# Patient Record
Sex: MALE | Race: WHITE | Employment: OTHER | URBAN - METROPOLITAN AREA
[De-identification: names, ages, dates, MRNs, and addresses within clinical notes are randomized per-mention and may not be internally consistent; named-entity substitution may affect disease eponyms.]

---

## 2017-06-28 DIAGNOSIS — I70.90 ASVD (ARTERIOSCLEROTIC VASCULAR DISEASE): ICD-10-CM

## 2017-06-28 PROBLEM — R73.02 GLUCOSE INTOLERANCE (IMPAIRED GLUCOSE TOLERANCE): Status: ACTIVE | Noted: 2017-06-28

## 2017-06-28 PROBLEM — J30.9 ALLERGIC RHINITIS: Status: ACTIVE | Noted: 2017-06-28

## 2017-06-28 PROBLEM — K57.90 DIVERTICULOSIS: Status: ACTIVE | Noted: 2017-06-28

## 2017-06-28 PROBLEM — K21.9 GERD (GASTROESOPHAGEAL REFLUX DISEASE): Status: ACTIVE | Noted: 2017-06-28

## 2017-06-28 PROBLEM — I10 HTN (HYPERTENSION): Status: ACTIVE | Noted: 2017-06-28

## 2017-06-28 PROBLEM — M19.90 DJD (DEGENERATIVE JOINT DISEASE): Status: ACTIVE | Noted: 2017-06-28

## 2017-06-28 PROBLEM — M54.50 LOW BACK PAIN: Status: ACTIVE | Noted: 2017-06-28

## 2017-06-28 PROBLEM — Z79.899 ON STATIN THERAPY: Status: ACTIVE | Noted: 2017-06-28

## 2017-06-28 PROBLEM — I73.9 PVD (PERIPHERAL VASCULAR DISEASE) (HCC): Status: ACTIVE | Noted: 2017-06-28

## 2017-06-28 PROBLEM — R33.9 URINARY RETENTION: Status: ACTIVE | Noted: 2017-06-28

## 2017-06-28 PROBLEM — G47.00 INSOMNIA: Status: ACTIVE | Noted: 2017-06-28

## 2017-06-28 PROBLEM — N52.9 ED (ERECTILE DYSFUNCTION): Status: ACTIVE | Noted: 2017-06-28

## 2017-06-28 PROBLEM — E78.5 HYPERLIPIDEMIA: Status: ACTIVE | Noted: 2017-06-28

## 2017-06-28 RX ORDER — OMEPRAZOLE 20 MG/1
20 CAPSULE, DELAYED RELEASE ORAL DAILY
COMMUNITY
End: 2022-01-01

## 2017-06-28 RX ORDER — ATENOLOL 25 MG/1
25 TABLET ORAL DAILY
COMMUNITY
End: 2018-04-15

## 2017-06-28 RX ORDER — PRAVASTATIN SODIUM 20 MG/1
20 TABLET ORAL DAILY
COMMUNITY
End: 2018-05-02 | Stop reason: SINTOL

## 2017-06-28 RX ORDER — ASPIRIN 81 MG/1
81 TABLET ORAL DAILY
COMMUNITY
End: 2022-01-01

## 2017-06-28 RX ORDER — ETODOLAC 400 MG/1
400 TABLET, FILM COATED ORAL 2 TIMES DAILY
COMMUNITY
End: 2018-04-15

## 2017-06-28 RX ORDER — AMLODIPINE BESYLATE 5 MG/1
10 TABLET ORAL DAILY
COMMUNITY
End: 2020-03-19 | Stop reason: ALTCHOICE

## 2017-07-13 ENCOUNTER — OFFICE VISIT (OUTPATIENT)
Dept: INTERNAL MEDICINE CLINIC | Age: 72
End: 2017-07-13

## 2017-07-13 VITALS
HEART RATE: 48 BPM | RESPIRATION RATE: 16 BRPM | BODY MASS INDEX: 26.31 KG/M2 | DIASTOLIC BLOOD PRESSURE: 78 MMHG | WEIGHT: 183.8 LBS | HEIGHT: 70 IN | SYSTOLIC BLOOD PRESSURE: 130 MMHG

## 2017-07-13 DIAGNOSIS — E78.2 MIXED HYPERLIPIDEMIA: ICD-10-CM

## 2017-07-13 DIAGNOSIS — K21.9 GASTROESOPHAGEAL REFLUX DISEASE WITHOUT ESOPHAGITIS: ICD-10-CM

## 2017-07-13 DIAGNOSIS — J30.89 NON-SEASONAL ALLERGIC RHINITIS, UNSPECIFIED ALLERGIC RHINITIS TRIGGER: Primary | ICD-10-CM

## 2017-07-13 DIAGNOSIS — I73.9 PVD (PERIPHERAL VASCULAR DISEASE) (HCC): ICD-10-CM

## 2017-07-13 DIAGNOSIS — M19.90 OSTEOARTHRITIS, UNSPECIFIED OSTEOARTHRITIS TYPE, UNSPECIFIED SITE: ICD-10-CM

## 2017-07-13 DIAGNOSIS — R73.02 GLUCOSE INTOLERANCE (IMPAIRED GLUCOSE TOLERANCE): ICD-10-CM

## 2017-07-13 DIAGNOSIS — I10 ESSENTIAL HYPERTENSION: ICD-10-CM

## 2017-07-13 NOTE — PROGRESS NOTES
Chief Complaint   Patient presents with    Physical     3 month follow up. SUBJECTIVE:    Katarina Jimenez is a 70 y.o. male he presents in follow-up of his hypertension glucose intolerance hyperlipidemia GERD DJD allergic rhinitis peripheral vascular disease. He seems to be doing well currently denies any chest pain shortness of breath or any cardio respiratory complaints. He does note a little bit of a dry mouth which is concerned as to why that he is he notes a worse at nighttime when he wakes up. He does not note that he mouth breathes but he really does not know while he is asleep. He has taken all his medications and trying to follow his diet. He denies any other complaints on complete review of systems. Current Outpatient Prescriptions   Medication Sig Dispense Refill    pravastatin (PRAVACHOL) 20 mg tablet Take 20 mg by mouth daily. Indications: Pravastatin Sodium 20 MG, 1 (one) Tablet daily      atenolol (TENORMIN) 25 mg tablet Take 25 mg by mouth daily.  omeprazole (PRILOSEC) 20 mg capsule Take 20 mg by mouth daily. Indications: 1 (one) Capsule DR daily      etodolac (LODINE) 400 mg tablet Take 400 mg by mouth two (2) times a day. Indications: 1 (one) Tablet two times daily      amLODIPine (NORVASC) 5 mg tablet Take 5 mg by mouth daily. Indications: AmLODIPine Besylate 5 MG, 1 (one) Tablet daily      aspirin delayed-release 81 mg tablet Take 81 mg by mouth daily.        Past Medical History:   Diagnosis Date    Allergic rhinitis 6/28/2017    ASVD (arteriosclerotic vascular disease) 6/28/2017    Story: carotid stenosis followed by Vasc Surg    Diverticulosis 6/28/2017    Comments: on colonoscopy 12/01    DJD (degenerative joint disease) 6/28/2017    ED (erectile dysfunction) 6/28/2017    GERD (gastroesophageal reflux disease) 6/28/2017    Glucose intolerance (impaired glucose tolerance) 6/28/2017    HTN (hypertension) 6/28/2017    Hyperlipidemia 6/28/2017    Insomnia 6/28/2017    Low back pain 6/28/2017    On statin therapy 6/28/2017    PVD (peripheral vascular disease) ([facility] [address] 6/28/2017    Urinary retention 6/28/2017     Past Surgical History:   Procedure Laterality Date    HX APPENDECTOMY      HX CHOLECYSTECTOMY  1997    HX HEENT  1950    Tonsilectomy     Allergies   Allergen Reactions    Corticosteroids (Glucocorticoids) Other (comments)     LOSS OF CONSCIOUSNESS       REVIEW OF SYSTEMS:  General: negative for - chills or fever  ENT: negative for - headaches, nasal congestion or tinnitus  Respiratory: negative for - cough, hemoptysis, shortness of breath or wheezing  Cardiovascular : negative for - chest pain, edema, palpitations or shortness of breath  Gastrointestinal: negative for - abdominal pain, blood in stools, heartburn or nausea/vomiting  Genito-Urinary: no dysuria, trouble voiding, or hematuria  Musculoskeletal: negative for - gait disturbance, joint pain, joint stiffness or joint swelling  Neurological: no TIA or stroke symptoms  Hematologic: no bruises, no bleeding, no swollen glands  Integument: no lumps, mole changes, nail changes or rash  Endocrine:no malaise/lethargy or unexpected weight changes      Social History     Social History    Marital status:      Spouse name: N/A    Number of children: N/A    Years of education: N/A     Social History Main Topics    Smoking status: Former Smoker    Smokeless tobacco: Former User    Alcohol use No    Drug use: None    Sexual activity: Not Asked     Other Topics Concern    None     Social History Narrative     No family history on file. OBJECTIVE:     Visit Vitals    /78 (BP 1 Location: Right arm, BP Patient Position: Sitting)    Pulse (!) 48    Resp 16    Ht 5' 10\" (1.778 m)    Wt 183 lb 12.8 oz (83.4 kg)    BMI 26.37 kg/m2     CONSTITUTIONAL: well , well nourished, appears age appropriate  EYES: perrla, eom intact  ENMT:moist mucous membranes, pharynx clear  NECK: supple.  Thyroid normal  RESPIRATORY: Chest: clear to ascultation and percussion   CARDIOVASCULAR: Heart: regular rate and rhythm  GASTROINTESTINAL: Abdomen: soft, bowel sounds active  HEMATOLOGIC: no pathological lymph nodes palpated  MUSCULOSKELETAL: Extremities: no edema, pulse 1+   INTEGUMENT: No unusual rashes or suspicious skin lesions noted. Nails appear normal.  NEUROLOGIC: non-focal exam   MENTAL STATUS: alert and oriented, appropriate affect     ASSESSMENT:   1. Non-seasonal allergic rhinitis, unspecified allergic rhinitis trigger    2. Essential hypertension    3. Mixed hyperlipidemia    4. Glucose intolerance (impaired glucose tolerance)    5. Gastroesophageal reflux disease without esophagitis    6. Osteoarthritis, unspecified osteoarthritis type, unspecified site    7. PVD (peripheral vascular disease) ([facility] [address]      Impression  Hypertension seems to be well controlled we will continue his current medication reviewed with him  Glucose intolerance I reviewed his last A1c with him we will see what the status is today  Hyperlipidemia reviewed his last numbers with him and we will see what they are if we need to make any changes I will notify him  Peripheral vascular disease that seems to be stable he does not have any significant claudication symptoms  Allergic rhinitis seems to be stable at one of his dry mouth could be related to this  Dry mouth we will check a T4 TSH I suspect this is mouth breathing at nighttime  We will recheck him again in follow-up in 3 months sooner should there be a problem    PLAN:  .  Orders Placed This Encounter    AMB POC CK (CPK)    AMB POC HEMOGLOBIN A1C    AMB POC LIPID PROFILE    AMB POC HEPATIC FUNCTION PANEL    AMB POC GLUCOSE, QUANTITATIVE, BLOOD    AMB POC T4, FREE         ATTENTION:   This medical record was transcribed using an electronic medical records system. Although proofread, it may and can contain electronic and spelling errors.   Other human spelling and other errors may be present. Corrections may be executed at a later time. Please feel free to contact us for any clarifications as needed.       Follow-up Disposition: Not on File      Kimberley Holt MD

## 2017-07-13 NOTE — PROGRESS NOTES
1. Have you been to the ER, urgent care clinic since your last visit? Hospitalized since your last visit? No    2. Have you seen or consulted any other health care providers outside of the [address] [place] [address] since your last visit? Include any pap smears or colon screening. Yes. Saw Dr. Marisela Chapin, scheduled for cataract surgery.

## 2017-07-14 LAB
ALBUMIN SERPL-MCNC: 3.9 G/DL (ref 3.9–5.4)
ALKALINE PHOS POC: 116 U/L (ref 38–126)
ALT SERPL-CCNC: 53 U/L (ref 9–52)
AST SERPL-CCNC: 53 U/L (ref 14–36)
BILIRUBIN, CONJUGATED POC: 0 (ref 0–0.3)
BILIRUBIN, UNCONJUGATED POC: 0.3 (ref 0–1.1)
CHOLEST SERPL-MCNC: 170 MG/DL (ref 0–200)
CK (CPK) POC: 60 U/L (ref 30–135)
GLUCOSE POC: 102 MG/DL (ref 75–110)
HBA1C MFR BLD HPLC: 5.8 % (ref 4–5.7)
HDLC SERPL-MCNC: 50 MG/DL (ref 35–130)
LDL CHOLESTEROL POC: 97 MG/DL (ref 0–130)
PROT SERPL-MCNC: 6.9 G/DL (ref 6.3–8.2)
T4 FREE SERPL-MCNC: 1.02 NG/DL (ref 0.58–2.3)
TCHOL/HDL RATIO (POC): 3.4 (ref 0–4)
TOTAL BILIRUBIN POC: 0.9 MG/DL (ref 0.2–1.3)
TRIGL SERPL-MCNC: 115 MG/DL (ref 0–200)
TSH BLD-ACNC: 0.7 UIU/ML (ref 0.34–5.6)
VLDLC SERPL CALC-MCNC: 23 MG/DL

## 2017-08-01 ENCOUNTER — OFFICE VISIT (OUTPATIENT)
Dept: INTERNAL MEDICINE CLINIC | Age: 72
End: 2017-08-01

## 2017-08-01 VITALS
HEIGHT: 70 IN | SYSTOLIC BLOOD PRESSURE: 130 MMHG | BODY MASS INDEX: 26.2 KG/M2 | WEIGHT: 183 LBS | TEMPERATURE: 97.5 F | OXYGEN SATURATION: 99 % | DIASTOLIC BLOOD PRESSURE: 78 MMHG | RESPIRATION RATE: 18 BRPM | HEART RATE: 56 BPM

## 2017-08-01 DIAGNOSIS — Z01.810 PRE-OPERATIVE CARDIOVASCULAR EXAMINATION: Primary | ICD-10-CM

## 2017-08-01 DIAGNOSIS — H25.011 CORTICAL AGE-RELATED CATARACT OF RIGHT EYE: ICD-10-CM

## 2017-08-01 DIAGNOSIS — R73.02 GLUCOSE INTOLERANCE (IMPAIRED GLUCOSE TOLERANCE): ICD-10-CM

## 2017-08-01 DIAGNOSIS — I10 ESSENTIAL HYPERTENSION: ICD-10-CM

## 2017-08-01 PROBLEM — H25.9 AGE-RELATED CATARACT: Status: ACTIVE | Noted: 2017-08-01

## 2017-08-01 NOTE — PROGRESS NOTES
HPI:   27-year-old white male who presents today for preoperative medical consultation and clearance for planned right cataract surgery to be done on 8/14/2017 by Dr. Chaitanya Stein at [facility]. He is regularly followed by me for hypertension, glucose intolerance, hyperlipidemia, GERD, DJD, allergic rhinitis, atherosclerotic vascular disease, and BPH. He currently denies any chest pain shortness of breath cardiovascular complaints. There are no headaches or neurologic complaints. He denies any GI/ complaint. There are no other complaints on complete review of systems except decreased vision particularly in the right eye in the evening.     Patient Active Problem List    Diagnosis    Pre-operative cardiovascular examination    Age-related cataract    Allergic rhinitis    Diverticulosis     Comments: on colonoscopy 12/01      Urinary retention    PVD (peripheral vascular disease) ([address]    On statin therapy    Low back pain    Insomnia    HTN (hypertension)    Hyperlipidemia    Glucose intolerance (impaired glucose tolerance)    GERD (gastroesophageal reflux disease)    ED (erectile dysfunction)    DJD (degenerative joint disease)    ASVD (arteriosclerotic vascular disease)     Story: carotid stenosis followed by Vasc Surg         Past Medical History:   Diagnosis Date    Allergic rhinitis 6/28/2017    ASVD (arteriosclerotic vascular disease) 6/28/2017    Story: carotid stenosis followed by Vasc Surg    Diverticulosis 6/28/2017    Comments: on colonoscopy 12/01    DJD (degenerative joint disease) 6/28/2017    ED (erectile dysfunction) 6/28/2017    GERD (gastroesophageal reflux disease) 6/28/2017    Glucose intolerance (impaired glucose tolerance) 6/28/2017    HTN (hypertension) 6/28/2017    Hyperlipidemia 6/28/2017    Insomnia 6/28/2017    Low back pain 6/28/2017    On statin therapy 6/28/2017    PVD (peripheral vascular disease) ([address] 6/28/2017    Urinary retention 6/28/2017       Social History   Substance Use Topics    Smoking status: Former Smoker     Quit date: 8/1/1975    Smokeless tobacco: Former User     Quit date: 8/1/1992    Alcohol use Yes      Comment: social       Family History   Problem Relation Age of Onset    Diabetes Mother     Diabetes Father     Heart Disease Brother     Heart Disease Brother        Outpatient Prescriptions Marked as Taking for the 8/1/17 encounter (Office Visit) with Roxanne Dick MD   Medication Sig Dispense Refill    pravastatin (PRAVACHOL) 20 mg tablet Take 20 mg by mouth daily. Indications: Pravastatin Sodium 20 MG, 1 (one) Tablet daily      atenolol (TENORMIN) 25 mg tablet Take 25 mg by mouth daily.  omeprazole (PRILOSEC) 20 mg capsule Take 20 mg by mouth daily. Indications: 1 (one) Capsule DR daily      etodolac (LODINE) 400 mg tablet Take 400 mg by mouth two (2) times a day. Indications: 1 (one) Tablet two times daily      amLODIPine (NORVASC) 5 mg tablet Take 5 mg by mouth daily. Indications: AmLODIPine Besylate 5 MG, 1 (one) Tablet daily      aspirin delayed-release 81 mg tablet Take 81 mg by mouth daily. Allergies   Allergen Reactions    Corticosteroids (Glucocorticoids) Other (comments)     LOSS OF CONSCIOUSNESS       ROS:     Constitutional: He denies fevers, weight loss, sweats. Eyes: No blurred or double vision. Decreased visual acuity is noted in the evening  ENT: No difficulty with swallowing, taste, speech or smell. Neck: no stiffness or swelling  Respiratory: No cough wheezing or shortness of breath. Cardiovascular: Denies chest pain, palpitations, unexplained indigestion or syncope. Gastrointestinal:  No changes in bowel movements, no abdominal pain, no bloating. Genitourinary:  He denies frequency, nocturia or stranguria. Extremities: No joint pain, stiffness or swelling. Neurological:  No numbness, tingling, burring paresthesias or loss of motor strength.   No syncope, dizziness or frequent headache  Lymphatic: no adenopathy noted  Hematologic: no easy bruising or bleeding gums  Skin:  No recent rashes or mole changes. Psychiatric/Behavioral:  Negative for depression. The patient is not nervous/anxious. PE:     Vitals:    08/01/17 1435   BP: 130/78   Pulse: (!) 56   Resp: 18   Temp: 97.5 °F (36.4 °C)   TempSrc: Oral   SpO2: 99%   Weight: 183 lb (83 kg)   Height: 5' 10\" (1.778 m)   PainSc:   0 - No pain        General appearance - alert, well appearing, and in no distress  Mental status - alert, oriented to person, place, and time  HEENT:  Ears - bilateral TM's and external ear canals clear  Eyes - pupillary responses were normal.  Extraocular muscle function intact. Lids and conjunctiva not injected. Fundoscopic exam revealed sharp disc margins. eye movements intact  Pharynx- clear with teeth in good repair. No masses were noted  Neck - supple without thyromegaly or burit. No JVD noted  Lungs - clear to auscultation and percussion  Cardiac- normal rate, regular rhythm without murmurs. PMI not displaced. No gallop, rub or click  Abdomen - flat, soft, non-tender without palpable organomegaly or mass. No pulsatile mass was felt, and not bruit was heard. Bowel sounds were active  Extremities -  no clubbing cyanosis or edema  Lymphatics - no palpable lymphadenopathy, no hepatosplenomegaly  Hematologic: no petechiae or purpura  Peripheral vascular -Femoral, Dorsalis pedis and posterior tibial pulses felt without difficulty  Skin - no rash or unusual mole change noted  Neurological - Cranial nerves II-XII grossly intact. Motor strength 5/5. DTR's 2+ and symmetric. Station and gait normal  Back exam - full range of motion, no tenderness, palpable spasm or pain on motion  Musculoskeletal - no joint tenderness, deformity or swelling      Assessment/Plan:     ASSESSMENT:   1. Pre-operative cardiovascular examination    2. Cortical age-related cataract of right eye    3. Essential hypertension    4. Glucose intolerance (impaired glucose tolerance)      Impression  He is medically clear for the planned surgery. EKG done today normal sinus bradycardia at 54 otherwise within normal limits. Copy this to Dr. Shalini Zepeda. PLAN:  .  Orders Placed This Encounter    AMB POC EKG ROUTINE W/ 12 LEADS, INTER & REP         ATTENTION:   This medical record was transcribed using an electronic medical records system. Although proofread, it may and can contain electronic and spelling errors. Other human spelling and other errors may be present. Corrections may be executed at a later time. Please feel free to contact us for any clarifications as needed. Follow-up Disposition: Not on File      Susan Delong MD    Health Maintenance reviewed - updated. Orders Placed This Encounter    AMB POC EKG ROUTINE W/ 12 LEADS, INTER & REP     Order Specific Question:   Reason for Exam:     Answer:   htn       There are no discontinued medications. Current Outpatient Prescriptions   Medication Sig Dispense Refill    pravastatin (PRAVACHOL) 20 mg tablet Take 20 mg by mouth daily. Indications: Pravastatin Sodium 20 MG, 1 (one) Tablet daily      atenolol (TENORMIN) 25 mg tablet Take 25 mg by mouth daily.  omeprazole (PRILOSEC) 20 mg capsule Take 20 mg by mouth daily. Indications: 1 (one) Capsule DR daily      etodolac (LODINE) 400 mg tablet Take 400 mg by mouth two (2) times a day. Indications: 1 (one) Tablet two times daily      amLODIPine (NORVASC) 5 mg tablet Take 5 mg by mouth daily. Indications: AmLODIPine Besylate 5 MG, 1 (one) Tablet daily      aspirin delayed-release 81 mg tablet Take 81 mg by mouth daily. EKG - sinus bradycardia 54 within normal limits except rate    Recommended healthy diet low in carbohydrates, fats, sodium and cholesterol. Recommended regular cardiovascular exercise 3-6 times per week for 30-60 minutes daily. Verbal and written instructions (see AVS) provided. Patient expresses understanding of diagnosis and treatment plan.

## 2017-08-01 NOTE — PROGRESS NOTES
Chief Complaint   Patient presents with    Pre-op Exam     cataract surgery 8/14       1. Have you been to the ER, urgent care clinic since your last visit? Hospitalized since your last visit? No    2. Have you seen or consulted any other health care providers outside of the [address] [place] [address] since your last visit? Include any pap smears or colon screening.  No

## 2017-08-01 NOTE — MR AVS SNAPSHOT
Visit Information Date & Time Provider Department Dept. Phone Encounter #  
 8/1/2017  1:40 PM Cathy Rutherford MD [facility] [phone] [id number] Your Appointments 10/16/2017  8:20 AM  
FOLLOW UP 10 with MD KAYLA Nunes ANDREI ANDERSON [facility] ([address]) Appt Note: 3 month flp - HTN, Glucose Intolerance, Hyperlipidemia [address] [phone] [address] [address] Upcoming Health Maintenance Date Due Hepatitis C Screening 1945 DTaP/Tdap/Td series (1 - Tdap) 9/3/1966 FOBT Q 1 YEAR AGE 50-75 9/3/1995 ZOSTER VACCINE AGE 60> 7/3/2005 GLAUCOMA SCREENING Q2Y 9/3/2010 Pneumococcal 65+ Low/Medium Risk (1 of 2 - PCV13) 9/3/2010 MEDICARE YEARLY EXAM 9/3/2010 INFLUENZA AGE 9 TO ADULT 8/1/2017 Allergies as of 8/1/2017  Review Complete On: 8/1/2017 By: Bud Willis RN Severity Noted Reaction Type Reactions Corticosteroids (Glucocorticoids)  06/28/2017    Other (comments) LOSS OF CONSCIOUSNESS Current Immunizations  Never Reviewed Name Date Pneumococcal Conjugate (PCV-13) 8/3/2015 Pneumococcal Polysaccharide (PPSV-23) 9/27/2010 Not reviewed this visit Vitals BP Pulse Temp Resp Height(growth percentile) Weight(growth percentile) 130/78 (BP 1 Location: Left arm, BP Patient Position: Sitting) (!) 56 97.5 °F (36.4 °C) (Oral) 18 5' 10\" (1.778 m) 183 lb (83 kg) SpO2 BMI Smoking Status 99% 26.26 kg/m2 Former Smoker BMI and BSA Data Body Mass Index Body Surface Area  
 26.26 kg/m 2 2.02 m 2 Preferred Pharmacy Pharmacy Name Phone [facility] [phone], [address] AT [place] [address] [phone] Your Updated Medication List  
  
   
 This list is accurate as of: 8/1/17  3:19 PM.  Always use your most recent med list. amLODIPine 5 mg tablet Commonly known as:  Job Dub Take 5 mg by mouth daily. Indications: AmLODIPine Besylate 5 MG, 1 (one) Tablet daily  
  
 aspirin delayed-release 81 mg tablet Take 81 mg by mouth daily. atenolol 25 mg tablet Commonly known as:  TENORMIN Take 25 mg by mouth daily. etodolac 400 mg tablet Commonly known as:  LODINE Take 400 mg by mouth two (2) times a day. Indications: 1 (one) Tablet two times daily  
  
 omeprazole 20 mg capsule Commonly known as:  PRILOSEC Take 20 mg by mouth daily. Indications: 1 (one) Capsule DR daily  
  
 pravastatin 20 mg tablet Commonly known as:  PRAVACHOL Take 20 mg by mouth daily. Indications: Pravastatin Sodium 20 MG, 1 (one) Tablet daily Introducing [facility] SERVICES! Jana Hill introduces [employer] patient portal. Now you can access parts of your medical record, email your doctor's office, and request medication refills online. 1. In your internet browser, go to [email or web address] 2. Click on the First Time User? Click Here link in the Sign In box. You will see the New Member Sign Up page. 3. Enter your [employer] Access Code exactly as it appears below. You will not need to use this code after youve completed the sign-up process. If you do not sign up before the expiration date, you must request a new code. · [employer] Access Code: [email or web address] Expires: 10/11/2017  8:51 AM 
 
4. Enter the last four digits of your Social Security Number (xxxx) and Date of Birth (mm/dd/yyyy) as indicated and click Submit. You will be taken to the next sign-up page. 5. Create a [employer] ID. This will be your [employer] login ID and cannot be changed, so think of one that is secure and easy to remember. 6. Create a [employer] password. You can change your password at any time. 7. Enter your Password Reset Question and Answer. This can be used at a later time if you forget your password. 8. Enter your e-mail address. You will receive e-mail notification when new information is available in [address] 9. Click Sign Up. You can now view and download portions of your medical record. 10. Click the Download Summary menu link to download a portable copy of your medical information. If you have questions, please visit the Frequently Asked Questions section of the [employer] website. Remember, [employer] is NOT to be used for urgent needs. For medical emergencies, dial 911. Now available from your iPhone and Android! Please provide this summary of care documentation to your next provider. Your primary care clinician is listed as Bobby. If you have any questions after today's visit, please call [phone].

## 2017-08-02 ENCOUNTER — TELEPHONE (OUTPATIENT)
Dept: INTERNAL MEDICINE CLINIC | Age: 72
End: 2017-08-02

## 2017-08-02 NOTE — TELEPHONE ENCOUNTER
Follow up phone regarding elevated LFTs. Discussed with  and advised pt this would be followed at next visit in 3 months.

## 2017-08-04 RX ORDER — CHOLECALCIFEROL (VITAMIN D3) 125 MCG
440 CAPSULE ORAL
COMMUNITY
End: 2021-06-03

## 2017-08-04 NOTE — PERIOP NOTES
[facility]  Ambulatory Surgery Unit  Pre-operative Instructions    Surgery/Procedure Date  8/14/17            Tentative Arrival Time 7:00am      1. On the day of your surgery/procedure, please report to the Ambulatory Surgery Unit Registration Desk and sign in at your designated time. The Ambulatory Surgery Unit is located in [facility] on the [facility] side of the [facility] across from the [address] [place] [address]. Please have all of your health insurance cards and a photo ID. 2. You must have someone with you to drive you home, as you should not drive a car for 24 hours following anesthesia. Please make arrangements for a responsible adult friend or family member to stay with you for at least the first 24 hours after your surgery. 3. Do not have anything to eat or drink (including water, gum, mints, coffee, juice) after midnight   8/13/17. This may not apply to medications prescribed by your physician. (Please note below the special instructions with medications to take the morning of surgery, if applicable.)    4. We recommend you do not drink any alcoholic beverages for 24 hours before and after your surgery. 5. Stop all Aspirin, non-steroidal anti-inflammatory drugs (i.e. Advil, Aleve), vitamins, and supplements as directed by your surgeon's office. **If you are currently taking Plavix, Coumadin, or other blood-thinning agents, contact your surgeon for instructions. **    6. In an effort to help prevent surgical site infection, we ask that you shower with an anti-bacterial soap (i.e. Dial or Safeguard)  on the morning of surgery,  Do not apply any lotions, powders, or deodorants after the shower on the day of your procedure. If applicable, please do not shave the operative site for 48 hours prior to surgery. 7. Wear comfortable clothes. Wear glasses instead of contacts. Do not bring any jewelry or money (other than copays or fees as instructed).  Do not wear make-up, particularly baileyara, the morning of your surgery. Do not wear nail polish, particularly if you are having foot /hand surgery. Wear your hair loose or down, no ponytails, buns, silvio pins or clips. All body piercings must be removed. 8. You should understand that if you do not follow these instructions your surgery may be cancelled. If your physical condition changes (i.e. fever, cold or flu) please contact your surgeon as soon as possible. 9. It is important that you be on time. If a situation occurs where you may be late, or if you have any questions or problems, please call [phone].    10. Your surgery time may be subject to change. You will receive a phone call the day prior to surgery to confirm your arrival time. 11. Pediatric patients: please bring a change of clothes, diapers, bottle/sippy cup, pacifier, etc.      Special Instructions: Take all medications and inhalers, as prescribed, on the morning of surgery with a sip of water EXCEPT: Aspirin      I understand a pre-operative phone call will be made to verify my surgery time. In the event that I am not available, I give permission for a message to be left on my answering service and/or with another person? yes         ___________________      ___________________      ________________reviewed with patient during phone assessment: he acknowledged understanding of them.   (Signature of Patient)          (Witness)                   (Date and Time)

## 2017-08-11 ENCOUNTER — ANESTHESIA EVENT (OUTPATIENT)
Dept: SURGERY | Age: 72
End: 2017-08-11
Payer: MEDICARE

## 2017-08-11 RX ORDER — ONDANSETRON 2 MG/ML
4 INJECTION INTRAMUSCULAR; INTRAVENOUS AS NEEDED
Status: CANCELLED | OUTPATIENT
Start: 2017-08-11

## 2017-08-11 RX ORDER — SODIUM CHLORIDE, SODIUM LACTATE, POTASSIUM CHLORIDE, CALCIUM CHLORIDE 600; 310; 30; 20 MG/100ML; MG/100ML; MG/100ML; MG/100ML
25 INJECTION, SOLUTION INTRAVENOUS CONTINUOUS
Status: CANCELLED | OUTPATIENT
Start: 2017-08-11

## 2017-08-11 RX ORDER — DIPHENHYDRAMINE HYDROCHLORIDE 50 MG/ML
12.5 INJECTION, SOLUTION INTRAMUSCULAR; INTRAVENOUS AS NEEDED
Status: CANCELLED | OUTPATIENT
Start: 2017-08-11 | End: 2017-08-11

## 2017-08-11 RX ORDER — SODIUM CHLORIDE 0.9 % (FLUSH) 0.9 %
5-10 SYRINGE (ML) INJECTION AS NEEDED
Status: CANCELLED | OUTPATIENT
Start: 2017-08-11

## 2017-08-11 RX ORDER — FENTANYL CITRATE 50 UG/ML
25 INJECTION, SOLUTION INTRAMUSCULAR; INTRAVENOUS
Status: CANCELLED | OUTPATIENT
Start: 2017-08-11

## 2017-08-14 ENCOUNTER — ANESTHESIA (OUTPATIENT)
Dept: SURGERY | Age: 72
End: 2017-08-14
Payer: MEDICARE

## 2017-08-14 ENCOUNTER — HOSPITAL ENCOUNTER (OUTPATIENT)
Age: 72
Setting detail: OUTPATIENT SURGERY
Discharge: HOME | End: 2017-08-14
Attending: OPHTHALMOLOGY | Admitting: OPHTHALMOLOGY
Payer: MEDICARE

## 2017-08-14 VITALS
OXYGEN SATURATION: 100 % | BODY MASS INDEX: 25.93 KG/M2 | TEMPERATURE: 97.8 F | HEART RATE: 42 BPM | WEIGHT: 181.13 LBS | DIASTOLIC BLOOD PRESSURE: 74 MMHG | HEIGHT: 70 IN | SYSTOLIC BLOOD PRESSURE: 158 MMHG | RESPIRATION RATE: 16 BRPM

## 2017-08-14 PROCEDURE — 74011000250 HC RX REV CODE- 250

## 2017-08-14 PROCEDURE — 74011000250 HC RX REV CODE- 250: Performed by: OPHTHALMOLOGY

## 2017-08-14 PROCEDURE — 76060000073 HC AMB SURG ANES FIRST 0.5 HR: Performed by: OPHTHALMOLOGY

## 2017-08-14 PROCEDURE — 76030000002 HC AMB SURG OR TIME FIRST 0.: Performed by: OPHTHALMOLOGY

## 2017-08-14 PROCEDURE — V2632 POST CHMBR INTRAOCULAR LENS: HCPCS | Performed by: OPHTHALMOLOGY

## 2017-08-14 PROCEDURE — 77030018846 HC SOL IRR STRL H20 ICUM -A: Performed by: OPHTHALMOLOGY

## 2017-08-14 PROCEDURE — 74011250636 HC RX REV CODE- 250/636: Performed by: OPHTHALMOLOGY

## 2017-08-14 PROCEDURE — 74011250636 HC RX REV CODE- 250/636

## 2017-08-14 PROCEDURE — 76210000046 HC AMBSU PH II REC FIRST 0.5 HR: Performed by: OPHTHALMOLOGY

## 2017-08-14 DEVICE — LENS IOL POST 1-PC 6X13 19.5 -- ACRYSOF: Type: IMPLANTABLE DEVICE | Site: EYE | Status: FUNCTIONAL

## 2017-08-14 RX ORDER — OFLOXACIN 3 MG/ML
1 SOLUTION/ DROPS OPHTHALMIC
Status: COMPLETED | OUTPATIENT
Start: 2017-08-14 | End: 2017-08-14

## 2017-08-14 RX ORDER — SODIUM CHLORIDE 0.9 % (FLUSH) 0.9 %
5-10 SYRINGE (ML) INJECTION EVERY 8 HOURS
Status: DISCONTINUED | OUTPATIENT
Start: 2017-08-14 | End: 2017-08-14 | Stop reason: HOSPADM

## 2017-08-14 RX ORDER — TROPICAMIDE 10 MG/ML
1 SOLUTION/ DROPS OPHTHALMIC
Status: COMPLETED | OUTPATIENT
Start: 2017-08-14 | End: 2017-08-14

## 2017-08-14 RX ORDER — SODIUM CHLORIDE 0.9 % (FLUSH) 0.9 %
5-10 SYRINGE (ML) INJECTION AS NEEDED
Status: DISCONTINUED | OUTPATIENT
Start: 2017-08-14 | End: 2017-08-14 | Stop reason: HOSPADM

## 2017-08-14 RX ORDER — TIMOLOL MALEATE 5 MG/ML
SOLUTION/ DROPS OPHTHALMIC AS NEEDED
Status: DISCONTINUED | OUTPATIENT
Start: 2017-08-14 | End: 2017-08-14 | Stop reason: HOSPADM

## 2017-08-14 RX ORDER — TETRACAINE HYDROCHLORIDE 5 MG/ML
SOLUTION OPHTHALMIC AS NEEDED
Status: DISCONTINUED | OUTPATIENT
Start: 2017-08-14 | End: 2017-08-14 | Stop reason: HOSPADM

## 2017-08-14 RX ORDER — MIDAZOLAM HYDROCHLORIDE 1 MG/ML
INJECTION, SOLUTION INTRAMUSCULAR; INTRAVENOUS AS NEEDED
Status: DISCONTINUED | OUTPATIENT
Start: 2017-08-14 | End: 2017-08-14 | Stop reason: HOSPADM

## 2017-08-14 RX ORDER — TROPICAMIDE 10 MG/ML
SOLUTION/ DROPS OPHTHALMIC
Status: COMPLETED
Start: 2017-08-14 | End: 2017-08-14

## 2017-08-14 RX ORDER — SODIUM CHLORIDE 9 MG/ML
25 INJECTION, SOLUTION INTRAVENOUS CONTINUOUS
Status: DISCONTINUED | OUTPATIENT
Start: 2017-08-14 | End: 2017-08-14 | Stop reason: HOSPADM

## 2017-08-14 RX ORDER — SODIUM CHLORIDE, SODIUM LACTATE, POTASSIUM CHLORIDE, CALCIUM CHLORIDE 600; 310; 30; 20 MG/100ML; MG/100ML; MG/100ML; MG/100ML
25 INJECTION, SOLUTION INTRAVENOUS CONTINUOUS
Status: DISCONTINUED | OUTPATIENT
Start: 2017-08-14 | End: 2017-08-14 | Stop reason: HOSPADM

## 2017-08-14 RX ORDER — NEOMYCIN SULFATE, POLYMYXIN B SULFATE, AND DEXAMETHASONE 3.5; 10000; 1 MG/G; [USP'U]/G; MG/G
OINTMENT OPHTHALMIC AS NEEDED
Status: DISCONTINUED | OUTPATIENT
Start: 2017-08-14 | End: 2017-08-14 | Stop reason: HOSPADM

## 2017-08-14 RX ORDER — LIDOCAINE HYDROCHLORIDE 10 MG/ML
0.1 INJECTION, SOLUTION EPIDURAL; INFILTRATION; INTRACAUDAL; PERINEURAL AS NEEDED
Status: DISCONTINUED | OUTPATIENT
Start: 2017-08-14 | End: 2017-08-14 | Stop reason: HOSPADM

## 2017-08-14 RX ADMIN — MIDAZOLAM HYDROCHLORIDE 1 MG: 1 INJECTION, SOLUTION INTRAMUSCULAR; INTRAVENOUS at 08:23

## 2017-08-14 RX ADMIN — OFLOXACIN 1 DROP: 3 SOLUTION OPHTHALMIC at 07:29

## 2017-08-14 RX ADMIN — OFLOXACIN 1 DROP: 3 SOLUTION OPHTHALMIC at 07:25

## 2017-08-14 RX ADMIN — TROPICAMIDE 1 DROP: 10 SOLUTION/ DROPS OPHTHALMIC at 07:25

## 2017-08-14 RX ADMIN — TROPICAMIDE 1 DROP: 10 SOLUTION/ DROPS OPHTHALMIC at 07:16

## 2017-08-14 RX ADMIN — OFLOXACIN 1 DROP: 3 SOLUTION OPHTHALMIC at 07:16

## 2017-08-14 RX ADMIN — SODIUM CHLORIDE 25 ML/HR: 900 INJECTION, SOLUTION INTRAVENOUS at 07:25

## 2017-08-14 RX ADMIN — TROPICAMIDE 1 DROP: 10 SOLUTION/ DROPS OPHTHALMIC at 07:29

## 2017-08-14 NOTE — ANESTHESIA POSTPROCEDURE EVALUATION
Post-Anesthesia Evaluation and Assessment    Patient: Patricia Blair MRN: [id number]  SSN: [id number]    YOB: 1945  Age: 70 y.o. Sex: male       Cardiovascular Function/Vital Signs  Visit Vitals    /74 (BP 1 Location: Right arm, BP Patient Position: At rest)    Pulse (!) 42    Temp 36.6 °C (97.8 °F)    Resp 16    Ht 5' 10\" (1.778 m)    Wt 82.2 kg (181 lb 2 oz)    SpO2 100%    BMI 25.99 kg/m2       Patient is status post MAC anesthesia for Procedure(s):  CATARACT EXTRACTION WITH INTRA OCULAR LENS IMPLANT  RIGHT EYE. Nausea/Vomiting: None    Postoperative hydration reviewed and adequate. Pain:  Pain Scale 1: Numeric (0 - 10) (08/14/17 0849)  Pain Intensity 1: 0 (08/14/17 0849)   Managed    Neurological Status:   Neuro (WDL): Within Defined Limits (08/14/17 0708)   At baseline    Mental Status and Level of Consciousness: Arousable    Pulmonary Status:   O2 Device: Room air (08/14/17 0849)   Adequate oxygenation and airway patent    Complications related to anesthesia: None    Post-anesthesia assessment completed.  No concerns    Signed By: Luther Eason MD     August 14, 2017

## 2017-08-14 NOTE — OP NOTES
Preoperative Diagnosis: NUCLEAR SCLEROTIC CATARACT RIGHT EYE  H25.11  Postoperative Diagnosis: NUCLEAR SCLEROTIC CATARACT RIGHT EYE  H25.11  Procedure: Extracapsular cataract extraction with lens implant right eye  Anesthesia: MAC with local  Estimated Blood Loss: None  Complications: None  Specimens: None  Assistants: None    The patient's right eye was dilated with mydriacyl 1% and ofloxacin 0.3% for 3 doses preoperatively. The patient was taken to the operating room and was given sedation. Tetracaine was given topically to the right eye, and the eye was prepped and draped in the usual manner for sterile eye surgery, including Betadine solution being dropped onto the conjunctiva and the beginning of the prep. The eyelashes were isolated with a plastic drape. A lid speculum was placed. A #15 blade was used to make a paracentesis at the 10:30 location. The eye was flushed with a lidocaine / epinephrine mixture (\"Shugarcaine\"). The eye was filled with Healon, and a crescent blade was used to make a 2.5 mm incision at the limbus temporally. This was dissected 2 mm into clear cornea, and the eye was entered with a 2.4 mm keratome. A 0.12 forceps was used for fixation during the procedure. A capsulorhexis flap was started with a cystotome, and this was completed 360 degrees with Utrata forceps. The capsular piece was removed. [place] dissection was performed with the \"Shugarcaine\" mixture on a cannula. The lens nucleus was removed using phacoemulsification with a total phaco time of 0:57.1. The lens was cracked and manipulated with a Sinsky hook. Residual cortex was removed using irrigation / aspiration. The capsule remained intact. The capsule was refilled with Healon, and an Shawn Intraocular lens model [id number] power 19.50 was placed in a lens folding cartridge with Healon. The lens was unfolded into the capsular bag. The lens centered well. Residual Healon was removed using I / A.   No suture was required to close the incision. The eye was flushed with BSS through the paracentesis. The eye was left soft and formed at the end of the case. Betadine solution was placed on the conjunctival surface at the end of the case. The incision site was water tight. The speculum was removed, and a drop of timolol 0.5% and carmen/poly/dex ointment was placed on the eye followed by a shield. The patient tolerated the procedure well and is to follow-up in one day.

## 2017-08-14 NOTE — PERIOP NOTES
Anabelle Brochure  1945  [id number]    Situation:  Verbal report given from: [address]  Procedure: Procedure(s):  CATARACT EXTRACTION WITH INTRA OCULAR LENS IMPLANT  RIGHT EYE    Background:    Preoperative diagnosis: Age-related nuclear cataract of right eye [H25.11]    Postoperative diagnosis: Age-related nuclear cataract of right eye [H25.11]    :  Dr. Erlin Garcia    Assistant(s): Circ-1: Lauren Verduzco RN  Scrub Tech-1: Pio Shah    Specimens: * No specimens in log *    Assessment:  Intra-procedure medications         Anesthesia gave intra-procedure sedation and medications, see anesthesia flow sheet     Intravenous fluids: LR@ KVO     Vital signs stable       Recommendation:    Permission to share finding with family or friend yes

## 2017-08-14 NOTE — ANESTHESIA PREPROCEDURE EVALUATION
Anesthetic History   No history of anesthetic complications            Review of Systems / Medical History  Patient summary reviewed, nursing notes reviewed and pertinent labs reviewed    Pulmonary  Within defined limits                 Neuro/Psych   Within defined limits           Cardiovascular    Hypertension          PAD (carotid stenosis) and hyperlipidemia    Exercise tolerance: >4 METS     GI/Hepatic/Renal     GERD: well controlled           Endo/Other        Arthritis     Other Findings   Comments: Low back pain           Physical Exam    Airway  Mallampati: II  TM Distance: < 4 cm  Neck ROM: normal range of motion   Mouth opening: Normal     Cardiovascular    Rhythm: regular        Pertinent negatives: No murmur  Comments: bradycardia Dental    Dentition: Bridges  Comments: Upper right   Pulmonary  Breath sounds clear to auscultation               Abdominal  GI exam deferred       Other Findings            Anesthetic Plan    ASA: 2  Anesthesia type: MAC          Induction: Intravenous  Anesthetic plan and risks discussed with: Patient      Took BB at 6 am

## 2017-08-14 NOTE — DISCHARGE INSTRUCTIONS
Dimple Shah MD  [place] [address]  [place] [address] [address]  Phone: [phone]       Fax: [phone]  If you are unable to keep appointment, kindly give 24 hours notice please. REMOVE PATCH  START DROPS WHEN YOU GET HOME  PUT PATCH BACK ON AT BEDTIME    1. DO NOT RUB the eye that was operated on. 2. Do not strain excessively. It is all right to bend as long as you do not strain. 3. It is safe to take a shower, wash your face, and wash your hair. Just keep the eye closed. 4. Do not swim for 1 week after surgery. 5. If you have any problems or questions, do not hesitate to call. There is always a physician on call at [phone] ext. [phone].   6. Follow instructions on eye drops from office. If you were given prescriptions, please review the written information on the prescribed medications. DO NOT DRIVE WHILE TAKING NARCOTIC PAIN MEDICATIONS. DISCHARGE SUMMARY from Nurse    The following personal items collected during your admission are returned to you:   Dental Appliance: Dental Appliances: None  Vision: Visual Aid: Glasses  Hearing Aid:    Jewelry: Jewelry: None  Clothing: Clothing: None  Other Valuables: Other Valuables: None  Valuables sent to safe:      PATIENT INSTRUCTIONS:    After general anesthesia or intravenous sedation, for 24 hours or while taking prescription Narcotics:  · Someone should be with you for the next 24 hours. · For your own safety, a responsible adult must drive you home. · Limit your activities  · Recommended activity: Rest today, Do not climb stairs or shower unattended for the next 24 hours. · Do not drive and operate hazardous machinery  · Do not make important personal or business decisions  · Do  not drink alcoholic beverages  · If you have not urinated within 8 hours after discharge, please contact your surgeon on call.     Report the following to your surgeon:  · Excessive pain, swelling, redness or odor of or around the surgical area  · Temperature over 100.5  · Nausea and vomiting lasting longer than 4 hours or if unable to take medications  · Any signs of decreased circulation or nerve impairment to extremity: change in color, persistent  numbness, tingling, coldness or increase pain  ·   ·   · You will receive a Post Operative Call from one of the Recovery Room Nurses on the day after your surgery to check on you. It is very important for us to know how you are recovering after your surgery. · You may receive an e-mail or letter in the mail from [place] regarding your experience with us in the Ambulatory Surgery Unit. Your feedback is valuable to us and we appreciate your participation in the survey. · If the above instructions are not adequate, please contact Vinny Lopez RN, [facility] anesthesia Nurse Manager or our Anesthesiologist, at [phone]. ·   · We wish youre a speedy recovery ? What to do at Home:      *  Please give a list of your current medications to your Primary Care Provider. *  Please update this list whenever your medications are discontinued, doses are      changed, or new medications (including over-the-counter products) are added. *  Please carry medication information at all times in case of emergency situations. These are general instructions for a healthy lifestyle:    No smoking/ No tobacco products/ Avoid exposure to second hand smoke    Surgeon General's Warning:  Quitting smoking now greatly reduces serious risk to your health.     Obesity, smoking, and sedentary lifestyle greatly increases your risk for illness    A healthy diet, regular physical exercise & weight monitoring are important for maintaining a healthy lifestyle    You may be retaining fluid if you have a history of heart failure or if you experience any of the following symptoms:  Weight gain of 3 pounds or more overnight or 5 pounds in a week, increased swelling in our hands or feet or shortness of breath while lying flat in bed. Please call your doctor as soon as you notice any of these symptoms; do not wait until your next office visit. Recognize signs and symptoms of STROKE:    B - Balance  E - Eyes    F-face looks uneven    A-arms unable to move or move even    S-speech slurred or non-existent    T-time-call 911 as soon as signs and symptoms begin-DO NOT go       Back to bed or wait to see if you get better-TIME IS BRAIN. If you have not received your influenza and/or pneumococcal vaccine, please follow up with your primary care physician. The discharge information has been reviewed with the patient and caregiver. The patient and caregiver verbalized understanding.

## 2017-08-14 NOTE — IP AVS SNAPSHOT
[address] [facility] 
[phone] Patient: Addi Gautam MRN: [id number] [facility]:2/3/2606 You are allergic to the following Allergen Reactions Corticosteroids (Glucocorticoids) Other (comments) Depo medrol says patient, loss of consciousness Recent Documentation Height Weight BMI Smoking Status 1.778 m 82.2 kg 25.99 kg/m2 Former Smoker Emergency Contacts Name Discharge Info Relation Home Work Mobile [address]  Spouse [3] [phone] About your hospitalization You were admitted on:  August 14, 2017 You last received care in the:  [facility] You were discharged on:  August 14, 2017 Unit phone number:  [phone] Why you were hospitalized Your primary diagnosis was:  Not on File Providers Seen During Your Hospitalizations Provider Role Specialty Primary office phone Pito Crooks MD Attending Provider Ophthalmology [phone] Your Primary Care Physician (PCP) Primary Care Physician Office Phone Office Fax Gamaliel Mackay [phone] Follow-up Information Follow up With Details Comments Contact Info MD Amber Bateman [address] [facility] 
[phone] Your Appointments Monday August 14, 2017 CATARACT EXTRACTION WITH INTRA OCULAR LENS IMPLANT with MD [facility] UN[facility] (RI OR PRE ASSESSMENT) [address] [facility]  
[phone] Current Discharge Medication List  
  
ASK your doctor about these medications Dose & Instructions Dispensing Information Comments Morning Noon Evening Bedtime ALEVE 220 mg Cap Generic drug:  naproxen sodium Your last dose was:     
   
Your next dose is:    
   
   
 Dose:  440 mg  
 Take 440 mg by mouth daily as needed. Refills:  0  
     
   
   
   
  
 amLODIPine 5 mg tablet Commonly known as:  [place] Cordial Your last dose was: Your next dose is:    
   
   
 Dose:  5 mg Take 5 mg by mouth daily. Indications: AmLODIPine Besylate 5 MG, 1 (one) Tablet daily Refills:  0  
     
   
   
   
  
 aspirin delayed-release 81 mg tablet Your last dose was: Your next dose is:    
   
   
 Dose:  81 mg Take 81 mg by mouth daily. Refills:  0  
     
   
   
   
  
 atenolol 25 mg tablet Commonly known as:  TENORMIN Your last dose was: Your next dose is:    
   
   
 Dose:  25 mg Take 25 mg by mouth daily. Refills:  0  
     
   
   
   
  
 etodolac 400 mg tablet Commonly known as:  LODINE Your last dose was: Your next dose is:    
   
   
 Dose:  400 mg Take 400 mg by mouth two (2) times a day. Indications: 1 (one) Tablet two times daily Refills:  0  
     
   
   
   
  
 omeprazole 20 mg capsule Commonly known as:  PRILOSEC Your last dose was: Your next dose is:    
   
   
 Dose:  20 mg Take 20 mg by mouth daily. Indications: 1 (one) Capsule DR daily Refills:  0  
     
   
   
   
  
 pravastatin 20 mg tablet Commonly known as:  PRAVACHOL Your last dose was: Your next dose is:    
   
   
 Dose:  20 mg Take 20 mg by mouth daily. Indications: Pravastatin Sodium 20 MG, 1 (one) Tablet daily Refills:  0 PROLENSA 0.07 % ophthalmic solution Generic drug:  bromfenac Your last dose was: Your next dose is:    
   
   
 Dose:  1 Drop Administer 1 Drop to right eye daily. Refills:  0 Discharge Instructions MD SIVA Singletary [address] Phone: [phone]       Fax: [phone] If you are unable to keep appointment, kindly give 24 hours notice please. REMOVE PATCH 
START DROPS WHEN YOU GET HOME 
PUT PATCH BACK ON AT BEDTIME 1. DO NOT RUB the eye that was operated on. 2. Do not strain excessively. It is all right to bend as long as you do not strain. 3. It is safe to take a shower, wash your face, and wash your hair. Just keep the eye closed. 4. Do not swim for 1 week after surgery. 5. If you have any problems or questions, do not hesitate to call. There is always a physician on call at [phone] ext. [phone].  
6. Follow instructions on eye drops from office. If you were given prescriptions, please review the written information on the prescribed medications. DO NOT DRIVE WHILE TAKING NARCOTIC PAIN MEDICATIONS. DISCHARGE SUMMARY from Nurse The following personal items collected during your admission are returned to you:  
Dental Appliance: Dental Appliances: None Vision: Visual Aid: Glasses Hearing Aid:   
Jewelry: Jewelry: None Clothing: Clothing: None Other Valuables: Other Valuables: None Valuables sent to safe:   
 
PATIENT INSTRUCTIONS: 
 
After general anesthesia or intravenous sedation, for 24 hours or while taking prescription Narcotics: · Someone should be with you for the next 24 hours. · For your own safety, a responsible adult must drive you home. · Limit your activities · Recommended activity: Rest today, Do not climb stairs or shower unattended for the next 24 hours. · Do not drive and operate hazardous machinery · Do not make important personal or business decisions · Do  not drink alcoholic beverages · If you have not urinated within 8 hours after discharge, please contact your surgeon on call. Report the following to your surgeon: 
· Excessive pain, swelling, redness or odor of or around the surgical area · Temperature over 100.5 · Nausea and vomiting lasting longer than 4 hours or if unable to take medications · Any signs of decreased circulation or nerve impairment to extremity: change in color, persistent  numbness, tingling, coldness or increase pain ·  
·  
· You will receive a Post Operative Call from one of the Recovery Room Nurses on the day after your surgery to check on you. It is very important for us to know how you are recovering after your surgery. · You may receive an e-mail or letter in the mail from [employer] regarding your experience with us in the Ambulatory Surgery Unit. Your feedback is valuable to us and we appreciate your participation in the survey. · If the above instructions are not adequate, please contact Fuentes Guy RN, [facility] anesthesia Nurse Manager or our Anesthesiologist, at [phone]. ·  
· We wish youre a speedy recovery ? What to do at Home: *  Please give a list of your current medications to your Primary Care Provider. *  Please update this list whenever your medications are discontinued, doses are 
    changed, or new medications (including over-the-counter products) are added. *  Please carry medication information at all times in case of emergency situations. These are general instructions for a healthy lifestyle: No smoking/ No tobacco products/ Avoid exposure to second hand smoke Surgeon General's Warning:  Quitting smoking now greatly reduces serious risk to your health. Obesity, smoking, and sedentary lifestyle greatly increases your risk for illness A healthy diet, regular physical exercise & weight monitoring are important for maintaining a healthy lifestyle You may be retaining fluid if you have a history of heart failure or if you experience any of the following symptoms:  Weight gain of 3 pounds or more overnight or 5 pounds in a week, increased swelling in our hands or feet or shortness of breath while lying flat in bed. Please call your doctor as soon as you notice any of these symptoms; do not wait until your next office visit.  
 
Recognize signs and symptoms of STROKE: 
 
 B - Balance E - Eyes F-face looks uneven A-arms unable to move or move even S-speech slurred or non-existent T-time-call 911 as soon as signs and symptoms begin-DO NOT go Back to bed or wait to see if you get better-TIME IS BRAIN. If you have not received your influenza and/or pneumococcal vaccine, please follow up with your primary care physician. The discharge information has been reviewed with the patient and caregiver. The patient and caregiver verbalized understanding. Discharge Orders None Introducing [facility]! [facility] introduces [employer] patient portal. Now you can access parts of your medical record, email your doctor's office, and request medication refills online. 1. In your internet browser, go to [email or web address] 2. Click on the First Time User? Click Here link in the Sign In box. You will see the New Member Sign Up page. 3. Enter your Neo[employer] Access Code exactly as it appears below. You will not need to use this code after youve completed the sign-up process. If you do not sign up before the expiration date, you must request a new code. · Neo[employer] Access Code: [email or web address] Expires: 10/11/2017  8:51 AM 
 
4. Enter the last four digits of your Social Security Number (xxxx) and Date of Birth (mm/dd/yyyy) as indicated and click Submit. You will be taken to the next sign-up page. 5. Create a Neo[employer]x ID. This will be your Neo[employer]x login ID and cannot be changed, so think of one that is secure and easy to remember. 6. Create a Neo[employer]x password. You can change your password at any time. 7. Enter your Password Reset Question and Answer. This can be used at a later time if you forget your password. 8. Enter your e-mail address. You will receive e-mail notification when new information is available in [address] 9. Click Sign Up. You can now view and download portions of your medical record. 10. Click the Download Summary menu link to download a portable copy of your medical information. If you have questions, please visit the Frequently Asked Questions section of the Sense.[email or web address] website. Remember, MyChart is NOT to be used for urgent needs. For medical emergencies, dial 911. Now available from your iPhone and Android! General Information Please provide this summary of care documentation to your next provider. Patient Signature:  ____________________________________________________________ Date:  ____________________________________________________________  
  
[facility] Provider Signature:  ____________________________________________________________ Date:  ____________________________________________________________

## 2017-08-14 NOTE — BRIEF OP NOTE
BRIEF OPERATIVE NOTE    Date of Procedure: 8/14/2017   Preoperative Diagnosis: Age-related nuclear cataract of right eye [H25.11]  Postoperative Diagnosis: Age-related nuclear cataract of right eye [H25.11]    Procedure(s):  CATARACT EXTRACTION WITH INTRA OCULAR LENS IMPLANT  RIGHT EYE  Surgeon(s) and Role:     * Alea Almonte MD - Primary         Assistant Staff:       Surgical Staff:  Circ-1: Antonio Craig RN  Scrub Tech-1: Nina Shah  Event Time In   Incision Start 0831   Incision Close 0846     Anesthesia: MAC   Estimated Blood Loss: none  Specimens: * No specimens in log *   Findings: cataract right eye  Complications: none  Implants:   Implant Name Type Inv.  Item Serial No.  Lot No. LRB No. Used Action   LENS IOL POST 1-PC 6X13 19.5 -- [employer] - [id number]   LENS IOL POST 1-PC 6X13 19.5 -- [employer] [id number] [employer] NA Right 1 Implanted

## 2017-10-16 ENCOUNTER — OFFICE VISIT (OUTPATIENT)
Dept: INTERNAL MEDICINE CLINIC | Age: 72
End: 2017-10-16

## 2017-10-16 VITALS
WEIGHT: 185 LBS | OXYGEN SATURATION: 98 % | DIASTOLIC BLOOD PRESSURE: 74 MMHG | SYSTOLIC BLOOD PRESSURE: 140 MMHG | HEIGHT: 70 IN | TEMPERATURE: 97.7 F | HEART RATE: 45 BPM | BODY MASS INDEX: 26.48 KG/M2

## 2017-10-16 DIAGNOSIS — Z00.00 MEDICARE ANNUAL WELLNESS VISIT, INITIAL: ICD-10-CM

## 2017-10-16 DIAGNOSIS — J30.89 CHRONIC NON-SEASONAL ALLERGIC RHINITIS, UNSPECIFIED TRIGGER: ICD-10-CM

## 2017-10-16 DIAGNOSIS — M19.90 OSTEOARTHRITIS, UNSPECIFIED OSTEOARTHRITIS TYPE, UNSPECIFIED SITE: ICD-10-CM

## 2017-10-16 DIAGNOSIS — R73.02 GLUCOSE INTOLERANCE (IMPAIRED GLUCOSE TOLERANCE): ICD-10-CM

## 2017-10-16 DIAGNOSIS — T75.89XA EFFECTS OF EXPOSURE TO EXTERNAL CAUSE, INITIAL ENCOUNTER: ICD-10-CM

## 2017-10-16 DIAGNOSIS — Z12.5 PROSTATE CANCER SCREENING: ICD-10-CM

## 2017-10-16 DIAGNOSIS — E78.2 MIXED HYPERLIPIDEMIA: ICD-10-CM

## 2017-10-16 DIAGNOSIS — Z23 ENCOUNTER FOR IMMUNIZATION: ICD-10-CM

## 2017-10-16 DIAGNOSIS — I10 ESSENTIAL HYPERTENSION: Primary | ICD-10-CM

## 2017-10-16 DIAGNOSIS — K21.9 GASTROESOPHAGEAL REFLUX DISEASE WITHOUT ESOPHAGITIS: ICD-10-CM

## 2017-10-16 LAB
ALBUMIN SERPL-MCNC: 4.2 G/DL (ref 3.9–5.4)
ALKALINE PHOS POC: 98 U/L (ref 38–126)
ALT SERPL-CCNC: 48 U/L (ref 9–52)
AST SERPL-CCNC: 32 U/L (ref 14–36)
BACTERIA UA POCT, BACTPOCT: NORMAL
BILIRUB UR QL STRIP: NORMAL
BUN BLD-MCNC: 19 MG/DL (ref 9–20)
CALCIUM BLD-MCNC: 9.7 MG/DL (ref 8.4–10.2)
CASTS UA POCT: NORMAL
CHLORIDE BLD-SCNC: 106 MMOL/L (ref 98–107)
CHOLEST SERPL-MCNC: 172 MG/DL (ref 0–200)
CK (CPK) POC: 43 U/L (ref 30–135)
CLUE CELLS, CLUEPOCT: NEGATIVE
CO2 POC: 29 MMOL/L (ref 22–32)
CREAT BLD-MCNC: 1.2 MG/DL (ref 0.8–1.5)
CRYSTALS UA POCT, CRYSPOCT: NEGATIVE
EGFR (POC): 60.1
EPITHELIAL CELLS POCT: NORMAL
GLUCOSE POC: 100 MG/DL (ref 75–110)
GLUCOSE UR-MCNC: NEGATIVE MG/DL
GRAN# POC: 4.5 K/UL (ref 2–7.8)
GRAN% POC: 65.8 % (ref 37–92)
HBA1C MFR BLD HPLC: 5.9 % (ref 4.5–5.7)
HCT VFR BLD CALC: 46.9 % (ref 37–51)
HDLC SERPL-MCNC: 43 MG/DL (ref 35–130)
HGB BLD-MCNC: 15.4 G/DL (ref 12–18)
KETONES P FAST UR STRIP-MCNC: NEGATIVE MG/DL
LDL CHOLESTEROL POC: 103.6 MG/DL (ref 0–130)
LY# POC: 2 K/UL (ref 0.6–4.1)
LY% POC: 30.2 % (ref 10–58.5)
MCH RBC QN: 30.6 PG (ref 26–32)
MCHC RBC-ENTMCNC: 32.9 G/DL (ref 30–36)
MCV RBC: 93 FL (ref 80–97)
MID #, POC: 0.2 K/UL (ref 0–1.8)
MID% POC: 4 % (ref 0.1–24)
MUCUS UA POCT, MUCPOCT: NORMAL
PH UR STRIP: 6 [PH] (ref 5–7)
PLATELET # BLD: 179 K/UL (ref 140–440)
POTASSIUM SERPL-SCNC: 4.8 MMOL/L (ref 3.6–5)
PROT SERPL-MCNC: 7 G/DL (ref 6.3–8.2)
PROT UR QL STRIP: NEGATIVE MG/DL
PSA SERPL-MCNC: 4.6 NG/ML (ref 0–4)
RBC # BLD: 5.05 M/UL (ref 4.2–6.3)
RBC UA POCT, RBCPOCT: NORMAL
SODIUM SERPL-SCNC: 147 MMOL/L (ref 137–145)
SP GR UR STRIP: 1.01 (ref 1.01–1.02)
T4 FREE SERPL-MCNC: 1.03 NG/DL (ref 0.71–1.85)
TCHOL/HDL RATIO (POC): 4 (ref 0–4)
TOTAL BILIRUBIN POC: 0.7 MG/DL (ref 0.2–1.3)
TRICH UA POCT, TRICHPOC: NEGATIVE
TRIGL SERPL-MCNC: 127 MG/DL (ref 0–200)
TSH BLD-ACNC: 1.08 UIU/ML (ref 0.4–4.2)
UA UROBILINOGEN AMB POC: NORMAL (ref 0.2–1)
URINALYSIS CLARITY POC: CLEAR
URINALYSIS COLOR POC: NORMAL
URINE BLOOD POC: NORMAL
URINE CULT COMMENT, POCT: NORMAL
URINE LEUKOCYTES POC: NEGATIVE
URINE NITRITES POC: NEGATIVE
VLDLC SERPL CALC-MCNC: 25.4 MG/DL
WBC # BLD: 6.7 K/UL (ref 4.1–10.9)
WBC UA POCT, WBCPOCT: NORMAL
YEAST UA POCT, YEASTPOC: NEGATIVE

## 2017-10-16 NOTE — PROGRESS NOTES
Radha Mckeon presents with   Chief Complaint   Patient presents with    Follow-up   Patient is here for a 3 month followup & fasting labs. He is requesting a flu shot & is completing paperwork for this. No new complaints. 1. Have you been to the ER, urgent care clinic since your last visit? Hospitalized since your last visit? No    2. Have you seen or consulted any other health care providers outside of the [address] [place] [address] since your last visit? Include any pap smears or colon screening.  No
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
## 2017-10-16 NOTE — MR AVS SNAPSHOT
Visit Information Date & Time Provider Department Dept. Phone Encounter #  
 10/16/2017  8:20 AM Susanna Huntley MD [address] [place] [address] [facility] [phone] [id number] Follow-up Instructions Return in about 3 months (around 1/16/2018). Upcoming Health Maintenance Date Due DTaP/Tdap/Td series (1 - Tdap) 9/3/1966 FOBT Q 1 YEAR AGE 50-75 9/3/1995 ZOSTER VACCINE AGE 60> 7/3/2005 GLAUCOMA SCREENING Q2Y 9/3/2010 MEDICARE YEARLY EXAM 10/17/2018 Allergies as of 10/16/2017  Review Complete On: 10/16/2017 By: Susanna Huntley MD  
  
 Severity Noted Reaction Type Reactions Corticosteroids (Glucocorticoids) High 06/28/2017   Systemic Other (comments) Depo medrol says patient, loss of consciousness Current Immunizations  Never Reviewed Name Date Influenza High Dose Vaccine PF  Incomplete Pneumococcal Conjugate (PCV-13) 8/3/2015 Pneumococcal Polysaccharide (PPSV-23) 9/27/2010 Not reviewed this visit You Were Diagnosed With   
  
 Codes Comments Essential hypertension    -  Primary ICD-10-CM: I10 
ICD-9-CM: 401.9 Glucose intolerance (impaired glucose tolerance)     ICD-10-CM: R73.02 
ICD-9-CM: 790.22 Mixed hyperlipidemia     ICD-10-CM: E78.2 ICD-9-CM: 272.2 Gastroesophageal reflux disease without esophagitis     ICD-10-CM: K21.9 ICD-9-CM: 530.81 Osteoarthritis, unspecified osteoarthritis type, unspecified site     ICD-10-CM: M19.90 ICD-9-CM: 715.90 Chronic non-seasonal allergic rhinitis, unspecified trigger     ICD-10-CM: J30.89 ICD-9-CM: 477.9 Medicare annual wellness visit, initial     ICD-10-CM: Z00.00 ICD-9-CM: V70.0 Effects of exposure to external cause, initial encounter     ICD-10-CM: T75.89XA ICD-9-CM: 994.9 Encounter for immunization     ICD-10-CM: F50 ICD-9-CM: V03.89 Prostate cancer screening     ICD-10-CM: Z12.5 ICD-9-CM: V76.44 Vitals BP Pulse Temp Height(growth percentile) Weight(growth percentile) SpO2  
 140/74 (!) 45 97.7 °F (36.5 °C) (Oral) 5' 10\" (1.778 m) 185 lb (83.9 kg) 98% BMI Smoking Status 26.54 kg/m2 Former Smoker Vitals History BMI and BSA Data Body Mass Index Body Surface Area  
 26.54 kg/m 2 2.04 m 2 Preferred Pharmacy Pharmacy Name Phone [phone], [address] AT [place] [address] [phone] Your Updated Medication List  
  
   
This list is accurate as of: 10/16/17  8:52 AM.  Always use your most recent med list.  
  
  
  
  
 ALEVE 220 mg Cap Generic drug:  naproxen sodium Take 440 mg by mouth daily as needed. amLODIPine 5 mg tablet Commonly known as:  [place] Take 5 mg by mouth daily. Indications: AmLODIPine Besylate 5 MG, 1 (one) Tablet daily  
  
 aspirin delayed-release 81 mg tablet Take 81 mg by mouth daily. atenolol 25 mg tablet Commonly known as:  TENORMIN Take 25 mg by mouth daily. etodolac 400 mg tablet Commonly known as:  LODINE Take 400 mg by mouth two (2) times a day. Indications: 1 (one) Tablet two times daily  
  
 omeprazole 20 mg capsule Commonly known as:  PRILOSEC Take 20 mg by mouth daily. Indications: 1 (one) Capsule DR daily  
  
 pravastatin 20 mg tablet Commonly known as:  PRAVACHOL Take 20 mg by mouth daily. Indications: Pravastatin Sodium 20 MG, 1 (one) Tablet daily We Performed the Following ADMIN INFLUENZA VIRUS VAC [ [facility]] AMB POC CK (CPK) [48953 CPT(R)] AMB POC COMPLETE CBC,AUTOMATED ENTER T6071488 CPT(R)] AMB POC COMPREHENSIVE METABOLIC PANEL [80259 CPT(R)] AMB POC HEMOGLOBIN A1C [66885 CPT(R)] AMB POC LIPID PROFILE [54981 CPT(R)] AMB POC PSA  (MEDICARE) [ [facility]] AMB POC T4, FREE V039438 CPT(R)] AMB POC TSH [44838 CPT(R)] AMB POC URINALYSIS DIP STICK AUTO W/ MICRO  [51536 CPT(R)] HEPATITIS C AB [24097 CPT(R)] INFLUENZA VIRUS VACCINE, HIGH DOSE SEASONAL, PRESERVATIVE FREE [96442 CPT(R)] Follow-up Instructions Return in about 3 months (around 1/16/2018). Introducing [facility]! [employer] introduces [employer] patient portal. Now you can access parts of your medical record, email your doctor's office, and request medication refills online. 1. In your internet browser, go to [email or web address] 2. Click on the First Time User? Click Here link in the Sign In box. You will see the New Member Sign Up page. 3. Enter your [employer] Access Code exactly as it appears below. You will not need to use this code after youve completed the sign-up process. If you do not sign up before the expiration date, you must request a new code. · [employer] Access Code: [email or web address] Expires: 1/14/2018  8:10 AM 
 
4. Enter the last four digits of your Social Security Number (xxxx) and Date of Birth (mm/dd/yyyy) as indicated and click Submit. You will be taken to the next sign-up page. 5. Create a [employer]Apps ID. This will be your [employer] login ID and cannot be changed, so think of one that is secure and easy to remember. 6. Create a [employer] password. You can change your password at any time. 7. Enter your Password Reset Question and Answer. This can be used at a later time if you forget your password. 8. Enter your e-mail address. You will receive e-mail notification when new information is available in [address] 9. Click Sign Up. You can now view and download portions of your medical record. 10. Click the Download Summary menu link to download a portable copy of your medical information. If you have questions, please visit the Frequently Asked Questions section of the [employer] website. Remember, [employer]s is NOT to be used for urgent needs. For medical emergencies, dial 911. Now available from your iPhone and Android! Please provide this summary of care documentation to your next provider. Your primary care clinician is listed as Bobby. If you have any questions after today's visit, please call [phone].

## 2017-10-17 LAB — HCV AB S/CO SERPL IA: <0.1 S/CO RATIO (ref 0–0.9)

## 2017-10-17 NOTE — PROGRESS NOTES
Labs are good except for PSA is slightly elevated at 4.6. Probably due to infection so Cipro 500 twice daily for 2 weeks and follow-up PSA in about a month.

## 2017-10-18 RX ORDER — CIPROFLOXACIN 500 MG/1
500 TABLET ORAL 2 TIMES DAILY
Qty: 28 TAB | Refills: 0 | Status: SHIPPED | OUTPATIENT
Start: 2017-10-18 | End: 2018-04-15

## 2017-10-18 NOTE — PROGRESS NOTES
Message  Received: MD Kirstin Shin RN               Labs are good except for PSA is slightly elevated at 4.6. Probably due to infection so Cipro 500 twice daily for 2 weeks and follow-up PSA in about a month. Associated Results        AMB POC HEMOGLOBIN A1C   Status:  Final result   Visible to patient:  No (Not Released) Dx:  Glucose intolerance (impaired glucose. .. Order: [id number]         Notes Recorded by Geovanni Solis MD on 10/17/2017 at 7:05 PM   Labs are good except for PSA is slightly elevated at 4.6. Probably due to infection so Cipro 500 twice daily for 2 weeks and follow-up PSA in about a month. Ref Range & Units 2d ago   3mo ago     Hemoglobin A1c (POC) 4.5 - 5.7 %   5.9 (A)     5.8 (A)R, CM     Resulting Agency  [facility]           Specimen Collected: 10/16/17  9:01 AM Last Resulted: 10/16/17  9:04 AM                 CM=Additional comments  R=Reference range differs from displayed range                     AMB POC LIPID PROFILE   Status:  Final result   Visible to patient:  No (Not Released) Dx:  Mixed hyperlipidemia Order: [id number]         Notes Recorded by Geovanni Solis MD on 10/17/2017 at 7:05 PM   Labs are good except for PSA is slightly elevated at 4.6. Probably due to infection so Cipro 500 twice daily for 2 weeks and follow-up PSA in about a month.               Ref Range & Units 2d ago   3mo ago     Cholesterol (POC) 0 - 200 mg/dL   172.0     170.0      Triglycerides (POC) 0 - 200 mg/dL   127.0     115.0      HDL Cholesterol (POC) 35 - 130 mg/dL   43.0     50.0      VLDL (POC) MG/DL   25.4     23.0      LDL Cholesterol (POC) 0.0 - 130.0 MG/DL   103.6     97.0      TChol/HDL Ratio (POC) 0.0 - 4.0   4.0     3.4     Resulting Agency  [facility]           Specimen Collected: 10/16/17  9:01 AM Last Resulted: 10/16/17  9:04 AM                          AMB POC COMPREHENSIVE METABOLIC PANEL Status:  Final result   Visible to patient:  No (Not Released) Dx:  Mixed hyperlipidemia Order: [id number]         Notes Recorded by Radha Blackman MD on 10/17/2017 at 7:05 PM   Labs are good except for PSA is slightly elevated at 4.6. Probably due to infection so Cipro 500 twice daily for 2 weeks and follow-up PSA in about a month. Ref Range & Units 2d ago  (10/16/17) 3mo ago  (7/13/17) 3mo ago  (7/13/17)      GLUCOSE 75 - 110 mg/dL   100.0     102.0      BUN 9 - 20 mg/dL   19.0      Creatinine (POC) 0.8 - 1.5 mg/dL   1.2      Sodium (POC) 137 - 145 MMOL/L   147.0 (A)      Potassium (POC) 3.6 - 5.0 MMOL/L   4.8      CHLORIDE 98 - 107 MMOL/L   106.0      CO2 22 - 32 MMOL/L   29.0      CALCIUM 8.4 - 10.2 mg/dL   9.7      TOTAL PROTEIN 6.3 - 8.2 g/dL   7.0     6.9      ALBUMIN 3.9 - 5.4 g/dL   4.2     3.9      AST (POC) 14 - 36 U/L   32     53 (A)      ALT (POC) 9 - 52 U/L   48     53 (A)      ALKALINE PHOS 38 - 126 U/L   98.0     116.0      TOTAL BILIRUBIN 0.2 - 1.3 mg/dL   0.7     0.9      eGFR (POC)    60.1     Resulting Agency  [facility]           Specimen Collected: 10/16/17  9:01 AM Last Resulted: 10/16/17  9:04 AM                           AMB POC CK (CPK)   Status:  Final result   Visible to patient:  No (Not Released) Dx:  Mixed hyperlipidemia Order: [id number]         Notes Recorded by Radha Blackman MD on 10/17/2017 at 7:05 PM   Labs are good except for PSA is slightly elevated at 4.6. Probably due to infection so Cipro 500 twice daily for 2 weeks and follow-up PSA in about a month.               Ref Range & Units 2d ago   3mo ago     CK (CPK) (POC) 30 - 135 U/L   43.0     60.0     Resulting Agency  [facility]           Specimen Collected: 10/16/17  9:01 AM Last Resulted: 10/16/17  9:04 AM                          AMB POC PSA  (MEDICARE)   Status:  Final result   Visible to patient:  No (Not Released) Dx:  Prostate cancer screening Order: [id number]         Notes Recorded by Princess Regan MD on 10/17/2017 at 7:05 PM   Labs are good except for PSA is slightly elevated at 4.6. Probably due to infection so Cipro 500 twice daily for 2 weeks and follow-up PSA in about a month. Ref Range & Units 2d ago    PSA (POC) 0.0 - 4.0 ng/mL   4.6 (A)     Resulting Agency  [facility]           Specimen Collected: 10/16/17  9:01 AM Last Resulted: 10/16/17  9:04 AM                           AMB POC T4, FREE   Status:  Final result   Visible to patient:  No (Not Released) Dx:  Essential hypertension Order: [id number]         Notes Recorded by Princess Regan MD on 10/17/2017 at 7:05 PM   Labs are good except for PSA is slightly elevated at 4.6. Probably due to infection so Cipro 500 twice daily for 2 weeks and follow-up PSA in about a month. Ref Range & Units 2d ago   3mo ago     FREE T4 (POC) 0.71 - 1.85 ng/dL   1.03     1. 02R     Resulting Agency  [facility]           Specimen Collected: 10/16/17  9:01 AM Last Resulted: 10/16/17  9:04 AM                 R=Reference range differs from displayed range                     AMB POC TSH   Status:  Final result   Visible to patient:  No (Not Released) Dx:  Essential hypertension Order: [id number]         Notes Recorded by Princess Regan MD on 10/17/2017 at 7:05 PM   Labs are good except for PSA is slightly elevated at 4.6. Probably due to infection so Cipro 500 twice daily for 2 weeks and follow-up PSA in about a month.               Ref Range & Units 2d ago   3mo ago     TSH POC 0.40 - 4.20 uIU/ml   1.08     0.70R     Resulting Agency  [facility]           Specimen Collected: 10/16/17  9:01 AM Last Resulted: 10/16/17  9:04 AM                 R=Reference range differs from displayed range                     AMB POC URINALYSIS DIP STICK AUTO W/ MICRO    Status:  Final result   Visible to patient:  No (Not Released) Dx:  Essential hypertension Order: [id number]         Notes Recorded by Princess Regan MD on 10/17/2017 at 7:05 PM Labs are good except for PSA is slightly elevated at 4.6. Probably due to infection so Cipro 500 twice daily for 2 weeks and follow-up PSA in about a month. Ref Range & Units 2d ago    Color (UA POC)    Light Yellow     Clarity (UA POC)    Clear     Glucose (UA POC) Negative   Negative     Bilirubin (UA POC) Negative   3+     Ketones (UA POC) Negative   Negative     Specific gravity (UA POC) 1.010 - 1.025   1.015     Blood (UA POC) Negative   1+     pH (UA POC) 5.0 - 7.0   6.0     Protein (UA POC) Negative mg/dL   Negative     Urobilinogen (UA POC) 0.2 - 1   normal     Nitrites (UA POC) Negative   Negative     Leukocyte esterase (UA POC) Negative   Negative     Epithelial cells (UA POC)    Few     Mucus (UA POC)    None     WBCs (UA POC)    0-3     RBCs (UA POC)    0-2     Casts (UA POC) Negative   neg     Crystals (UA POC) Negative   Negative     Clue Cells (UA POC)    NEGATIVE     Trichomonas (UA POC)    Negative     Yeast (UA POC)    Negative     Bacteria (UA POC) Negative   None     URINE CULT COMMENT (UA POC)    Culture Not Indicated     Resulting Agency  [facility]           Narrative          Urine Dipstick  ICTOTEST   POSITIVE           Specimen Collected: 10/16/17  9:01 AM Last Resulted: 10/16/17  9:04 AM                           AMB POC COMPLETE CBC,AUTOMATED ENTER   Status:  Final result   Visible to patient:  No (Not Released) Dx:  Essential hypertension Order: [id number]         Notes Recorded by Kris Garza MD on 10/17/2017 at 7:05 PM   Labs are good except for PSA is slightly elevated at 4.6. Probably due to infection so Cipro 500 twice daily for 2 weeks and follow-up PSA in about a month.             Ref Range & Units 2d ago    WBC (POC) 4.1 - 10.9 K/uL   6.7     RBC (POC) 4.20 - 6.30 M/uL   5.05     HGB (POC) 12.0 - 18.0 g/dL   15.4     HCT (POC) 37.0 - 51.0 %   46.9     MCV (POC) 80.0 - 97.0 fL   93.0     MCH (POC) 26.0 - 32.0 pg   30.6     MCHC (POC) 30.0 - 36.0 g/dL   32.9 PLATELET (POC) 911.9 - 440.0 K/uL   179.0     ABS. LYMPHS (POC) 0.6 - 4.1 K/uL   2.0     LYMPHOCYTES (POC) 10.0 - 58.5 %   30.2     Mid # (POC) 0.0 - 1.8 K/uL   0.2     MID% POC 0.1 - 24.0 %   4.0     ABS. GRANS (POC) 2.0 - 7.8 K/uL   4.5     GRANULOCYTES (POC) 37.0 - 92.0 %   65.8     Resulting Agency  [facility]           Specimen Collected: 10/16/17  9:01 AM Last Resulted: 10/16/17  9:04 AM                           HEPATITIS C AB   Status:  Final result   Visible to patient:  No (Not Released) Dx:  Effects of exposure to external cause. .. Order: [id number]         Notes Recorded by Myrtha Bence, MD on 10/17/2017 at 7:05 PM   Labs are good except for PSA is slightly elevated at 4.6. Probably due to infection so Cipro 500 twice daily for 2 weeks and follow-up PSA in about a month. Ref Range & Units 2d ago    Hep C Virus Ab 0.0 - 0.9 s/co ratio   <0.1     Comments:                                   Negative:     < 0.8                                 Indeterminate: 0.8 - 0.9                                      Positive:     > 0.9     The CDC recommends that a positive HCV antibody result     be followed up with a HCV Nucleic Acid Amplification     test (516200).      Resulting Agency  [facility]           Narrative          Performed at:  [address]  [id number]  : Vicki Enciso MD, Phone:  [phone]           Specimen Collected: 10/16/17  8:54 AM Last Resulted: 10/17/17  7:42 AM                           Status of Other Orders          Order    Lab Status Result Date Provider Status       ADMIN INFLUENZA VIRUS VAC No Result  Ordered       INFLUENZA VIRUS VACCINE, HIGH DOSE SEASONAL, PRESERVATIVE FREE No Result  Ordered                           AMB POC HEMOGLOBIN A1C [[id number]] (Order [id number])   Point of Care Testing     Date: 10/16/2017   Department: [facility]   Ordering/Authorizing: Myrtha Bence, MD Result Notes        Notes Recorded by Maryse Pimentel MD on 10/17/2017 at 7:05 PM   Labs are good except for PSA is slightly elevated at 4.6. Probably due to infection so Cipro 500 twice daily for 2 weeks and follow-up PSA in about a month. 10/17/2017  1:30 PM - Bruna Herbie       Component Results        Component Value Flag Ref Range Units Status     Hemoglobin A1c (POC) 5.9 (A) 4.5 - 5.7 % Final         Lab and Collection        AMB POC HEMOGLOBIN A1C (Order #[id number]) on 10/16/2017 - Lab and Collection Information         Result History        AMB POC HEMOGLOBIN A1C (Order #[id number]) on 10/16/2017 - Order Result History Report         Specimen Information        Blood      Collected: 10/16/2017 0901           Provider Information        Ordering User Authorizing Provider     MD Maryse Denney MD       PCP Billing Provider     MD Maryse Denney MD         Lab [address] [phone]         mat             Lab Inquiry View [address] [facility] Lab Information     Reviewed By Lavell Garg RN on 10/18/2017  1:39 PM     Maryse Pimentel MD on 10/17/2017  7:06 PM         How to build your own SmartLinks        AMB POC HEMOGLOBIN A1C (Order #[id number]) on 10/16/17             AMB POC LIPID PROFILE [[id number]] (Order [id number])   Point of Care Testing     Date: 10/16/2017   Department: [address] [place] [address]   Ordering/Authorizing: Maryse Pimentel MD           Result Notes        Notes Recorded by Maryse Pimentel MD on 10/17/2017 at 7:05 PM   Labs are good except for PSA is slightly elevated at 4.6. Probably due to infection so Cipro 500 twice daily for 2 weeks and follow-up PSA in about a month.                  10/17/2017  1:29 PM - Bruna Herbie       Component Results Component Value Flag Ref Range Units Status     Cholesterol (POC) 172.0  0 - 200 mg/dL Final     Triglycerides (POC) 127.0  0 - 200 mg/dL Final     HDL Cholesterol (POC) 43.0  35 - 130 mg/dL Final     VLDL (POC) 25.4   MG/DL Final     LDL Cholesterol (POC) 103.6  0.0 - 130.0 MG/DL Final     TChol/HDL Ratio (POC) 4.0  0.0 - 4.0  Final         Lab and Collection        AMB POC LIPID PROFILE (Order #[id number]) on 10/16/2017 - Lab and Collection Information         Result History        AMB POC LIPID PROFILE (Order #[id number]) on 10/16/2017 - Order Result History Report         Specimen Information        Collected: 10/16/2017 0901           Provider Information        Ordering User Authorizing Provider     MD Kris Nunez MD       PCP Billing Provider     MD Kris Nunez MD         Lab [address] [phone]         mat             Lab Inquiry View [facility] Lab Information     Reviewed By Lavell Billingsley RN on 10/18/2017  1:39 PM     Kris Garza MD on 10/17/2017  7:06 PM         How to build your own SmartLinks        AMB POC LIPID PROFILE (Order #[id number]) on 10/16/17             AMB POC COMPREHENSIVE METABOLIC PANEL [[id number]] (Order [id number])   Point of Care Testing     Date: 10/16/2017   Department: [address] [place] [address]   Ordering/Authorizing: Kris Garza MD           Result Notes        Notes Recorded by Kris Garza MD on 10/17/2017 at 7:05 PM   Labs are good except for PSA is slightly elevated at 4.6. Probably due to infection so Cipro 500 twice daily for 2 weeks and follow-up PSA in about a month.                  10/17/2017  1:29 PM - Hall Semen       Component Results        Component Value Flag Ref Range Units Status     GLUCOSE 100.0  75 - 110 mg/dL Final     BUN 19.0  9 - 20 mg/dL Final     Creatinine (POC) 1.2  0.8 - 1.5 mg/dL Final     Sodium (POC) 147.0 (A) 137 - 145 MMOL/L Final     Potassium (POC) 4.8  3.6 - 5.0 MMOL/L Final     CHLORIDE 106.0  98 - 107 MMOL/L Final     CO2 29.0  22 - 32 MMOL/L Final     CALCIUM 9.7  8.4 - 10.2 mg/dL Final     TOTAL PROTEIN 7.0  6.3 - 8.2 g/dL Final     ALBUMIN 4.2  3.9 - 5.4 g/dL Final     AST (POC) 32  14 - 36 U/L Final     ALT (POC) 48  9 - 52 U/L Final     ALKALINE PHOS 98.0  38 - 126 U/L Final     TOTAL BILIRUBIN 0.7  0.2 - 1.3 mg/dL Final     eGFR (POC) 60.1    Final         Lab and Collection        AMB POC COMPREHENSIVE METABOLIC PANEL (Order #[id number]) on 10/16/2017 - Lab and Collection Information         Result History        AMB POC COMPREHENSIVE METABOLIC PANEL (Order #[id number]) on 10/16/2017 - Order Result History Report         Specimen Information        Collected: 10/16/2017 0901           Provider Information        Ordering User Authorizing Provider     MD Tika Colon MD       PCP Billing Provider     MD Tika Colon MD         Lab Information        Lab     [facility]    [address] [address]         mat             Lab Inquiry View [address] [place] [address] Lab Information     Reviewed By Lavell Cuellar RN on 10/18/2017  1:39 PM     Tika Moore MD on 10/17/2017  7:06 PM         How to build your own SmartLinks        [address] (Order #[id number]) on 10/16/17             AMB POC CK (CPK) [[id number]] (Order [id number])   Point of Care Testing     Date: 10/16/2017   Department: [address] [place] [address]   Ordering/Authorizing: Tika Moore MD           Result Notes        Notes Recorded by Tika Moore MD on 10/17/2017 at 7:05 PM   Labs are good except for PSA is slightly elevated at 4.6.   Probably due to infection so Cipro 500 twice daily for 2 weeks and follow-up PSA in about a month. 10/17/2017  1:31 PM - Georgiana Moy       Component Results        Component Value Flag Ref Range Units Status     CK (CPK) (POC) 43.0  30 - 135 U/L Final         Lab and Collection        AMB POC CK (CPK) (Order #[id number]) on 10/16/2017 - Lab and Collection Information         Result History        AMB POC CK (CPK) (Order #[id number]) on 10/16/2017 - Order Result History Report         Specimen Information        Collected: 10/16/2017 0901           Provider Information        Ordering User Authorizing Provider     MD Meagan Parish MD       PCP Billing Provider     MD Meagan Parish MD         Lab [address] [phone]         mat             Lab Inquiry View [address] [place] [address] Lab Information     Reviewed By Lavell Roberts RN on 10/18/2017  1:39 PM     Meagan Gupta MD on 10/17/2017  7:06 PM         How to build your own SmartLinks        AMB POC CK (CPK) (Order #[id number]) on 10/16/17             HEPATITIS C AB [[id number]] (Order [id number])   Lab     Date: 10/16/2017   Department: [address] [place] [address]   Ordering/Authorizing: Meagan Gupta MD           Result Notes        Notes Recorded by Meagan Gupta MD on 10/17/2017 at 7:05 PM   Labs are good except for PSA is slightly elevated at 4.6. Probably due to infection so Cipro 500 twice daily for 2 weeks and follow-up PSA in about a month.                  10/17/2017  7:42 AM - Ranjan, [facility] Lab Results In       Component Results        Component Value Flag Ref Range Units Status     Hep C Virus Ab <0.1  0.0 - 0.9 s/co ratio Final     Comment:                                       Negative:     < 0.8                                 Indeterminate: 0.8 - 0.9 Positive:     > 0.9     The CDC recommends that a positive HCV antibody result     be followed up with a HCV Nucleic Acid Amplification     test (725424). Narrative        Performed at:  [address]  [id number]  : Jayesh Gallegos MD, Phone:  [phone]         Lab and Collection        HEPATITIS Guilluame Due (Order #[id number]) on 10/16/2017 - Lab and Collection Information         Specimen Information        Collected: 10/16/2017 9138           Provider Information        Ordering User Authorizing Provider     MD Adelfo Renteria MD       PCP     Adelfo Donnelly MD         Lab Information        Lab     [facility]   Jayesh Gallegos M.D.   [address] Lab Information     Reviewed By Lavell Toribio RN on 10/18/2017  1:39 PM     Adelfo Donnelly MD on 10/17/2017  7:06 PM         How to build your own [facility] (Order #[id number]) on 10/16/17             AMB POC PSA  (MEDICARE) [[id number]] (Order [id number])   Point of Care Testing     Date: 10/16/2017   Department: [address] [place] [address]   Ordering/Authorizing: Adelfo Donnelly MD           Result Notes        Notes Recorded by Adelfo Donnelly MD on 10/17/2017 at 7:05 PM   Labs are good except for PSA is slightly elevated at 4.6. Probably due to infection so Cipro 500 twice daily for 2 weeks and follow-up PSA in about a month.                  10/17/2017  1:31 PM - Britney Gamino       Component Results        Component Value Flag Ref Range Units Status     PSA (POC) 4.6 (A) 0.0 - 4.0 ng/mL Final         Lab and Collection        AMB POC PSA  (MEDICARE) (Order #[id number]) on 10/16/2017 - Lab and Collection Information         Result History        AMB POC PSA  (MEDICARE) (Order #[id number]) on 10/16/2017 - Order Result History Report         Specimen Information        Collected: 10/16/2017 0901           Provider Information        Ordering User Authorizing Provider     MD Mello Mathis MD       PCP Billing Provider     MD Mello Mathis MD         Lab [address] [place]   [address] [place] [address]   [address] [address]         mat             Lab Inquiry View [facility] Lab Information     Reviewed By Lavell Theodore RN on 10/18/2017  1:39 PM     Mello Feliciano MD on 10/17/2017  7:06 PM         How to build your own SmartLinks        AMB POC PSA  (Zaina Burns) (Order #[id number]) on 10/16/17             AMB POC T4, FREE [[id number]] (Order [id number])   Point of Care Testing     Date: 10/16/2017   Department: [address] [place] [address]   Ordering/Authorizing: Mello Feliciano MD           Result Notes        Notes Recorded by Mello Feliciano MD on 10/17/2017 at 7:05 PM   Labs are good except for PSA is slightly elevated at 4.6. Probably due to infection so Cipro 500 twice daily for 2 weeks and follow-up PSA in about a month.                  10/17/2017  1:31 PM - Dewayne Kashif       Component Results        Component Value Flag Ref Range Units Status     FREE T4 (POC) 1.03  0.71 - 1.85 ng/dL Final         Lab and Collection        AMB POC T4, FREE (Order #[id number]) on 10/16/2017 - Lab and Collection Information         Result History        AMB POC T4, FREE (Order #[id number]) on 10/16/2017 - Order Result History Report         Specimen Information        Collected: 10/16/2017 0901           Provider Information        Ordering User Authorizing Provider     MD Mello Mathis MD       PCP Billing Provider     MD Mello Mathis MD         Lab Information        Lab [address] MD   [address] [place] [phone]         mat             Lab Inquiry View [address] [place] [address] Lab Information     Reviewed By Lavell Martinez RN on 10/18/2017  1:39 PM     Shaq Scott MD on 10/17/2017  7:06 PM         How to build your own SmartLinks        AMB POC T4, FREE (Order #[id number]) on 10/16/17             AMB POC TSH [[id number]] (Order [id number])   Point of Care Testing     Date: 10/16/2017   Department: [address] [place] [address]   Ordering/Authorizing: Shaq Scott MD           Result Notes        Notes Recorded by Shaq Scott MD on 10/17/2017 at 7:05 PM   Labs are good except for PSA is slightly elevated at 4.6. Probably due to infection so Cipro 500 twice daily for 2 weeks and follow-up PSA in about a month.                  10/17/2017  1:31 PM - Althea Drafts       Component Results        Component Value Flag Ref Range Units Status     TSH POC 1.08  0.40 - 4.20 uIU/ml Final         Lab and Collection        AMB POC TSH (Order #[id number]) on 10/16/2017 - Lab and Collection Information         Result History        AMB POC TSH (Order #[id number]) on 10/16/2017 - Order Result History Report         Specimen Information        Collected: 10/16/2017 0901           Provider Information        Ordering User Authorizing Provider     MD Shaq Gaines MD       PCP Billing Provider     MD Shaq Gaines MD         Lab Information        Lab     [facility]   DIA Godoy MD   [address] [phone]         mat             Lab Inquiry View [address] [place] [address] Lab Information     Reviewed By Lavell Martinez RN on 10/18/2017  1:39 PM     Shaq Scott MD on 10/17/2017  7:06 PM         How to build your own SmartLinks        AMB POC TSH (Order #[id number]) on 10/16/17             AMB POC URINALYSIS DIP STICK AUTO W/ MICRO  [[id number]] (Order [id number])   Point of Care Testing     Date: 10/16/2017   Department: [facility]   Ordering/Authorizing: Amie Vera MD           Result Notes        Notes Recorded by Amie Vera MD on 10/17/2017 at 7:05 PM   Labs are good except for PSA is slightly elevated at 4.6. Probably due to infection so Cipro 500 twice daily for 2 weeks and follow-up PSA in about a month.                  10/17/2017  1:33 PM - [facility]       Component Results        Component Value Flag Ref Range Units Status     Color (UA POC) Light Yellow    Final     Clarity (UA POC) Clear    Final     Glucose (UA POC) Negative  Negative  Final     Bilirubin (UA POC) 3+  Negative  Final     Ketones (UA POC) Negative  Negative  Final     Specific gravity (UA POC) 1.015  1.010 - 1.025  Final     Blood (UA POC) 1+  Negative  Final     pH (UA POC) 6.0  5.0 - 7.0  Final     Protein (UA POC) Negative  Negative mg/dL Final     Urobilinogen (UA POC) normal  0.2 - 1  Final     Nitrites (UA POC) Negative  Negative  Final     Leukocyte esterase (UA POC) Negative  Negative  Final     Epithelial cells (UA POC) Few    Final     Mucus (UA POC) None    Final     WBCs (UA POC) 0-3    Final     RBCs (UA POC) 0-2    Final     Casts (UA POC) neg  Negative  Final     Crystals (UA POC) Negative  Negative  Final     Clue Cells (UA POC) NEGATIVE    Final     Trichomonas (UA POC) Negative    Final     Yeast (UA POC) Negative    Final     Bacteria (UA POC) None  Negative  Final     URINE CULT COMMENT (UA POC) Culture Not Indicated    Final         Narrative        Urine Dipstick  ICTOTEST   POSITIVE         Lab and Collection        AMB POC URINALYSIS DIP STICK AUTO W/ MICRO  (Order #[id number]) on 10/16/2017 - Lab and Collection Information         Result History        AMB POC URINALYSIS DIP STICK AUTO W/ MICRO (Order #[id number]) on 10/16/2017 - Order Result History Report         Specimen Information        Collected: 10/16/2017 0901           Provider Information        Ordering User Authorizing Provider     MD Ariel Watts MD       PCP Billing Provider     MD Ariel Watts MD         Lab 9 [place] Keri Freedman [place]   [address] [place] [id number]         dan             Lab [address] [facility] Lab Information     Reviewed By Lavell Gavin RN on 10/18/2017  1:39 PM     Ariel Dean MD on 10/17/2017  7:06 PM         How to build your own SmartLinks        AMB POC URINALYSIS DIP [facility]  (Order #[id number]) on 10/16/17             AMB POC COMPLETE CBC,AUTOMATED ENTER [[id number]] (Order [id number])   Point of Care Testing     Date: 10/16/2017   Department: [address] [place] [address]   Ordering/Authorizing: Ariel eDan MD           Result Notes        Notes Recorded by Ariel Dean MD on 10/17/2017 at 7:05 PM   Labs are good except for PSA is slightly elevated at 4.6. Probably due to infection so Cipro 500 twice daily for 2 weeks and follow-up PSA in about a month. 10/17/2017  1:28 PM - [facility]       Component Results        Component Value Flag Ref Range Units Status     WBC (POC) 6.7  4.1 - 10.9 K/uL Final     RBC (POC) 5.05  4.20 - 6.30 M/uL Final     HGB (POC) 15.4  12.0 - 18.0 g/dL Final     HCT (POC) 46.9  37.0 - 51.0 % Final     MCV (POC) 93.0  80.0 - 97.0 fL Final     MCH (POC) 30.6  26.0 - 32.0 pg Final     MCHC (POC) 32.9  30.0 - 36.0 g/dL Final     PLATELET (POC) 069.9  140.0 - 440.0 K/uL Final     ABS. LYMPHS (POC) 2.0  0.6 - 4.1 K/uL Final     LYMPHOCYTES (POC) 30.2  10.0 - 58.5 % Final     Mid # (POC) 0.2  0.0 - 1.8 K/uL Final     MID% POC 4.0  0.1 - 24.0 % Final     ABS.  GRANS (POC) 4.5  2.0 - 7.8 K/uL Final     GRANULOCYTES (POC) 65.8  37.0 - 92.0 % Final         Lab and Collection        AMB POC COMPLETE CBC,AUTOMATED ENTER (Order #[id number]) on 10/16/2017 - Lab and Collection Information         Result History        AMB POC COMPLETE CBC,AUTOMATED ENTER (Order #[id number]) on 10/16/2017 - Order Result History Report         Specimen Information        Collected: 10/16/2017 0901           Provider Information        Ordering User Authorizing Provider     MD Tika Colon MD       PCP Billing Provider     MD Tika Colon MD         Lab Information        Lab      [facility], MD   [facility]   [address] [id number]         dan             Lab Inquiry View [address] [facility] Lab Information     Reviewed By Lavell Cuellar RN on 10/18/2017  1:39 PM     Tika Moore MD on 10/17/2017  7:06 PM     Labs are good except for PSA is slightly elevated at 4.6.  Probably due to infection so Cipro 500 twice daily for 2 weeks and follow-up PSA in about a month. Informed patient regarding lab showed slight increase in the PSA. Rx sent to patient's pharmacy. Other lab work okay except for borderline FBS and Glyco.  Patient has a history of borderline blood sugar problems. Advised to continue to work on diet and exercise.

## 2017-10-18 NOTE — TELEPHONE ENCOUNTER
Requested Prescriptions     Pending Prescriptions Disp Refills    ciprofloxacin HCl (CIPRO) 500 mg tablet 28 Tab 0     Sig: Take 1 Tab by mouth two (2) times a day.

## 2017-11-21 ENCOUNTER — LAB ONLY (OUTPATIENT)
Dept: INTERNAL MEDICINE CLINIC | Age: 72
End: 2017-11-21

## 2017-11-21 DIAGNOSIS — Z87.898 HISTORY OF ELEVATED PSA: Primary | ICD-10-CM

## 2017-11-21 LAB — PSA SERPL-MCNC: 3.9 NG/ML (ref 0–4)

## 2018-04-15 ENCOUNTER — HOSPITAL ENCOUNTER (EMERGENCY)
Age: 73
Discharge: HOME | End: 2018-04-15
Attending: EMERGENCY MEDICINE
Payer: MEDICARE

## 2018-04-15 VITALS
HEIGHT: 70 IN | DIASTOLIC BLOOD PRESSURE: 69 MMHG | HEART RATE: 65 BPM | RESPIRATION RATE: 24 BRPM | TEMPERATURE: 98.9 F | WEIGHT: 185 LBS | SYSTOLIC BLOOD PRESSURE: 128 MMHG | BODY MASS INDEX: 26.48 KG/M2 | OXYGEN SATURATION: 98 %

## 2018-04-15 DIAGNOSIS — R55 SYNCOPE AND COLLAPSE: Primary | ICD-10-CM

## 2018-04-15 DIAGNOSIS — R00.1 SINUS BRADYCARDIA: ICD-10-CM

## 2018-04-15 LAB
ANION GAP SERPL CALC-SCNC: 6 MMOL/L (ref 5–15)
BUN SERPL-MCNC: 17 MG/DL (ref 6–20)
BUN/CREAT SERPL: 15 (ref 12–20)
CALCIUM SERPL-MCNC: 7.9 MG/DL (ref 8.5–10.1)
CHLORIDE SERPL-SCNC: 110 MMOL/L (ref 97–108)
CO2 SERPL-SCNC: 26 MMOL/L (ref 21–32)
CREAT SERPL-MCNC: 1.12 MG/DL (ref 0.7–1.3)
ERYTHROCYTE [DISTWIDTH] IN BLOOD BY AUTOMATED COUNT: 13.4 % (ref 11.5–14.5)
GLUCOSE SERPL-MCNC: 138 MG/DL (ref 65–100)
HCT VFR BLD AUTO: 38.7 % (ref 36.6–50.3)
HGB BLD-MCNC: 13.4 G/DL (ref 12.1–17)
MCH RBC QN AUTO: 31 PG (ref 26–34)
MCHC RBC AUTO-ENTMCNC: 34.6 G/DL (ref 30–36.5)
MCV RBC AUTO: 89.6 FL (ref 80–99)
NRBC # BLD: 0 K/UL (ref 0–0.01)
NRBC BLD-RTO: 0 PER 100 WBC
PLATELET # BLD AUTO: 130 K/UL (ref 150–400)
PMV BLD AUTO: 10.7 FL (ref 8.9–12.9)
POTASSIUM SERPL-SCNC: 3.9 MMOL/L (ref 3.5–5.1)
RBC # BLD AUTO: 4.32 M/UL (ref 4.1–5.7)
SODIUM SERPL-SCNC: 142 MMOL/L (ref 136–145)
TROPONIN I SERPL-MCNC: <0.04 NG/ML
TROPONIN I SERPL-MCNC: <0.04 NG/ML
WBC # BLD AUTO: 5.8 K/UL (ref 4.1–11.1)

## 2018-04-15 PROCEDURE — 80048 BASIC METABOLIC PNL TOTAL CA: CPT | Performed by: EMERGENCY MEDICINE

## 2018-04-15 PROCEDURE — 74011250636 HC RX REV CODE- 250/636: Performed by: EMERGENCY MEDICINE

## 2018-04-15 PROCEDURE — 36415 COLL VENOUS BLD VENIPUNCTURE: CPT | Performed by: EMERGENCY MEDICINE

## 2018-04-15 PROCEDURE — 96361 HYDRATE IV INFUSION ADD-ON: CPT

## 2018-04-15 PROCEDURE — 93005 ELECTROCARDIOGRAM TRACING: CPT

## 2018-04-15 PROCEDURE — 84484 ASSAY OF TROPONIN QUANT: CPT | Performed by: EMERGENCY MEDICINE

## 2018-04-15 PROCEDURE — 85027 COMPLETE CBC AUTOMATED: CPT | Performed by: EMERGENCY MEDICINE

## 2018-04-15 PROCEDURE — 96360 HYDRATION IV INFUSION INIT: CPT

## 2018-04-15 PROCEDURE — 99284 EMERGENCY DEPT VISIT MOD MDM: CPT

## 2018-04-15 RX ADMIN — SODIUM CHLORIDE 1000 ML: 900 INJECTION, SOLUTION INTRAVENOUS at 11:45
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
## 2018-04-15 NOTE — ED NOTES
Pt ambulated in the hallway with out difficulty maintained SPO2 of 97-99% on room air and HR of 78-82 bpm. RN will notify MD.

## 2018-04-15 NOTE — ED NOTES
Pt states he has hx of bradycardia . EMS reports pt was diaphoretic with HR in 30's on arrival. Pt stated feeling light-headed. HR NSR on arrival. Denies chest pain or shortness of breath. Pt did state one episode of diarrhea this morning.

## 2018-04-15 NOTE — DISCHARGE INSTRUCTIONS
Bradycardia: Care Instructions  Your Care Instructions    Bradycardia is a slow heart rate. If your heart beats too slowly, it can't supply your body with enough blood. This can make you weak or dizzy. Or it may make you pass out. Sometimes medicine can cause this problem. If this happens, your doctor may have you adjust one of your medicines. If a medicine is not the problem, your doctor may recommend a pacemaker. It is important to treat bradycardia so that you don't get more serious health problems. Your doctor will want to see you on a routine schedule to make sure that your heartbeat is normal.  Follow-up care is a key part of your treatment and safety. Be sure to make and go to all appointments, and call your doctor if you are having problems. It's also a good idea to know your test results and keep a list of the medicines you take. How can you care for yourself at home? · Take your medicines exactly as prescribed. Call your doctor if you think you are having a problem with your medicine. If your bradycardia is caused by another disease, your doctor will try to treat the disease. If it is caused by heart medicines, he or she will adjust your medicines. · Make lifestyle changes to improve your heart health. ¨ Get regular exercise. Try for 30 minutes on most days of the week. If you do not have other heart problems, you likely do not have limits on the type or level of activity that you can do. You may want to walk, swim, bike, or do other activities. Ask your doctor what level of exercise is safe for you. ¨ To control your cholesterol, avoid foods with a lot of fat, saturated fat, or sodium. Try to eat more fiber. And if your doctor says it's okay, get some exercise on most days. ¨ Do not smoke. Smoking can make your heart condition worse. If you need help quitting, talk to your doctor about stop-smoking programs and medicines. These can increase your chances of quitting for good.   ¨ Limit alcohol to 2 drinks a day for men and 1 drink a day for women. Too much alcohol can cause health problems. Pacemaker  If you have a pacemaker, you will get more specific information about it. Be sure to:  · Check your pulse as your doctor tells you. · Have your pacemaker checked as often as your doctor recommends. You may be able to do this over the phone or computer. · Avoid strong magnetic or electrical fields. These include wand metal detectors used in airports, MRIs, welding equipment, and generators. · You will be checked several times right after you get your pacemaker and when it is time to have the battery changed. Batteries last for 5 to 15 years. · You can talk on a cell phone. But keep it 6 inches away from your pacemaker. · Microwaves, TVs, radios, and kitchen and bathroom appliances won't harm you. When should you call for help? Call 911 anytime you think you may need emergency care. For example, call if:  ? · You have symptoms of sudden heart failure. These may include:  ¨ Severe trouble breathing. ¨ A fast or irregular heartbeat. ¨ Coughing up pink, foamy mucus. ¨ You passed out. ? · You have symptoms of a stroke. These may include:  ¨ Sudden numbness, tingling, weakness, or loss of movement in your face, arm, or leg, especially on only one side of your body. ¨ Sudden vision changes. ¨ Sudden trouble speaking. ¨ Sudden confusion or trouble understanding simple statements. ¨ Sudden problems with walking or balance. ¨ A sudden, severe headache that is different from past headaches. ?Call your doctor now or seek immediate medical care if:  ? · You have new or changed symptoms of heart failure, such as:  ¨ New or increased shortness of breath. ¨ New or worse swelling in your legs, ankles, or feet. ¨ Sudden weight gain, such as more than 2 to 3 pounds in a day or 5 pounds in a week.  (Your doctor may suggest a different range of weight gain.)  ¨ Feeling dizzy or lightheaded or like you may faint.  ¨ Feeling so tired or weak that you cannot do your usual activities. ¨ Not sleeping well. Shortness of breath wakes you at night. You need extra pillows to prop yourself up to breathe easier. ? Watch closely for changes in your health, and be sure to contact your doctor if:  ? · You do not get better as expected. Where can you learn more? Go to http://satya-ashley.info/. Enter X688 in the search box to learn more about \"Bradycardia: Care Instructions. \"  Current as of: September 21, 2016  Content Version: 11.4  © 8306-6745 [employer]. Care instructions adapted under license by [employer] (which disclaims liability or warranty for this information). If you have questions about a medical condition or this instruction, always ask your healthcare professional. [address] any warranty or liability for your use of this information.

## 2018-04-16 LAB
ATRIAL RATE: 63 BPM
CALCULATED P AXIS, ECG09: 79 DEGREES
CALCULATED R AXIS, ECG10: 17 DEGREES
CALCULATED T AXIS, ECG11: 47 DEGREES
DIAGNOSIS, 93000: NORMAL
P-R INTERVAL, ECG05: 194 MS
Q-T INTERVAL, ECG07: 412 MS
QRS DURATION, ECG06: 86 MS
QTC CALCULATION (BEZET), ECG08: 421 MS
VENTRICULAR RATE, ECG03: 63 BPM

## 2018-04-30 ENCOUNTER — OFFICE VISIT (OUTPATIENT)
Dept: CARDIOLOGY CLINIC | Age: 73
End: 2018-04-30

## 2018-04-30 VITALS
OXYGEN SATURATION: 98 % | SYSTOLIC BLOOD PRESSURE: 124 MMHG | WEIGHT: 184.4 LBS | DIASTOLIC BLOOD PRESSURE: 68 MMHG | HEART RATE: 93 BPM | BODY MASS INDEX: 26.4 KG/M2 | HEIGHT: 70 IN

## 2018-04-30 DIAGNOSIS — I10 ESSENTIAL HYPERTENSION: ICD-10-CM

## 2018-04-30 DIAGNOSIS — R55 SYNCOPE, UNSPECIFIED SYNCOPE TYPE: Primary | ICD-10-CM

## 2018-04-30 DIAGNOSIS — E78.2 MIXED HYPERLIPIDEMIA: ICD-10-CM

## 2018-04-30 DIAGNOSIS — R00.1 SINUS BRADYCARDIA: ICD-10-CM

## 2018-04-30 RX ORDER — MELATONIN
2000 DAILY
Status: ON HOLD | COMMUNITY
End: 2022-01-01
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
## 2018-04-30 NOTE — PROGRESS NOTES
Chief Complaint   Patient presents with   [facility] Follow Up     DIZZINESS     1. Have you been to the ER, urgent care clinic since your last visit? Hospitalized since your last visit? YES, [facility] ON 4/15/18 FOR DIZZINESS    2. Have you seen or consulted any other health care providers outside of the [address] [place] [address] since your last visit? Include any pap smears or colon screening.  YES. DR. Joce Anguiano ( PCP AT [place] [address]. )

## 2018-04-30 NOTE — MR AVS SNAPSHOT
[address] [facility] 
[phone] Patient: Toy Zamora MRN: [id number] BILL:9[birth year]3/[birth year] Visit Information Date & Time Provider Department Dept. Phone Encounter #  
 4/30/2018  3:30 PM Alexandria Delong, [address] [place] [address] [facility] [phone] Your Appointments 5/3/2018 11:00 AM  
ECHO CARDIOGRAMS 2D with [address]  [facility] [address]) Appt Note: per Dr. Donny Batista [facility]  
[phone] [address] [facility] [address] [address]  
  
    
 5/4/2018 11:00 AM  
STRESS ECHOCARDIOGRAMS with [facility] [address] [place] [address]) Appt Note: per Dr. Donny Batista [facility]  
[phone] [address] [facility] [address] [address]  
  
    
 5/10/2018  2:00 PM  
PROCEDURE with Akira Posada [facility] [address] [place] [address]) Appt Note: per Dr. Ninfa Clemens [address] [facility]  
[phone] [address] [facility] Upcoming Health Maintenance Date Due DTaP/Tdap/Td series (1 - Tdap) 9/3/1966 ZOSTER VACCINE AGE 60> 7/3/2005 GLAUCOMA SCREENING Q2Y 9/3/2010 Influenza Age 5 to Adult 8/1/2018 MEDICARE YEARLY EXAM 10/17/2018 COLONOSCOPY 12/1/2021 Allergies as of 4/30/2018  Review Complete On: 4/30/2018 By: Alexandria Delong MD  
  
 Severity Noted Reaction Type Reactions Corticosteroids (Glucocorticoids) High 06/28/2017   Systemic Other (comments) Depo medrol says patient, loss of consciousness Current Immunizations  Never Reviewed Name Date Influenza High Dose Vaccine PF 10/16/2017 Pneumococcal Conjugate (PCV-13) 8/3/2015 Pneumococcal Polysaccharide (PPSV-23) 9/27/2010 Not reviewed this visit You Were Diagnosed With   
  
 Codes Comments Syncope, unspecified syncope type    -  Primary ICD-10-CM: R55 
ICD-9-CM: 780.2 Essential hypertension     ICD-10-CM: I10 
ICD-9-CM: 401.9 Sinus bradycardia     ICD-10-CM: R00.1 ICD-9-CM: 427.89 Mixed hyperlipidemia     ICD-10-CM: E78.2 ICD-9-CM: 272.2 Vitals BP Pulse Height(growth percentile) Weight(growth percentile) SpO2 BMI  
 124/68 (BP 1 Location: Left arm, BP Patient Position: Standing) 93 5' 10\" (1.778 m) 184 lb 6.4 oz (83.6 kg) 98% 26.46 kg/m2 Smoking Status Former Smoker Vitals History BMI and BSA Data Body Mass Index Body Surface Area  
 26.46 kg/m 2 2.03 m 2 Preferred Pharmacy [phone] - [address], [address] [place] [address] [phone] Your Updated Medication List  
  
   
This list is accurate as of 4/30/18  4:49 PM.  Always use your most recent med list.  
  
  
  
  
 ALEVE 220 mg Cap Generic drug:  naproxen sodium Take 440 mg by mouth daily as needed. amLODIPine 5 mg tablet Commonly known as:  Unknown Middleton Take 10 mg by mouth. Indications: AmLODIPine Besylate 5 MG, 1 (one) Tablet daily  
  
 aspirin delayed-release 81 mg tablet Take 81 mg by mouth daily. omeprazole 20 mg capsule Commonly known as:  PRILOSEC Take 20 mg by mouth daily. Indications: 1 (one) Capsule DR daily  
  
 pravastatin 20 mg tablet Commonly known as:  PRAVACHOL Take 20 mg by mouth daily. Indications: Pravastatin Sodium 20 MG, 1 (one) Tablet daily VITAMIN D3 1,000 unit tablet Generic drug:  cholecalciferol Take  by mouth daily. We Performed the Following AMB POC EKG ROUTINE W/ 12 LEADS, INTER & REP [25105 CPT(R)] Introducing [facility]!    
 [employer] introduces [employer] patient portal. Now you can access parts of your medical record, email your doctor's office, and request medication refills online. 1. In your internet browser, go to [email or web address] 2. Click on the First Time User? Click Here link in the Sign In box. You will see the New Member Sign Up page. 3. Enter your [employer] Access Code exactly as it appears below. You will not need to use this code after youve completed the sign-up process. If you do not sign up before the expiration date, you must request a new code. · [employer] Access Code: [email or web address] Expires: 7/14/2018 11:02 AM 
 
4. Enter the last four digits of your Social Security Number (xxxx) and Date of Birth (mm/dd/yyyy) as indicated and click Submit. You will be taken to the next sign-up page. 5. Create a Styky ID. This will be your Styky login ID and cannot be changed, so think of one that is secure and easy to remember. 6. Create a [employer]ky password. You can change your password at any time. 7. Enter your Password Reset Question and Answer. This can be used at a later time if you forget your password. 8. Enter your e-mail address. You will receive e-mail notification when new information is available in [address] 9. Click Sign Up. You can now view and download portions of your medical record. 10. Click the Download Summary menu link to download a portable copy of your medical information. If you have questions, please visit the Frequently Asked Questions section of the [employer] website. Remember, [employer] is NOT to be used for urgent needs. For medical emergencies, dial 911. Now available from your iPhone and Android! Please provide this summary of care documentation to your next provider. Your primary care clinician is listed as Bobby. If you have any questions after today's visit, please call [phone].

## 2018-05-03 ENCOUNTER — CLINICAL SUPPORT (OUTPATIENT)
Dept: CARDIOLOGY CLINIC | Age: 73
End: 2018-05-03

## 2018-05-03 DIAGNOSIS — I10 ESSENTIAL HYPERTENSION: ICD-10-CM

## 2018-05-03 DIAGNOSIS — R00.1 SINUS BRADYCARDIA: ICD-10-CM

## 2018-05-03 DIAGNOSIS — R55 SYNCOPE, UNSPECIFIED SYNCOPE TYPE: ICD-10-CM

## 2018-05-03 DIAGNOSIS — E78.2 MIXED HYPERLIPIDEMIA: ICD-10-CM

## 2018-05-04 ENCOUNTER — CLINICAL SUPPORT (OUTPATIENT)
Dept: CARDIOLOGY CLINIC | Age: 73
End: 2018-05-04

## 2018-05-04 DIAGNOSIS — E78.2 MIXED HYPERLIPIDEMIA: ICD-10-CM

## 2018-05-04 DIAGNOSIS — R00.1 SINUS BRADYCARDIA: ICD-10-CM

## 2018-05-04 DIAGNOSIS — R55 SYNCOPE, UNSPECIFIED SYNCOPE TYPE: ICD-10-CM

## 2018-05-04 DIAGNOSIS — R07.89 OTHER CHEST PAIN: Primary | ICD-10-CM

## 2018-05-04 DIAGNOSIS — I10 ESSENTIAL HYPERTENSION: ICD-10-CM

## 2018-05-04 DIAGNOSIS — R06.02 SOB (SHORTNESS OF BREATH): ICD-10-CM

## 2018-05-04 NOTE — PROGRESS NOTES
Identified patient using full name and . Patient education for testing complete. Patient verbalized understanding.     Katey Antoine RN

## 2018-05-10 ENCOUNTER — OFFICE VISIT (OUTPATIENT)
Dept: CARDIOLOGY CLINIC | Age: 73
End: 2018-05-10

## 2018-05-10 DIAGNOSIS — E78.2 MIXED HYPERLIPIDEMIA: ICD-10-CM

## 2018-05-10 DIAGNOSIS — R55 SYNCOPE, UNSPECIFIED SYNCOPE TYPE: ICD-10-CM

## 2018-05-10 DIAGNOSIS — I10 ESSENTIAL HYPERTENSION: ICD-10-CM

## 2018-05-10 DIAGNOSIS — R00.1 SINUS BRADYCARDIA: ICD-10-CM

## 2018-05-16 PROBLEM — I48.0 PAF (PAROXYSMAL ATRIAL FIBRILLATION) (HCC): Status: ACTIVE | Noted: 2018-05-16

## 2018-05-17 ENCOUNTER — TELEPHONE (OUTPATIENT)
Dept: CARDIOLOGY CLINIC | Age: 73
End: 2018-05-17

## 2018-05-17 NOTE — TELEPHONE ENCOUNTER
----- Message from Mika Tarango MD sent at 5/16/2018  9:04 PM EDT -----  Lucero Vazquez few short runs of irregular heartbeat on Holter monitor. No excessively slow heartbeat and no contraindication to driving based on his echo and stress test which were normal.  I recommend he get back on atenolol 25 mg daily, start aspirin 81 mg daily, follow-up with me or An Hong in the next month or 2 to reassess. Not quite enough atrial fibrillation in this 24 hour. To require a stronger blood thinner, but low threshold to start a stronger blood thinner if any prolonged atrial fibrillation or palpitations.

## 2018-05-17 NOTE — PROGRESS NOTES
Diaz Bark few short runs of irregular heartbeat on Holter monitor. No excessively slow heartbeat and no contraindication to driving based on his echo and stress test which were normal.  I recommend he get back on atenolol 25 mg daily, start aspirin 81 mg daily, follow-up with me or Alexandra Can in the next month or 2 to reassess. Not quite enough atrial fibrillation in this 24 hour. To require a stronger blood thinner, but low threshold to start a stronger blood thinner if any prolonged atrial fibrillation or palpitations.

## 2018-05-17 NOTE — TELEPHONE ENCOUNTER
Verified patient with two identifiers. Pt informed of holter monitor results. Informed to restart atenolol 25 mg and continue 81 mg aspirin. Also informed no excessively slow heart beat and no contraindication to driving based on echo and stress test which were normal.  He is to keep us posted on any change in palpitations. He will follow up in a month to reassess. Pt verbalized understanding.

## 2018-06-29 ENCOUNTER — OFFICE VISIT (OUTPATIENT)
Dept: CARDIOLOGY CLINIC | Age: 73
End: 2018-06-29

## 2018-06-29 VITALS
SYSTOLIC BLOOD PRESSURE: 150 MMHG | HEART RATE: 55 BPM | OXYGEN SATURATION: 99 % | WEIGHT: 183.8 LBS | HEIGHT: 70 IN | BODY MASS INDEX: 26.31 KG/M2 | DIASTOLIC BLOOD PRESSURE: 70 MMHG | RESPIRATION RATE: 16 BRPM

## 2018-06-29 DIAGNOSIS — I48.0 PAF (PAROXYSMAL ATRIAL FIBRILLATION) (HCC): Primary | ICD-10-CM

## 2018-06-29 DIAGNOSIS — I10 ESSENTIAL HYPERTENSION: ICD-10-CM

## 2018-06-29 DIAGNOSIS — I65.29 STENOSIS OF CAROTID ARTERY, UNSPECIFIED LATERALITY: ICD-10-CM

## 2018-06-29 DIAGNOSIS — R55 SYNCOPE, UNSPECIFIED SYNCOPE TYPE: ICD-10-CM

## 2018-06-29 DIAGNOSIS — E78.5 HYPERLIPIDEMIA, UNSPECIFIED HYPERLIPIDEMIA TYPE: ICD-10-CM

## 2018-06-29 RX ORDER — ATENOLOL 50 MG/1
TABLET ORAL DAILY
COMMUNITY
End: 2018-06-29 | Stop reason: SDUPTHER

## 2018-06-29 RX ORDER — ATENOLOL 25 MG/1
25 TABLET ORAL DAILY
Qty: 90 TAB | Refills: 4 | Status: SHIPPED | OUTPATIENT
Start: 2018-06-29 | End: 2019-07-10 | Stop reason: SDUPTHER

## 2018-06-29 NOTE — MR AVS SNAPSHOT
[address] [facility] 
[phone] Patient: Peterson Davis MRN: [id number] FAU:[birth year] Visit Information Date & Time Provider Department Dept. Phone Encounter #  
 6/29/2018 10:00 AM Familia Hudson [facility] [phone] Your Appointments 8/2/2018  9:00 AM  
PROCEDURE with Nirmal Salazar [facility]) Appt Note: Dr Jonnie Fuel holter 24hr, verbal from Dr. Ernesta [facility]  
[phone] [address]  
  
    
 2/4/2019  9:00 AM  
ESTABLISHED PATIENT with Yared Marino MD  
[address] [facility]) [address]  
[phone] [address] Upcoming Health Maintenance Date Due DTaP/Tdap/Td series (1 - Tdap) 9/3/1966 ZOSTER VACCINE AGE 60> 7/3/2005 GLAUCOMA SCREENING Q2Y 9/3/2010 Influenza Age 5 to Adult 8/1/2018 MEDICARE YEARLY EXAM 10/17/2018 COLONOSCOPY 12/1/2021 Allergies as of 6/29/2018  Review Complete On: 6/29/2018 By: Yared Marino MD  
  
 Severity Noted Reaction Type Reactions Corticosteroids (Glucocorticoids) High 06/28/2017   Systemic Other (comments) Depo medrol says patient, loss of consciousness Pravastatin  05/02/2018    Other (comments) Possible cause of elevated LFTs for 18 2018 AST 86  Current Immunizations  Never Reviewed Name Date Influenza High Dose Vaccine PF 10/16/2017 Pneumococcal Conjugate (PCV-13) 8/3/2015 Pneumococcal Polysaccharide (PPSV-23) 9/27/2010 Not reviewed this visit You Were Diagnosed With   
  
 Codes Comments PA[facility]oxysmal [facility] fibrillation) [facility]    -  Primary ICD-10-CM: I48.0 ICD-9-CM: 427.31   
 Essential hypertension     ICD-10-CM: I10 
ICD-9-CM: 401.9 Hyperlipidemia, unspecified hyperlipidemia type     ICD-10-CM: E78.5 ICD-9-CM: 272.4 Syncope, unspecified syncope type     ICD-10-CM: R55 
ICD-9-CM: 780.2 Stenosis of carotid artery, unspecified laterality     ICD-10-CM: I65.29 ICD-9-CM: 433.10 Vitals BP Pulse Resp Height(growth percentile) Weight(growth percentile) SpO2  
 150/70 (BP 1 Location: Right arm, BP Patient Position: Sitting) (!) 55 16 5' 10\" (1.778 m) 183 lb 12.8 oz (83.4 kg) 99% BMI Smoking Status 26.37 kg/m2 Former Smoker Vitals History BMI and BSA Data Body Mass Index Body Surface Area  
 26.37 kg/m 2 2.03 m 2 Preferred Pharmacy Pharmacy Name Phone [phone] [address] [facility], [address] [place] [address] [phone] Your Updated Medication List  
  
   
This list is accurate as of 6/29/18 10:51 AM.  Always use your most recent med list.  
  
  
  
  
 ALEVE 220 mg Cap Generic drug:  naproxen sodium Take 440 mg by mouth daily as needed. amLODIPine 5 mg tablet Commonly known as:  Choco Adriana Take 10 mg by mouth. Indications: AmLODIPine Besylate 5 MG, 1 (one) Tablet daily  
  
 aspirin delayed-release 81 mg tablet Take 81 mg by mouth daily. atenolol 25 mg tablet Commonly known as:  TENORMIN Take 1 Tab by mouth daily. omeprazole 20 mg capsule Commonly known as:  PRILOSEC Take 20 mg by mouth daily. Indications: 1 (one) Capsule DR daily VITAMIN D3 1,000 unit tablet Generic drug:  cholecalciferol Take  by mouth daily. Prescriptions Sent to Pharmacy Refills  
 atenolol (TENORMIN) 25 mg tablet 4 Sig: Take 1 Tab by mouth daily. Class: Normal  
 Pharmacy: [facility] [address] [place] [address], [facility], [facility] [address] [facility] #: [phone] Route: Oral  
  
We Performed the Following AMB POC EKG ROUTINE W/ 12 LEADS, INTER & REP [81908 CPT(R)] To-Do List   
 07/29/2018 ECG:  CARDIAC HOLTER MONITOR, 24 HOURS Introducing [facility]! [facility] introduces [employer] patient portal. Now you can access parts of your medical record, email your doctor's office, and request medication refills online. 1. In your internet browser, go to [email or web address] 2. Click on the First Time User? Click Here link in the Sign In box. You will see the New Member Sign Up page. 3. Enter your [employer] Access Code exactly as it appears below. You will not need to use this code after youve completed the sign-up process. If you do not sign up before the expiration date, you must request a new code. · [employer] Access Code: [email or web address] Expires: 7/14/2018 11:02 AM 
 
4. Enter the last four digits of your Social Security Number (xxxx) and Date of Birth (mm/dd/yyyy) as indicated and click Submit. You will be taken to the next sign-up page. 5. Create a [employer] ID. This will be your [employer] login ID and cannot be changed, so think of one that is secure and easy to remember. 6. Create a [employer] password. You can change your password at any time. 7. Enter your Password Reset Question and Answer. This can be used at a later time if you forget your password. 8. Enter your e-mail address. You will receive e-mail notification when new information is available in [address] 9. Click Sign Up. You can now view and download portions of your medical record. 10. Click the Download Summary menu link to download a portable copy of your medical information. If you have questions, please visit the Frequently Asked Questions section of the [employer] website. Remember, [employer] is NOT to be used for urgent needs. For medical emergencies, dial 911. Now available from your iPhone and Android! Please provide this summary of care documentation to your next provider. Your primary care clinician is listed as Jeniffer Stanford. If you have any questions after today's visit, please call [phone].

## 2018-06-29 NOTE — PROGRESS NOTES
1. Have you been to the ER, urgent care clinic since your last visit? Hospitalized since your last visit? No    2. Have you seen or consulted any other health care providers outside of the [address] [place] [address] since your last visit? Include any pap smears or colon screening.  Yes, Dr. Juárez Fees PCP x 1 mo ago    Chief Complaint   Patient presents with    Cholesterol Problem     2 mo f/u    Hypertension     \"    Results     for holter monitor, 24 hr
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
## 2018-08-02 ENCOUNTER — CLINICAL SUPPORT (OUTPATIENT)
Dept: CARDIOLOGY CLINIC | Age: 73
End: 2018-08-02

## 2018-08-02 DIAGNOSIS — I10 ESSENTIAL HYPERTENSION: ICD-10-CM

## 2018-08-02 DIAGNOSIS — R55 SYNCOPE, UNSPECIFIED SYNCOPE TYPE: ICD-10-CM

## 2018-08-02 DIAGNOSIS — E78.5 HYPERLIPIDEMIA, UNSPECIFIED HYPERLIPIDEMIA TYPE: ICD-10-CM

## 2018-08-02 DIAGNOSIS — I48.0 PAF (PAROXYSMAL ATRIAL FIBRILLATION) (HCC): ICD-10-CM

## 2018-08-02 DIAGNOSIS — I65.29 STENOSIS OF CAROTID ARTERY, UNSPECIFIED LATERALITY: ICD-10-CM

## 2018-08-06 NOTE — PROGRESS NOTES
Holter looks stable on lower dose of beta-blocker. If he is feeling better, continue same. No significant atrial fibrillation detected.

## 2018-08-07 ENCOUNTER — TELEPHONE (OUTPATIENT)
Dept: CARDIOLOGY CLINIC | Age: 73
End: 2018-08-07

## 2018-08-07 NOTE — TELEPHONE ENCOUNTER
----- Message from Oran Lennox, MD sent at 8/6/2018  4:45 PM EDT -----  Holter looks stable on lower dose of beta-blocker. If he is feeling better, continue same. No significant atrial fibrillation detected.

## 2018-08-15 ENCOUNTER — TELEPHONE (OUTPATIENT)
Dept: CARDIOLOGY CLINIC | Age: 73
End: 2018-08-15

## 2018-08-16 NOTE — TELEPHONE ENCOUNTER
Received a fax asking about cardiac clearance. Patient was seen in June 29, does he need an appt or is he ok ?

## 2018-08-16 NOTE — TELEPHONE ENCOUNTER
Please call Piotr Venegas,  called again today about getting cardiac clearance for \"MRI Guided fusion prostate biopsy scheduled for 9-10-18    Please call back to advise;  Anyone who answers phone can assist.     Phone [phone] ext [phone]

## 2018-08-22 ENCOUNTER — OFFICE VISIT (OUTPATIENT)
Dept: CARDIOLOGY CLINIC | Age: 73
End: 2018-08-22

## 2018-08-22 VITALS
DIASTOLIC BLOOD PRESSURE: 82 MMHG | OXYGEN SATURATION: 98 % | SYSTOLIC BLOOD PRESSURE: 130 MMHG | HEIGHT: 70 IN | RESPIRATION RATE: 16 BRPM | BODY MASS INDEX: 26.48 KG/M2 | HEART RATE: 60 BPM | WEIGHT: 185 LBS

## 2018-08-22 DIAGNOSIS — E78.5 HYPERLIPIDEMIA, UNSPECIFIED HYPERLIPIDEMIA TYPE: ICD-10-CM

## 2018-08-22 DIAGNOSIS — I65.29 STENOSIS OF CAROTID ARTERY, UNSPECIFIED LATERALITY: ICD-10-CM

## 2018-08-22 DIAGNOSIS — I10 ESSENTIAL HYPERTENSION: ICD-10-CM

## 2018-08-22 DIAGNOSIS — I48.0 PAF (PAROXYSMAL ATRIAL FIBRILLATION) (HCC): Primary | ICD-10-CM

## 2018-08-22 DIAGNOSIS — Z01.810 PRE-OPERATIVE CARDIOVASCULAR EXAMINATION: ICD-10-CM

## 2018-08-22 NOTE — MR AVS SNAPSHOT
[address] [facility] 
[phone] Patient: Мария Noland MRN: [id number] IMK:[birth year] Visit Information Date & Time Provider Department Dept. Phone Encounter #  
 8/22/2018  2:45 PM Peyman Greenwood, [address] [place] [address] [facility] [phone] [id number] Follow-up Instructions Return in about 1 year (around 8/22/2019). Follow-up and Disposition History Your Appointments 2/4/2019  9:00 AM  
ESTABLISHED PATIENT with MD Danelle Ameston [facility]) [address] [place] [address]  
[phone] [address] [place] [address] Upcoming Health Maintenance Date Due DTaP/Tdap/Td series (1 - Tdap) 9/3/1966 ZOSTER VACCINE AGE 60> 7/3/2005 GLAUCOMA SCREENING Q2Y 9/3/2010 Influenza Age 5 to Adult 8/1/2018 MEDICARE YEARLY EXAM 10/17/2018 COLONOSCOPY 12/1/2021 Allergies as of 8/22/2018  Review Complete On: 8/22/2018 By: Peyman Greenwood MD  
  
 Severity Noted Reaction Type Reactions Corticosteroids (Glucocorticoids) High 06/28/2017   Systemic Other (comments) Depo medrol says patient, loss of consciousness Pravastatin  05/02/2018    Other (comments) Possible cause of elevated LFTs for 18 2018 AST 86  Current Immunizations  Never Reviewed Name Date Influenza High Dose Vaccine PF 10/16/2017 Pneumococcal Conjugate (PCV-13) 8/3/2015 Pneumococcal Polysaccharide (PPSV-23) 9/27/2010 Not reviewed this visit You Were Diagnosed With   
  
 Codes Comments PA[facility]oxysmal [facility] fibrillation) [facility]    -  Primary ICD-10-CM: I48.0 ICD-9-CM: 427.31 Hyperlipidemia, unspecified hyperlipidemia type     ICD-10-CM: E78.5 ICD-9-CM: 272.4 Essential hypertension     ICD-10-CM: I10 
ICD-9-CM: 401.9 Pre-operative cardiovascular examination     ICD-10-CM: Z01.810 ICD-9-CM: V72.81 Stenosis of carotid artery, unspecified laterality     ICD-10-CM: I65.29 ICD-9-CM: 433.10 Vitals BP Pulse Resp Height(growth percentile) Weight(growth percentile) SpO2  
 130/82 60 16 5' 10\" (1.778 m) 185 lb (83.9 kg) 98% BMI Smoking Status 26.54 kg/m2 Former Smoker Vitals History BMI and BSA Data Body Mass Index Body Surface Area  
 26.54 kg/m 2 2.04 m 2 Preferred Pharmacy Pharmacy Name Phone [phone] - [address], [address] AT [place] [address] [phone] Your Updated Medication List  
  
   
This list is accurate as of 8/22/18  4:11 PM.  Always use your most recent med list.  
  
  
  
  
 ALEVE 220 mg Cap Generic drug:  naproxen sodium Take 440 mg by mouth daily as needed. amLODIPine 5 mg tablet Commonly known as:  Cedillo Del Take 5 mg by mouth. Indications: AmLODIPine Besylate 5 MG, 1 (one) Tablet daily  
  
 aspirin delayed-release 81 mg tablet Take 81 mg by mouth daily. atenolol 25 mg tablet Commonly known as:  TENORMIN Take 1 Tab by mouth daily. omeprazole 20 mg capsule Commonly known as:  PRILOSEC Take 20 mg by mouth daily. Indications: 1 (one) Capsule DR daily VITAMIN D3 1,000 unit tablet Generic drug:  cholecalciferol Take  by mouth daily. We Performed the Following AMB POC EKG ROUTINE W/ 12 LEADS, INTER & REP [93460 CPT(R)] Follow-up Instructions Return in about 1 year (around 8/22/2019). Introducing [facility]! Jacey Gomes introduces [employer] patient portal. Now you can access parts of your medical record, email your doctor's office, and request medication refills online. 1. In your internet browser, go to [email or web address] 2. Click on the First Time User? Click Here link in the Sign In box. You will see the New Member Sign Up page. 3. Enter your [employer] Access Code exactly as it appears below. You will not need to use this code after youve completed the sign-up process. If you do not sign up before the expiration date, you must request a new code. · [employer] Access Code: [email or web address] Expires: 10/31/2018  8:40 AM 
 
4. Enter the last four digits of your Social Security Number (xxxx) and Date of Birth (mm/dd/yyyy) as indicated and click Submit. You will be taken to the next sign-up page. 5. Create a [employer] ID. This will be your [employer] login ID and cannot be changed, so think of one that is secure and easy to remember. 6. Create a [employer] password. You can change your password at any time. 7. Enter your Password Reset Question and Answer. This can be used at a later time if you forget your password. 8. Enter your e-mail address. You will receive e-mail notification when new information is available in [address] 9. Click Sign Up. You can now view and download portions of your medical record. 10. Click the Download Summary menu link to download a portable copy of your medical information. If you have questions, please visit the Frequently Asked Questions section of the [employer] website. Remember, [employer] is NOT to be used for urgent needs. For medical emergencies, dial 911. Now available from your iPhone and Android! Please provide this summary of care documentation to your next provider. Your primary care clinician is listed as Tyler Royal. If you have any questions after today's visit, please call [phone].
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
## 2019-07-05 NOTE — PERIOP NOTES
[facility]  Ambulatory Surgery Unit  Pre-operative Instructions    Surgery/Procedure Date 7/15/19            Tentative Arrival Time 0615      1. On the day of your surgery/procedure, please report to the Ambulatory Surgery Unit Registration Desk and sign in at your designated time. The Ambulatory Surgery Unit is located in [facility] on the [facility] side of the [facility] across from the [address] [place] [address]. Please have all of your health insurance cards and a photo ID. 2. You must have someone with you to drive you home, as you should not drive a car for 24 hours following anesthesia. Please make arrangements for a responsible adult friend or family member to stay with you for at least the first 24 hours after your surgery. 3. Do not have anything to eat or drink (including water, gum, mints, coffee, juice) after 11:59 PM  7/14/19. This may not apply to medications prescribed by your physician. (Please note below the special instructions with medications to take the morning of surgery, if applicable.)    4. We recommend you do not drink any alcoholic beverages for 24 hours before and after your surgery. 5. Contact your surgeons office for instructions on the following medications: non-steroidal anti-inflammatory drugs (i.e. Advil, Aleve), vitamins, and supplements. (Some surgeons will want you to stop these medications prior to surgery and others may allow you to take them)   **If you are currently taking Plavix, Coumadin, Aspirin and/or other blood-thinning agents, contact your surgeon for instructions. ** Your surgeon will partner with the physician prescribing these medications to determine if it is safe to stop or if you need to continue taking. Please do not stop taking these medications without instructions from your surgeon.     6. In an effort to help prevent surgical site infection, we ask that you shower with an anti-bacterial soap (i.e. Dial/Safeguard, or the soap provided to you at your preadmission testing appointment) for 3 days prior to and on the morning of surgery, using a fresh towel after each shower. (Please begin this process with fresh bed linens.) Do not apply any lotions, powders, or deodorants after the shower on the day of your procedure. If applicable, please do not shave the operative site for 48 hours prior to surgery. 7. Wear comfortable clothes. Wear glasses instead of contacts. Do not bring any jewelry or money (other than copays or fees as instructed). Do not wear make-up, particularly mascara, the morning of your surgery. Do not wear nail polish, particularly if you are having foot /hand surgery. Wear your hair loose or down, no ponytails, buns, silvio pins or clips. All body piercings must be removed. 8. You should understand that if you do not follow these instructions your surgery may be cancelled. If your physical condition changes (i.e. fever, cold or flu) please contact your surgeon as soon as possible. 9. It is important that you be on time. If a situation occurs where you may be late, or if you have any questions or problems, please call [phone].    10. Your surgery time may be subject to change. You will receive a phone call the day prior to surgery to confirm your arrival time. 11. Pediatric patients: please bring a change of clothes, diapers, bottle/sippy cup, pacifier, etc.      Special Instructions: Take all medications and inhalers, as prescribed, on the morning of surgery with a sip of water EXCEPT: aspirin    Insulin Dependent Diabetic patients: Take your diabetic medications as prescribed the day before surgery. Hold all diabetic medications the day of surgery. If you are scheduled to arrive for surgery after 8:00 AM, and your AM blood sugar is >200, please call Ambulatory Surgery. I understand a pre-operative phone call will be made to verify my surgery time.   In the event that I am not available, I give permission for a message to be left on my answering service and/or with another person?      yes         ___________________      ___________________      ________________  (Signature of Patient)          (Witness)                   (Date and Time)

## 2019-07-11 RX ORDER — ATENOLOL 25 MG/1
TABLET ORAL
Qty: 90 TAB | Refills: 0 | Status: SHIPPED | OUTPATIENT
Start: 2019-07-11 | End: 2019-10-17 | Stop reason: SDUPTHER

## 2019-07-12 ENCOUNTER — ANESTHESIA EVENT (OUTPATIENT)
Dept: SURGERY | Age: 74
End: 2019-07-12
Payer: MEDICARE

## 2019-07-15 ENCOUNTER — ANESTHESIA (OUTPATIENT)
Dept: SURGERY | Age: 74
End: 2019-07-15
Payer: MEDICARE

## 2019-07-15 ENCOUNTER — HOSPITAL ENCOUNTER (OUTPATIENT)
Age: 74
Setting detail: OUTPATIENT SURGERY
Discharge: HOME | End: 2019-07-15
Attending: OPHTHALMOLOGY | Admitting: OPHTHALMOLOGY
Payer: MEDICARE

## 2019-07-15 VITALS
OXYGEN SATURATION: 99 % | WEIGHT: 183 LBS | RESPIRATION RATE: 16 BRPM | HEIGHT: 70 IN | SYSTOLIC BLOOD PRESSURE: 110 MMHG | BODY MASS INDEX: 26.2 KG/M2 | TEMPERATURE: 97.5 F | DIASTOLIC BLOOD PRESSURE: 84 MMHG | HEART RATE: 45 BPM

## 2019-07-15 PROCEDURE — 74011250636 HC RX REV CODE- 250/636

## 2019-07-15 PROCEDURE — 77030038831 HC SEAL SYNTH RESURE OCCULR OCEL -G: Performed by: OPHTHALMOLOGY

## 2019-07-15 PROCEDURE — 76210000046 HC AMBSU PH II REC FIRST 0.5 HR: Performed by: OPHTHALMOLOGY

## 2019-07-15 PROCEDURE — 77030021352 HC CBL LD SYS DISP COVD -B: Performed by: OPHTHALMOLOGY

## 2019-07-15 PROCEDURE — 76060000061 HC AMB SURG ANES 0.5 TO 1 HR: Performed by: OPHTHALMOLOGY

## 2019-07-15 PROCEDURE — 74011000250 HC RX REV CODE- 250

## 2019-07-15 PROCEDURE — 74011000250 HC RX REV CODE- 250: Performed by: OPHTHALMOLOGY

## 2019-07-15 PROCEDURE — V2632 POST CHMBR INTRAOCULAR LENS: HCPCS | Performed by: OPHTHALMOLOGY

## 2019-07-15 PROCEDURE — 77030018846 HC SOL IRR STRL H20 ICUM -A: Performed by: OPHTHALMOLOGY

## 2019-07-15 PROCEDURE — 76030000000 HC AMB SURG OR TIME 0.5 TO 1: Performed by: OPHTHALMOLOGY

## 2019-07-15 PROCEDURE — 74011250636 HC RX REV CODE- 250/636: Performed by: OPHTHALMOLOGY

## 2019-07-15 DEVICE — LENS IOL POST 1-PC 6X13 19.0. -- ACRYSOF: Type: IMPLANTABLE DEVICE | Site: EYE | Status: FUNCTIONAL

## 2019-07-15 RX ORDER — TIMOLOL MALEATE 5 MG/ML
SOLUTION/ DROPS OPHTHALMIC AS NEEDED
Status: DISCONTINUED | OUTPATIENT
Start: 2019-07-15 | End: 2019-07-15 | Stop reason: HOSPADM

## 2019-07-15 RX ORDER — SODIUM CHLORIDE 0.9 % (FLUSH) 0.9 %
5-40 SYRINGE (ML) INJECTION AS NEEDED
Status: DISCONTINUED | OUTPATIENT
Start: 2019-07-15 | End: 2019-07-15 | Stop reason: HOSPADM

## 2019-07-15 RX ORDER — CIPROFLOXACIN HYDROCHLORIDE 3.5 MG/ML
1 SOLUTION/ DROPS TOPICAL
Status: COMPLETED | OUTPATIENT
Start: 2019-07-15 | End: 2019-07-15

## 2019-07-15 RX ORDER — NEOMYCIN SULFATE, POLYMYXIN B SULFATE, AND DEXAMETHASONE 3.5; 10000; 1 MG/G; [USP'U]/G; MG/G
OINTMENT OPHTHALMIC AS NEEDED
Status: DISCONTINUED | OUTPATIENT
Start: 2019-07-15 | End: 2019-07-15 | Stop reason: HOSPADM

## 2019-07-15 RX ORDER — LIDOCAINE HYDROCHLORIDE 10 MG/ML
0.1 INJECTION, SOLUTION EPIDURAL; INFILTRATION; INTRACAUDAL; PERINEURAL AS NEEDED
Status: DISCONTINUED | OUTPATIENT
Start: 2019-07-15 | End: 2019-07-15 | Stop reason: HOSPADM

## 2019-07-15 RX ORDER — SODIUM CHLORIDE 9 MG/ML
25 INJECTION, SOLUTION INTRAVENOUS CONTINUOUS
Status: DISCONTINUED | OUTPATIENT
Start: 2019-07-15 | End: 2019-07-15 | Stop reason: HOSPADM

## 2019-07-15 RX ORDER — TROPICAMIDE 10 MG/ML
SOLUTION/ DROPS OPHTHALMIC
Status: COMPLETED
Start: 2019-07-15 | End: 2019-07-15

## 2019-07-15 RX ORDER — SODIUM CHLORIDE 0.9 % (FLUSH) 0.9 %
5-40 SYRINGE (ML) INJECTION EVERY 8 HOURS
Status: DISCONTINUED | OUTPATIENT
Start: 2019-07-15 | End: 2019-07-15 | Stop reason: HOSPADM

## 2019-07-15 RX ORDER — SODIUM CHLORIDE, SODIUM LACTATE, POTASSIUM CHLORIDE, CALCIUM CHLORIDE 600; 310; 30; 20 MG/100ML; MG/100ML; MG/100ML; MG/100ML
25 INJECTION, SOLUTION INTRAVENOUS CONTINUOUS
Status: DISCONTINUED | OUTPATIENT
Start: 2019-07-15 | End: 2019-07-15 | Stop reason: HOSPADM

## 2019-07-15 RX ORDER — TROPICAMIDE 10 MG/ML
1 SOLUTION/ DROPS OPHTHALMIC
Status: COMPLETED | OUTPATIENT
Start: 2019-07-15 | End: 2019-07-15

## 2019-07-15 RX ORDER — ONDANSETRON 2 MG/ML
4 INJECTION INTRAMUSCULAR; INTRAVENOUS AS NEEDED
Status: DISCONTINUED | OUTPATIENT
Start: 2019-07-15 | End: 2019-07-15 | Stop reason: HOSPADM

## 2019-07-15 RX ORDER — MIDAZOLAM HYDROCHLORIDE 1 MG/ML
INJECTION, SOLUTION INTRAMUSCULAR; INTRAVENOUS AS NEEDED
Status: DISCONTINUED | OUTPATIENT
Start: 2019-07-15 | End: 2019-07-15 | Stop reason: HOSPADM

## 2019-07-15 RX ORDER — TETRACAINE HYDROCHLORIDE 5 MG/ML
SOLUTION OPHTHALMIC AS NEEDED
Status: DISCONTINUED | OUTPATIENT
Start: 2019-07-15 | End: 2019-07-15 | Stop reason: HOSPADM

## 2019-07-15 RX ORDER — CIPROFLOXACIN HYDROCHLORIDE 3.5 MG/ML
SOLUTION/ DROPS TOPICAL
Status: COMPLETED
Start: 2019-07-15 | End: 2019-07-15

## 2019-07-15 RX ORDER — FENTANYL CITRATE 50 UG/ML
25 INJECTION, SOLUTION INTRAMUSCULAR; INTRAVENOUS
Status: DISCONTINUED | OUTPATIENT
Start: 2019-07-15 | End: 2019-07-15 | Stop reason: HOSPADM

## 2019-07-15 RX ORDER — DIPHENHYDRAMINE HYDROCHLORIDE 50 MG/ML
12.5 INJECTION, SOLUTION INTRAMUSCULAR; INTRAVENOUS AS NEEDED
Status: DISCONTINUED | OUTPATIENT
Start: 2019-07-15 | End: 2019-07-15 | Stop reason: HOSPADM

## 2019-07-15 RX ADMIN — CIPROFLOXACIN 1 DROP: 3 SOLUTION OPHTHALMIC at 06:45

## 2019-07-15 RX ADMIN — TROPICAMIDE 1 DROP: 10 SOLUTION/ DROPS OPHTHALMIC at 06:45

## 2019-07-15 RX ADMIN — TROPICAMIDE 1 DROP: 10 SOLUTION/ DROPS OPHTHALMIC at 06:57

## 2019-07-15 RX ADMIN — CIPROFLOXACIN HYDROCHLORIDE 1 DROP: 3.5 SOLUTION/ DROPS TOPICAL at 06:45

## 2019-07-15 RX ADMIN — MIDAZOLAM HYDROCHLORIDE 1 MG: 1 INJECTION, SOLUTION INTRAMUSCULAR; INTRAVENOUS at 07:39

## 2019-07-15 RX ADMIN — SODIUM CHLORIDE 25 ML/HR: 900 INJECTION, SOLUTION INTRAVENOUS at 06:42

## 2019-07-15 RX ADMIN — CIPROFLOXACIN HYDROCHLORIDE 1 DROP: 3.5 SOLUTION/ DROPS TOPICAL at 06:58

## 2019-07-15 RX ADMIN — TROPICAMIDE 1 DROP: 10 SOLUTION/ DROPS OPHTHALMIC at 06:51

## 2019-07-15 RX ADMIN — CIPROFLOXACIN HYDROCHLORIDE 1 DROP: 3.5 SOLUTION/ DROPS TOPICAL at 06:51

## 2019-07-15 NOTE — PERIOP NOTES
Nimisha Muller  1945  [id number]    Situation:  Verbal report given from: KClint Baez and KAILEE Frost RN  Procedure: Procedure(s):  CATARACT EXTRACTION WITH INTRA OCULAR LENS IMPLANT LEFT EYE    Background:    Preoperative diagnosis: H25.12 Age-related nuclear cataract, left eye    Postoperative diagnosis: H25.12 Age-related nuclear cataract, left eye    :  Dr. Griselda Milligan    Assistant(s): Circ-1: Mariaa Acuña RN  Scrub Tech-1: Alisa Lynn    Specimens: * No specimens in log *    Assessment:  Intra-procedure medications         Anesthesia gave intra-procedure sedation and medications, see anesthesia flow sheet     Intravenous fluids: LR@ KVO     Vital signs stable       Recommendation:    Permission to share finding with wife

## 2019-07-15 NOTE — DISCHARGE INSTRUCTIONS
Hue Mehta MD  [facility]  [facility] [address]  [place], [place] [facility]  Phone: [phone]       Fax: [phone]  If you are unable to keep appointment, kindly give 24 hours notice please. REMOVE PATCH  START DROPS WHEN YOU GET HOME  PUT PATCH BACK ON AT BEDTIME    1. DO NOT RUB the eye that was operated on. 2. Do not strain excessively. It is all right to bend as long as you do not strain. 3. It is safe to take a shower, wash your face, and wash your hair. Just keep the eye closed. 4. Do not swim for 1 week after surgery. 5. If you have any problems or questions, do not hesitate to call. There is always a physician on call at [phone] ext. [phone].   6. Follow instructions on eye drops from office. 7. You may take Tylenol or Advil for discomfort. If it pressure not relieved by Tylenol or Advil, please call Dr. Topher Peraza office. TO PREVENT AN INFECTION      1. [address] YOUR HANDS     To prevent infection, good handwashing is the most important thing you or your caregiver can do.  Wash your hands with soap and water or use the hand  we gave you before you touch any wounds. 2.  USE CLEAN SHEETS     Use freshly cleaned sheets on your bed after surgery.  To keep the surgery site clean, do not allow pets to sleep with you while your wound is still healing. 3. STOP SMOKING    Stop smoking, or at least cut back on smoking     Smoking slows your healing. 4.  CONTROL YOUR BLOOD SUGAR     High blood sugars slow wound healing.  If you are diabetic, control your blood sugar levels before and after your surgery. If you were given prescriptions, please review the written information on the prescribed medications. DO NOT DRIVE WHILE TAKING NARCOTIC PAIN MEDICATIONS.     DISCHARGE SUMMARY from Nurse    The following personal items collected during your admission are returned to you:   Dental Appliance: Dental Appliances: None  Vision: Visual Aid: Glasses(wife)  Hearing Aid:    Jewelry: Jewelry: Ring, With patient  Clothing: Clothing: With patient  Other Valuables: Other Valuables: Eyeglasses(wife)  Valuables sent to safe:      PATIENT INSTRUCTIONS:    After general anesthesia or intravenous sedation, for 24 hours or while taking prescription Narcotics:  · Someone should be with you for the next 24 hours. · For your own safety, a responsible adult must drive you home. · Limit your activities  · Recommended activity: Rest today, Do not climb stairs or shower unattended for the next 24 hours. · Do not drive and operate hazardous machinery  · Do not make important personal or business decisions  · Do  not drink alcoholic beverages  · If you have not urinated within 8 hours after discharge, please contact your surgeon on call. Report the following to your surgeon:  · Excessive pain, swelling, redness or odor of or around the surgical area  · Temperature over 100.5  · Nausea and vomiting lasting longer than 4 hours or if unable to take medications  · Any signs of decreased circulation or nerve impairment to extremity: change in color, persistent  numbness, tingling, coldness or increase pain  ·   ·   · You will receive a Post Operative Call from one of the Recovery Room Nurses on the day after your surgery to check on you. It is very important for us to know how you are recovering after your surgery. · You may receive an e-mail or letter in the mail from [facility] regarding your experience with us in the Ambulatory Surgery Unit. Your feedback is valuable to us and we appreciate your participation in the survey. · We wish youre a speedy recovery ? What to do at Home:      *  Please give a list of your current medications to your Primary Care Provider. *  Please update this list whenever your medications are discontinued, doses are      changed, or new medications (including over-the-counter products) are added.     *  Please carry medication information at all times in case of emergency situations. These are general instructions for a healthy lifestyle:    No smoking/ No tobacco products/ Avoid exposure to second hand smoke    Surgeon General's Warning:  Quitting smoking now greatly reduces serious risk to your health. Obesity, smoking, and sedentary lifestyle greatly increases your risk for illness    A healthy diet, regular physical exercise & weight monitoring are important for maintaining a healthy lifestyle    You may be retaining fluid if you have a history of heart failure or if you experience any of the following symptoms:  Weight gain of 3 pounds or more overnight or 5 pounds in a week, increased swelling in our hands or feet or shortness of breath while lying flat in bed. Please call your doctor as soon as you notice any of these symptoms; do not wait until your next office visit. Recognize signs and symptoms of STROKE:    B - Balance  E - Eyes    F-face looks uneven    A-arms unable to move or move even    S-speech slurred or non-existent    T-time-call 911 as soon as signs and symptoms begin-DO NOT go       Back to bed or wait to see if you get better-TIME IS BRAIN. If you have not received your influenza and/or pneumococcal vaccine, please follow up with your primary care physician. The discharge information has been reviewed with the patient and caregiver. The patient and caregiver verbalized understanding.

## 2019-07-15 NOTE — PERIOP NOTES
Permission received to review discharge instructions and discuss private health information with Hector Patel, wife.

## 2019-07-15 NOTE — OP NOTES
Date of Procedure: 7/15/19  Preoperative Diagnosis: Nuclear Sclerotic CATARACT LEFT EYE H25.12  Postoperative Diagnosis: Nuclear Sclerotic CATARACT LEFT EYE H25.12  Procedure: Extracapsular cataract extraction with lens implant left eye  Surgeon: MARCUS Norton MD  Assistants: None  Findings: Cataract left eye  Anesthesia: MAC with local  Estimated Blood Loss: None  Complications: None  Specimens: None  Prosthetic Devices Implanted: Intraocular lens implant left eye    The patient's left eye was dilated with mydriacyl 1% and ciprofloxacin 0.3% for 3 doses preoperatively. The patient was taken to the operating room and was given sedation. Tetracaine was given topically to the left eye, and the eye was prepped and draped in the usual manner for sterile eye surgery, including Betadine solution being dropped onto the conjunctiva at the beginning of the prep. The eyelashes were isolated with a plastic drape. A lid speculum was placed. Limbal relaxing incisions were made at the beginning of the case. A corneal marking gauge was used to make a 30 degree arc length at the 75 degree axis superiorly and inferiorly. After the marking was made, as small amount of Provisc (Duovisc) was placed on the incision sites, and a 600 micron depth leigh ann knife was used to make the 30 degree arc superiorly and inferiorly one half millimeters in from the limbus. A #15 blade was used to make a paracentesis at the 5:00 location. The eye was flushed with a lidocaine / epinephrine mixture (\"Shugarcaine\"). The eye was filled with Viscoat (Duovisc), and a crescent blade was used to make a 2.5 mm incision at the limbus temporally. This was dissected 2 mm into clear cornea, and the eye was entered with a 2.4 mm keratome. A 0.12 forceps was used for fixation during the procedure. A capsulorhexis flap was started with a cystotome, and this was completed 360 degrees with Utrata forceps. The capsular piece was removed.   [place] dissection was performed with the \"Shugarcaine\" mixture on a cannula. The lens nucleus was removed using phacoemulsification with a total phaco time of 1:16 minutes at 6.3%. The lens was cracked and manipulated with a Sinsky hook. Residual cortex was removed using irrigation / aspiration. The capsule remained intact. The capsule was refilled with Provisc, and an Shawn Intraocular lens model SN60WF power 19.0 was placed in a lens folding cartridge with Provisc. The lens was unfolded into the capsular bag. The lens centered well. Residual Provisc and Viscoat were removed using I / A. The Resure wound sealant was used to close the incision. The eye was flushed with BSS through the paracentesis. Betadine solution was placed on the conjunctival surface at the end of the case. The eye was left soft and formed at the end of the case. The incision site was water tight. The speculum was removed, and a drop of timolol 0.5% and carmen/poly/dex ointment was placed on the eye followed by a shield. The patient tolerated the procedure well and is to follow-up in one day.

## 2019-07-15 NOTE — ANESTHESIA POSTPROCEDURE EVALUATION
Procedure(s):  CATARACT EXTRACTION WITH INTRA OCULAR LENS IMPLANT LEFT EYE. MAC    Anesthesia Post Evaluation      Multimodal analgesia: multimodal analgesia not used between 6 hours prior to anesthesia start to PACU discharge  Patient location during evaluation: bedside  Patient participation: complete - patient participated  Level of consciousness: awake and alert  Pain score: 0  Airway patency: patent  Anesthetic complications: no  Cardiovascular status: acceptable  Respiratory status: acceptable  Hydration status: acceptable  Post anesthesia nausea and vomiting:  none      No vitals data found for the desired time range.

## 2019-07-15 NOTE — ANESTHESIA PREPROCEDURE EVALUATION
Anesthetic History   No history of anesthetic complications            Review of Systems / Medical History  Patient summary reviewed, nursing notes reviewed and pertinent labs reviewed    Pulmonary  Within defined limits                 Neuro/Psych   Within defined limits           Cardiovascular    Hypertension        Dysrhythmias : atrial fibrillation  PAD (carotid stenosis) and hyperlipidemia  Pertinent negatives: Cardiac stents: Paroxysmal.  Exercise tolerance: >4 METS     GI/Hepatic/Renal     GERD: well controlled           Endo/Other        Arthritis     Other Findings   Comments: Low back pain           Physical Exam    Airway  Mallampati: I  TM Distance: < 4 cm  Neck ROM: normal range of motion   Mouth opening: Normal     Cardiovascular    Rhythm: regular        Pertinent negatives: No murmur  Comments: bradycardia Dental    Dentition: Brid[place]  Comments: Upper right   Pulmonary  Breath sounds clear to auscultation               Abdominal  GI exam deferred       Other Findings            Anesthetic Plan    ASA: 2  Anesthesia type: MAC          Induction: Intravenous  Anesthetic plan and risks discussed with: Patient      Took BB yesterday at 2100

## 2019-07-15 NOTE — BRIEF OP NOTE
BRIEF OPERATIVE NOTE    Date of Procedure: 7/15/2019   Preoperative Diagnosis: H25.12 Age-related nuclear cataract, left eye  Postoperative Diagnosis: H25.12 Age-related nuclear cataract, left eye    Procedure(s):  CATARACT EXTRACTION WITH INTRA OCULAR LENS IMPLANT LEFT EYE  Surgeon(s) and Role:     * Mary May MD - Primary         Surgical Assistant: none    Surgical Staff:  Circ-1: Cesar Ogden RN  Scrub Tech-1: Nathan Michelle  Event Time In Time Out   Incision Start 4652    Incision Close 0815      Anesthesia: MAC   Estimated Blood Loss: none  Specimens: * No specimens in log *   Findings: cataract left eye  Complications: none  Implants:   Implant Name Type Inv.  Item Serial No.  Lot No. LRB No. Used Action   LENS IOL POST 1-PC 6X13 19.0. -- [employer] - [id number]  LENS IOL POST 1-PC 6X13 19.0. -- [employer] [id number] [employer]  Left 1 Implanted

## 2019-08-29 ENCOUNTER — OFFICE VISIT (OUTPATIENT)
Dept: CARDIOLOGY CLINIC | Age: 74
End: 2019-08-29

## 2019-08-29 VITALS
BODY MASS INDEX: 26.18 KG/M2 | HEIGHT: 70 IN | OXYGEN SATURATION: 98 % | DIASTOLIC BLOOD PRESSURE: 60 MMHG | HEART RATE: 58 BPM | SYSTOLIC BLOOD PRESSURE: 118 MMHG | RESPIRATION RATE: 18 BRPM | WEIGHT: 182.9 LBS

## 2019-08-29 DIAGNOSIS — I48.0 PAF (PAROXYSMAL ATRIAL FIBRILLATION) (HCC): Primary | ICD-10-CM

## 2019-08-29 DIAGNOSIS — I10 ESSENTIAL HYPERTENSION: ICD-10-CM

## 2019-08-29 DIAGNOSIS — E78.2 MIXED HYPERLIPIDEMIA: ICD-10-CM

## 2019-08-29 DIAGNOSIS — I65.29 STENOSIS OF CAROTID ARTERY, UNSPECIFIED LATERALITY: ICD-10-CM

## 2019-08-29 RX ORDER — LIDOCAINE 50 MG/G
OINTMENT TOPICAL AS NEEDED
COMMUNITY
End: 2022-01-01

## 2019-08-29 RX ORDER — PRAVASTATIN SODIUM 20 MG/1
20 TABLET ORAL
COMMUNITY
End: 2022-01-01

## 2019-08-29 RX ORDER — TAMSULOSIN HYDROCHLORIDE 0.4 MG/1
0.4 CAPSULE ORAL DAILY
COMMUNITY
End: 2022-01-01

## 2019-08-29 NOTE — PROGRESS NOTES
1. Have you been to the ER, urgent care clinic since your last visit? Hospitalized since your last visit? No.    2. Have you seen or consulted any other health care providers outside of the [address] [place] [address] since your last visit? Include any pap smears or colon screening.    No.        Chief Complaint   Patient presents with    Annual Exam     pt denies any cardiac symptoms

## 2019-08-29 NOTE — Clinical Note
8/29/19 Patient: Brie Cisneros YOB: 1945 Date of Visit: 8/29/2019 Foster Stein MD 
Patient Can Only Remember The Practice Name And Not The Physician 
VIA Dear Emily Stein MD, Thank you for referring Mr. Traci Roberts to [facility] for evaluation. My notes for this consultation are attached. If you have questions, please do not hesitate to call me. I look forward to following your patient along with you.  
 
 
Sincerely, 
 
Ariel Cantor MD
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
## 2019-09-24 PROBLEM — Z00.00 MEDICARE ANNUAL WELLNESS VISIT, INITIAL: Status: RESOLVED | Noted: 2017-10-16 | Resolved: 2019-09-24

## 2019-09-24 PROBLEM — Z12.5 PROSTATE CANCER SCREENING: Status: RESOLVED | Noted: 2017-10-16 | Resolved: 2019-09-24

## 2019-09-25 PROBLEM — Z01.810 PRE-OPERATIVE CARDIOVASCULAR EXAMINATION: Status: RESOLVED | Noted: 2017-08-01 | Resolved: 2019-09-25

## 2020-01-15 RX ORDER — ATENOLOL 25 MG/1
TABLET ORAL
Qty: 90 TAB | Refills: 0 | Status: SHIPPED | OUTPATIENT
Start: 2020-01-15 | End: 2020-04-13

## 2020-02-06 ENCOUNTER — TELEPHONE (OUTPATIENT)
Dept: CARDIOLOGY CLINIC | Age: 75
End: 2020-02-06

## 2020-02-06 NOTE — TELEPHONE ENCOUNTER
Verified patient with 2 identifiers   Patient is in [place] and recently had a PMI. Patient states he normally sees Dr Jamaica Chapman for cardiology. Advised patient he would still see Dr Jamaica Chapman however would also need to see Dr Daisha Velez as he is the EP here. Advised to schedule an appt when he returns to [place]. Patient advised understanding and agrees.

## 2020-02-06 NOTE — TELEPHONE ENCOUNTER
Please give pt a call, he recently got a pacemaker put in and would like to speak w/ you regarding that, He wanted to see Dr. Andrey Guido about his pacemaker and I told him he would have to see Adrián Maxwell Che for that, he did not want to make an apt w/o speaking to you 1st    thanks

## 2020-03-09 ENCOUNTER — TELEPHONE (OUTPATIENT)
Dept: CARDIOLOGY CLINIC | Age: 75
End: 2020-03-09

## 2020-03-09 NOTE — TELEPHONE ENCOUNTER
Please call to discuss hospitalization while in [place]; had pacemaker put in per spouse. Still having some dizziness off and on.

## 2020-03-09 NOTE — TELEPHONE ENCOUNTER
Patient will contact the hospital in [place] to have records faxed from recent visit. Has a medtronic micra pacemakee now, still C/O dizziness, his PCP is the [place] will monitor BP and call with results.  [employer] call for appt with Dr Berrios Center NP for Dr Jessica Orta thanks

## 2020-03-19 ENCOUNTER — OFFICE VISIT (OUTPATIENT)
Dept: CARDIOLOGY CLINIC | Age: 75
End: 2020-03-19

## 2020-03-19 ENCOUNTER — CLINICAL SUPPORT (OUTPATIENT)
Dept: CARDIOLOGY CLINIC | Age: 75
End: 2020-03-19

## 2020-03-19 VITALS
HEIGHT: 70 IN | OXYGEN SATURATION: 99 % | BODY MASS INDEX: 26.05 KG/M2 | HEART RATE: 58 BPM | DIASTOLIC BLOOD PRESSURE: 52 MMHG | RESPIRATION RATE: 16 BRPM | SYSTOLIC BLOOD PRESSURE: 96 MMHG | WEIGHT: 182 LBS

## 2020-03-19 DIAGNOSIS — I73.9 PVD (PERIPHERAL VASCULAR DISEASE) (HCC): ICD-10-CM

## 2020-03-19 DIAGNOSIS — R00.1 SINUS BRADYCARDIA: ICD-10-CM

## 2020-03-19 DIAGNOSIS — I48.0 PAF (PAROXYSMAL ATRIAL FIBRILLATION) (HCC): ICD-10-CM

## 2020-03-19 DIAGNOSIS — R55 SYNCOPE, UNSPECIFIED SYNCOPE TYPE: ICD-10-CM

## 2020-03-19 DIAGNOSIS — I48.0 PAF (PAROXYSMAL ATRIAL FIBRILLATION) (HCC): Primary | ICD-10-CM

## 2020-03-19 DIAGNOSIS — Z95.0 CARDIAC PACEMAKER IN SITU: Primary | ICD-10-CM

## 2020-03-19 DIAGNOSIS — Z95.0 PACEMAKER: ICD-10-CM

## 2020-03-19 DIAGNOSIS — E78.2 MIXED HYPERLIPIDEMIA: ICD-10-CM

## 2020-03-19 DIAGNOSIS — I10 ESSENTIAL HYPERTENSION: ICD-10-CM

## 2020-03-19 RX ORDER — APIXABAN 5 MG/1
1 TABLET, FILM COATED ORAL 2 TIMES DAILY
COMMUNITY
Start: 2020-03-03 | End: 2022-01-01

## 2020-03-19 RX ORDER — DIGOXIN 125 MCG
1 TABLET ORAL DAILY
COMMUNITY
Start: 2020-02-06 | End: 2020-03-19 | Stop reason: ALTCHOICE

## 2020-03-19 RX ORDER — DILTIAZEM HYDROCHLORIDE 60 MG/1
30 TABLET, FILM COATED ORAL 3 TIMES DAILY
COMMUNITY
Start: 2020-02-05 | End: 2020-03-19 | Stop reason: ALTCHOICE
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
## 2020-03-19 NOTE — PROGRESS NOTES
1. Have you been to the ER, urgent care clinic since your last visit? Hospitalized since your last visit? YES, IN [place], DIZZINESS, SOB. 2 WEEKS AGO. 2. Have you seen or consulted any other health care providers outside of the [address] [place] [address] since your last visit? Include any pap smears or colon screening. NO    NEW PATIENT. C/O DISCOMFORT IN CHEST OFF AND ON, DIZZINESS, SLIGHT SOB.

## 2020-03-19 NOTE — Clinical Note
3/19/20 Patient: Ashlyn Canchola YOB: 1945 Date of Visit: 3/19/2020 Foster Stein MD 
Patient Can Only Remember The Practice Name And Not The Physician 
VIA Dear Holly Stein MD, Thank you for referring Mr. Jeramie Brandt to [facility] for evaluation. My notes for this consultation are attached. If you have questions, please do not hesitate to call me. I look forward to following your patient along with you. Sincerely, Jennifer Barber MD

## 2020-03-25 ENCOUNTER — OFFICE VISIT (OUTPATIENT)
Dept: CARDIOLOGY CLINIC | Age: 75
End: 2020-03-25

## 2020-03-25 DIAGNOSIS — Z95.0 CARDIAC PACEMAKER IN SITU: Primary | ICD-10-CM

## 2020-03-25 DIAGNOSIS — I48.0 PAF (PAROXYSMAL ATRIAL FIBRILLATION) (HCC): ICD-10-CM

## 2020-03-25 DIAGNOSIS — R55 SYNCOPE, UNSPECIFIED SYNCOPE TYPE: ICD-10-CM

## 2020-04-13 RX ORDER — ATENOLOL 25 MG/1
TABLET ORAL
Qty: 90 TAB | Refills: 0 | Status: SHIPPED | OUTPATIENT
Start: 2020-04-13 | End: 2020-10-19

## 2020-05-07 ENCOUNTER — HOSPITAL ENCOUNTER (OUTPATIENT)
Dept: CT IMAGING | Age: 75
Discharge: HOME | End: 2020-05-07
Attending: INTERNAL MEDICINE
Payer: MEDICARE

## 2020-05-07 DIAGNOSIS — R91.1 SOLITARY PULMONARY NODULE: ICD-10-CM

## 2020-05-07 PROCEDURE — 71250 CT THORAX DX C-: CPT

## 2020-06-22 ENCOUNTER — OFFICE VISIT (OUTPATIENT)
Dept: CARDIOLOGY CLINIC | Age: 75
End: 2020-06-22

## 2020-06-22 DIAGNOSIS — Z95.0 CARDIAC PACEMAKER IN SITU: Primary | ICD-10-CM

## 2020-06-22 DIAGNOSIS — R55 SYNCOPE, UNSPECIFIED SYNCOPE TYPE: ICD-10-CM

## 2020-08-27 ENCOUNTER — OFFICE VISIT (OUTPATIENT)
Dept: CARDIOLOGY CLINIC | Age: 75
End: 2020-08-27
Payer: MEDICARE

## 2020-08-27 VITALS
OXYGEN SATURATION: 99 % | SYSTOLIC BLOOD PRESSURE: 120 MMHG | DIASTOLIC BLOOD PRESSURE: 80 MMHG | HEART RATE: 90 BPM | RESPIRATION RATE: 16 BRPM | WEIGHT: 195.5 LBS | HEIGHT: 70 IN | BODY MASS INDEX: 27.99 KG/M2

## 2020-08-27 DIAGNOSIS — I48.0 PAF (PAROXYSMAL ATRIAL FIBRILLATION) (HCC): ICD-10-CM

## 2020-08-27 DIAGNOSIS — E78.2 MIXED HYPERLIPIDEMIA: ICD-10-CM

## 2020-08-27 DIAGNOSIS — I10 ESSENTIAL HYPERTENSION: ICD-10-CM

## 2020-08-27 DIAGNOSIS — R06.09 DOE (DYSPNEA ON EXERTION): ICD-10-CM

## 2020-08-27 DIAGNOSIS — Z95.0 CARDIAC PACEMAKER IN SITU: ICD-10-CM

## 2020-08-27 DIAGNOSIS — I48.0 PAF (PAROXYSMAL ATRIAL FIBRILLATION) (HCC): Primary | ICD-10-CM

## 2020-08-27 DIAGNOSIS — I73.9 PVD (PERIPHERAL VASCULAR DISEASE) (HCC): ICD-10-CM

## 2020-08-27 DIAGNOSIS — R55 SYNCOPE, UNSPECIFIED SYNCOPE TYPE: ICD-10-CM

## 2020-08-27 DIAGNOSIS — I49.5 SSS (SICK SINUS SYNDROME) (HCC): ICD-10-CM

## 2020-08-27 DIAGNOSIS — I65.29 STENOSIS OF CAROTID ARTERY, UNSPECIFIED LATERALITY: ICD-10-CM

## 2020-08-27 PROCEDURE — G8432 DEP SCR NOT DOC, RNG: HCPCS | Performed by: INTERNAL MEDICINE

## 2020-08-27 PROCEDURE — 3017F COLORECTAL CA SCREEN DOC REV: CPT | Performed by: INTERNAL MEDICINE

## 2020-08-27 PROCEDURE — 99214 OFFICE O/P EST MOD 30 MIN: CPT | Performed by: INTERNAL MEDICINE

## 2020-08-27 PROCEDURE — G8427 DOCREV CUR MEDS BY ELIG CLIN: HCPCS | Performed by: INTERNAL MEDICINE

## 2020-08-27 PROCEDURE — G8754 DIAS BP LESS 90: HCPCS | Performed by: INTERNAL MEDICINE

## 2020-08-27 PROCEDURE — G8536 NO DOC ELDER MAL SCRN: HCPCS | Performed by: INTERNAL MEDICINE

## 2020-08-27 PROCEDURE — 1101F PT FALLS ASSESS-DOCD LE1/YR: CPT | Performed by: INTERNAL MEDICINE

## 2020-08-27 PROCEDURE — 93000 ELECTROCARDIOGRAM COMPLETE: CPT | Performed by: INTERNAL MEDICINE

## 2020-08-27 PROCEDURE — G8419 CALC BMI OUT NRM PARAM NOF/U: HCPCS | Performed by: INTERNAL MEDICINE

## 2020-08-27 PROCEDURE — G8752 SYS BP LESS 140: HCPCS | Performed by: INTERNAL MEDICINE

## 2020-08-27 RX ORDER — TIOTROPIUM BROMIDE INHALATION SPRAY 3.12 UG/1
SPRAY, METERED RESPIRATORY (INHALATION)
COMMUNITY
Start: 2020-08-06 | End: 2022-01-01 | Stop reason: CLARIF
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
## 2020-08-27 NOTE — PROGRESS NOTES
Chief Complaint   Patient presents with    Follow-up    Hypertension    Cholesterol Problem    Irregular Heart Beat       1. Have you been to the ER, urgent care clinic since your last visit? Hospitalized since your last visit? No    2. Have you seen or consulted any other health care providers outside of the [address] [place] [address] since your last visit? Include any pap smears or colon screening. Yes Pulmonary. Patient C/O dizziness. and SOB. Ivet Yu

## 2020-09-25 ENCOUNTER — TELEPHONE (OUTPATIENT)
Dept: CARDIOLOGY CLINIC | Age: 75
End: 2020-09-25

## 2020-09-25 ENCOUNTER — ANCILLARY PROCEDURE (OUTPATIENT)
Dept: CARDIOLOGY CLINIC | Age: 75
End: 2020-09-25
Payer: MEDICARE

## 2020-09-25 VITALS
HEIGHT: 70 IN | BODY MASS INDEX: 25.91 KG/M2 | SYSTOLIC BLOOD PRESSURE: 136 MMHG | WEIGHT: 181 LBS | DIASTOLIC BLOOD PRESSURE: 84 MMHG

## 2020-09-25 PROCEDURE — 78452 HT MUSCLE IMAGE SPECT MULT: CPT | Performed by: INTERNAL MEDICINE

## 2020-09-25 PROCEDURE — A9502 TC99M TETROFOSMIN: HCPCS | Performed by: INTERNAL MEDICINE

## 2020-09-25 PROCEDURE — 93015 CV STRESS TEST SUPVJ I&R: CPT | Performed by: INTERNAL MEDICINE

## 2020-09-28 ENCOUNTER — OFFICE VISIT (OUTPATIENT)
Dept: CARDIOLOGY CLINIC | Age: 75
End: 2020-09-28
Payer: MEDICARE

## 2020-09-28 DIAGNOSIS — I10 ESSENTIAL HYPERTENSION: ICD-10-CM

## 2020-09-28 DIAGNOSIS — I49.5 SSS (SICK SINUS SYNDROME) (HCC): ICD-10-CM

## 2020-09-28 DIAGNOSIS — Z95.0 CARDIAC PACEMAKER IN SITU: Primary | ICD-10-CM

## 2020-09-28 DIAGNOSIS — I48.0 PAF (PAROXYSMAL ATRIAL FIBRILLATION) (HCC): ICD-10-CM

## 2020-09-28 DIAGNOSIS — Z95.0 CARDIAC PACEMAKER IN SITU: ICD-10-CM

## 2020-09-28 DIAGNOSIS — R94.39 ABNORMAL NUCLEAR STRESS TEST: Primary | ICD-10-CM

## 2020-09-28 LAB
STRESS BASELINE DIAS BP: 84 MMHG
STRESS BASELINE HR: 89 BPM
STRESS BASELINE SYS BP: 136 MMHG
STRESS PEAK DIAS BP: 84 MMHG
STRESS PEAK SYS BP: 136 MMHG
STRESS PERCENT HR ACHIEVED: 66 %
STRESS POST PEAK HR: 96 BPM
STRESS RATE PRESSURE PRODUCT: NORMAL BPM*MMHG
STRESS ST DEPRESSION: 0 MM
STRESS ST ELEVATION: 0 MM
STRESS TARGET HR: 145 BPM

## 2020-09-28 PROCEDURE — 93296 REM INTERROG EVL PM/IDS: CPT | Performed by: INTERNAL MEDICINE

## 2020-09-28 PROCEDURE — 93294 REM INTERROG EVL PM/LDLS PM: CPT | Performed by: INTERNAL MEDICINE

## 2020-09-28 NOTE — PROGRESS NOTES
Please advise stress test shows no evidence of any active blood flow problems to the heart. Calculated heart function is likely incorrect which happens fairly commonly with atrial fibrillation since his echocardiogram a few months ago was normal.  I recommend checking an echocardiogram to verify heart function is still normal.  If that is normal, follow-up in 1 year. Echocardiogram ordered.

## 2020-10-03 ENCOUNTER — TELEPHONE (OUTPATIENT)
Dept: CARDIOLOGY CLINIC | Age: 75
End: 2020-10-03

## 2020-10-03 NOTE — TELEPHONE ENCOUNTER
----- Message from Emiliano Montgomery MD sent at 9/28/2020  5:06 PM EDT -----  Please advise stress test shows no evidence of any active blood flow problems to the heart. Calculated heart function is likely incorrect which happens fairly commonly with atrial fibrillation since his echocardiogram a few months ago was normal.  I recommend checking an echocardiogram to verify heart function is still normal.  If that is normal, follow-up in 1 year. Echocardiogram ordered.

## 2020-10-03 NOTE — TELEPHONE ENCOUNTER
Spoke with patient. Verified patient with two patient identifiers. Advised echo normal.  Calculated heart function likely incorrect due to a fib. Will get echo to verify. Order is in. Will ask PSR to call pt to schedule echo . Patient verbalized understanding.

## 2020-10-13 ENCOUNTER — ANCILLARY PROCEDURE (OUTPATIENT)
Dept: CARDIOLOGY CLINIC | Age: 75
End: 2020-10-13
Payer: MEDICARE

## 2020-10-13 VITALS
BODY MASS INDEX: 25.91 KG/M2 | HEIGHT: 70 IN | DIASTOLIC BLOOD PRESSURE: 84 MMHG | SYSTOLIC BLOOD PRESSURE: 136 MMHG | WEIGHT: 181 LBS

## 2020-10-13 PROCEDURE — 93306 TTE W/DOPPLER COMPLETE: CPT | Performed by: INTERNAL MEDICINE

## 2020-10-14 ENCOUNTER — TELEPHONE (OUTPATIENT)
Dept: CARDIOLOGY CLINIC | Age: 75
End: 2020-10-14

## 2020-10-14 DIAGNOSIS — I10 ESSENTIAL HYPERTENSION: Primary | ICD-10-CM

## 2020-10-14 DIAGNOSIS — I70.90 ASVD (ARTERIOSCLEROTIC VASCULAR DISEASE): ICD-10-CM

## 2020-10-14 LAB
ECHO AO ASC DIAM: 3.48 CM
ECHO AO ROOT DIAM: 3.2 CM
ECHO AV AREA PEAK VELOCITY: 2.63 CM2
ECHO AV AREA/BSA PEAK VELOCITY: 1.3 CM2/M2
ECHO AV PEAK GRADIENT: 3.34 MMHG
ECHO AV PEAK VELOCITY: 73.93 CM/S
ECHO LA VOL 2C: 72.69 ML (ref 18–58)
ECHO LA VOL 4C: 55.71 ML (ref 18–58)
ECHO LA VOL BP: 67.64 ML (ref 18–58)
ECHO LA VOL BP: 67.64 ML (ref 18–58)
ECHO LA VOLUME INDEX A2C: 36.33 ML/M2 (ref 16–28)
ECHO LA VOLUME INDEX A4C: 27.85 ML/M2 (ref 16–28)
ECHO LV EDV A2C: 69.64 ML
ECHO LV EDV A4C: 64.67 ML
ECHO LV EDV BP: 69.11 ML (ref 67–155)
ECHO LV EDV BP: 69.11 ML (ref 67–155)
ECHO LV EDV INDEX A4C: 32.3 ML/M2
ECHO LV EDV NDEX A2C: 34.8 ML/M2
ECHO LV EJECTION FRACTION A2C: 41 PERCENT
ECHO LV EJECTION FRACTION A2C: 41 PERCENT
ECHO LV EJECTION FRACTION A4C: 39 PERCENT
ECHO LV EJECTION FRACTION A4C: 39 PERCENT
ECHO LV EJECTION FRACTION BIPLANE: 40.2 PERCENT (ref 55–100)
ECHO LV EJECTION FRACTION BIPLANE: 40.2 PERCENT (ref 55–100)
ECHO LV ESV A2C: 41.34 ML
ECHO LV ESV A4C: 39.41 ML
ECHO LV ESV BP: 41.34 ML (ref 22–58)
ECHO LV ESV BP: 41.34 ML (ref 22–58)
ECHO LV ESV INDEX A2C: 20.7 ML/M2
ECHO LV ESV INDEX A4C: 19.7 ML/M2
ECHO LV INTERNAL DIMENSION DIASTOLIC: 3.71 CM (ref 4.2–5.9)
ECHO LV INTERNAL DIMENSION SYSTOLIC: 2.7 CM
ECHO LV IVSD: 0.82 CM (ref 0.6–1)
ECHO LV MASS 2D: 96.5 G (ref 88–224)
ECHO LV MASS INDEX 2D: 48.3 G/M2 (ref 49–115)
ECHO LV POSTERIOR WALL DIASTOLIC: 0.97 CM (ref 0.6–1)
ECHO LVOT DIAM: 1.96 CM
ECHO LVOT PEAK GRADIENT: 1.38 MMHG
ECHO LVOT PEAK VELOCITY: 58.47 CM/S
ECHO LVOT SV: 32 ML
ECHO LVOT VTI: 10.58 CM
ECHO MV A VELOCITY: 33.37 CENTIMETER/SECOND
ECHO MV AREA PISA: 0.11 CM2
ECHO MV E DECELERATION TIME (DT): 0.11 S
ECHO MV E VELOCITY: 103.2 CENTIMETER/SECOND
ECHO MV EROA PISA: 0.11 CM2
ECHO MV REGURGITANT VOLUME: 18.39 ML
ECHO MV REGURGITANT VTIA: 171.28 CM
ECHO PV PEAK INSTANTANEOUS GRADIENT SYSTOLIC: 3.98 MMHG
ECHO PV REGURGITANT MAX VELOCITY: 450.58 CM/S
ECHO PV REGURGITANT MAX VELOCITY: 99.69 CM/S
ECHO RA AREA 4C: 18.25 CM2
LVOT MG: 0.68 MMHG

## 2020-10-14 NOTE — PROGRESS NOTES
Pumping heart strength mildly decreased. Recommend to see Olesya during her PM clinic to evaluate AF burden. Would also like to start on another medication therapy to help improve heart function but needs to get most recent lab work from PCP. Continue same for now until he sees Olesya and we obtain the labs. If he has not gotten any labs done this year, then we will order them.   Thanks

## 2020-10-14 NOTE — TELEPHONE ENCOUNTER
----- Message from Mariella Coronado NP sent at 10/14/2020  2:46 PM EDT -----  Pumping heart strength mildly decreased. Recommend to see Olesya during her PM clinic to evaluate AF burden. Would also like to start on another medication therapy to help improve heart function but needs to get most recent lab work from PCP. Continue same for now until he sees Olesya and we obtain the labs. If he has not gotten any labs done this year, then we will order them.   Thanks

## 2020-10-14 NOTE — TELEPHONE ENCOUNTER
Verified patient with 2 identifiers   Spoke with patient regarding results and recommendations. Voiced understanding. Had lab work done at [place] in 4/2020. Can we order more to do at [facility]. Patient next appt with Olesya is 4/2021-we need to schedule one sooner correct? What med will patient be starting pending lab results?

## 2020-10-14 NOTE — TELEPHONE ENCOUNTER
Verified patient with 2 identifiers   Advised that we will order more lab work  Will leave lab slip up front for . Also advised we would like him to see Olesya asap. Will route msg to  to call patient to schedule appt with Olesya.

## 2020-10-16 LAB
BUN SERPL-MCNC: 14 MG/DL (ref 8–27)
BUN/CREAT SERPL: 10 (ref 10–24)
CALCIUM SERPL-MCNC: 9.5 MG/DL (ref 8.6–10.2)
CHLORIDE SERPL-SCNC: 104 MMOL/L (ref 96–106)
CO2 SERPL-SCNC: 23 MMOL/L (ref 20–29)
CREAT SERPL-MCNC: 1.35 MG/DL (ref 0.76–1.27)
GLUCOSE SERPL-MCNC: 133 MG/DL (ref 65–99)
INTERPRETATION: NORMAL
POTASSIUM SERPL-SCNC: 4.7 MMOL/L (ref 3.5–5.2)
SODIUM SERPL-SCNC: 142 MMOL/L (ref 134–144)

## 2020-10-19 ENCOUNTER — OFFICE VISIT (OUTPATIENT)
Dept: CARDIOLOGY CLINIC | Age: 75
End: 2020-10-19
Payer: MEDICARE

## 2020-10-19 VITALS
BODY MASS INDEX: 25.91 KG/M2 | SYSTOLIC BLOOD PRESSURE: 104 MMHG | WEIGHT: 181 LBS | DIASTOLIC BLOOD PRESSURE: 68 MMHG | RESPIRATION RATE: 18 BRPM | OXYGEN SATURATION: 98 % | HEIGHT: 70 IN | HEART RATE: 91 BPM

## 2020-10-19 DIAGNOSIS — I50.20 SYSTOLIC CONGESTIVE HEART FAILURE, UNSPECIFIED HF CHRONICITY (HCC): ICD-10-CM

## 2020-10-19 DIAGNOSIS — I48.0 PAF (PAROXYSMAL ATRIAL FIBRILLATION) (HCC): ICD-10-CM

## 2020-10-19 DIAGNOSIS — R06.09 DOE (DYSPNEA ON EXERTION): ICD-10-CM

## 2020-10-19 DIAGNOSIS — I10 ESSENTIAL HYPERTENSION: ICD-10-CM

## 2020-10-19 DIAGNOSIS — I48.0 PAF (PAROXYSMAL ATRIAL FIBRILLATION) (HCC): Primary | ICD-10-CM

## 2020-10-19 DIAGNOSIS — I49.5 SSS (SICK SINUS SYNDROME) (HCC): ICD-10-CM

## 2020-10-19 DIAGNOSIS — Z95.0 CARDIAC PACEMAKER IN SITU: ICD-10-CM

## 2020-10-19 PROCEDURE — 1101F PT FALLS ASSESS-DOCD LE1/YR: CPT | Performed by: NURSE PRACTITIONER

## 2020-10-19 PROCEDURE — 93000 ELECTROCARDIOGRAM COMPLETE: CPT | Performed by: NURSE PRACTITIONER

## 2020-10-19 PROCEDURE — 3017F COLORECTAL CA SCREEN DOC REV: CPT | Performed by: NURSE PRACTITIONER

## 2020-10-19 PROCEDURE — G8427 DOCREV CUR MEDS BY ELIG CLIN: HCPCS | Performed by: NURSE PRACTITIONER

## 2020-10-19 PROCEDURE — G8754 DIAS BP LESS 90: HCPCS | Performed by: NURSE PRACTITIONER

## 2020-10-19 PROCEDURE — G8432 DEP SCR NOT DOC, RNG: HCPCS | Performed by: NURSE PRACTITIONER

## 2020-10-19 PROCEDURE — G8536 NO DOC ELDER MAL SCRN: HCPCS | Performed by: NURSE PRACTITIONER

## 2020-10-19 PROCEDURE — G8419 CALC BMI OUT NRM PARAM NOF/U: HCPCS | Performed by: NURSE PRACTITIONER

## 2020-10-19 PROCEDURE — G8752 SYS BP LESS 140: HCPCS | Performed by: NURSE PRACTITIONER

## 2020-10-19 PROCEDURE — 99213 OFFICE O/P EST LOW 20 MIN: CPT | Performed by: NURSE PRACTITIONER

## 2020-10-19 RX ORDER — CARVEDILOL 3.12 MG/1
3.12 TABLET ORAL 2 TIMES DAILY WITH MEALS
Qty: 60 TAB | Refills: 3 | Status: SHIPPED | OUTPATIENT
Start: 2020-10-19 | End: 2021-01-13 | Stop reason: SDUPTHER
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
## 2020-10-19 NOTE — PROGRESS NOTES
Chief Complaint   Patient presents with    New Patient     Referred by Di Hernadez NP for decreased EF and A Fib     Shortness of Breath     with and without exertion     Dizziness     lightheadedness this am      1. Have you been to the ER, urgent care clinic since your last visit? Hospitalized since your last visit? No     2. Have you seen or consulted any other health care providers outside of the [address] [place] [address] since your last visit? Include any pap smears or colon screening.   No

## 2020-10-21 ENCOUNTER — CLINICAL SUPPORT (OUTPATIENT)
Dept: CARDIOLOGY CLINIC | Age: 75
End: 2020-10-21
Payer: MEDICARE

## 2020-10-21 DIAGNOSIS — I48.91 ATRIAL FIBRILLATION, UNSPECIFIED TYPE (HCC): Primary | ICD-10-CM

## 2020-10-21 DIAGNOSIS — I48.0 PAF (PAROXYSMAL ATRIAL FIBRILLATION) (HCC): ICD-10-CM

## 2020-10-21 PROCEDURE — 93228 REMOTE 30 DAY ECG REV/REPORT: CPT | Performed by: INTERNAL MEDICINE

## 2020-10-21 NOTE — PROGRESS NOTES
Patient received a 30 day event monitor. Instructions given verbally as well as an instruction sheet. Pt verbalized understanding.     [facility] Event Monitoring

## 2020-10-23 ENCOUNTER — TELEPHONE (OUTPATIENT)
Dept: CARDIOLOGY CLINIC | Age: 75
End: 2020-10-23

## 2020-10-23 LAB
ERYTHROCYTE [DISTWIDTH] IN BLOOD BY AUTOMATED COUNT: 13.7 % (ref 11.6–15.4)
HCT VFR BLD AUTO: 49.2 % (ref 37.5–51)
HGB BLD-MCNC: 16.7 G/DL (ref 13–17.7)
MCH RBC QN AUTO: 31.6 PG (ref 26.6–33)
MCHC RBC AUTO-ENTMCNC: 33.9 G/DL (ref 31.5–35.7)
MCV RBC AUTO: 93 FL (ref 79–97)
PLATELET # BLD AUTO: 176 X10E3/UL (ref 150–450)
RBC # BLD AUTO: 5.28 X10E6/UL (ref 4.14–5.8)
WBC # BLD AUTO: 7.4 X10E3/UL (ref 3.4–10.8)

## 2020-10-23 NOTE — TELEPHONE ENCOUNTER
----- Message from Tasha Donahue NP sent at 10/23/2020  8:37 AM EDT -----  Hgb stable---normal.  Low concern for any bleed.  Continue eliquis

## 2020-10-30 ENCOUNTER — TELEPHONE (OUTPATIENT)
Dept: CARDIOLOGY CLINIC | Age: 75
End: 2020-10-30

## 2020-10-30 NOTE — TELEPHONE ENCOUNTER
----- Message from Christa Verdugo NP sent at 10/14/2020  2:46 PM EDT -----  Pumping heart strength mildly decreased. Recommend to see Olesya during her PM clinic to evaluate AF burden. Would also like to start on another medication therapy to help improve heart function but needs to get most recent lab work from PCP. Continue same for now until he sees Olesya and we obtain the labs. If he has not gotten any labs done this year, then we will order them.   Thanks

## 2020-11-02 NOTE — TELEPHONE ENCOUNTER
Message   Received: Today   Message Contents   MARVEL Miranda LPN    Caller: Unspecified (3 days ago,  4:00 PM)               Labs reviewed; no med changes for now. Keep appt with Dr. Quiana Deutsch as scheduled     Thanks,   Joanne Porter pt,verified pt with two pt identifiers, advised pt his labs were reviewed and no med changes for now, keep appt with  as scheduled on 1/13/21. Pt verbalized understanding.

## 2020-11-25 ENCOUNTER — TELEPHONE (OUTPATIENT)
Dept: CARDIOLOGY CLINIC | Age: 75
End: 2020-11-25

## 2020-11-25 NOTE — TELEPHONE ENCOUNTER
----- Message from ATIYA Kong sent at 11/24/2020 10:09 AM EST -----  Monitor confirms AF RVR. Please have him scheduled to see Dr Nelly Becerril for follow up. Thanks. Called pt,verified pt with two pt identifiers, advised pt that his monitor showed AF RVR. Advised that he will need to f/u in office with . pt confirmed appt on 12/3/20 with  to discuss further. Pt verbalized understanding.

## 2020-12-03 ENCOUNTER — OFFICE VISIT (OUTPATIENT)
Dept: CARDIOLOGY CLINIC | Age: 75
End: 2020-12-03
Payer: MEDICARE

## 2020-12-03 ENCOUNTER — TELEPHONE (OUTPATIENT)
Dept: CARDIOLOGY CLINIC | Age: 75
End: 2020-12-03

## 2020-12-03 VITALS
SYSTOLIC BLOOD PRESSURE: 132 MMHG | HEART RATE: 72 BPM | DIASTOLIC BLOOD PRESSURE: 80 MMHG | WEIGHT: 180.8 LBS | OXYGEN SATURATION: 99 % | HEIGHT: 70 IN | RESPIRATION RATE: 18 BRPM | BODY MASS INDEX: 25.88 KG/M2

## 2020-12-03 DIAGNOSIS — E78.2 MIXED HYPERLIPIDEMIA: ICD-10-CM

## 2020-12-03 DIAGNOSIS — R06.09 DOE (DYSPNEA ON EXERTION): ICD-10-CM

## 2020-12-03 DIAGNOSIS — I50.22 CHRONIC SYSTOLIC CONGESTIVE HEART FAILURE (HCC): ICD-10-CM

## 2020-12-03 DIAGNOSIS — I49.5 SSS (SICK SINUS SYNDROME) (HCC): ICD-10-CM

## 2020-12-03 DIAGNOSIS — I10 ESSENTIAL HYPERTENSION: ICD-10-CM

## 2020-12-03 DIAGNOSIS — I42.0 DILATED CARDIOMYOPATHY (HCC): ICD-10-CM

## 2020-12-03 DIAGNOSIS — I48.0 PAF (PAROXYSMAL ATRIAL FIBRILLATION) (HCC): Primary | ICD-10-CM

## 2020-12-03 DIAGNOSIS — Z95.0 CARDIAC PACEMAKER IN SITU: ICD-10-CM

## 2020-12-03 PROCEDURE — G8510 SCR DEP NEG, NO PLAN REQD: HCPCS | Performed by: INTERNAL MEDICINE

## 2020-12-03 PROCEDURE — G8752 SYS BP LESS 140: HCPCS | Performed by: INTERNAL MEDICINE

## 2020-12-03 PROCEDURE — 1101F PT FALLS ASSESS-DOCD LE1/YR: CPT | Performed by: INTERNAL MEDICINE

## 2020-12-03 PROCEDURE — G8427 DOCREV CUR MEDS BY ELIG CLIN: HCPCS | Performed by: INTERNAL MEDICINE

## 2020-12-03 PROCEDURE — G8536 NO DOC ELDER MAL SCRN: HCPCS | Performed by: INTERNAL MEDICINE

## 2020-12-03 PROCEDURE — 99215 OFFICE O/P EST HI 40 MIN: CPT | Performed by: INTERNAL MEDICINE

## 2020-12-03 PROCEDURE — G8754 DIAS BP LESS 90: HCPCS | Performed by: INTERNAL MEDICINE

## 2020-12-03 PROCEDURE — 3017F COLORECTAL CA SCREEN DOC REV: CPT | Performed by: INTERNAL MEDICINE

## 2020-12-03 PROCEDURE — G8419 CALC BMI OUT NRM PARAM NOF/U: HCPCS | Performed by: INTERNAL MEDICINE

## 2020-12-03 PROCEDURE — 93000 ELECTROCARDIOGRAM COMPLETE: CPT | Performed by: INTERNAL MEDICINE

## 2020-12-03 RX ORDER — POLYETHYLENE GLYCOL 3350 17 G/17G
17 POWDER, FOR SOLUTION ORAL
COMMUNITY
End: 2022-01-01

## 2020-12-03 NOTE — PROGRESS NOTES
Chief Complaint   Patient presents with    Irregular Heart Beat     a fib RVR detected on monitor    Chest Pain     was having when he had the monitor on      1. Have you been to the ER, urgent care clinic since your last visit? Hospitalized since your last visit? No     2. Have you seen or consulted any other health care providers outside of the [address] [place] [address] since your last visit? Include any pap smears or colon screening.   No
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
## 2020-12-04 LAB
ALBUMIN SERPL-MCNC: 4.3 G/DL (ref 3.7–4.7)
ALBUMIN/GLOB SERPL: 1.7 {RATIO} (ref 1.2–2.2)
ALP SERPL-CCNC: 150 IU/L (ref 39–117)
ALT SERPL-CCNC: 51 IU/L (ref 0–44)
AST SERPL-CCNC: 41 IU/L (ref 0–40)
BILIRUB SERPL-MCNC: 0.8 MG/DL (ref 0–1.2)
BUN SERPL-MCNC: 11 MG/DL (ref 8–27)
BUN/CREAT SERPL: 8 (ref 10–24)
CALCIUM SERPL-MCNC: 9.6 MG/DL (ref 8.6–10.2)
CHLORIDE SERPL-SCNC: 105 MMOL/L (ref 96–106)
CO2 SERPL-SCNC: 24 MMOL/L (ref 20–29)
CREAT SERPL-MCNC: 1.33 MG/DL (ref 0.76–1.27)
ERYTHROCYTE [DISTWIDTH] IN BLOOD BY AUTOMATED COUNT: 13 % (ref 11.6–15.4)
GLOBULIN SER CALC-MCNC: 2.6 G/DL (ref 1.5–4.5)
GLUCOSE SERPL-MCNC: 137 MG/DL (ref 65–99)
HCT VFR BLD AUTO: 48.7 % (ref 37.5–51)
HGB BLD-MCNC: 16.8 G/DL (ref 13–17.7)
INR PPP: 1 (ref 0.9–1.2)
INTERPRETATION: NORMAL
MAGNESIUM SERPL-MCNC: 2.3 MG/DL (ref 1.6–2.3)
MCH RBC QN AUTO: 32 PG (ref 26.6–33)
MCHC RBC AUTO-ENTMCNC: 34.5 G/DL (ref 31.5–35.7)
MCV RBC AUTO: 93 FL (ref 79–97)
PLATELET # BLD AUTO: 199 X10E3/UL (ref 150–450)
POTASSIUM SERPL-SCNC: 4.7 MMOL/L (ref 3.5–5.2)
PROT SERPL-MCNC: 6.9 G/DL (ref 6–8.5)
PROTHROMBIN TIME: 11.1 SEC (ref 9.1–12)
RBC # BLD AUTO: 5.25 X10E6/UL (ref 4.14–5.8)
SODIUM SERPL-SCNC: 141 MMOL/L (ref 134–144)
WBC # BLD AUTO: 9.1 X10E3/UL (ref 3.4–10.8)

## 2020-12-14 ENCOUNTER — HOSPITAL ENCOUNTER (OUTPATIENT)
Dept: PREADMISSION TESTING | Age: 75
Discharge: HOME | End: 2020-12-14
Payer: MEDICARE

## 2020-12-14 PROCEDURE — 87635 SARS-COV-2 COVID-19 AMP PRB: CPT

## 2020-12-15 LAB
HEALTH STATUS, XMCV2T: NORMAL
SARS-COV-2, COV2NT: NOT DETECTED
SOURCE, COVRS: NORMAL
SPECIMEN SOURCE, FCOV2M: NORMAL
SPECIMEN TYPE, XMCV1T: NORMAL

## 2020-12-18 ENCOUNTER — ANESTHESIA EVENT (OUTPATIENT)
Dept: CARDIAC CATH/INVASIVE PROCEDURES | Age: 75
End: 2020-12-18
Payer: MEDICARE

## 2020-12-18 ENCOUNTER — ANESTHESIA (OUTPATIENT)
Dept: CARDIAC CATH/INVASIVE PROCEDURES | Age: 75
End: 2020-12-18
Payer: MEDICARE

## 2020-12-18 ENCOUNTER — HOSPITAL ENCOUNTER (OUTPATIENT)
Age: 75
Discharge: HOME | End: 2020-12-18
Attending: INTERNAL MEDICINE | Admitting: INTERNAL MEDICINE
Payer: MEDICARE

## 2020-12-18 VITALS
BODY MASS INDEX: 25.77 KG/M2 | HEIGHT: 70 IN | OXYGEN SATURATION: 100 % | RESPIRATION RATE: 19 BRPM | WEIGHT: 180 LBS | TEMPERATURE: 97.7 F | DIASTOLIC BLOOD PRESSURE: 54 MMHG | SYSTOLIC BLOOD PRESSURE: 106 MMHG | HEART RATE: 78 BPM

## 2020-12-18 DIAGNOSIS — I48.21 PERMANENT ATRIAL FIBRILLATION (HCC): Chronic | ICD-10-CM

## 2020-12-18 DIAGNOSIS — I48.91 ATRIAL FIBRILLATION, UNSPECIFIED TYPE (HCC): ICD-10-CM

## 2020-12-18 DIAGNOSIS — R00.0 TACHYCARDIA: Chronic | ICD-10-CM

## 2020-12-18 DIAGNOSIS — I10 ESSENTIAL HYPERTENSION: Chronic | ICD-10-CM

## 2020-12-18 DIAGNOSIS — E78.2 MIXED HYPERLIPIDEMIA: Chronic | ICD-10-CM

## 2020-12-18 DIAGNOSIS — I42.0 DILATED CARDIOMYOPATHY (HCC): Chronic | ICD-10-CM

## 2020-12-18 PROBLEM — E78.5 HYPERLIPIDEMIA: Chronic | Status: ACTIVE | Noted: 2017-06-28

## 2020-12-18 PROCEDURE — 74011000258 HC RX REV CODE- 258: Performed by: NURSE ANESTHETIST, CERTIFIED REGISTERED

## 2020-12-18 PROCEDURE — C1894 INTRO/SHEATH, NON-LASER: HCPCS | Performed by: INTERNAL MEDICINE

## 2020-12-18 PROCEDURE — 77030018729 HC ELECTRD DEFIB PAD CARD -B: Performed by: INTERNAL MEDICINE

## 2020-12-18 PROCEDURE — 77030016894 HC CBL EP DX CATH3 STJU -B: Performed by: INTERNAL MEDICINE

## 2020-12-18 PROCEDURE — 74011000250 HC RX REV CODE- 250: Performed by: INTERNAL MEDICINE

## 2020-12-18 PROCEDURE — 76060000032 HC ANESTHESIA 0.5 TO 1 HR: Performed by: INTERNAL MEDICINE

## 2020-12-18 PROCEDURE — 93650 ICAR CATH ABLTJ AV NODE FUNC: CPT | Performed by: INTERNAL MEDICINE

## 2020-12-18 PROCEDURE — 77030029065 HC DRSG HEMO QCLOT ZMED -B: Performed by: INTERNAL MEDICINE

## 2020-12-18 PROCEDURE — 77030027107 HC PTCH EXT REF CARTO3 J&J -F: Performed by: INTERNAL MEDICINE

## 2020-12-18 PROCEDURE — 74011000250 HC RX REV CODE- 250: Performed by: NURSE ANESTHETIST, CERTIFIED REGISTERED

## 2020-12-18 PROCEDURE — C1732 CATH, EP, DIAG/ABL, 3D/VECT: HCPCS | Performed by: INTERNAL MEDICINE

## 2020-12-18 PROCEDURE — 74011250636 HC RX REV CODE- 250/636: Performed by: NURSE ANESTHETIST, CERTIFIED REGISTERED

## 2020-12-18 PROCEDURE — 74011250636 HC RX REV CODE- 250/636: Performed by: INTERNAL MEDICINE

## 2020-12-18 PROCEDURE — C1730 CATH, EP, 19 OR FEW ELECT: HCPCS | Performed by: INTERNAL MEDICINE

## 2020-12-18 RX ORDER — SODIUM CHLORIDE 9 MG/ML
INJECTION, SOLUTION INTRAVENOUS
Status: DISCONTINUED | OUTPATIENT
Start: 2020-12-18 | End: 2020-12-18 | Stop reason: HOSPADM

## 2020-12-18 RX ORDER — PROPOFOL 10 MG/ML
INJECTION, EMULSION INTRAVENOUS AS NEEDED
Status: DISCONTINUED | OUTPATIENT
Start: 2020-12-18 | End: 2020-12-18 | Stop reason: HOSPADM

## 2020-12-18 RX ORDER — SODIUM CHLORIDE 0.9 % (FLUSH) 0.9 %
5-40 SYRINGE (ML) INJECTION AS NEEDED
Status: DISCONTINUED | OUTPATIENT
Start: 2020-12-18 | End: 2020-12-18 | Stop reason: HOSPADM

## 2020-12-18 RX ORDER — LIDOCAINE HYDROCHLORIDE 10 MG/ML
INJECTION INFILTRATION; PERINEURAL AS NEEDED
Status: DISCONTINUED | OUTPATIENT
Start: 2020-12-18 | End: 2020-12-18 | Stop reason: HOSPADM

## 2020-12-18 RX ORDER — PROPOFOL 10 MG/ML
INJECTION, EMULSION INTRAVENOUS
Status: DISCONTINUED | OUTPATIENT
Start: 2020-12-18 | End: 2020-12-18 | Stop reason: HOSPADM

## 2020-12-18 RX ORDER — HEPARIN SODIUM 200 [USP'U]/100ML
INJECTION, SOLUTION INTRAVENOUS
Status: COMPLETED | OUTPATIENT
Start: 2020-12-18 | End: 2020-12-18

## 2020-12-18 RX ORDER — EPHEDRINE SULFATE/0.9% NACL/PF 50 MG/5 ML
SYRINGE (ML) INTRAVENOUS AS NEEDED
Status: DISCONTINUED | OUTPATIENT
Start: 2020-12-18 | End: 2020-12-18 | Stop reason: HOSPADM

## 2020-12-18 RX ORDER — ACETAMINOPHEN 325 MG/1
650 TABLET ORAL
Status: DISCONTINUED | OUTPATIENT
Start: 2020-12-18 | End: 2020-12-18 | Stop reason: HOSPADM

## 2020-12-18 RX ORDER — HYDROCODONE BITARTRATE AND ACETAMINOPHEN 5; 325 MG/1; MG/1
1 TABLET ORAL
Status: DISCONTINUED | OUTPATIENT
Start: 2020-12-18 | End: 2020-12-18 | Stop reason: HOSPADM

## 2020-12-18 RX ORDER — ONDANSETRON 2 MG/ML
INJECTION INTRAMUSCULAR; INTRAVENOUS AS NEEDED
Status: DISCONTINUED | OUTPATIENT
Start: 2020-12-18 | End: 2020-12-18 | Stop reason: HOSPADM

## 2020-12-18 RX ORDER — SODIUM CHLORIDE 0.9 % (FLUSH) 0.9 %
5-40 SYRINGE (ML) INJECTION EVERY 8 HOURS
Status: DISCONTINUED | OUTPATIENT
Start: 2020-12-18 | End: 2020-12-18 | Stop reason: HOSPADM

## 2020-12-18 RX ORDER — FENTANYL CITRATE 50 UG/ML
INJECTION, SOLUTION INTRAMUSCULAR; INTRAVENOUS AS NEEDED
Status: DISCONTINUED | OUTPATIENT
Start: 2020-12-18 | End: 2020-12-18 | Stop reason: HOSPADM

## 2020-12-18 RX ADMIN — PHENYLEPHRINE HYDROCHLORIDE 40 MCG/MIN: 10 INJECTION INTRAVENOUS at 08:07

## 2020-12-18 RX ADMIN — ONDANSETRON HYDROCHLORIDE 4 MG: 2 INJECTION, SOLUTION INTRAMUSCULAR; INTRAVENOUS at 08:20

## 2020-12-18 RX ADMIN — PROPOFOL 40 MG: 10 INJECTION, EMULSION INTRAVENOUS at 08:01

## 2020-12-18 RX ADMIN — SODIUM CHLORIDE: 9 INJECTION, SOLUTION INTRAVENOUS at 07:50

## 2020-12-18 RX ADMIN — PROPOFOL 50 MCG/KG/MIN: 10 INJECTION, EMULSION INTRAVENOUS at 08:01

## 2020-12-18 RX ADMIN — FENTANYL CITRATE 25 MCG: 50 INJECTION, SOLUTION INTRAMUSCULAR; INTRAVENOUS at 08:10

## 2020-12-18 RX ADMIN — Medication 10 MG: at 08:18

## 2020-12-18 NOTE — PROGRESS NOTES
Patient ambulated in hallway with steady gait, no complaints of discomfort, dizziness, sob. Right groin site clean dry and intact. Discharge instructions reviewed with patient and patients wife, answered questions as needed. Transported patient in wheelchair to car.

## 2020-12-18 NOTE — DISCHARGE INSTRUCTIONS
[address] [place] [address] [address] [facility]  [phone]        AV NODE ABLATION DISCHARGE INSTRUCTIONS    Patient ID:  Jihan Torres  [id number]  56 y.o.  1945    Admit Date: 12/18/2020    Discharge Date: 12/18/2020     Admitting Physician: Yany Stevenson MD     Discharge Physician: Yany Stevenson MD    Admission Diagnoses:   Atrial fibrillation, unspecified type ([facility] [I48.91]  A-fib [facility]) [I48.91]    Discharge Diagnoses: Active Problems:    HTN (hypertension) (6/28/2017)      Hyperlipidemia (6/28/2017)      Dilated cardiomyopathy ([facility] (12/18/2020)      Permanent atrial fibrillation ([facility] (12/18/2020)      Tachycardia (12/18/2020)      A-fib ([email or web address] (12/18/2020)        Discharge Condition: Good    Cardiology Procedures this Admission:  AV node ablation    Disposition: home    Reference discharge instructions provided by nursing for diet and activity. Follow-up with Dr Karyna Zhang or his Nurse Practitioners in 1-2 weeks. Call [phone] to make an appointment. Signed:  Yany Stevenson MD  12/18/2020  8:34 AM    S/P AV NODE ABLATION DISCHARGE INSTRUCTIONS    It is normal to feel tired the first couple days. Take it easy and follow the physicians instructions. CHECK THE CATHETER INSERTION SITE DAILY:  You may shower 24 hours after the procedure, remove the bandage during showering. Wash with soap and water and pat dry. Gentle cleaning of the site with soap and water is sufficient, cover with a dry clean dressing or bandage. Do not apply creams or powders to the area. Do not sit in a bathtub or pool of water for 7 days or until wound has completely healed. Temporary bruising and discomfort is normal and may last a few weeks. You may have a  formation of a small lump at the site which may last up to 6 weeks.     CALL THE PHYSICIAN:  If the site becomes red, swollen or feels warm to the touch  If there is bleeding or drainage or if there is unusual pain at the groin or down the leg.  If there is any bleeding, lie down, apply pressure or have someone apply pressure with a clean cloth until the bleeding stops. If the bleeding continues, call 911 to be transported to the hospital.  DO NOT DRIVE YOURSELF, [place]. Activity:      For the first 24-48 hours or as instructed by the physician:  No lifting, pushing or pulling over 5 pounds and no straining the insertion site. Do not life grocery bags or the garbage can, do not run the vacuum  or  for 7 days. Start with short walks as in the hospital and gradually increase as tolerated each day. It is recommended to walk 30 minutes 5-7 days per week. Follow your physicians instructions on activity. Avoid walking outside in extremes of heat or cold. Walk inside when it is cold and windy or hot and humid. Things to keep in mind:  No driving for at least 5 days or as designated by your physician. Limit the number of times you go up and down the stairs  Take rests and pace yourself with activity. Be careful and do not strain with bowel movements. Medications: Take all medications as prescribed  Call your physician if you have any questions  Keep an updated list of your medications with you at all times and give a list to your physician and pharmacist    Signs and Symptoms:  Be cautious of symptoms of angina or recurrent symptoms such as chest discomfort, unusual shortness of breath or fatigue, palpitations. After Care: Follow up with your physician as instructed. Follow a heart healthy diet with proper portion control, daily stress management, daily exercise, blood pressure and cholesterol control , and smoking cessation.

## 2020-12-18 NOTE — INTERVAL H&P NOTE
Update History & Physical    The Patient's History and Physical of December 03, 2020 was reviewed with the patient and I examined the patient. There was no change. The surgical site was confirmed by the patient and me. Plan:  The risk, benefits, expected outcome, and alternative to the recommended procedure have been discussed with the patient. Patient understands and wants to proceed with the procedure.     Electronically signed by Melany Flores MD on 12/18/2020 at 8:25 AM

## 2020-12-18 NOTE — Clinical Note
Sheath #1: sheath exchanged for [facility] 8FR L63CM 8FR DIL GWIRE L145CM DIA0.038IN. Hemostasis achieved.  8fr sheath RFV removed/ SR0 advanced over package wire/ aspirated and flushed

## 2020-12-18 NOTE — ANESTHESIA PREPROCEDURE EVALUATION
Anesthetic History   No history of anesthetic complications            Review of Systems / Medical History  Patient summary reviewed and pertinent labs reviewed    Pulmonary  Within defined limits                 Neuro/Psych   Within defined limits           Cardiovascular    Hypertension        Dysrhythmias : atrial fibrillation  PAD (carotid stenosis) and hyperlipidemia  Pertinent negatives: Cardiac stents: Paroxysmal.  Exercise tolerance: >4 METS  Comments: EF 40-45%   GI/Hepatic/Renal     GERD: well controlled           Endo/Other        Arthritis     Other Findings   Comments: Low back pain           Physical Exam    Airway  Mallampati: I  TM Distance: < 4 cm  Neck ROM: normal range of motion   Mouth opening: Normal     Cardiovascular    Rhythm: irregular  Rate: abnormal      Pertinent negatives: No murmur  Comments: bradycardia Dental    Dentition: Bridges  Comments: Upper right   Pulmonary  Breath sounds clear to auscultation               Abdominal  GI exam deferred       Other Findings            Anesthetic Plan    ASA: 3  Anesthesia type: MAC and total IV anesthesia          Induction: Intravenous  Anesthetic plan and risks discussed with: Patient      Took BB yesterday at 2100

## 2020-12-18 NOTE — PROGRESS NOTES
TRANSFER - IN REPORT:    Verbal report received from KAILEE Polk RN and WILL Can on Teri Guido  being received from EP lab for routine progression of care. Report consisted of patients Situation, Background, Assessment and Recommendations(SBAR). Information from the following report(s) SBAR was reviewed with the receiving clinician. Opportunity for questions and clarification was provided. Assessment completed upon patients arrival to [address] [place] [address] and care assumed. Cardiac Cath Lab Recovery Arrival Note:    Teri Guido arrived to [facility] recovery area. Patient procedure= AV node ablation. Patient on cardiac monitor, non-invasive blood pressure, SPO2 monitor. On RA . IV  KVO  Patient status doing well without problems. Patient is A&Ox 4. Patient reports no complaints. PROCEDURE SITE CHECK:    Procedure site:without any bleeding and no hematoma, no pain/discomfort reported at procedure site. No change in patient status. Continue to monitor patient and status.

## 2020-12-18 NOTE — ANESTHESIA POSTPROCEDURE EVALUATION
Procedure(s):  ABLATION AV NODE.    MAC, total IV anesthesia    Anesthesia Post Evaluation        Patient location during evaluation: PACU  Note status: Adequate. Level of consciousness: responsive to verbal stimuli and sleepy but conscious  Pain management: satisfactory to patient  Airway patency: patent  Anesthetic complications: no  Cardiovascular status: acceptable  Respiratory status: acceptable  Hydration status: acceptable  Comments: +Post-Anesthesia Evaluation and Assessment    Patient: Canelo Butt MRN: [id number]  SSN: [id number]   YOB: 1945  Age: 76 y.o. Sex: male      Cardiovascular Function/Vital Signs    BP (!) 145/100   Pulse 76   Temp 36.5 °C (97.7 °F)   Resp 20   Ht 5' 10\" (1.778 m)   Wt 81.6 kg (180 lb)   SpO2 98%   BMI 25.83 kg/m²     Patient is status post Procedure(s):  ABLATION AV NODE. Nausea/Vomiting: Controlled. Postoperative hydration reviewed and adequate. Pain:  Pain Scale 1: Numeric (0 - 10) (12/18/20 1000)  Pain Intensity 1: 0 (12/18/20 1000)   Managed. Neurological Status: At baseline. Mental Status and Level of Consciousness: Arousable. Pulmonary Status:   O2 Device: Room air (12/18/20 1000)   Adequate oxygenation and airway patent. Complications related to anesthesia: None    Post-anesthesia assessment completed. No concerns. Signed By: Rojelio Herron MD    12/18/2020  Post anesthesia nausea and vomiting:  controlled      INITIAL Post-op Vital signs:   Vitals Value Taken Time   /100 12/18/20 1015   Temp 36.5 °C (97.7 °F) 12/18/20 0834   Pulse 75 12/18/20 1027   Resp 18 12/18/20 1027   SpO2 100 % 12/18/20 1027   Vitals shown include unvalidated device data.

## 2020-12-18 NOTE — Clinical Note
TRANSFER - OUT REPORT:     Verbal report given to: [facility] Recovery Area. Report consisted of patient's Situation, Background, Assessment and   Recommendations(SBAR). Opportunity for questions and clarification was provided. Patient transported with a Registered Nurse, Monitor and Oxygen. Oxygen used for patient = nasal cannula, @ 2 - 6 Liters. Patient transported to: [address]

## 2020-12-18 NOTE — PROGRESS NOTES
Cardiac Cath Lab Recovery Arrival Note:      Steven Patton arrived to Cardiac Cath Lab, Recovery Area. Staff introduced to patient. Patient identifiers verified with NAME and DATE OF BIRTH. Procedure verified with patient. Consent forms reviewed and signed by patient or authorized representative and verified. Allergies verified. Patient and family oriented to department. Patient and family informed of procedure and plan of care. Questions answered with review. Patient prepped for procedure, per orders from physician, prior to arrival.    Patient on cardiac monitor, non-invasive blood pressure, SPO2 monitor. On room air. Patient is A&Ox 3. Patient reports noc/o. Patient in stretcher, in low position, with side rails up, call bell within reach, patient instructed to call if assistance as needed. Patient prep in: [address], [place] [address]. Patient family has pager # none  Family in: [address] [place] [address].    Prep by: David Berman, PASTOR and Kayley Henning RN

## 2020-12-24 ENCOUNTER — OFFICE VISIT (OUTPATIENT)
Dept: CARDIOLOGY CLINIC | Age: 75
End: 2020-12-24
Payer: MEDICARE

## 2020-12-24 VITALS
SYSTOLIC BLOOD PRESSURE: 112 MMHG | RESPIRATION RATE: 18 BRPM | DIASTOLIC BLOOD PRESSURE: 68 MMHG | HEART RATE: 84 BPM | BODY MASS INDEX: 25.2 KG/M2 | HEIGHT: 70 IN | OXYGEN SATURATION: 99 % | WEIGHT: 176 LBS

## 2020-12-24 DIAGNOSIS — I10 ESSENTIAL HYPERTENSION: ICD-10-CM

## 2020-12-24 DIAGNOSIS — E78.2 MIXED HYPERLIPIDEMIA: ICD-10-CM

## 2020-12-24 DIAGNOSIS — R00.0 TACHYCARDIA: ICD-10-CM

## 2020-12-24 DIAGNOSIS — I48.21 PERMANENT ATRIAL FIBRILLATION (HCC): Primary | Chronic | ICD-10-CM

## 2020-12-24 DIAGNOSIS — I42.0 DILATED CARDIOMYOPATHY (HCC): ICD-10-CM

## 2020-12-24 PROCEDURE — 1101F PT FALLS ASSESS-DOCD LE1/YR: CPT | Performed by: INTERNAL MEDICINE

## 2020-12-24 PROCEDURE — G8419 CALC BMI OUT NRM PARAM NOF/U: HCPCS | Performed by: INTERNAL MEDICINE

## 2020-12-24 PROCEDURE — 99214 OFFICE O/P EST MOD 30 MIN: CPT | Performed by: INTERNAL MEDICINE

## 2020-12-24 PROCEDURE — 93000 ELECTROCARDIOGRAM COMPLETE: CPT | Performed by: INTERNAL MEDICINE

## 2020-12-24 PROCEDURE — G8754 DIAS BP LESS 90: HCPCS | Performed by: INTERNAL MEDICINE

## 2020-12-24 PROCEDURE — G8432 DEP SCR NOT DOC, RNG: HCPCS | Performed by: INTERNAL MEDICINE

## 2020-12-24 PROCEDURE — G8536 NO DOC ELDER MAL SCRN: HCPCS | Performed by: INTERNAL MEDICINE

## 2020-12-24 PROCEDURE — G8752 SYS BP LESS 140: HCPCS | Performed by: INTERNAL MEDICINE

## 2020-12-24 PROCEDURE — 3017F COLORECTAL CA SCREEN DOC REV: CPT | Performed by: INTERNAL MEDICINE

## 2020-12-24 PROCEDURE — G8427 DOCREV CUR MEDS BY ELIG CLIN: HCPCS | Performed by: INTERNAL MEDICINE
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
## 2020-12-24 NOTE — PROGRESS NOTES
Chief Complaint   Patient presents with    Surgical Follow-up     av node 12/18/20, Pt c/o odd sensation when going outside in moisture. 1. Have you been to the ER, urgent care clinic since your last visit? Hospitalized since your last visit? No    2. Have you seen or consulted any other health care providers outside of the [address] [place] [address] since your last visit? Include any pap smears or colon screening.  No    O2

## 2021-01-01 ENCOUNTER — HOSPITAL ENCOUNTER (OUTPATIENT)
Dept: ULTRASOUND IMAGING | Age: 76
Discharge: HOME | End: 2021-12-16
Payer: MEDICARE

## 2021-01-01 ENCOUNTER — OFFICE VISIT (OUTPATIENT)
Dept: CARDIOLOGY CLINIC | Age: 76
End: 2021-01-01
Payer: MEDICARE

## 2021-01-01 ENCOUNTER — TRANSCRIBE ORDER (OUTPATIENT)
Dept: SCHEDULING | Age: 76
End: 2021-01-01

## 2021-01-01 ENCOUNTER — OFFICE VISIT (OUTPATIENT)
Dept: NEUROLOGY | Age: 76
End: 2021-01-01
Payer: MEDICARE

## 2021-01-01 DIAGNOSIS — I48.91 ATRIAL FIBRILLATION, UNSPECIFIED TYPE (HCC): ICD-10-CM

## 2021-01-01 DIAGNOSIS — Z95.0 CARDIAC PACEMAKER IN SITU: Primary | ICD-10-CM

## 2021-01-01 DIAGNOSIS — M54.12 CERVICAL RADICULOPATHY: ICD-10-CM

## 2021-01-01 DIAGNOSIS — M79.604 RIGHT LEG PAIN: Primary | ICD-10-CM

## 2021-01-01 DIAGNOSIS — R20.2 TINGLING IN EXTREMITIES: ICD-10-CM

## 2021-01-01 DIAGNOSIS — I49.5 SSS (SICK SINUS SYNDROME) (HCC): ICD-10-CM

## 2021-01-01 DIAGNOSIS — M25.473 ANKLE SWELLING: ICD-10-CM

## 2021-01-01 DIAGNOSIS — M54.16 LUMBAR RADICULOPATHY: ICD-10-CM

## 2021-01-01 DIAGNOSIS — M79.604 RIGHT LEG PAIN: ICD-10-CM

## 2021-01-01 DIAGNOSIS — M17.11 PRIMARY OSTEOARTHRITIS OF RIGHT KNEE: Primary | ICD-10-CM

## 2021-01-01 PROCEDURE — 95886 MUSC TEST DONE W/N TEST COMP: CPT | Performed by: PSYCHIATRY & NEUROLOGY

## 2021-01-01 PROCEDURE — 93296 REM INTERROG EVL PM/IDS: CPT | Performed by: INTERNAL MEDICINE

## 2021-01-01 PROCEDURE — 93294 REM INTERROG EVL PM/LDLS PM: CPT | Performed by: INTERNAL MEDICINE

## 2021-01-01 PROCEDURE — 95912 NRV CNDJ TEST 11-12 STUDIES: CPT | Performed by: PSYCHIATRY & NEUROLOGY

## 2021-01-01 PROCEDURE — 93971 EXTREMITY STUDY: CPT | Performed by: RADIOLOGY

## 2021-01-01 RX ORDER — CARVEDILOL 3.12 MG/1
3.12 TABLET ORAL 2 TIMES DAILY WITH MEALS
Qty: 180 TABLET | Refills: 1 | Status: CANCELLED | OUTPATIENT
Start: 2021-01-01

## 2021-01-01 RX ORDER — CARVEDILOL 3.12 MG/1
3.12 TABLET ORAL 2 TIMES DAILY WITH MEALS
Qty: 180 TABLET | Refills: 0 | Status: SHIPPED | OUTPATIENT
Start: 2021-01-01 | End: 2021-01-01 | Stop reason: SDUPTHER

## 2021-01-01 RX ORDER — PREGABALIN 75 MG/1
75 CAPSULE ORAL 2 TIMES DAILY
Qty: 180 CAPSULE | Refills: 1 | Status: SHIPPED | OUTPATIENT
Start: 2021-01-01 | End: 2022-01-01 | Stop reason: CLARIF

## 2021-01-04 ENCOUNTER — OFFICE VISIT (OUTPATIENT)
Dept: CARDIOLOGY CLINIC | Age: 76
End: 2021-01-04
Payer: MEDICARE

## 2021-01-04 DIAGNOSIS — I49.5 SSS (SICK SINUS SYNDROME) (HCC): ICD-10-CM

## 2021-01-04 DIAGNOSIS — Z95.0 CARDIAC PACEMAKER IN SITU: Primary | ICD-10-CM

## 2021-01-04 PROCEDURE — 93296 REM INTERROG EVL PM/IDS: CPT | Performed by: INTERNAL MEDICINE

## 2021-01-04 PROCEDURE — 93294 REM INTERROG EVL PM/LDLS PM: CPT | Performed by: INTERNAL MEDICINE

## 2021-01-13 RX ORDER — CARVEDILOL 3.12 MG/1
3.12 TABLET ORAL 2 TIMES DAILY WITH MEALS
Qty: 180 TAB | Refills: 1 | Status: SHIPPED | OUTPATIENT
Start: 2021-01-13 | End: 2021-01-01

## 2021-04-05 ENCOUNTER — OFFICE VISIT (OUTPATIENT)
Dept: CARDIOLOGY CLINIC | Age: 76
End: 2021-04-05
Payer: MEDICARE

## 2021-04-05 DIAGNOSIS — Z95.0 CARDIAC PACEMAKER IN SITU: Primary | ICD-10-CM

## 2021-04-05 DIAGNOSIS — I49.5 SSS (SICK SINUS SYNDROME) (HCC): ICD-10-CM

## 2021-04-05 PROCEDURE — 93293 PM PHONE R-STRIP DEVICE EVAL: CPT

## 2021-04-05 PROCEDURE — 93296 REM INTERROG EVL PM/IDS: CPT | Performed by: INTERNAL MEDICINE

## 2021-04-05 PROCEDURE — 93294 REM INTERROG EVL PM/LDLS PM: CPT | Performed by: INTERNAL MEDICINE

## 2021-04-28 ENCOUNTER — TELEPHONE (OUTPATIENT)
Dept: CARDIOLOGY CLINIC | Age: 76
End: 2021-04-28

## 2021-04-28 NOTE — TELEPHONE ENCOUNTER
Please call patient he needs to get MRI    Is his device compatible?       [phone]    Thanks  Shasha Buckley

## 2021-04-28 NOTE — TELEPHONE ENCOUNTER
Spoke to Hector Musa pt's device is MRI conditional. She will let MRI know to have them fax the correct clearance forms to us. No further questions at this time.

## 2021-05-03 ENCOUNTER — TRANSCRIBE ORDER (OUTPATIENT)
Dept: SCHEDULING | Age: 76
End: 2021-05-03

## 2021-05-03 DIAGNOSIS — M54.2 NECK PAIN: ICD-10-CM

## 2021-05-03 DIAGNOSIS — M54.50 LUMBAR PAIN WITH RADIATION DOWN BOTH LEGS: ICD-10-CM

## 2021-05-03 DIAGNOSIS — M43.16 SPONDYLOLISTHESIS OF LUMBAR REGION: ICD-10-CM

## 2021-05-03 DIAGNOSIS — M51.36 DDD (DEGENERATIVE DISC DISEASE), LUMBAR: Primary | ICD-10-CM

## 2021-05-03 DIAGNOSIS — M79.604 LUMBAR PAIN WITH RADIATION DOWN BOTH LEGS: ICD-10-CM

## 2021-05-03 DIAGNOSIS — M79.605 LUMBAR PAIN WITH RADIATION DOWN BOTH LEGS: ICD-10-CM

## 2021-05-03 DIAGNOSIS — Z98.1 S/P SPINAL FUSION: ICD-10-CM

## 2021-05-03 DIAGNOSIS — M50.30 DDD (DEGENERATIVE DISC DISEASE), CERVICAL: Primary | ICD-10-CM

## 2021-05-17 ENCOUNTER — APPOINTMENT (OUTPATIENT)
Dept: MRI IMAGING | Age: 76
End: 2021-05-17
Attending: PHYSICIAN ASSISTANT
Payer: MEDICARE

## 2021-05-17 ENCOUNTER — HOSPITAL ENCOUNTER (EMERGENCY)
Age: 76
Discharge: HOME | End: 2021-05-17
Attending: EMERGENCY MEDICINE
Payer: MEDICARE

## 2021-05-17 VITALS
DIASTOLIC BLOOD PRESSURE: 65 MMHG | OXYGEN SATURATION: 100 % | RESPIRATION RATE: 17 BRPM | SYSTOLIC BLOOD PRESSURE: 127 MMHG | HEART RATE: 75 BPM | WEIGHT: 185 LBS | TEMPERATURE: 98.2 F | HEIGHT: 70 IN | BODY MASS INDEX: 26.48 KG/M2

## 2021-05-17 DIAGNOSIS — M54.2 CHRONIC NECK PAIN: ICD-10-CM

## 2021-05-17 DIAGNOSIS — G89.29 CHRONIC NECK PAIN: ICD-10-CM

## 2021-05-17 DIAGNOSIS — M50.30 DDD (DEGENERATIVE DISC DISEASE), CERVICAL: ICD-10-CM

## 2021-05-17 DIAGNOSIS — G89.29 CHRONIC BILATERAL LOW BACK PAIN WITH BILATERAL SCIATICA: Primary | ICD-10-CM

## 2021-05-17 DIAGNOSIS — M54.41 CHRONIC BILATERAL LOW BACK PAIN WITH BILATERAL SCIATICA: Primary | ICD-10-CM

## 2021-05-17 DIAGNOSIS — M51.37 DDD (DEGENERATIVE DISC DISEASE), LUMBOSACRAL: ICD-10-CM

## 2021-05-17 DIAGNOSIS — M54.42 CHRONIC BILATERAL LOW BACK PAIN WITH BILATERAL SCIATICA: Primary | ICD-10-CM

## 2021-05-17 PROCEDURE — 72141 MRI NECK SPINE W/O DYE: CPT

## 2021-05-17 PROCEDURE — 99285 EMERGENCY DEPT VISIT HI MDM: CPT

## 2021-05-17 PROCEDURE — 74011250637 HC RX REV CODE- 250/637: Performed by: PHYSICIAN ASSISTANT

## 2021-05-17 PROCEDURE — 72148 MRI LUMBAR SPINE W/O DYE: CPT

## 2021-05-17 RX ORDER — OXYCODONE AND ACETAMINOPHEN 5; 325 MG/1; MG/1
1 TABLET ORAL
Status: COMPLETED | OUTPATIENT
Start: 2021-05-17 | End: 2021-05-17

## 2021-05-17 RX ORDER — OXYCODONE HYDROCHLORIDE 5 MG/1
5 TABLET ORAL
Qty: 6 TAB | Refills: 0 | Status: SHIPPED | OUTPATIENT
Start: 2021-05-17 | End: 2021-05-20

## 2021-05-17 RX ADMIN — OXYCODONE HYDROCHLORIDE AND ACETAMINOPHEN 1 TABLET: 5; 325 TABLET ORAL at 15:28

## 2021-05-17 RX ADMIN — OXYCODONE HYDROCHLORIDE AND ACETAMINOPHEN 1 TABLET: 5; 325 TABLET ORAL at 11:21

## 2021-05-17 NOTE — ED NOTES
GUERO Segura evaluating patient in triage at this time. Pt requesting to wait for an ED room because patient does not want to sit in a wheelchair or regular chair d/t pain.

## 2021-05-17 NOTE — ED NOTES
1510: Assumed care of patient. Patient placed in position of comfort. Call bell in reach. Skin warm and dry. Respirations even and unlabored. In no apparent distress at this time. Assisted pt to standing position, to use the urinal. 1 person standby required. Pt tolerated well.     1605: Skin warm and dry. Respirations even and unlabored. In no apparent distress at this time. Discharge paperwork provided. Assisted pt to wheelchair and took him to his vehicle - his wife will be driving him home.
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
## 2021-05-17 NOTE — ED NOTES
Attempted to see patient in focused care. The patient does not want to be seen up front. He prefers a bed. Patient with ongoing back pain from generative changes. Reports that he is scheduled to have an MRI of both his lower back and C-spine next week. Woke this morning with worsening of pain and the inability to stand. EMS called to bring to the ED.

## 2021-05-17 NOTE — DISCHARGE INSTRUCTIONS
Rest, alternate ice/moist heat, gentle stretching, massage. Avoid prolonged sitting. Follow up with primary care for recheck. Contact information provided for Orthopedist if symptoms persist.  Return to the Emergency Dept for any continued/worsening pain.

## 2021-05-17 NOTE — ED NOTES
TRANSFER - IN REPORT:    Verbal report received from Herbert Jolly (name) on Pierre Simpson. Report consisted of patients Situation, Background, Assessment and   Recommendations(SBAR). Information from the following report(s) SBAR and ED Summary was reviewed with the receiving nurse. Opportunity for questions and clarification was provided.         1330 Pt resting in stretcher at this time, pt denies any pain, call bell within reach       1401 Pt off to MRI

## 2021-05-17 NOTE — ED NOTES
Bedside shift change report given to Ramona Ramsay  (oncoming nurse) by Heath Priest (offgoing nurse). Report included the following information SBAR, Kardex, ED Summary, [facility] and Recent Results.

## 2021-05-30 ENCOUNTER — APPOINTMENT (OUTPATIENT)
Dept: GENERAL RADIOLOGY | Age: 76
End: 2021-05-30
Attending: EMERGENCY MEDICINE
Payer: MEDICARE

## 2021-05-30 ENCOUNTER — HOSPITAL ENCOUNTER (OUTPATIENT)
Age: 76
Setting detail: OBSERVATION
Discharge: HOME HEALTH | End: 2021-06-03
Attending: EMERGENCY MEDICINE | Admitting: STUDENT IN AN ORGANIZED HEALTH CARE EDUCATION/TRAINING PROGRAM
Payer: MEDICARE

## 2021-05-30 ENCOUNTER — APPOINTMENT (OUTPATIENT)
Dept: ULTRASOUND IMAGING | Age: 76
End: 2021-05-30
Attending: EMERGENCY MEDICINE
Payer: MEDICARE

## 2021-05-30 DIAGNOSIS — M25.512 ACUTE PAIN OF BOTH SHOULDERS: ICD-10-CM

## 2021-05-30 DIAGNOSIS — R60.0 BILATERAL LEG EDEMA: ICD-10-CM

## 2021-05-30 DIAGNOSIS — R26.2 INABILITY TO WALK: ICD-10-CM

## 2021-05-30 DIAGNOSIS — M79.605 BILATERAL LEG PAIN: Primary | ICD-10-CM

## 2021-05-30 DIAGNOSIS — M25.511 ACUTE PAIN OF BOTH SHOULDERS: ICD-10-CM

## 2021-05-30 DIAGNOSIS — M79.604 BILATERAL LEG PAIN: Primary | ICD-10-CM

## 2021-05-30 PROBLEM — M79.606 LEG PAIN: Status: ACTIVE | Noted: 2021-05-30

## 2021-05-30 LAB
ALBUMIN SERPL-MCNC: 2.7 G/DL (ref 3.5–5)
ALBUMIN/GLOB SERPL: 0.6 {RATIO} (ref 1.1–2.2)
ALP SERPL-CCNC: 140 U/L (ref 45–117)
ALT SERPL-CCNC: 55 U/L (ref 12–78)
ANION GAP SERPL CALC-SCNC: 5 MMOL/L (ref 5–15)
APPEARANCE UR: CLEAR
AST SERPL-CCNC: 40 U/L (ref 15–37)
BACTERIA URNS QL MICRO: NEGATIVE /HPF
BASOPHILS # BLD: 0 K/UL (ref 0–0.1)
BASOPHILS NFR BLD: 0 % (ref 0–1)
BILIRUB SERPL-MCNC: 0.8 MG/DL (ref 0.2–1)
BILIRUB UR QL: NEGATIVE
BUN SERPL-MCNC: 10 MG/DL (ref 6–20)
BUN/CREAT SERPL: 10 (ref 12–20)
CALCIUM SERPL-MCNC: 9.1 MG/DL (ref 8.5–10.1)
CHLORIDE SERPL-SCNC: 104 MMOL/L (ref 97–108)
CO2 SERPL-SCNC: 27 MMOL/L (ref 21–32)
COLOR UR: NORMAL
CREAT SERPL-MCNC: 0.99 MG/DL (ref 0.7–1.3)
DIFFERENTIAL METHOD BLD: ABNORMAL
EOSINOPHIL # BLD: 0.1 K/UL (ref 0–0.4)
EOSINOPHIL NFR BLD: 1 % (ref 0–7)
EPITH CASTS URNS QL MICRO: NORMAL /LPF
ERYTHROCYTE [DISTWIDTH] IN BLOOD BY AUTOMATED COUNT: 13 % (ref 11.5–14.5)
GLOBULIN SER CALC-MCNC: 4.2 G/DL (ref 2–4)
GLUCOSE SERPL-MCNC: 160 MG/DL (ref 65–100)
GLUCOSE UR STRIP.AUTO-MCNC: NEGATIVE MG/DL
HCT VFR BLD AUTO: 40.8 % (ref 36.6–50.3)
HGB BLD-MCNC: 13.6 G/DL (ref 12.1–17)
HGB UR QL STRIP: NEGATIVE
HYALINE CASTS URNS QL MICRO: NORMAL /LPF (ref 0–5)
IMM GRANULOCYTES # BLD AUTO: 0.1 K/UL (ref 0–0.04)
IMM GRANULOCYTES NFR BLD AUTO: 1 % (ref 0–0.5)
KETONES UR QL STRIP.AUTO: NEGATIVE MG/DL
LEUKOCYTE ESTERASE UR QL STRIP.AUTO: NEGATIVE
LYMPHOCYTES # BLD: 1.6 K/UL (ref 0.8–3.5)
LYMPHOCYTES NFR BLD: 15 % (ref 12–49)
MCH RBC QN AUTO: 31.4 PG (ref 26–34)
MCHC RBC AUTO-ENTMCNC: 33.3 G/DL (ref 30–36.5)
MCV RBC AUTO: 94.2 FL (ref 80–99)
MONOCYTES # BLD: 1.1 K/UL (ref 0–1)
MONOCYTES NFR BLD: 10 % (ref 5–13)
NEUTS SEG # BLD: 7.7 K/UL (ref 1.8–8)
NEUTS SEG NFR BLD: 73 % (ref 32–75)
NITRITE UR QL STRIP.AUTO: NEGATIVE
NRBC # BLD: 0 K/UL (ref 0–0.01)
NRBC BLD-RTO: 0 PER 100 WBC
PH UR STRIP: 6 [PH] (ref 5–8)
PLATELET # BLD AUTO: 248 K/UL (ref 150–400)
PMV BLD AUTO: 9.9 FL (ref 8.9–12.9)
POTASSIUM SERPL-SCNC: 4.3 MMOL/L (ref 3.5–5.1)
PROT SERPL-MCNC: 6.9 G/DL (ref 6.4–8.2)
PROT UR STRIP-MCNC: NEGATIVE MG/DL
RBC # BLD AUTO: 4.33 M/UL (ref 4.1–5.7)
RBC #/AREA URNS HPF: NORMAL /HPF (ref 0–5)
SODIUM SERPL-SCNC: 136 MMOL/L (ref 136–145)
SP GR UR REFRACTOMETRY: 1.02 (ref 1–1.03)
TROPONIN I SERPL-MCNC: <0.05 NG/ML
UA: UC IF INDICATED,UAUC: NORMAL
UROBILINOGEN UR QL STRIP.AUTO: 1 EU/DL (ref 0.2–1)
WBC # BLD AUTO: 10.6 K/UL (ref 4.1–11.1)
WBC URNS QL MICRO: NORMAL /HPF (ref 0–4)

## 2021-05-30 PROCEDURE — 74011000250 HC RX REV CODE- 250: Performed by: INTERNAL MEDICINE

## 2021-05-30 PROCEDURE — 74011250637 HC RX REV CODE- 250/637: Performed by: INTERNAL MEDICINE

## 2021-05-30 PROCEDURE — 99218 HC RM OBSERVATION: CPT

## 2021-05-30 PROCEDURE — 2709999900 HC NON-CHARGEABLE SUPPLY

## 2021-05-30 PROCEDURE — 96374 THER/PROPH/DIAG INJ IV PUSH: CPT

## 2021-05-30 PROCEDURE — 77030029684 HC NEB SM VOL KT MONA -A

## 2021-05-30 PROCEDURE — 93970 EXTREMITY STUDY: CPT

## 2021-05-30 PROCEDURE — 80053 COMPREHEN METABOLIC PANEL: CPT

## 2021-05-30 PROCEDURE — 99285 EMERGENCY DEPT VISIT HI MDM: CPT

## 2021-05-30 PROCEDURE — 36415 COLL VENOUS BLD VENIPUNCTURE: CPT

## 2021-05-30 PROCEDURE — 84484 ASSAY OF TROPONIN QUANT: CPT

## 2021-05-30 PROCEDURE — 81001 URINALYSIS AUTO W/SCOPE: CPT

## 2021-05-30 PROCEDURE — 96375 TX/PRO/DX INJ NEW DRUG ADDON: CPT

## 2021-05-30 PROCEDURE — 93005 ELECTROCARDIOGRAM TRACING: CPT

## 2021-05-30 PROCEDURE — 74011250636 HC RX REV CODE- 250/636: Performed by: EMERGENCY MEDICINE

## 2021-05-30 PROCEDURE — 73030 X-RAY EXAM OF SHOULDER: CPT

## 2021-05-30 PROCEDURE — 72170 X-RAY EXAM OF PELVIS: CPT

## 2021-05-30 PROCEDURE — 94640 AIRWAY INHALATION TREATMENT: CPT

## 2021-05-30 PROCEDURE — 85025 COMPLETE CBC W/AUTO DIFF WBC: CPT

## 2021-05-30 PROCEDURE — 94760 N-INVAS EAR/PLS OXIMETRY 1: CPT

## 2021-05-30 RX ORDER — SODIUM CHLORIDE 0.9 % (FLUSH) 0.9 %
5-40 SYRINGE (ML) INJECTION EVERY 8 HOURS
Status: DISCONTINUED | OUTPATIENT
Start: 2021-05-30 | End: 2021-06-03 | Stop reason: HOSPADM

## 2021-05-30 RX ORDER — POLYETHYLENE GLYCOL 3350 17 G/17G
17 POWDER, FOR SOLUTION ORAL DAILY PRN
Status: DISCONTINUED | OUTPATIENT
Start: 2021-05-30 | End: 2021-06-03 | Stop reason: HOSPADM

## 2021-05-30 RX ORDER — ACETAMINOPHEN 650 MG/1
650 SUPPOSITORY RECTAL
Status: DISCONTINUED | OUTPATIENT
Start: 2021-05-30 | End: 2021-06-03 | Stop reason: HOSPADM

## 2021-05-30 RX ORDER — LIDOCAINE 40 MG/G
CREAM TOPICAL AS NEEDED
Status: DISCONTINUED | OUTPATIENT
Start: 2021-05-30 | End: 2021-06-03 | Stop reason: HOSPADM

## 2021-05-30 RX ORDER — OXYCODONE HYDROCHLORIDE 5 MG/1
5 TABLET ORAL
Status: DISCONTINUED | OUTPATIENT
Start: 2021-05-30 | End: 2021-06-03 | Stop reason: HOSPADM

## 2021-05-30 RX ORDER — GABAPENTIN 100 MG/1
100 CAPSULE ORAL 2 TIMES DAILY
Status: DISCONTINUED | OUTPATIENT
Start: 2021-05-31 | End: 2021-06-03 | Stop reason: HOSPADM

## 2021-05-30 RX ORDER — FENTANYL CITRATE 50 UG/ML
25 INJECTION, SOLUTION INTRAMUSCULAR; INTRAVENOUS
Status: COMPLETED | OUTPATIENT
Start: 2021-05-30 | End: 2021-05-30

## 2021-05-30 RX ORDER — IPRATROPIUM BROMIDE 0.5 MG/2.5ML
500 SOLUTION RESPIRATORY (INHALATION)
Status: DISCONTINUED | OUTPATIENT
Start: 2021-05-30 | End: 2021-05-31

## 2021-05-30 RX ORDER — TRAMADOL HYDROCHLORIDE 50 MG/1
50 TABLET ORAL
Status: DISCONTINUED | OUTPATIENT
Start: 2021-05-30 | End: 2021-06-03 | Stop reason: HOSPADM

## 2021-05-30 RX ORDER — DIAZEPAM 10 MG/2ML
2 INJECTION INTRAMUSCULAR
Status: COMPLETED | OUTPATIENT
Start: 2021-05-30 | End: 2021-05-30

## 2021-05-30 RX ORDER — MELATONIN
2000 DAILY
Status: DISCONTINUED | OUTPATIENT
Start: 2021-05-31 | End: 2021-06-03 | Stop reason: HOSPADM

## 2021-05-30 RX ORDER — ONDANSETRON 2 MG/ML
4 INJECTION INTRAMUSCULAR; INTRAVENOUS
Status: COMPLETED | OUTPATIENT
Start: 2021-05-30 | End: 2021-05-30

## 2021-05-30 RX ORDER — ASPIRIN 81 MG/1
81 TABLET ORAL DAILY
Status: DISCONTINUED | OUTPATIENT
Start: 2021-05-31 | End: 2021-06-03 | Stop reason: HOSPADM

## 2021-05-30 RX ORDER — PANTOPRAZOLE SODIUM 40 MG/1
40 TABLET, DELAYED RELEASE ORAL
Status: DISCONTINUED | OUTPATIENT
Start: 2021-05-31 | End: 2021-06-03 | Stop reason: HOSPADM

## 2021-05-30 RX ORDER — ACETAMINOPHEN 325 MG/1
650 TABLET ORAL
Status: DISCONTINUED | OUTPATIENT
Start: 2021-05-30 | End: 2021-06-03 | Stop reason: HOSPADM

## 2021-05-30 RX ORDER — PRAVASTATIN SODIUM 10 MG/1
20 TABLET ORAL
Status: DISCONTINUED | OUTPATIENT
Start: 2021-05-30 | End: 2021-06-03 | Stop reason: HOSPADM

## 2021-05-30 RX ORDER — TAMSULOSIN HYDROCHLORIDE 0.4 MG/1
0.4 CAPSULE ORAL DAILY
Status: DISCONTINUED | OUTPATIENT
Start: 2021-05-31 | End: 2021-06-03 | Stop reason: HOSPADM

## 2021-05-30 RX ORDER — PROMETHAZINE HYDROCHLORIDE 25 MG/1
12.5 TABLET ORAL
Status: DISCONTINUED | OUTPATIENT
Start: 2021-05-30 | End: 2021-06-03 | Stop reason: HOSPADM

## 2021-05-30 RX ORDER — CARVEDILOL 3.12 MG/1
3.12 TABLET ORAL 2 TIMES DAILY WITH MEALS
Status: DISCONTINUED | OUTPATIENT
Start: 2021-05-30 | End: 2021-06-03 | Stop reason: HOSPADM

## 2021-05-30 RX ORDER — ONDANSETRON 2 MG/ML
4 INJECTION INTRAMUSCULAR; INTRAVENOUS
Status: DISCONTINUED | OUTPATIENT
Start: 2021-05-30 | End: 2021-06-03 | Stop reason: HOSPADM

## 2021-05-30 RX ORDER — SODIUM CHLORIDE 0.9 % (FLUSH) 0.9 %
5-40 SYRINGE (ML) INJECTION AS NEEDED
Status: DISCONTINUED | OUTPATIENT
Start: 2021-05-30 | End: 2021-06-03 | Stop reason: HOSPADM

## 2021-05-30 RX ORDER — GABAPENTIN 100 MG/1
100 CAPSULE ORAL 2 TIMES DAILY
COMMUNITY
End: 2021-07-27

## 2021-05-30 RX ADMIN — Medication 10 ML: at 22:00

## 2021-05-30 RX ADMIN — DIAZEPAM 2 MG: 5 INJECTION, SOLUTION INTRAMUSCULAR; INTRAVENOUS at 16:12

## 2021-05-30 RX ADMIN — APIXABAN 5 MG: 5 TABLET, FILM COATED ORAL at 21:30

## 2021-05-30 RX ADMIN — ONDANSETRON 4 MG: 2 INJECTION INTRAMUSCULAR; INTRAVENOUS at 14:57

## 2021-05-30 RX ADMIN — IPRATROPIUM BROMIDE 0.5 MG: 0.5 SOLUTION RESPIRATORY (INHALATION) at 20:57

## 2021-05-30 RX ADMIN — OXYCODONE 5 MG: 5 TABLET ORAL at 21:30

## 2021-05-30 RX ADMIN — FENTANYL CITRATE 25 MCG: 50 INJECTION, SOLUTION INTRAMUSCULAR; INTRAVENOUS at 14:57
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
## 2021-05-30 NOTE — ED NOTES
TRANSFER - OUT REPORT:    Verbal report given to David Bloom RN (name) on Tatiana Olivas  being transferred to Ortho (unit) for routine progression of care       Report consisted of patients Situation, Background, Assessment and   Recommendations(SBAR). Information from the following report(s) SBAR, Kardex and ED Summary was reviewed with the receiving nurse. Lines:   Peripheral IV 05/30/21 Right Forearm (Active)   Site Assessment Clean, dry, & intact 05/30/21 1153   Phlebitis Assessment 0 05/30/21 1153   Infiltration Assessment 0 05/30/21 1153   Dressing Status Clean, dry, & intact 05/30/21 1153   Dressing Type Tape;Transparent 05/30/21 1153   Hub Color/Line Status Pink;Capped;Flushed;Patent 05/30/21 1153   Action Taken Blood drawn;Dressing reinforced 05/30/21 1153        Opportunity for questions and clarification was provided.       Patient transported with:   [email or web address]
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
## 2021-05-30 NOTE — ED NOTES
MD Mayiln Amos at bedside evaluating the pt. 1418- Pt transported to Xray    1515- Bedside shift change report given to Junie Hong (oncoming nurse) by Breonna Acosta (offgoing nurse).  Report included the following information SBAR, ED Summary and MAR.       1516- MD Maylin Amos at bedside

## 2021-05-30 NOTE — ED NOTES
TRANSFER - IN REPORT:    Verbal report received from [place]  on [address] Report consisted of patients Situation, Background, Assessment and   Recommendations(SBAR). Information from the following report(s) SBAR and ED Summary was reviewed with the receiving nurse. Opportunity for questions and clarification was provided. Pt reports pain is still 10/10 and pt can not lift legs or left arm due to pain. [address] pulses obtained via doppler.

## 2021-05-30 NOTE — ED NOTES
EMS Yesterday pt had Right arm weakness and today has left arm pain and weakness with leg swelling. HX of degenerative Disk disease and Pacemaker. Pt comes from home.

## 2021-05-30 NOTE — ED NOTES
Several attempts to call report made no answer. Attempted to call charge phone and phone hung up notified Nursing supervisor and placed order for transport.

## 2021-05-31 ENCOUNTER — APPOINTMENT (OUTPATIENT)
Dept: GENERAL RADIOLOGY | Age: 76
End: 2021-05-31
Attending: STUDENT IN AN ORGANIZED HEALTH CARE EDUCATION/TRAINING PROGRAM
Payer: MEDICARE

## 2021-05-31 LAB
ANION GAP SERPL CALC-SCNC: 7 MMOL/L (ref 5–15)
BUN SERPL-MCNC: 10 MG/DL (ref 6–20)
BUN/CREAT SERPL: 10 (ref 12–20)
CALCIUM SERPL-MCNC: 9 MG/DL (ref 8.5–10.1)
CHLORIDE SERPL-SCNC: 105 MMOL/L (ref 97–108)
CO2 SERPL-SCNC: 24 MMOL/L (ref 21–32)
CREAT SERPL-MCNC: 1 MG/DL (ref 0.7–1.3)
ERYTHROCYTE [DISTWIDTH] IN BLOOD BY AUTOMATED COUNT: 12.7 % (ref 11.5–14.5)
GLUCOSE SERPL-MCNC: 146 MG/DL (ref 65–100)
HCT VFR BLD AUTO: 39.8 % (ref 36.6–50.3)
HGB BLD-MCNC: 13.4 G/DL (ref 12.1–17)
MCH RBC QN AUTO: 31 PG (ref 26–34)
MCHC RBC AUTO-ENTMCNC: 33.7 G/DL (ref 30–36.5)
MCV RBC AUTO: 92.1 FL (ref 80–99)
NRBC # BLD: 0 K/UL (ref 0–0.01)
NRBC BLD-RTO: 0 PER 100 WBC
PLATELET # BLD AUTO: 265 K/UL (ref 150–400)
PMV BLD AUTO: 10 FL (ref 8.9–12.9)
POTASSIUM SERPL-SCNC: 3.9 MMOL/L (ref 3.5–5.1)
RBC # BLD AUTO: 4.32 M/UL (ref 4.1–5.7)
SODIUM SERPL-SCNC: 136 MMOL/L (ref 136–145)
WBC # BLD AUTO: 10 K/UL (ref 4.1–11.1)

## 2021-05-31 PROCEDURE — 94760 N-INVAS EAR/PLS OXIMETRY 1: CPT

## 2021-05-31 PROCEDURE — 80048 BASIC METABOLIC PNL TOTAL CA: CPT

## 2021-05-31 PROCEDURE — 97116 GAIT TRAINING THERAPY: CPT

## 2021-05-31 PROCEDURE — 85027 COMPLETE CBC AUTOMATED: CPT

## 2021-05-31 PROCEDURE — 36415 COLL VENOUS BLD VENIPUNCTURE: CPT

## 2021-05-31 PROCEDURE — 97165 OT EVAL LOW COMPLEX 30 MIN: CPT | Performed by: OCCUPATIONAL THERAPIST

## 2021-05-31 PROCEDURE — 99218 HC RM OBSERVATION: CPT

## 2021-05-31 PROCEDURE — 74011250637 HC RX REV CODE- 250/637: Performed by: NURSE PRACTITIONER

## 2021-05-31 PROCEDURE — 71045 X-RAY EXAM CHEST 1 VIEW: CPT

## 2021-05-31 PROCEDURE — 74011250637 HC RX REV CODE- 250/637: Performed by: INTERNAL MEDICINE

## 2021-05-31 PROCEDURE — 97162 PT EVAL MOD COMPLEX 30 MIN: CPT

## 2021-05-31 PROCEDURE — 97530 THERAPEUTIC ACTIVITIES: CPT | Performed by: OCCUPATIONAL THERAPIST

## 2021-05-31 RX ORDER — PREDNISONE 20 MG/1
40 TABLET ORAL
Status: DISCONTINUED | OUTPATIENT
Start: 2021-06-01 | End: 2021-06-03 | Stop reason: HOSPADM

## 2021-05-31 RX ORDER — IPRATROPIUM BROMIDE 0.5 MG/2.5ML
500 SOLUTION RESPIRATORY (INHALATION)
Status: DISCONTINUED | OUTPATIENT
Start: 2021-05-31 | End: 2021-06-03 | Stop reason: HOSPADM

## 2021-05-31 RX ADMIN — TAMSULOSIN HYDROCHLORIDE 0.4 MG: 0.4 CAPSULE ORAL at 08:51

## 2021-05-31 RX ADMIN — GABAPENTIN 100 MG: 100 CAPSULE ORAL at 08:51

## 2021-05-31 RX ADMIN — TRAMADOL HYDROCHLORIDE 50 MG: 50 TABLET ORAL at 02:31

## 2021-05-31 RX ADMIN — APIXABAN 5 MG: 5 TABLET, FILM COATED ORAL at 17:54

## 2021-05-31 RX ADMIN — Medication 2000 UNITS: at 08:49

## 2021-05-31 RX ADMIN — Medication 10 ML: at 22:23

## 2021-05-31 RX ADMIN — ASPIRIN 81 MG: 81 TABLET, COATED ORAL at 08:51

## 2021-05-31 RX ADMIN — CARVEDILOL 3.12 MG: 3.12 TABLET, FILM COATED ORAL at 17:54

## 2021-05-31 RX ADMIN — APIXABAN 5 MG: 5 TABLET, FILM COATED ORAL at 08:50

## 2021-05-31 RX ADMIN — CARVEDILOL 3.12 MG: 3.12 TABLET, FILM COATED ORAL at 08:50

## 2021-05-31 RX ADMIN — GABAPENTIN 100 MG: 100 CAPSULE ORAL at 17:54

## 2021-05-31 RX ADMIN — TRAMADOL HYDROCHLORIDE 50 MG: 50 TABLET ORAL at 22:23

## 2021-05-31 RX ADMIN — Medication 10 ML: at 14:37

## 2021-05-31 RX ADMIN — TRAMADOL HYDROCHLORIDE 50 MG: 50 TABLET ORAL at 14:37

## 2021-05-31 RX ADMIN — PANTOPRAZOLE SODIUM 40 MG: 40 TABLET, DELAYED RELEASE ORAL at 08:50
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
## 2021-05-31 NOTE — PROGRESS NOTES
ADULT PROTOCOL: JET AEROSOL ASSESSMENT    Patient  Sun Solomon     76 y.o.   male     5/30/2021  9:11 PM    Breath Sounds Pre Procedure: Right Breath Sounds: Clear                               Left Breath Sounds: Clear    Breath Sounds Post Procedure: Right Breath Sounds: Clear                                 Left Breath Sounds: Clear    Breathing pattern: Pre procedure Breathing Pattern: Regular          Post procedure Breathing Pattern: Regular    Heart Rate: Pre procedure Pulse: 73           Post procedure Pulse: 76    Resp Rate: Pre procedure Respirations: 18           Post procedure Respirations: 16       Cough: Pre procedure Cough: Non-productive               Post procedure Cough: Non-productive    Suctioned: NO      Oxygen: O2 Device: None (Room air)        Changed: NO    SpO2: Pre procedure SpO2: 98 %   without oxygen              Post procedure SpO2: 100 %  without oxygen    Nebulizer Therapy: Current medications Aerosolized Medications: Ipratropium bromide      Changed: NO    Smoking History: former smoker      Problem List:   Patient Active Problem List   Diagnosis Code    Allergic rhinitis J30.9    Diverticulosis K57.90    Urinary retention R33.9    PVD (peripheral vascular disease) (HCC) I73.9    On statin therapy Z79.899    Low back pain M54.5    Insomnia G47.00    HTN (hypertension) I10    Hyperlipidemia E78.5    Glucose intolerance (impaired glucose tolerance) R73.02    GERD (gastroesophageal reflux disease) K21.9    ED (erectile dysfunction) N52.9    DJD (degenerative joint disease) M19.90    ASVD (arteriosclerotic vascular disease) I70.90    Age-related cataract H25.9    PAF (paroxysmal atrial fibrillation) (HCC) I48.0    Dilated cardiomyopathy (HCC) I42.0    Permanent atrial fibrillation (HCC) I48.21    Tachycardia R00.0    A-fib (HCC) I48.91    Leg pain M79.606       Respiratory Therapist: Sherry Acuna, RT
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
## 2021-05-31 NOTE — PROGRESS NOTES
End of Shift Note    Bedside shift change report given to PASTOR Rboert (oncoming nurse) by Heide Liriano RN (offgoing nurse). Report included the following information SBAR, Kardex, ED Summary, Intake/Output, MAR, Recent Results and Med Rec Status    Shift worked:  5281-0325     Shift summary and any significant changes:     pt admitted this shift from ED. Pt having BLE pain and Cspine pain. Medicated per MAR. Periodic confusion. Concerns for physician to address:       Zone phone for oncoming shift:   [phone]       Activity:  Activity Level: Bed Rest, Dangle Side of Bed  Number times ambulated in hallways past shift: 0  Number of times OOB to chair past shift: 0    Cardiac:   Cardiac Monitoring: No           Access:   Current line(s): PIV     Genitourinary:   Urinary status: voiding    Respiratory:   O2 Device: None (Room air)  Chronic home O2 use?: NO  Incentive spirometer at bedside: NO     GI:  Last Bowel Movement Date: 05/30/21  Current diet:  DIET CARDIAC Regular  Passing flatus: YES  Tolerating current diet: YES       Pain Management:   Patient states pain is manageable on current regimen: YES    Skin:  Ghulam Score: 17  Interventions: PT/OT consult and nutritional support     Patient Safety:  Fall Score:  Total Score: 4  Interventions: bed/chair alarm, assistive device (walker, cane, etc), gripper socks and pt to call before getting OOB  High Fall Risk: Yes    Length of Stay:  Expected LOS: - - -  Actual LOS: 0      Heide Liriano RN

## 2021-05-31 NOTE — PROGRESS NOTES
Problem: Breathing Pattern - Ineffective  Goal: *Absence of hypoxia  Outcome: Progressing Towards Goal  Goal: *Use of effective breathing techniques  Outcome: Progressing Towards Goal  Goal: *PALLIATIVE CARE:  Alleviation of Dyspnea  Outcome: Progressing Towards Goal     Problem: Patient Education: Go to Patient Education Activity  Goal: Patient/Family Education  Outcome: Progressing Towards Goal     Problem: Pressure Injury - Risk of  Goal: *Prevention of pressure injury  Description: Document Ghulam Scale and appropriate interventions in the flowsheet. Outcome: Progressing Towards Goal  Note: Pressure Injury Interventions:                                            Problem: Patient Education: Go to Patient Education Activity  Goal: Patient/Family Education  Outcome: Progressing Towards Goal     Problem: Falls - Risk of  Goal: *Absence of Falls  Description: Document Sonia Ruts Fall Risk and appropriate interventions in the flowsheet.   Outcome: Progressing Towards Goal  Note: Fall Risk Interventions:  Mobility Interventions: Bed/chair exit alarm, PT Consult for mobility concerns    Mentation Interventions: Bed/chair exit alarm    Medication Interventions: Bed/chair exit alarm, Patient to call before getting OOB                   Problem: Patient Education: Go to Patient Education Activity  Goal: Patient/Family Education  Outcome: Progressing Towards Goal     Problem: Pain  Goal: *Control of Pain  Outcome: Progressing Towards Goal     Problem: Patient Education: Go to Patient Education Activity  Goal: Patient/Family Education  Outcome: Progressing Towards Goal

## 2021-05-31 NOTE — PROGRESS NOTES
Hospitalist Progress Note    NAME: Urban Tavares   :  1945   MRN:  [id number]       Assessment / Plan:  Intractable acute on chronic low back pain associated with bilateral leg pain and leg weakness  Ambulatory dysfunction  Left shoulder pain  Severe osteoarthritis    Patient had MRI of the cervical spine/ lumbar spine done 2 weeks ago which were negative for cord compression but showed severe osteoarthritis  MRI of the cervical spine showed:  No cord compression or intrinsic cord signal abnormality. No acute findings.   Degenerative changes primarily at C6-C7 resulting in mild central stenosis and  mild to severe bilateral foraminal stenosis. MRI lumbar spine  No evidence for acute abnormality. Postsurgical and degenerative changes as  described above most pronounced at L3-4. Given that his bilateral lower ext weakness is new, will get MRI lumbar spine  C/w tramadol and oxycodone  Ortho consulted for possible steroid injection, PT OT    Low grade fever:  Noted a single low grade fever of 100.5  UA negative  Will get CXR  Blood culture if fever again     Chronic persistent A.  Fib  Hypertension  GERD  BPH  Continue with Eliquis  Continue with Coreg  Continue Protonix  Continue with Flomax    Code Status: Full code  Surrogate Decision Maker: Wife     DVT Prophylaxis: Eliquis  GI Prophylaxis: not indicated     Baseline: Ambulatory    Estimated discharge date: 6/3  Barriers:     Subjective:     Chief Complaint / Reason for Physician Visit  F/u for severe bilateral leg pain and weakness  He has pain in his bilateral calf, with bilateral weakness    Review of Systems:  Symptom Y/N Comments  Symptom Y/N Comments   Fever/Chills n   Chest Pain n    Poor Appetite n   Edema n    Cough    Abdominal Pain     Sputum    Joint Pain     SOB/HANNA    Pruritis/Rash     Nausea/vomit    Tolerating PT/OT     Diarrhea    Tolerating Diet     Constipation    Other       Could NOT obtain due to:      Objective:     VITALS:   Last 24hrs VS reviewed since prior progress note. Most recent are:  Patient Vitals for the past 24 hrs:   Temp Pulse Resp BP SpO2   05/31/21 0735 98 °F (36.7 °C) 80 17 (!) 145/75 99 %   05/31/21 0359 99 °F (37.2 °C) 75 16 (!) 154/67 100 %   05/30/21 2349 (!) 100.5 °F (38.1 °C) 81 16 (!) 141/66 93 %   05/30/21 2055 -- -- -- -- 98 %   05/30/21 1914 97.8 °F (36.6 °C) 78 15 139/70 99 %   05/30/21 1830 -- -- -- 133/60 --   05/30/21 1815 -- -- -- 121/68 --   05/30/21 1745 -- -- -- 112/60 --   05/30/21 1730 -- -- -- 134/76 --   05/30/21 1715 -- -- -- 122/66 --   05/30/21 1700 -- -- -- (!) 127/113 --   05/30/21 1645 -- -- -- 124/71 --   05/30/21 1630 -- -- -- 124/62 --   05/30/21 1615 -- -- -- 128/67 --   05/30/21 1600 -- -- -- 127/64 --   05/30/21 1545 -- -- -- 119/69 --   05/30/21 1530 98.1 °F (36.7 °C) -- -- 125/66 --   05/30/21 1457 -- -- -- (!) 130/57 --   05/30/21 1412 -- -- -- 129/66 --   05/30/21 1342 -- -- -- 128/64 --       Intake/Output Summary (Last 24 hours) at 5/31/2021 1131  Last data filed at 5/30/2021 1915  Gross per 24 hour   Intake --   Output 350 ml   Net -350 ml        I had a face to face encounter and independently examined this patient on 5/31/2021, as outlined below:  PHYSICAL EXAM:  General: WD, WN. Alert, cooperative, no acute distress    EENT:  EOMI. Anicteric sclerae. MMM  Resp:  CTA bilaterally, no wheezing or rales. No accessory muscle use  CV:  Regular  rhythm,  No edema  GI:  Soft, Non distended, Non tender. +Bowel sounds  Neurologic:  Alert and oriented X 2, normal speech, power 3/5 in bilateral lower ext  Psych:   Good insight. Not anxious nor agitated  Skin:  No rashes.   No jaundice    Reviewed most current lab test results and cultures  YES  Reviewed most current radiology test results   YES  Review and summation of old records today    NO  Reviewed patient's current orders and MAR    YES  PMH/SH reviewed - no change compared to H&P  ________________________________________________________________________  Care Plan discussed with:    Comments   Patient y    Family  y    RN y    Care Manager     Consultant                        Multidiciplinary team rounds were held today with , nursing, pharmacist and clinical coordinator. Patient's plan of care was discussed; medications were reviewed and discharge planning was addressed. ________________________________________________________________________  Total NON critical care TIME:  35   Minutes    Total CRITICAL CARE TIME Spent:   Minutes non procedure based      Comments   >50% of visit spent in counseling and coordination of care     ________________________________________________________________________  Prasanth Childs MD     Procedures: see electronic medical records for all procedures/Xrays and details which were not copied into this note but were reviewed prior to creation of Plan. LABS:  I reviewed today's most current labs and imaging studies.   Pertinent labs include:  Recent Labs     05/31/21 0227 05/30/21  1143   WBC 10.0 10.6   HGB 13.4 13.6   HCT 39.8 40.8    248     Recent Labs     05/31/21 0227 05/30/21  1143    136   K 3.9 4.3    104   CO2 24 27   * 160*   BUN 10 10   CREA 1.00 0.99   CA 9.0 9.1   ALB  --  2.7*   TBILI  --  0.8   ALT  --  55       Signed: Prasanth Childs MD
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
## 2021-06-01 PROBLEM — M79.605 BILATERAL LOWER EXTREMITY PAIN: Status: ACTIVE | Noted: 2021-06-01

## 2021-06-01 PROBLEM — M79.604 BILATERAL LOWER EXTREMITY PAIN: Status: ACTIVE | Noted: 2021-06-01

## 2021-06-01 PROBLEM — R29.898 WEAKNESS OF BOTH LEGS: Status: ACTIVE | Noted: 2021-06-01

## 2021-06-01 LAB
ATRIAL RATE: 71 BPM
CALCULATED R AXIS, ECG10: -58 DEGREES
CALCULATED T AXIS, ECG11: 109 DEGREES
DIAGNOSIS, 93000: NORMAL
Q-T INTERVAL, ECG07: 410 MS
QRS DURATION, ECG06: 154 MS
QTC CALCULATION (BEZET), ECG08: 457 MS
VENTRICULAR RATE, ECG03: 75 BPM

## 2021-06-01 PROCEDURE — 97110 THERAPEUTIC EXERCISES: CPT

## 2021-06-01 PROCEDURE — 74011250637 HC RX REV CODE- 250/637: Performed by: INTERNAL MEDICINE

## 2021-06-01 PROCEDURE — 97116 GAIT TRAINING THERAPY: CPT

## 2021-06-01 PROCEDURE — 87040 BLOOD CULTURE FOR BACTERIA: CPT

## 2021-06-01 PROCEDURE — 99218 HC RM OBSERVATION: CPT

## 2021-06-01 PROCEDURE — 74011250637 HC RX REV CODE- 250/637: Performed by: NURSE PRACTITIONER

## 2021-06-01 PROCEDURE — 36415 COLL VENOUS BLD VENIPUNCTURE: CPT

## 2021-06-01 PROCEDURE — 94760 N-INVAS EAR/PLS OXIMETRY 1: CPT

## 2021-06-01 PROCEDURE — 65270000029 HC RM PRIVATE

## 2021-06-01 PROCEDURE — 74011636637 HC RX REV CODE- 636/637: Performed by: PHYSICIAN ASSISTANT

## 2021-06-01 RX ADMIN — PRAVASTATIN SODIUM 20 MG: 10 TABLET ORAL at 22:15

## 2021-06-01 RX ADMIN — GABAPENTIN 100 MG: 100 CAPSULE ORAL at 17:37

## 2021-06-01 RX ADMIN — Medication 10 ML: at 13:19

## 2021-06-01 RX ADMIN — APIXABAN 5 MG: 5 TABLET, FILM COATED ORAL at 09:21

## 2021-06-01 RX ADMIN — PANTOPRAZOLE SODIUM 40 MG: 40 TABLET, DELAYED RELEASE ORAL at 09:21

## 2021-06-01 RX ADMIN — CARVEDILOL 3.12 MG: 3.12 TABLET, FILM COATED ORAL at 17:37

## 2021-06-01 RX ADMIN — TAMSULOSIN HYDROCHLORIDE 0.4 MG: 0.4 CAPSULE ORAL at 09:21

## 2021-06-01 RX ADMIN — TRAMADOL HYDROCHLORIDE 50 MG: 50 TABLET ORAL at 09:31

## 2021-06-01 RX ADMIN — Medication 10 ML: at 22:15

## 2021-06-01 RX ADMIN — GABAPENTIN 100 MG: 100 CAPSULE ORAL at 09:21

## 2021-06-01 RX ADMIN — ASPIRIN 81 MG: 81 TABLET, COATED ORAL at 09:21

## 2021-06-01 RX ADMIN — PREDNISONE 40 MG: 20 TABLET ORAL at 09:21

## 2021-06-01 RX ADMIN — Medication 2000 UNITS: at 09:21

## 2021-06-01 RX ADMIN — APIXABAN 5 MG: 5 TABLET, FILM COATED ORAL at 17:37

## 2021-06-01 RX ADMIN — CARVEDILOL 3.12 MG: 3.12 TABLET, FILM COATED ORAL at 09:21

## 2021-06-01 NOTE — PROGRESS NOTES
Called MRI they are waiting for pacemaker rep to come they estimate it will not take place until tomorrow. All MRI screen forms have been completed and signed.

## 2021-06-01 NOTE — PROGRESS NOTES
Problem: Pressure Injury - Risk of  Goal: *Prevention of pressure injury  Description: Document Ghulam Scale and appropriate interventions in the flowsheet. Outcome: Progressing Towards Goal  Note: Pressure Injury Interventions:  Sensory Interventions: Assess changes in LOC, Check visual cues for pain, Discuss PT/OT consult with provider, Float heels, Pressure redistribution bed/mattress (bed type)         Activity Interventions: Increase time out of bed, Pressure redistribution bed/mattress(bed type), PT/OT evaluation    Mobility Interventions: Float heels, HOB 30 degrees or less, Pressure redistribution bed/mattress (bed type), PT/OT evaluation    Nutrition Interventions: Document food/fluid/supplement intake                     Problem: Falls - Risk of  Goal: *Absence of Falls  Description: Document Yogesh Fall Risk and appropriate interventions in the flowsheet.   Outcome: Progressing Towards Goal  Note: Fall Risk Interventions:  Mobility Interventions: Communicate number of staff needed for ambulation/transfer, OT consult for ADLs, Bed/chair exit alarm, Patient to call before getting OOB, PT Consult for mobility concerns, PT Consult for assist device competence, Utilize walker, cane, or other assistive device    Mentation Interventions: Adequate sleep, hydration, pain control, Bed/chair exit alarm, Evaluate medications/consider consulting pharmacy, More frequent rounding, Reorient patient    Medication Interventions: Bed/chair exit alarm, Evaluate medications/consider consulting pharmacy, Patient to call before getting OOB    Elimination Interventions: Bed/chair exit alarm, Call light in reach, Patient to call for help with toileting needs, Urinal in reach    History of Falls Interventions: Bed/chair exit alarm, Consult care management for discharge planning, Door open when patient unattended, Evaluate medications/consider consulting pharmacy, Investigate reason for fall, Room close to nurse's station, Utilize gait belt for transfer/ambulation

## 2021-06-01 NOTE — PROGRESS NOTES
Hospitalist Progress Note    NAME: Gustave Severe   :  1945   MRN:  [id number]       Assessment / Plan:  Intractable acute on chronic low back pain associated with bilateral leg pain and leg weakness  Ambulatory dysfunction  Left shoulder pain  Severe osteoarthritis    Patient had MRI of the cervical spine/ lumbar spine done 2 weeks ago which were negative for cord compression but showed severe osteoarthritis  MRI of the cervical spine showed:  No cord compression or intrinsic cord signal abnormality. No acute findings.   Degenerative changes primarily at C6-C7 resulting in mild central stenosis and  mild to severe bilateral foraminal stenosis. MRI lumbar spine  No evidence for acute abnormality. Postsurgical and degenerative changes as  described above most pronounced at L3-4. Given that his bilateral lower ext weakness is new, will get MRI lumbar spine, which is pending, has pacemaker, will need company rep to come in to deactive device, will happen tomorrow. C/w tramadol and oxycodone  Ortho consulted: recommended steroid trial, steroid shot for shoulder pain if worsens. Low grade fever:  Noted a single low grade fever of 100.5 on , no fever after that  UA negative, CXR normal     Chronic persistent A.  Fib  Hypertension  GERD  BPH  Continue with Eliquis  Continue with Coreg  Continue Protonix  Continue with Flomax    Code Status: Full code  Surrogate Decision Maker: Wife     DVT Prophylaxis: Eliquis  GI Prophylaxis: not indicated     Baseline: Ambulatory    Estimated discharge date: 6/3  Barriers: Needs pain control, MRI     Subjective:     Chief Complaint / Reason for Physician Visit  F/u for severe bilateral leg pain and weakness  He continues to have some posterior thigh and calf pain, slightly better than yesterday    Review of Systems:  Symptom Y/N Comments  Symptom Y/N Comments   Fever/Chills n   Chest Pain n    Poor Appetite n   Edema n    Cough    Abdominal Pain     Sputum    Joint Pain     SOB/HANNA    Pruritis/Rash     Nausea/vomit    Tolerating PT/OT     Diarrhea    Tolerating Diet     Constipation    Other       Could NOT obtain due to:      Objective:     VITALS:   Last 24hrs VS reviewed since prior progress note. Most recent are:  Patient Vitals for the past 24 hrs:   Temp Pulse Resp BP SpO2   06/01/21 1104 97.5 °F (36.4 °C) 76 17 (!) 153/81 100 %   06/01/21 0718 99.2 °F (37.3 °C) 76 18 (!) 146/67 98 %   06/01/21 0307 98.2 °F (36.8 °C) 75 18 (!) 147/67 97 %   05/31/21 2333 98.1 °F (36.7 °C) 75 18 (!) 142/60 98 %   05/31/21 1543 97.9 °F (36.6 °C) -- -- -- --   05/31/21 1541 97.9 °F (36.6 °C) 75 18 (!) 128/56 100 %       Intake/Output Summary (Last 24 hours) at 6/1/2021 1250  Last data filed at 6/1/2021 0830  Gross per 24 hour   Intake 240 ml   Output 900 ml   Net -660 ml        I had a face to face encounter and independently examined this patient on 6/1/2021, as outlined below:  PHYSICAL EXAM:  General: WD, WN. Alert, cooperative, no acute distress    EENT:  EOMI. Anicteric sclerae. MMM  Resp:  CTA bilaterally, no wheezing or rales. No accessory muscle use  CV:  Regular  rhythm,  No edema  GI:  Soft, Non distended, Non tender. +Bowel sounds  Neurologic:  Alert and oriented X 2, normal speech, power 3/5 in bilateral lower ext  Psych:   Good insight. Not anxious nor agitated  Skin:  No rashes. No jaundice    Reviewed most current lab test results and cultures  YES  Reviewed most current radiology test results   YES  Review and summation of old records today    NO  Reviewed patient's current orders and MAR    YES  PMH/SH reviewed - no change compared to H&P  ________________________________________________________________________  Care Plan discussed with:    Comments   Patient y    Family  y    RN y    Care Manager     Consultant                        Multidiciplinary team rounds were held today with , nursing, pharmacist and clinical coordinator.   Patient's plan of care was discussed; medications were reviewed and discharge planning was addressed. ________________________________________________________________________  Total NON critical care TIME:  35   Minutes    Total CRITICAL CARE TIME Spent:   Minutes non procedure based      Comments   >50% of visit spent in counseling and coordination of care     ________________________________________________________________________  Mohsen Ray MD     Procedures: see electronic medical records for all procedures/Xrays and details which were not copied into this note but were reviewed prior to creation of Plan. LABS:  I reviewed today's most current labs and imaging studies.   Pertinent labs include:  Recent Labs     05/31/21 0227 05/30/21  1143   WBC 10.0 10.6   HGB 13.4 13.6   HCT 39.8 40.8    248     Recent Labs     05/31/21 0227 05/30/21  1143    136   K 3.9 4.3    104   CO2 24 27   * 160*   BUN 10 10   CREA 1.00 0.99   CA 9.0 9.1   ALB  --  2.7*   TBILI  --  0.8   ALT  --  55       Signed: Mohsen Ray MD

## 2021-06-01 NOTE — PROGRESS NOTES
Bedside and Verbal shift change report given to PASTOR Collazo (oncoming nurse) by Arun Mayers (offgoing nurse). Report included the following information SBAR, Kardex, ED Summary, Procedure Summary, Intake/Output, MAR, Recent Results and Med Rec Status.

## 2021-06-01 NOTE — PROGRESS NOTES
Problem: Mobility Impaired (Adult and Pediatric)  Goal: *Acute Goals and Plan of Care (Insert Text)  Description: FUNCTIONAL STATUS PRIOR TO ADMISSION: Patient was modified independent using a rolling walker for functional mobility. A few weeks ago. HOME SUPPORT PRIOR TO ADMISSION: The patient lived with wife. Physical Therapy Goals  Initiated 5/31/2021  1. Patient will move from supine to sit and sit to supine  in bed with minimal assistance/contact guard assist within 7 day(s). 2.  Patient will transfer from bed to chair and chair to bed with minimal assistance/contact guard assist using the least restrictive device within 7 day(s). 3.  Patient will perform sit to stand with minimal assistance/contact guard assist within 7 day(s). 4.  Patient will ambulate with minimal assistance/contact guard assist for 50 feet with the least restrictive device within 7 day(s). Outcome: Progressing Towards Goal   PHYSICAL THERAPY TREATMENT  Patient: Abner Bravo (34 y.o. male)  Date: 6/1/2021  Diagnosis: Leg pain [M79.606] <principal problem not specified>       Precautions: Fall  Chart, physical therapy assessment, plan of care and goals were reviewed. ASSESSMENT  Patient continues with skilled PT services and is progressing towards goals. Pt presents with decreased strength and increased LE pain. Pt performed sit to stand transfer at max A x1. Pt requiring cueing for hand placement and to rock for momentum. Pt ambulated 13ft with RW at min A/CGA. Pt requiring constant cueing to stay within the walker and stand upright. Pt able to minimally correct. Pt requiring at seated rest breaks between bouts. Pt performed seated exercises with visual cueing. Pt with improved mobility tolerance.         Current Level of Function Impacting Discharge (mobility/balance): sit to stand transfer at max A x1, ambulation at min A/CGA with RW     Other factors to consider for discharge: decreased strength, LE pain PLAN :  Patient continues to benefit from skilled intervention to address the above impairments. Continue treatment per established plan of care. to address goals. Recommendation for discharge: (in order for the patient to meet his/her long term goals)  Therapy up to 5 days/week in SNF setting    This discharge recommendation:  Has been made in collaboration with the attending provider and/or case management    IF patient discharges home will need the following DME: rolling walker       SUBJECTIVE:   Patient stated  I have to get theses legs right.     OBJECTIVE DATA SUMMARY:   Critical Behavior:  Neurologic State: Alert, Appropriate for age  Orientation Level: Oriented to person, Oriented to situation, Disoriented to place, Disoriented to time, Other (Comment) (scattered confusion)  Cognition: Follows commands, Appropriate safety awareness, Appropriate for age attention/concentration, Appropriate decision making  Safety/Judgement: Fall prevention  Functional Mobility Training:  Bed Mobility:                    Transfers:  Sit to Stand: Maximum assistance;Assist x1;Additional time  Stand to Sit: Minimum assistance;Contact guard assistance                             Balance:  Sitting: Intact  Sitting - Static: Good (unsupported)  Sitting - Dynamic: Fair (occasional)  Standing: Impaired; With support  Standing - Static: Fair;Constant support  Standing - Dynamic : Fair;Constant support  Ambulation/Gait Training:  Distance (ft): 13 Feet (ft) (x2)  Assistive Device: Gait belt;Walker, rolling  Ambulation - Level of Assistance: Minimal assistance        Gait Abnormalities: Decreased step clearance;Shuffling gait; Step to gait        Base of Support: Widened     Speed/Paula: Shuffled; Slow  Step Length: Left shortened;Right shortened        Therapeutic Exercises:   Seated  LAQ x10  Marching x10  Hip Abd/Add x10  Heel and toe raises.    Pain Rating:  Pt reported LE pain     Activity Tolerance:   Fair and requires rest breaks    After treatment patient left in no apparent distress:   Sitting in chair and Call bell within reach    COMMUNICATION/COLLABORATION:   The patients plan of care was discussed with: Registered nurse.      Rose Santos PTA   Time Calculation: 23 mins

## 2021-06-01 NOTE — PROGRESS NOTES
End of Shift Note    Bedside shift change report given to Preethi Enriquez RN (oncoming nurse) by Desiree Slater (offgoing nurse). Report included the following information SBAR, Kardex, ED Summary, Intake/Output, MAR, Recent Results and Med Rec Status    Shift worked:  Day     Shift summary and any significant changes:     pt admitted from ED. Pt having BLE pain and Cspine pain. Medicated per MAR. Periodic confusion and decrease in orientation might be partly because of the pain medication that was given. Vitals stable. Waiting for MRI and pacemaker rep coming tomorrow at 9 am and MRI will send for patient at 8:30. Concerns for physician to address:    MRI once complete and possible injections   Zone phone for oncoming shift:   [phone]       Activity:  Activity Level: Up with Assistance  Number times ambulated in hallways past shift: 0  Number of times OOB to chair past shift: 0    Cardiac:   Cardiac Monitoring: No           Access:   Current line(s): PIV     Genitourinary:   Urinary status: voiding    Respiratory:   O2 Device: None (Room air)  Chronic home O2 use?: NO  Incentive spirometer at bedside: NO     GI:  Last Bowel Movement Date: 05/30/21  Current diet:  DIET CARDIAC Regular  Passing flatus: YES  Tolerating current diet: YES       Pain Management:   Patient states pain is manageable on current regimen: YES    Skin:  Ghulam Score: 19  Interventions: PT/OT consult and nutritional support     Patient Safety:  Fall Score:  Total Score: 5  Interventions: bed/chair alarm, assistive device (walker, cane, etc), gripper socks and pt to call before getting OOB  High Fall Risk: Yes    Length of Stay:  Expected LOS: - - -  Actual LOS: 0      Desiree Slater

## 2021-06-02 ENCOUNTER — APPOINTMENT (OUTPATIENT)
Dept: MRI IMAGING | Age: 76
End: 2021-06-02
Attending: STUDENT IN AN ORGANIZED HEALTH CARE EDUCATION/TRAINING PROGRAM
Payer: MEDICARE

## 2021-06-02 PROCEDURE — 74011250637 HC RX REV CODE- 250/637: Performed by: NURSE PRACTITIONER

## 2021-06-02 PROCEDURE — 72148 MRI LUMBAR SPINE W/O DYE: CPT

## 2021-06-02 PROCEDURE — 97116 GAIT TRAINING THERAPY: CPT

## 2021-06-02 PROCEDURE — 99218 HC RM OBSERVATION: CPT

## 2021-06-02 PROCEDURE — 97110 THERAPEUTIC EXERCISES: CPT

## 2021-06-02 PROCEDURE — 74011250637 HC RX REV CODE- 250/637: Performed by: INTERNAL MEDICINE

## 2021-06-02 PROCEDURE — 74011636637 HC RX REV CODE- 636/637: Performed by: PHYSICIAN ASSISTANT

## 2021-06-02 RX ADMIN — PANTOPRAZOLE SODIUM 40 MG: 40 TABLET, DELAYED RELEASE ORAL at 06:23

## 2021-06-02 RX ADMIN — Medication 10 ML: at 14:35

## 2021-06-02 RX ADMIN — APIXABAN 5 MG: 5 TABLET, FILM COATED ORAL at 17:37

## 2021-06-02 RX ADMIN — GABAPENTIN 100 MG: 100 CAPSULE ORAL at 17:38

## 2021-06-02 RX ADMIN — ASPIRIN 81 MG: 81 TABLET, COATED ORAL at 09:09

## 2021-06-02 RX ADMIN — CARVEDILOL 3.12 MG: 3.12 TABLET, FILM COATED ORAL at 17:37

## 2021-06-02 RX ADMIN — Medication 10 ML: at 21:31

## 2021-06-02 RX ADMIN — APIXABAN 5 MG: 5 TABLET, FILM COATED ORAL at 09:09

## 2021-06-02 RX ADMIN — CARVEDILOL 3.12 MG: 3.12 TABLET, FILM COATED ORAL at 09:09

## 2021-06-02 RX ADMIN — Medication 2000 UNITS: at 09:09

## 2021-06-02 RX ADMIN — TAMSULOSIN HYDROCHLORIDE 0.4 MG: 0.4 CAPSULE ORAL at 09:09

## 2021-06-02 RX ADMIN — GABAPENTIN 100 MG: 100 CAPSULE ORAL at 09:09

## 2021-06-02 RX ADMIN — PRAVASTATIN SODIUM 20 MG: 10 TABLET ORAL at 21:31

## 2021-06-02 RX ADMIN — Medication 10 ML: at 06:23

## 2021-06-02 RX ADMIN — PREDNISONE 40 MG: 20 TABLET ORAL at 09:08

## 2021-06-02 NOTE — PROGRESS NOTES
End of Shift Note    Bedside shift change report given to Kaushik Moy RN (oncoming nurse) by Shani Llamas (offgoing nurse). Report included the following information SBAR, Kardex, ED Summary, Intake/Output, MAR, Recent Results and Med Rec Status    Shift worked:  Day     Shift summary and any significant changes:     pt admitted from ED. Pt having BLE pain and Cspine pain. Medicated per MAR. Periodic confusion and decrease in orientation might be partly because of the pain medication that was given. Vitals stable. MRI completed nothing new and significant planning discharge currently. Concerns for physician to address:    discharge   Zone phone for oncoming shift:   [phone]       Activity:  Activity Level: Up with Assistance  Number times ambulated in hallways past shift: 0  Number of times OOB to chair past shift: 0    Cardiac:   Cardiac Monitoring: No           Access:   Current line(s): PIV     Genitourinary:   Urinary status: voiding    Respiratory:   O2 Device: None (Room air)  Chronic home O2 use?: NO  Incentive spirometer at bedside: NO     GI:  Last Bowel Movement Date: 05/30/21  Current diet:  DIET CARDIAC Regular  Passing flatus: YES  Tolerating current diet: YES       Pain Management:   Patient states pain is manageable on current regimen: YES    Skin:  Ghulam Score: 19  Interventions: PT/OT consult and nutritional support     Patient Safety:  Fall Score:  Total Score: 5  Interventions: bed/chair alarm, assistive device (walker, cane, etc), gripper socks and pt to call before getting OOB  High Fall Risk: Yes    Length of Stay:  Expected LOS: - - -  Actual LOS: [address] [facility]

## 2021-06-02 NOTE — PROGRESS NOTES
Hospitalist Progress Note    NAME: Penny Little   :  1945   MRN:  [id number]       Assessment / Plan:  Intractable acute on chronic low back pain associated with bilateral leg pain and leg weakness  Ambulatory dysfunction  Left shoulder pain  Severe osteoarthritis    Patient had MRI of the cervical spine/ lumbar spine done 2 weeks ago which were negative for cord compression but showed severe osteoarthritis  MRI of the cervical spine showed:  No cord compression or intrinsic cord signal abnormality. No acute findings.   Degenerative changes primarily at C6-C7 resulting in mild central stenosis and  mild to severe bilateral foraminal stenosis. MRI lumbar spine  No evidence for acute abnormality. Postsurgical and degenerative changes as  described above most pronounced at L3-4. Repeat MRI here showed: No change  C/w tramadol and oxycodone  Ortho consulted: recommended steroid trial, steroid shot for shoulder pain if worsens. PT/OT recommeded SNF    Low grade fever:  Resolved. Noted a single low grade fever of 100.5 on , no fever after that  UA negative, CXR normal     Chronic persistent A. Fib  Hypertension  GERD  BPH  Continue with Eliquis  Continue with Coreg  Continue Protonix  Continue with Flomax    Code Status: Full code  Surrogate Decision Maker: Wife     DVT Prophylaxis: Eliquis  GI Prophylaxis: not indicated     Baseline: Ambulatory    Estimated discharge date: 6/3, if SNF available     Subjective:     Chief Complaint / Reason for Physician Visit  F/u for severe bilateral leg pain and weakness  HE feels better today, worked with Physical therapy, pain is better.     Review of Systems:  Symptom Y/N Comments  Symptom Y/N Comments   Fever/Chills n   Chest Pain n    Poor Appetite n   Edema n    Cough    Abdominal Pain     Sputum    Joint Pain     SOB/HANNA    Pruritis/Rash     Nausea/vomit    Tolerating PT/OT     Diarrhea    Tolerating Diet     Constipation    Other       Could NOT obtain due to:      Objective:     VITALS:   Last 24hrs VS reviewed since prior progress note. Most recent are:  Patient Vitals for the past 24 hrs:   Temp Pulse Resp BP SpO2   06/02/21 1247 98.2 °F (36.8 °C) 77 18 (!) 141/72 99 %   06/02/21 0752 97.3 °F (36.3 °C) 93 18 (!) 149/71 98 %   06/02/21 0410 97.9 °F (36.6 °C) 79 18 (!) 144/67 100 %   06/01/21 2348 98.2 °F (36.8 °C) 78 18 (!) 153/65 99 %   06/01/21 1925 97.8 °F (36.6 °C) 75 18 129/70 95 %   06/01/21 1509 97.9 °F (36.6 °C) 76 18 132/66 94 %       Intake/Output Summary (Last 24 hours) at 6/2/2021 1341  Last data filed at 6/2/2021 0425  Gross per 24 hour   Intake 360 ml   Output 1125 ml   Net -765 ml        I had a face to face encounter and independently examined this patient on 6/2/2021, as outlined below:  PHYSICAL EXAM:  General: WD, WN. Alert, cooperative, no acute distress    EENT:  EOMI. Anicteric sclerae. MMM  Resp:  CTA bilaterally, no wheezing or rales. No accessory muscle use  CV:  Regular  rhythm,  No edema  GI:  Soft, Non distended, Non tender. +Bowel sounds  Neurologic:  Alert and oriented X 2, normal speech, power 3/5 in bilateral lower ext  Psych:   Good insight. Not anxious nor agitated  Skin:  No rashes. No jaundice    Reviewed most current lab test results and cultures  YES  Reviewed most current radiology test results   YES  Review and summation of old records today    NO  Reviewed patient's current orders and MAR    YES  PMH/SH reviewed - no change compared to H&P  ________________________________________________________________________  Care Plan discussed with:    Comments   Patient y    Family  y    RN y    Care Manager     Consultant                        Multidiciplinary team rounds were held today with , nursing, pharmacist and clinical coordinator. Patient's plan of care was discussed; medications were reviewed and discharge planning was addressed. ________________________________________________________________________  Total NON critical care TIME:  35   Minutes    Total CRITICAL CARE TIME Spent:   Minutes non procedure based      Comments   >50% of visit spent in counseling and coordination of care     ________________________________________________________________________  Neeraj Logan MD     Procedures: see electronic medical records for all procedures/Xrays and details which were not copied into this note but were reviewed prior to creation of Plan. LABS:  I reviewed today's most current labs and imaging studies.   Pertinent labs include:  Recent Labs     05/31/21 0227   WBC 10.0   HGB 13.4   HCT 39.8        Recent Labs     05/31/21 0227      K 3.9      CO2 24   *   BUN 10   CREA 1.00   CA 9.0       Signed: Neeraj Logan MD

## 2021-06-02 NOTE — PROGRESS NOTES
Transition of Care Plan:    RUR:9%  Disposition:Home with spouse  Follow up appointments:PCP  DME needed:TBD  Transportation at [address] or means to access home:    Spouse    IM Medicare letter:2nd IMM Letter  Caregiver Contact:Ara HowellObdph-Dbmvvp-[phone]  Discharge Caregiver contacted prior to discharge?                           Reason for Admission:   Right lower extremity pain and left shoulder pain and ambulatory                     RUR Score:    9%                 Plan for utilizing home health:       TBD    PCP: First and Last name:  Pt's PCP is at the [facility]      Name of Practice:    Are you a current patient: Yes/No: Yes   Approximate date of last visit:    Can you participate in a virtual visit with your PCP: Yes                    Current Advanced Directive/Advance Care Plan: Full Code      Healthcare Decision Maker:   Click here to complete [employer] including selection of the [employer] Relationship (ie \"Primary\")             Primary Decision Maker: Jayden Pederson [address] (ACP) Conversation      Date of Conversation: 6/1/2021  /Conducted with: Patient with [address]: Next of Kin by law (only applies in absence of a Healthcare Power of  or Legal Guardian)    Healthcare Decision Maker:     Primary Decision Maker: Jayden Pederson 92 - [phone]  Click here to [facility] including selection of the Healthcare Decision Maker Relationship (ie \"Primary\")      Content/Action Overview:   DECLINED ACP conversation - will revisit periodically   Reviewed DNR/DNI and patient elects Full Code (Attempt Resuscitation)         Length of Voluntary ACP Conversation in minutes:  <16 minutes (Non-Billable)    Davide Lyle                       Transition of Care Plan:                      Pt is a 77 yo male who was admitted to [facility] with a dx of right lower extremity pain and left shoulder pain and ambulatory. CM attempted to interview pt but did not answer his room phone. CM confirmed demographics with pt's spouse. Pt lives with his spouse in an one story home with 4-5 steps to enter. He has a rolling walker, cane and a shower chair. No hx of using home health services or being admitted a SNF or inpatient rehab. Pt has a hx of going to outpatient rehab. Pt uses [address] on [address]. Pt is independent in his ADLs but his spouse assist him in his IADLs. Pt's spouse transport pt to his medical appointments. According to the spouse pt has an advance directive at the [facility]. At the time of d/c pt's spouse will transport. CM will continue to follow and assist with d/c planning. Care Management Interventions  PCP Verified by CM: Yes (See a [place] doctor)  Mode of Transport at Discharge: Other (see comment) (Spouse to transport)  Transition of Care Consult (CM Consult): Discharge Planning (Home with spouse )  Discharge Durable Medical Equipment: No (Rolling walker, cane, shower chair)  Physical Therapy Consult: Yes  Occupational Therapy Consult: Yes  Speech Therapy Consult: No  Current Support Network: Lives with Spouse, Family Lives [place], [facility] (Family is supportive and involved)  Confirm Follow Up Transport: Family (Pt's wife transport pt to his medical appointments)  Discharge Location  Discharge Placement: Home with family assistance    [place], [place].   Care Manager [facility]  [phone]

## 2021-06-02 NOTE — PROGRESS NOTES
Problem: Mobility Impaired (Adult and Pediatric)  Goal: *Acute Goals and Plan of Care (Insert Text)  Description: FUNCTIONAL STATUS PRIOR TO ADMISSION: Patient was modified independent using a rolling walker for functional mobility. A few weeks ago. HOME SUPPORT PRIOR TO ADMISSION: The patient lived with wife. Physical Therapy Goals  Initiated 5/31/2021  1. Patient will move from supine to sit and sit to supine  in bed with minimal assistance/contact guard assist within 7 day(s). 2.  Patient will transfer from bed to chair and chair to bed with minimal assistance/contact guard assist using the least restrictive device within 7 day(s). 3.  Patient will perform sit to stand with minimal assistance/contact guard assist within 7 day(s). 4.  Patient will ambulate with minimal assistance/contact guard assist for 50 feet with the least restrictive device within 7 day(s). Outcome: Progressing Towards Goal   PHYSICAL THERAPY TREATMENT  Patient: Gustave Severe (9 y.o. male)  Date: 6/2/2021  Diagnosis: Leg pain [M79.606]  Bilateral lower extremity pain [M79.604, M79.605]  Weakness of both legs [R29.898] <principal problem not specified>       Precautions: Fall  Chart, physical therapy assessment, plan of care and goals were reviewed. ASSESSMENT  Patient continues with skilled PT services and is progressing towards goals. Pt received supine in bed with spouse at bedside. Pt tolerated session well, but continues to be limited by generalized weakness, neck and B shoulder pain, impaired balance and gait from baseline. Pt educated on log roll technique for mobility to minimize back pain, but still required min A. Pt demonstrated improvement in transfers ability requiring CGA-min A with verbal cues for proper hand placement. Pt tolerated gait training x 80 ft with RW with CGA-min A and still required verbal +manual cues for RW management.  Pt will continue to benefit from PT to progress mobility and reach highest level of independence. Recommend SNF placement upon discharge. Current Level of Function Impacting Discharge (mobility/balance): min A bed mobility, min A transfers with RW, gait training x 80 ft with RW    Other factors to consider for discharge: pain         PLAN :  Patient continues to benefit from skilled intervention to address the above impairments. Continue treatment per established plan of care. to address goals. Recommendation for discharge: (in order for the patient to meet his/her long term goals)  Therapy up to 5 days/week in SNF setting    This discharge recommendation:  Has been made in collaboration with the attending provider and/or case management    IF patient discharges home will need the following DME: to be determined (TBD)       SUBJECTIVE:   Patient stated I feel better today, but I think it's the steroids.     OBJECTIVE DATA SUMMARY:   Critical Behavior:  Neurologic State: Alert, Appropriate for age  Orientation Level: Oriented to person, Oriented to situation, Disoriented to time, Disoriented to place  Cognition: Follows commands  Safety/Judgement: Fall prevention  Functional Mobility Training:  Bed Mobility:   Min A supine to sit, rolling log roll technique                 Transfers:  Sit to Stand: Minimum assistance  Stand to Sit: Contact guard assistance;Minimum assistance                             Balance:  Sitting: Intact  Standing: Impaired; With support  Standing - Static: Constant support;Good  Standing - Dynamic : Constant support; Fair  Ambulation/Gait Training:  Distance (ft): 80 Feet (ft)  Assistive Device: Gait belt;Walker, rolling  Ambulation - Level of Assistance: Minimal assistance (verbal cues for proper RW distance)        Gait Abnormalities: Decreased step clearance;Shuffling gait        Base of Support: Widened     Speed/Paula: Slow  Step Length: Right shortened;Left shortened            Therapeutic Exercises:   Sit<>stands x 8 reps from chair, LAQ, seated marching, ankle pumps  Pain Rating:  Pt c/o shoulder and neck pain    Activity Tolerance:   Good and Fair    After treatment patient left in no apparent distress:   Sitting in chair, Call bell within reach, Bed / chair alarm activated, and Caregiver / family present    COMMUNICATION/COLLABORATION:   The patients plan of care was discussed with: Registered nurse.      Pola Maria, PT, DPT   Time Calculation: 25 mins

## 2021-06-02 NOTE — PROGRESS NOTES
Transition of Care Plan:     RUR:9%  Disposition:Home with HH  Follow up appointments:PCP  DME needed:TBD  Transportation at [address] or means to access home:    Spouse    IM Medicare letter:2nd IMM Letter  Caregiver Contact:Ara HowellUlodd-Nhreyv-[phone]  Discharge Caregiver contacted prior to discharge? 4:20pm: CM received a phone call from Pt's wife Arturo Norton. CM was informed that they have decided that they no longer want Pt to discharge to SNF. Pt's wife stated that they would like HH instead. 11am: CM met with Pt and wife to discuss discharge recommendation for SNF at this time. Pt and wife are receptive. CM provided Pt and wife with SNF listing for review. CM notified of Pt being affiliated with [facility]. As Pt does have a [facility] PCP. CM contacted [facility] transfer center to notify of admission.  [facility].         DARRELL Painting, [facility],[address]

## 2021-06-03 VITALS
RESPIRATION RATE: 18 BRPM | SYSTOLIC BLOOD PRESSURE: 128 MMHG | OXYGEN SATURATION: 100 % | DIASTOLIC BLOOD PRESSURE: 69 MMHG | HEART RATE: 77 BPM | TEMPERATURE: 97.5 F

## 2021-06-03 PROCEDURE — 74011250637 HC RX REV CODE- 250/637: Performed by: NURSE PRACTITIONER

## 2021-06-03 PROCEDURE — 99218 HC RM OBSERVATION: CPT

## 2021-06-03 PROCEDURE — 74011636637 HC RX REV CODE- 636/637: Performed by: PHYSICIAN ASSISTANT

## 2021-06-03 PROCEDURE — 74011250637 HC RX REV CODE- 250/637: Performed by: INTERNAL MEDICINE

## 2021-06-03 RX ORDER — PREDNISONE 20 MG/1
40 TABLET ORAL
Qty: 4 TABLET | Refills: 0 | Status: SHIPPED | OUTPATIENT
Start: 2021-06-04 | End: 2021-06-06

## 2021-06-03 RX ORDER — METHOCARBAMOL 500 MG/1
500 TABLET, FILM COATED ORAL DAILY
COMMUNITY
End: 2022-01-01 | Stop reason: CLARIF

## 2021-06-03 RX ADMIN — CARVEDILOL 3.12 MG: 3.12 TABLET, FILM COATED ORAL at 08:54

## 2021-06-03 RX ADMIN — APIXABAN 5 MG: 5 TABLET, FILM COATED ORAL at 08:54

## 2021-06-03 RX ADMIN — PANTOPRAZOLE SODIUM 40 MG: 40 TABLET, DELAYED RELEASE ORAL at 08:54

## 2021-06-03 RX ADMIN — Medication 10 ML: at 06:24

## 2021-06-03 RX ADMIN — ASPIRIN 81 MG: 81 TABLET, COATED ORAL at 08:53

## 2021-06-03 RX ADMIN — PREDNISONE 40 MG: 20 TABLET ORAL at 08:53

## 2021-06-03 RX ADMIN — Medication 2000 UNITS: at 08:53

## 2021-06-03 RX ADMIN — TAMSULOSIN HYDROCHLORIDE 0.4 MG: 0.4 CAPSULE ORAL at 08:54

## 2021-06-03 RX ADMIN — GABAPENTIN 100 MG: 100 CAPSULE ORAL at 08:54

## 2021-06-03 NOTE — PROGRESS NOTES
Problem: Pressure Injury - Risk of  Goal: *Prevention of pressure injury  Description: Document Ghulam Scale and appropriate interventions in the flowsheet. Outcome: Progressing Towards Goal  Note: Pressure Injury Interventions:  Sensory Interventions: Assess changes in LOC, Float heels, Minimize linen layers, Keep linens dry and wrinkle-free         Activity Interventions: Increase time out of bed, Pressure redistribution bed/mattress(bed type)    Mobility Interventions: HOB 30 degrees or less, Pressure redistribution bed/mattress (bed type)    Nutrition Interventions: Document food/fluid/supplement intake                     Problem: Patient Education: Go to Patient Education Activity  Goal: Patient/Family Education  Outcome: Progressing Towards Goal     Problem: Falls - Risk of  Goal: *Absence of Falls  Description: Document Yogesh Fall Risk and appropriate interventions in the flowsheet.   Outcome: Progressing Towards Goal  Note: Fall Risk Interventions:  Mobility Interventions: Communicate number of staff needed for ambulation/transfer, Patient to call before getting OOB, Utilize walker, cane, or other assistive device    Mentation Interventions: Adequate sleep, hydration, pain control, Bed/chair exit alarm, Door open when patient unattended, More frequent rounding, Reorient patient    Medication Interventions: Patient to call before getting OOB, Teach patient to arise slowly, Evaluate medications/consider consulting pharmacy    Elimination Interventions: Call light in reach, Patient to call for help with toileting needs    History of Falls Interventions: Bed/chair exit alarm, Consult care management for discharge planning, Evaluate medications/consider consulting pharmacy, Investigate reason for fall, Door open when patient unattended         Problem: Patient Education: Go to Patient Education Activity  Goal: Patient/Family Education  Outcome: Progressing Towards Goal     Problem: Pain  Goal: *Control of Pain  Outcome: Progressing Towards Goal     Problem: Patient Education: Go to Patient Education Activity  Goal: Patient/Family Education  Outcome: Progressing Towards Goal

## 2021-06-03 NOTE — PROGRESS NOTES
Pharmacist Discharge Medication Reconciliation    Encounter Diagnoses   Name Primary? Bilateral leg pain Yes    Bilateral leg edema     Acute pain of both shoulders     Inability to walk            Allergies: Corticosteroids (glucocorticoids) and Pravastatin    Discharge Medications:   Current Discharge Medication List        START taking these medications    Details   predniSONE (DELTASONE) 20 mg tablet Take 40 mg by mouth daily (with breakfast) for 2 days. Qty: 4 Tablet, Refills: 0  Start date: 6/4/2021, End date: 6/6/2021           CONTINUE these medications which have NOT CHANGED    Details   methocarbamoL (ROBAXIN) 500 mg tablet Take 500 mg by mouth daily. Indications: muscle spasm      gabapentin (NEURONTIN) 100 mg capsule Take 100 mg by mouth two (2) times a day. Indications: neuropathic pain      carvediloL (COREG) 3.125 mg tablet Take 1 Tab by mouth two (2) times daily (with meals). Qty: 180 Tab, Refills: 1      Eliquis 5 mg tablet Take 1 Tab by mouth two (2) times a day. tamsulosin (FLOMAX) 0.4 mg capsule Take 0.4 mg by mouth daily. pravastatin (PRAVACHOL) 20 mg tablet Take 20 mg by mouth nightly. cholecalciferol (VITAMIN D3) 1,000 unit tablet Take 2,000 Units by mouth daily. omeprazole (PRILOSEC) 20 mg capsule Take 20 mg by mouth daily. Indications: 1 (one) Capsule DR daily      aspirin delayed-release 81 mg tablet Take 81 mg by mouth daily. polyethylene glycol (MIRALAX) 17 gram packet Take 17 g by mouth daily as needed for Constipation. Spiriva Respimat 2.5 mcg/actuation inhaler INHALE 2 PUFFS D      lidocaine (XYLOCAINE) 5 % ointment Apply  to affected area as needed for Pain. STOP taking these medications       naproxen sodium (ALEVE) 220 mg cap Comments:   Reason for Stopping:               The patient's chart, MAR and AVS were reviewed by Amada.     Discharging Provider: Dr. Maria Luz Carlos    Thank You,     Amada  PharmD Candidate, [id number]

## 2021-06-03 NOTE — DISCHARGE SUMMARY
Hospitalist Discharge Summary     Patient ID:  Susan Constantino  [id number]  24 y.o.  1945  5/30/2021    PCP on record: Foster Stein MD    Admit date: 5/30/2021  Discharge date and time: 6/3/2021    DISCHARGE DIAGNOSIS:    Intractable acute on chronic low back pain associated with bilateral leg pain and leg weakness  Ambulatory dysfunction  Left shoulder pain  Severe osteoarthritis  Chronic persistent A. Fib  Hypertension  GERD  BPH    CONSULTATIONS:  IP CONSULT TO ORTHOPEDIC SURGERY    Excerpted HPI from H&P of Lawrence Mcgee MD:  Dillon Marrero is a 76 y.o.  male past medical history of chronic back pain due to osteoarthritis, arrhythmia status post pacemaker, hypertension who presents with right lower extremity pain with inability to stand up, weakness of lower extremity associated with left shoulder pain. Patient has had chronic low back pain for a while, followed by [address] [place] [address], has had steroid injection few weeks ago and is supposed to have another one in June. He had MRI of his lumbar spine and cervical spine done 2 weeks ago which showed no cord compression but  showed severe degenerative disease. He presented today because of  persistent right lower extremity pain, left shoulder pain and ability to lift both his legs and inability to lift his left shoulder. This morning he was not able to stand and walk. He denies any urinary or fecal incontinence, he denies any numbness or tingling sensation  X-ray of the left shoulder showed no acute abnormality , xrayof the right shoulder showed Right glenohumeral osteoarthritis  . The lower extremity was negative for DVT  Vital signs and labs were unremarkable    ______________________________________________________________________  DISCHARGE SUMMARY/HOSPITAL COURSE:  for full details see H&P, daily progress notes, labs, consult notes.      Intractable acute on chronic low back pain associated with bilateral leg pain and leg weakness  Ambulatory dysfunction  Left shoulder pain  Severe osteoarthritis     Patient had MRI of the cervical spine/ lumbar spine done 2 weeks ago which were negative for cord compression but showed severe osteoarthritis  MRI of the cervical spine showed:  No cord compression or intrinsic cord signal abnormality. No acute findings.   Degenerative changes primarily at C6-C7 resulting in mild central stenosis and  mild to severe bilateral foraminal stenosis. MRI lumbar spine  No evidence for acute abnormality. Postsurgical and degenerative changes as  described above most pronounced at L3-4.   Repeat MRI here showed: No change  C/w tramadol and oxycodone  Ortho consulted: recommended steroid trial, steroid shot for shoulder pain if worsens. PT/OT recommeded SNF     Low grade fever:  Resolved. Noted a single low grade fever of 100.5 on 5/31, no fever after that  UA negative, CXR normal     Chronic persistent A. Fib  Hypertension  GERD  BPH  Continue with Eliquis  Continue with Coreg  Continue Protonix  Continue with Flomax    _______________________________________________________________________  Patient seen and examined by me on discharge day. Pertinent Findings:  Gen:    Not in distress  Chest: Clear lungs  CVS:   Regular rhythm. No edema  Abd:  Soft, not distended, not tender  Neuro:  Alert,   _______________________________________________________________________  DISCHARGE MEDICATIONS:   Current Discharge Medication List      START taking these medications    Details   predniSONE (DELTASONE) 20 mg tablet Take 40 mg by mouth daily (with breakfast) for 2 days. Qty: 4 Tablet, Refills: 0  Start date: 6/4/2021, End date: 6/6/2021         CONTINUE these medications which have NOT CHANGED    Details   gabapentin (NEURONTIN) 100 mg capsule Take 100 mg by mouth two (2) times a day. Indications: neuropathic pain      carvediloL (COREG) 3.125 mg tablet Take 1 Tab by mouth two (2) times daily (with meals).   Qty: 180 Tab, Refills: 1      Eliquis 5 mg tablet Take 1 Tab by mouth two (2) times a day. tamsulosin (FLOMAX) 0.4 mg capsule Take 0.4 mg by mouth daily. pravastatin (PRAVACHOL) 20 mg tablet Take 20 mg by mouth nightly. cholecalciferol (VITAMIN D3) 1,000 unit tablet Take 2,000 Units by mouth daily. omeprazole (PRILOSEC) 20 mg capsule Take 20 mg by mouth daily. Indications: 1 (one) Capsule DR daily      aspirin delayed-release 81 mg tablet Take 81 mg by mouth daily. polyethylene glycol (MIRALAX) 17 gram packet Take 17 g by mouth daily as needed for Constipation. Spiriva Respimat 2.5 mcg/actuation inhaler INHALE 2 PUFFS D      lidocaine (XYLOCAINE) 5 % ointment Apply  to affected area as needed for Pain. STOP taking these medications       naproxen sodium (ALEVE) 220 mg cap Comments:   Reason for Stopping:                 Patient Follow Up Instructions: Activity: Activity as tolerated  Diet: Cardiac Diet  Wound Care: None needed    If you have questions regarding the hospital related prescriptions or hospital related issues please call [phone] [facility] at . You can always direct your questions to your primary care doctor if you are unable to reach your hospital physician; your PCP works as an extension of your hospital doctor just like your hospital doctor is an extension of your PCP for your time at [address] [facility].     If you experience any of the following symptoms then please call your primary care physician or return to the emergency room if you cannot get hold of your doctor:  Fever, chills, nausea, vomiting, diarrhea, change in mentation, falling, bleeding, shortness of breath      Follow-up Information     Follow up With Specialties Details Why Contact Info    Other, MD Foster    Patient can only remember the practice name and not the physician          ________________________________________________________________    Risk of deterioration: Low    Condition at Discharge:  Stable  __________________________________________________________________    Disposition  Home with family and home health services    ____________________________________________________________________    Code Status: Full Code  ___________________________________________________________________      Total time in minutes spent coordinating this discharge (includes going over instructions, follow-up, prescriptions, and preparing report for sign off to her PCP) :  >30 minutes    Signed:  Fern Velazquez MD

## 2021-06-03 NOTE — PROGRESS NOTES
End of Shift Note    Bedside shift change report given to Emmy Vargas RN (oncoming nurse) by Rory Hatchet (offgoing nurse). Report included the following information SBAR, Kardex, Intake/Output and MAR    Shift worked:  4657-0979     Shift summary and any significant changes:     Patient still having periodic confusion. Ambulated to the BR multiple times with assistance. Patient slept most of the night      Concerns for physician to address:  None     Zone phone for oncoming shift:   [phone]       Activity:  Activity Level: Up with Assistance  Number times ambulated in hallways past shift: 0  Number of times OOB to chair past shift: 1    Cardiac:   Cardiac Monitoring: No           Access:   Current line(s): PIV     Genitourinary:   Urinary status: voiding    Respiratory:   O2 Device: None (Room air)  Chronic home O2 use?: NO  Incentive spirometer at bedside: NO     GI:  Last Bowel Movement Date: 05/30/21  Current diet:  DIET CARDIAC Regular  Passing flatus: YES  Tolerating current diet: YES       Pain Management:   Patient states pain is manageable on current regimen: YES    Skin:  Ghulam Score: 19  Interventions: increase time out of bed    Patient Safety:  Fall Score:  Total Score: 5  Interventions: bed/chair alarm, assistive device (walker, cane, etc), gripper socks and pt to call before getting OOB  High Fall Risk: Yes    Length of Stay:  Expected LOS: - - -  Actual LOS: [facility]

## 2021-06-03 NOTE — ROUTINE PROCESS
Discharge AVS reviewed with pt and his wife. All medications reviewed individually with patient. Opportunities for questions and concerns provided.

## 2021-06-03 NOTE — PROGRESS NOTES
Transition of Care Plan:     RUR:9%  Disposition:Home with HH: [facility]  Follow up appointments: [facility]. Pt will need to arrange  DME needed:N/a  [address] or means to access home:    Spouse    IM Medicare letter:N/A  Caregiver Contact:Ara HowellElvom-Wdyssl-[phone]  Discharge Caregiver contacted prior to discharge? Pt's wife has been notified        CM received follow-up from [address] [place] [address], confirming that they had received Pt's clinicals. CM notified [facility] that Pt would be discharging home today with Home Health. [facility] instructed to send DC summary at discharge. CM spoke with Pt's wife regarding [place] choices. Pt's wife stated that she does not have a preference at this time. CM sent referrals to [place] [address], [facility] and [facility]. [facility] is able to accept Pt at this time. Care Management Interventions  PCP Verified by CM: Yes (See a [place] doctor)  Mode of Transport at Discharge:  Other (see comment) (Spouse to transport)  Transition of Care Consult (CM Consult): [address]: No  Reason Outside [place]: Out of service area  Discharge Durable Medical Equipment: No (Rolling walker, cane, shower chair)  Physical Therapy Consult: Yes  Occupational Therapy Consult: Yes  Speech Therapy Consult: No  Current Support Network: Lives with Spouse, Family Lives [place], Own Home (Family is supportive and involved)  Confirm Follow Up Transport: Family (Pt's wife transport pt to his medical appointments)  Discharge Location  Discharge Placement: Home with home health          Erlin Ray, [facility] [facility] [place]

## 2021-06-03 NOTE — PROGRESS NOTES
Ortho:    MRI L-Spine 6/2 reviewed  No urgent surgical intervention at this time    Fu with Dr Jaime Hurd or Dr Hatfield Mt after discharge   Continue PO meds, PT/OT rehab  WBAT w/ walker    Per Dr Walter Stern PA-C

## 2021-06-03 NOTE — DISCHARGE INSTRUCTIONS
HOSPITALIST DISCHARGE INSTRUCTIONS    NAME: Flaquita Angeles   :  1945   MRN:  [id number]     Date/Time:  6/3/2021 10:44 AM    ADMIT DATE: 2021   DISCHARGE DATE: 6/3/2021     Attending Physician: Cory Santos MD    DISCHARGE DIAGNOSIS:  Intractable acute on chronic low back pain associated with bilateral leg pain and leg weakness  Ambulatory dysfunction  Left shoulder pain  Severe osteoarthritis  Chronic persistent A. Fib  Hypertension  GERD  BPH    MEDICATIONS:  See above    · It is important that you take the medication exactly as they are prescribed. · Keep your medication in the bottles provided by the pharmacist and keep a list of the medication names, dosages, and times to be taken in your wallet. · Do not take other medications without consulting your doctor. Pain Management: per above medications    What to do at [address]:  Cardiac Diet    Recommended activity: Activity as tolerated    If you have questions regarding the hospital related prescriptions or hospital related issues please call [facility] at . You can always direct your questions to your primary care doctor if you are unable to reach your hospital physician; your PCP works as an extension of your hospital doctor just like your hospital doctor is an extension of your PCP for your time at [facility]. If you experience any of the following symptoms then please call your primary care physician or return to the emergency room if you cannot get hold of your doctor:  Fever, chills, nausea, vomiting, diarrhea, change in mentation, falling, bleeding, shortness of breath    Additional Instructions:      Bring these papers with you to your follow up appointments.  The papers will help your doctors be sure to continue the care plan from the hospital.              Information obtained by :  I understand that if any problems occur once I am at home I am to contact my physician. I understand and acknowledge receipt of the instructions indicated above.                                                                                                                                            Physician's or R.N.'s Signature                                                                  Date/Time                                                                                                                                              Patient or Representative Signature                                                          Da

## 2021-06-07 LAB
BACTERIA SPEC CULT: NORMAL
SERVICE CMNT-IMP: NORMAL

## 2021-06-08 ENCOUNTER — OFFICE VISIT (OUTPATIENT)
Dept: CARDIOLOGY CLINIC | Age: 76
End: 2021-06-08
Payer: MEDICARE

## 2021-06-08 ENCOUNTER — OFFICE VISIT (OUTPATIENT)
Dept: CARDIOLOGY CLINIC | Age: 76
End: 2021-06-08

## 2021-06-08 VITALS
SYSTOLIC BLOOD PRESSURE: 110 MMHG | DIASTOLIC BLOOD PRESSURE: 70 MMHG | BODY MASS INDEX: 25 KG/M2 | WEIGHT: 174.6 LBS | OXYGEN SATURATION: 98 % | HEIGHT: 70 IN | RESPIRATION RATE: 18 BRPM | HEART RATE: 76 BPM

## 2021-06-08 DIAGNOSIS — Z45.018 PACEMAKER REPROGRAMMING/CHECK: Primary | ICD-10-CM

## 2021-06-08 DIAGNOSIS — R00.0 TACHYCARDIA: ICD-10-CM

## 2021-06-08 DIAGNOSIS — I49.5 SSS (SICK SINUS SYNDROME) (HCC): ICD-10-CM

## 2021-06-08 DIAGNOSIS — I10 ESSENTIAL HYPERTENSION: ICD-10-CM

## 2021-06-08 DIAGNOSIS — I48.21 PERMANENT ATRIAL FIBRILLATION (HCC): ICD-10-CM

## 2021-06-08 DIAGNOSIS — I42.0 DILATED CARDIOMYOPATHY (HCC): ICD-10-CM

## 2021-06-08 DIAGNOSIS — E78.2 MIXED HYPERLIPIDEMIA: ICD-10-CM

## 2021-06-08 DIAGNOSIS — Z95.0 CARDIAC PACEMAKER IN SITU: Primary | ICD-10-CM

## 2021-06-08 DIAGNOSIS — Z95.0 CARDIAC PACEMAKER IN SITU: ICD-10-CM

## 2021-06-08 PROCEDURE — G8510 SCR DEP NEG, NO PLAN REQD: HCPCS | Performed by: INTERNAL MEDICINE

## 2021-06-08 PROCEDURE — G8427 DOCREV CUR MEDS BY ELIG CLIN: HCPCS | Performed by: INTERNAL MEDICINE

## 2021-06-08 PROCEDURE — 1111F DSCHRG MED/CURRENT MED MERGE: CPT | Performed by: INTERNAL MEDICINE

## 2021-06-08 PROCEDURE — 93000 ELECTROCARDIOGRAM COMPLETE: CPT | Performed by: INTERNAL MEDICINE

## 2021-06-08 PROCEDURE — G8754 DIAS BP LESS 90: HCPCS | Performed by: INTERNAL MEDICINE

## 2021-06-08 PROCEDURE — G8536 NO DOC ELDER MAL SCRN: HCPCS | Performed by: INTERNAL MEDICINE

## 2021-06-08 PROCEDURE — G8419 CALC BMI OUT NRM PARAM NOF/U: HCPCS | Performed by: INTERNAL MEDICINE

## 2021-06-08 PROCEDURE — 93279 PRGRMG DEV EVAL PM/LDLS PM: CPT | Performed by: INTERNAL MEDICINE

## 2021-06-08 PROCEDURE — 3017F COLORECTAL CA SCREEN DOC REV: CPT | Performed by: INTERNAL MEDICINE

## 2021-06-08 PROCEDURE — G8752 SYS BP LESS 140: HCPCS | Performed by: INTERNAL MEDICINE

## 2021-06-08 PROCEDURE — 1101F PT FALLS ASSESS-DOCD LE1/YR: CPT | Performed by: INTERNAL MEDICINE

## 2021-06-08 PROCEDURE — 99214 OFFICE O/P EST MOD 30 MIN: CPT | Performed by: INTERNAL MEDICINE

## 2021-06-08 RX ORDER — CYCLOBENZAPRINE HCL 10 MG
10 TABLET ORAL
COMMUNITY
End: 2022-01-01

## 2021-06-08 NOTE — PROGRESS NOTES
1. Have you been to the ER, urgent care clinic since your last visit? Hospitalized since your last visit? 5- ED [facility] Leg pain. 5- s/p fall, ED [facility].    2. Have you seen or consulted any other health care providers outside of the [address] [place] [address] since your last visit? Include any pap smears or colon screening. No    Chief Complaint   Patient presents with    Pacemaker Check     6 mo appt. C/O ankle edema, lightheadedness.
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
## 2021-07-27 ENCOUNTER — OFFICE VISIT (OUTPATIENT)
Dept: NEUROLOGY | Age: 76
End: 2021-07-27
Payer: MEDICARE

## 2021-07-27 VITALS
OXYGEN SATURATION: 99 % | BODY MASS INDEX: 24.91 KG/M2 | HEART RATE: 71 BPM | WEIGHT: 174 LBS | SYSTOLIC BLOOD PRESSURE: 120 MMHG | RESPIRATION RATE: 16 BRPM | HEIGHT: 70 IN | DIASTOLIC BLOOD PRESSURE: 72 MMHG

## 2021-07-27 DIAGNOSIS — M54.16 LUMBAR RADICULOPATHY: ICD-10-CM

## 2021-07-27 DIAGNOSIS — R20.2 TINGLING IN EXTREMITIES: Primary | ICD-10-CM

## 2021-07-27 PROCEDURE — G8420 CALC BMI NORM PARAMETERS: HCPCS | Performed by: PSYCHIATRY & NEUROLOGY

## 2021-07-27 PROCEDURE — G8510 SCR DEP NEG, NO PLAN REQD: HCPCS | Performed by: PSYCHIATRY & NEUROLOGY

## 2021-07-27 PROCEDURE — G8754 DIAS BP LESS 90: HCPCS | Performed by: PSYCHIATRY & NEUROLOGY

## 2021-07-27 PROCEDURE — G8752 SYS BP LESS 140: HCPCS | Performed by: PSYCHIATRY & NEUROLOGY

## 2021-07-27 PROCEDURE — G8536 NO DOC ELDER MAL SCRN: HCPCS | Performed by: PSYCHIATRY & NEUROLOGY

## 2021-07-27 PROCEDURE — 99204 OFFICE O/P NEW MOD 45 MIN: CPT | Performed by: PSYCHIATRY & NEUROLOGY

## 2021-07-27 PROCEDURE — 1101F PT FALLS ASSESS-DOCD LE1/YR: CPT | Performed by: PSYCHIATRY & NEUROLOGY

## 2021-07-27 PROCEDURE — 3017F COLORECTAL CA SCREEN DOC REV: CPT | Performed by: PSYCHIATRY & NEUROLOGY

## 2021-07-27 PROCEDURE — G8427 DOCREV CUR MEDS BY ELIG CLIN: HCPCS | Performed by: PSYCHIATRY & NEUROLOGY

## 2021-07-27 RX ORDER — PREGABALIN 75 MG/1
75 CAPSULE ORAL 2 TIMES DAILY
Qty: 60 CAPSULE | Refills: 0 | Status: SHIPPED | OUTPATIENT
Start: 2021-07-27 | End: 2021-08-23
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
## 2021-07-30 ENCOUNTER — TRANSCRIBE ORDER (OUTPATIENT)
Dept: SCHEDULING | Age: 76
End: 2021-07-30

## 2021-07-30 ENCOUNTER — ANCILLARY PROCEDURE (OUTPATIENT)
Dept: CARDIOLOGY CLINIC | Age: 76
End: 2021-07-30
Payer: MEDICARE

## 2021-07-30 VITALS
SYSTOLIC BLOOD PRESSURE: 120 MMHG | DIASTOLIC BLOOD PRESSURE: 72 MMHG | BODY MASS INDEX: 24.91 KG/M2 | HEIGHT: 70 IN | WEIGHT: 174 LBS

## 2021-07-30 DIAGNOSIS — Z45.018 PACEMAKER REPROGRAMMING/CHECK: ICD-10-CM

## 2021-07-30 DIAGNOSIS — I42.0 DILATED CARDIOMYOPATHY (HCC): ICD-10-CM

## 2021-07-30 DIAGNOSIS — R91.1 SOLITARY PULMONARY NODULE: Primary | ICD-10-CM

## 2021-07-30 DIAGNOSIS — I49.5 SSS (SICK SINUS SYNDROME) (HCC): ICD-10-CM

## 2021-07-30 DIAGNOSIS — R00.0 TACHYCARDIA: ICD-10-CM

## 2021-07-30 DIAGNOSIS — Z95.0 CARDIAC PACEMAKER IN SITU: ICD-10-CM

## 2021-07-30 DIAGNOSIS — I48.21 PERMANENT ATRIAL FIBRILLATION (HCC): ICD-10-CM

## 2021-07-30 PROCEDURE — 93306 TTE W/DOPPLER COMPLETE: CPT | Performed by: INTERNAL MEDICINE

## 2021-08-02 LAB
ECHO AO ASC DIAM: 3.59 CM
ECHO AO ROOT DIAM: 3.38 CM
ECHO AV PEAK GRADIENT: 3.64 MMHG
ECHO AV PEAK VELOCITY: 95.38 CM/S
ECHO EST RA PRESSURE: 3 MMHG
ECHO LA AREA 4C: 27.61 CM2
ECHO LA MAJOR AXIS: 4.03 CM
ECHO LA MINOR AXIS: 2.05 CM
ECHO LA VOL 2C: 68.8 ML (ref 18–58)
ECHO LA VOL 4C: 86.18 ML (ref 18–58)
ECHO LA VOL BP: 87.27 ML (ref 18–58)
ECHO LA VOL/BSA BIPLANE: 44.3 ML/M2 (ref 16–28)
ECHO LA VOLUME INDEX A2C: 34.92 ML/M2 (ref 16–28)
ECHO LA VOLUME INDEX A4C: 43.75 ML/M2 (ref 16–28)
ECHO LV E' LATERAL VELOCITY: 4.5 CM/S
ECHO LV E' SEPTAL VELOCITY: 5.21 CM/S
ECHO LV GLOBAL LONGITUDINAL STRAIN (GLS): -11.4 PERCENT
ECHO LV INTERNAL DIMENSION DIASTOLIC: 4.41 CM (ref 4.2–5.9)
ECHO LV INTERNAL DIMENSION SYSTOLIC: 2.41 CM
ECHO LV ISOVOLUMETRIC RELAXATION TIME (IVRT): 76.12 MS
ECHO LV IVSD: 1.21 CM (ref 0.6–1)
ECHO LV MASS 2D: 193.2 G (ref 88–224)
ECHO LV MASS INDEX 2D: 98.1 G/M2 (ref 49–115)
ECHO LV POSTERIOR WALL DIASTOLIC: 1.2 CM (ref 0.6–1)
ECHO LVOT PEAK GRADIENT: 1.88 MMHG
ECHO LVOT PEAK VELOCITY: 68.59 CM/S
ECHO MV "A" WAVE DURATION: 173.17 MS
ECHO MV A VELOCITY: 23.01 CM/S
ECHO MV E DECELERATION TIME (DT): 139.62 MS
ECHO MV E VELOCITY: 87.89 CM/S
ECHO MV E/A RATIO: 3.82
ECHO MV E/E' LATERAL: 19.53
ECHO MV E/E' RATIO (AVERAGED): 18.2
ECHO MV E/E' SEPTAL: 16.87
ECHO RIGHT VENTRICULAR SYSTOLIC PRESSURE (RVSP): 30.75 MMHG
ECHO TV REGURGITANT MAX VELOCITY: 263.41 CM/S
ECHO TV REGURGITANT PEAK GRADIENT: 27.75 MMHG
GLOBAL LONGITUDINAL STRAIN 2 CHAMBER: -12.7 PERCENT
GLOBAL LONGITUDINAL STRAIN 4 CHAMBER: -10.4 PERCENT
GLOBAL LONGITUDINAL STRAIN LONG AXIS: -10.9 PERCENT
LA VOL DISK BP: 83.41 ML (ref 18–58)

## 2021-08-04 ENCOUNTER — TELEPHONE (OUTPATIENT)
Dept: CARDIOLOGY CLINIC | Age: 76
End: 2021-08-04

## 2021-08-04 NOTE — TELEPHONE ENCOUNTER
Verified patient with 2 identifiers   Spoke with patient regarding results. Patient voiced understanding.

## 2021-08-04 NOTE — TELEPHONE ENCOUNTER
----- Message from ATIYA Matute sent at 8/3/2021  3:25 PM EDT -----  Your heart function is normal. Valves are fine. Your heart is not the cause of your edema.

## 2021-08-13 ENCOUNTER — HOSPITAL ENCOUNTER (OUTPATIENT)
Dept: CT IMAGING | Age: 76
Discharge: HOME | End: 2021-08-13
Attending: INTERNAL MEDICINE
Payer: MEDICARE

## 2021-08-13 DIAGNOSIS — R91.1 SOLITARY PULMONARY NODULE: ICD-10-CM

## 2021-08-13 PROCEDURE — 71250 CT THORAX DX C-: CPT

## 2021-08-23 DIAGNOSIS — R20.2 TINGLING IN EXTREMITIES: ICD-10-CM

## 2021-08-23 RX ORDER — PREGABALIN 75 MG/1
CAPSULE ORAL
Qty: 60 CAPSULE | Refills: 2 | Status: SHIPPED | OUTPATIENT
Start: 2021-08-23 | End: 2021-01-01 | Stop reason: SDUPTHER

## 2021-09-02 NOTE — PROCEDURES
ELECTRODIAGNOSTIC REPORT      Test Date:  2021    Patient: Theron Meyers : 9/3/2045 Physician: Dr. Neo Washington    ID#: [id number] SEX: Male Ref. Phys: Dr. Fred Mallory     Patient History / Exam:  The patient is a pleasant 59-year-old gentleman with history of pain in his neck and lumbar spine with sensory disturbance. He does have a wide-based gait. He does have 5 out of 5 strength throughout but pain does limit his ability to sustain a contraction. EMG & NCV Findings:    Nerve conduction studies as listed below in the right median sensory, radial sensory, ulnar sensory, superficial fibular sensory, and right sural sensory were normal.  The right fibular motor, median motor, tibial motor, and ulnar motor nerve conduction studies were also normal.    Disposable concentric needle examination of the muscles listed below revealed no active denervation but chronic neurogenic appearing changes were noted in the right triceps and pronator teres. This was also seen in the right tibialis anterior, medial gastroc, peroneus longus, and tensor fascia jerad. Impression: This study was abnormal.  There was electrodiagnostic evidence upon today's examination suggestin. A chronic cervical radiculopathy with no evidence of active denervation    2. A lower lumbar chronic radiculopathy with no evidence of active denervation    3. There was no evidence of a focal or generalized neuropathy or myopathy. ___________________________  [employer]   FAAN        Nerve Conduction Studies  Anti Sensory Summary Table     Stim Site NR Onset (ms) Peak (ms) O-P Amp (µV) Norm Peak (ms) Norm O-P Amp Site1 Site2 Dist (cm) Norm Eduardo (m/s)   Right Median Anti Sensory (2nd Digit)  32.7°C   Wrist    4.9 3.9 16.0 <4 >11 Wrist 2nd Digit 14.0    Right Radial Anti Sensory (Base 1st Digit)  32.7°C   Wrist    1.9 2.6 35.4 <2.9 >15 Wrist Base 1st Digit 10.0    Right Sup Fibular Anti Sensory (Lat ankle)  32.7°C   Lower leg 2.2 3.1 6.9 <4.4 >5.0 Lower leg Lat ankle 10.0 >32   Right Sural Anti Sensory (Lat Mall)  32.7°C   Calf    2.1 2.5 4.6 <4.5 >4.0 Calf Lat Mall 14.0    Right Ulnar Anti Sensory (5th Digit)  32.7°C   Wrist    3.0 3.5 8.2 <4.0 >5 Wrist 5th Digit 14.0      Motor Summary Table     Stim Site NR Onset (ms) Norm Onset (ms) O-P Amp (mV) Norm O-P Amp P-T Amp (mV) Site1 Site2 Dist (cm) Eduardo (m/s)   Right Fibular Motor (Ext Dig Brev)  32.7°C   Ankle    3.4 <6.1 2.7 >2.5  Ankle Ext Dig Brev 8.0 24   B Fib    12.2  2.2   B Fib Ankle 33.0 38   Poplt    14.5  2.0   Poplt B Fib 10.0 43   Right Median Motor (Abd Poll Brev)  32.7°C   Wrist    4.2 <4.5 4.1 >4.1  Wrist Abd Poll Brev 8.0 19   Elbow    8.4  3.8   Elbow Wrist 23.0 55   Right Tibial Motor (Abd Harvey Brev)  32.7°C   Ankle    4.3 <6.1 6.3 >4.4  Ankle Abd Harvey Brev 8.0 19   Knee    13.9  4.7   Knee Ankle 41.0 43   Right Ulnar Motor (Abd Dig Minimi)  32.7°C   Wrist    3.4 <3.7 7.0 >7.0  Wrist Abd Dig Minimi 8.0    B Elbow    7.8  6.1 >6.0  B Elbow Wrist 24.0 55   A Elbow    9.6  6.0 >6.0  A Elbow B Elbow 10.0 56     Comparison Summary Table     Stim Site NR Peak (ms) P-T Amp (µV) Site1 Site2 Dist (cm) Delta-P (ms)   Right Median/Ulnar Palm Comparison (Wrist)  32.7°C   Median Palm    2.3 25.8 Median Palm Ulnar Palm 8.0 0.2   Ulnar Palm    2.1 18.7           EMG     Side Muscle Nerve Root Ins Act Fibs Psw Recrt Duration Amp Poly Comment   Right AntTibialis Dp Br Peron L4-5 Nml Nml Nml Nml Nml Incr Nml    Right Deltoid Axillary C5-6 Nml Nml Nml Nml Nml Nml Nml    Right Triceps Radial C6-7-8 Nml Nml Nml Nml Nml Incr Nml    Right Biceps Musculocut C5-6 Nml Nml Nml Nml Nml Nml Nml    Right PronatorTeres Median C6-7 Nml Nml Nml Nml Nml Incr Nml    Right 1stDorInt Ulnar C8-T1 Nml Nml Nml Nml Nml Nml Nml    Right MedGastroc Tibial S1-2 Nml Nml Nml Nml Nml Incr 1+    Right VastusLat Femoral L2-4 Nml Nml Nml Nml Nml Nml Nml    Right Peroneus Long   Nml Nml Nml Nml Nml Incr 1+    Right Tensor Fascia Chio Sciatic L5 Nml Nml Nml Nml Nml Incr 1+      Waveforms:

## 2022-01-01 ENCOUNTER — APPOINTMENT (OUTPATIENT)
Dept: GENERAL RADIOLOGY | Age: 77
DRG: 069 | End: 2022-01-01
Attending: EMERGENCY MEDICINE
Payer: MEDICARE

## 2022-01-01 ENCOUNTER — APPOINTMENT (OUTPATIENT)
Dept: GENERAL RADIOLOGY | Age: 77
DRG: 064 | End: 2022-01-01
Attending: INTERNAL MEDICINE
Payer: MEDICARE

## 2022-01-01 ENCOUNTER — ANESTHESIA EVENT (OUTPATIENT)
Dept: ENDOSCOPY | Age: 77
End: 2022-01-01
Payer: MEDICARE

## 2022-01-01 ENCOUNTER — HOSPICE ADMISSION (OUTPATIENT)
Dept: HOSPICE | Facility: HOSPICE | Age: 77
End: 2022-01-01
Payer: MEDICARE

## 2022-01-01 ENCOUNTER — HOSPITAL ENCOUNTER (OUTPATIENT)
Age: 77
Setting detail: OUTPATIENT SURGERY
Discharge: HOME | End: 2022-03-22
Attending: COLON & RECTAL SURGERY | Admitting: COLON & RECTAL SURGERY
Payer: MEDICARE

## 2022-01-01 ENCOUNTER — ANESTHESIA EVENT (OUTPATIENT)
Dept: ENDOSCOPY | Age: 77
DRG: 064 | End: 2022-01-01
Payer: MEDICARE

## 2022-01-01 ENCOUNTER — HOME CARE VISIT (OUTPATIENT)
Dept: HOSPICE | Facility: HOSPICE | Age: 77
End: 2022-01-01
Payer: MEDICARE

## 2022-01-01 ENCOUNTER — HOSPITAL ENCOUNTER (OUTPATIENT)
Dept: MRI IMAGING | Age: 77
Discharge: HOME | End: 2022-02-17
Attending: NURSE PRACTITIONER
Payer: MEDICARE

## 2022-01-01 ENCOUNTER — APPOINTMENT (OUTPATIENT)
Dept: VASCULAR SURGERY | Age: 77
DRG: 064 | End: 2022-01-01
Attending: PSYCHIATRY & NEUROLOGY
Payer: MEDICARE

## 2022-01-01 ENCOUNTER — APPOINTMENT (OUTPATIENT)
Dept: CT IMAGING | Age: 77
DRG: 069 | End: 2022-01-01
Attending: EMERGENCY MEDICINE
Payer: MEDICARE

## 2022-01-01 ENCOUNTER — APPOINTMENT (OUTPATIENT)
Dept: CT IMAGING | Age: 77
DRG: 064 | End: 2022-01-01
Attending: EMERGENCY MEDICINE
Payer: MEDICARE

## 2022-01-01 ENCOUNTER — HOME CARE VISIT (OUTPATIENT)
Dept: SCHEDULING | Facility: HOME HEALTH | Age: 77
End: 2022-01-01
Payer: MEDICARE

## 2022-01-01 ENCOUNTER — HOSPITAL ENCOUNTER (OUTPATIENT)
Dept: PREADMISSION TESTING | Age: 77
Discharge: HOME | End: 2022-03-18
Payer: MEDICARE

## 2022-01-01 ENCOUNTER — OFFICE VISIT (OUTPATIENT)
Dept: CARDIOLOGY CLINIC | Age: 77
End: 2022-01-01
Payer: MEDICARE

## 2022-01-01 ENCOUNTER — APPOINTMENT (OUTPATIENT)
Dept: MRI IMAGING | Age: 77
DRG: 069 | End: 2022-01-01
Attending: INTERNAL MEDICINE
Payer: MEDICARE

## 2022-01-01 ENCOUNTER — APPOINTMENT (OUTPATIENT)
Dept: MRI IMAGING | Age: 77
DRG: 064 | End: 2022-01-01
Attending: INTERNAL MEDICINE
Payer: MEDICARE

## 2022-01-01 ENCOUNTER — HOSPITAL ENCOUNTER (INPATIENT)
Age: 77
LOS: 30 days | Discharge: HOSPICE | DRG: 064 | End: 2022-08-24
Attending: EMERGENCY MEDICINE | Admitting: INTERNAL MEDICINE
Payer: MEDICARE

## 2022-01-01 ENCOUNTER — APPOINTMENT (OUTPATIENT)
Dept: GENERAL RADIOLOGY | Age: 77
DRG: 064 | End: 2022-01-01
Attending: EMERGENCY MEDICINE
Payer: MEDICARE

## 2022-01-01 ENCOUNTER — ANESTHESIA (OUTPATIENT)
Dept: ENDOSCOPY | Age: 77
End: 2022-01-01
Payer: MEDICARE

## 2022-01-01 ENCOUNTER — APPOINTMENT (OUTPATIENT)
Dept: NON INVASIVE DIAGNOSTICS | Age: 77
DRG: 069 | End: 2022-01-01
Attending: INTERNAL MEDICINE
Payer: MEDICARE

## 2022-01-01 ENCOUNTER — ANESTHESIA (OUTPATIENT)
Dept: ENDOSCOPY | Age: 77
DRG: 064 | End: 2022-01-01
Payer: MEDICARE

## 2022-01-01 ENCOUNTER — HOSPITAL ENCOUNTER (INPATIENT)
Age: 77
LOS: 4 days | Discharge: HOME HEALTH | DRG: 069 | End: 2022-07-12
Attending: EMERGENCY MEDICINE | Admitting: INTERNAL MEDICINE
Payer: MEDICARE

## 2022-01-01 ENCOUNTER — APPOINTMENT (OUTPATIENT)
Dept: CT IMAGING | Age: 77
DRG: 064 | End: 2022-01-01
Attending: INTERNAL MEDICINE
Payer: MEDICARE

## 2022-01-01 ENCOUNTER — TRANSCRIBE ORDER (OUTPATIENT)
Dept: SCHEDULING | Age: 77
End: 2022-01-01

## 2022-01-01 VITALS — TEMPERATURE: 101.5 F | HEART RATE: 94 BPM

## 2022-01-01 VITALS
DIASTOLIC BLOOD PRESSURE: 60 MMHG | WEIGHT: 172 LBS | RESPIRATION RATE: 16 BRPM | TEMPERATURE: 97.6 F | BODY MASS INDEX: 24.62 KG/M2 | HEIGHT: 70 IN | HEART RATE: 70 BPM | OXYGEN SATURATION: 100 % | SYSTOLIC BLOOD PRESSURE: 126 MMHG

## 2022-01-01 VITALS
OXYGEN SATURATION: 96 % | RESPIRATION RATE: 28 BRPM | HEIGHT: 70 IN | BODY MASS INDEX: 28.32 KG/M2 | WEIGHT: 197.8 LBS | DIASTOLIC BLOOD PRESSURE: 73 MMHG | SYSTOLIC BLOOD PRESSURE: 135 MMHG | HEART RATE: 94 BPM | TEMPERATURE: 98.7 F

## 2022-01-01 VITALS
BODY MASS INDEX: 24.62 KG/M2 | HEART RATE: 70 BPM | OXYGEN SATURATION: 97 % | SYSTOLIC BLOOD PRESSURE: 116 MMHG | TEMPERATURE: 97.5 F | RESPIRATION RATE: 18 BRPM | HEIGHT: 70 IN | WEIGHT: 171.96 LBS | DIASTOLIC BLOOD PRESSURE: 69 MMHG

## 2022-01-01 VITALS
DIASTOLIC BLOOD PRESSURE: 52 MMHG | SYSTOLIC BLOOD PRESSURE: 98 MMHG | HEART RATE: 69 BPM | TEMPERATURE: 99.1 F | RESPIRATION RATE: 16 BRPM

## 2022-01-01 VITALS
OXYGEN SATURATION: 96 % | HEART RATE: 72 BPM | RESPIRATION RATE: 30 BRPM | DIASTOLIC BLOOD PRESSURE: 52 MMHG | SYSTOLIC BLOOD PRESSURE: 107 MMHG | TEMPERATURE: 98.4 F

## 2022-01-01 DIAGNOSIS — I63.81 BASAL GANGLIA INFARCTION (HCC): ICD-10-CM

## 2022-01-01 DIAGNOSIS — G45.9 TIA (TRANSIENT ISCHEMIC ATTACK): Primary | ICD-10-CM

## 2022-01-01 DIAGNOSIS — G93.40 ENCEPHALOPATHY: ICD-10-CM

## 2022-01-01 DIAGNOSIS — R47.01 APHASIA: ICD-10-CM

## 2022-01-01 DIAGNOSIS — I49.5 SSS (SICK SINUS SYNDROME) (HCC): ICD-10-CM

## 2022-01-01 DIAGNOSIS — R53.81 DEBILITY: ICD-10-CM

## 2022-01-01 DIAGNOSIS — M54.50 CHRONIC BILATERAL LOW BACK PAIN WITHOUT SCIATICA: ICD-10-CM

## 2022-01-01 DIAGNOSIS — U07.1 PNEUMONIA DUE TO COVID-19 VIRUS: ICD-10-CM

## 2022-01-01 DIAGNOSIS — I65.23 BILATERAL CAROTID ARTERY STENOSIS: ICD-10-CM

## 2022-01-01 DIAGNOSIS — J15.7 PNEUMONIA OF BOTH UPPER LOBES DUE TO MYCOPLASMA PNEUMONIAE: ICD-10-CM

## 2022-01-01 DIAGNOSIS — J69.0 ASPIRATION PNEUMONITIS (HCC): ICD-10-CM

## 2022-01-01 DIAGNOSIS — R50.9 FEVER, UNSPECIFIED FEVER CAUSE: ICD-10-CM

## 2022-01-01 DIAGNOSIS — E78.2 MIXED HYPERLIPIDEMIA: Chronic | ICD-10-CM

## 2022-01-01 DIAGNOSIS — R55 CONVULSIVE SYNCOPE: ICD-10-CM

## 2022-01-01 DIAGNOSIS — I63.9 CEREBROVASCULAR ACCIDENT (CVA), UNSPECIFIED MECHANISM (HCC): Primary | ICD-10-CM

## 2022-01-01 DIAGNOSIS — I67.9 CEREBRAL VASCULAR DISEASE: ICD-10-CM

## 2022-01-01 DIAGNOSIS — J12.82 PNEUMONIA DUE TO COVID-19 VIRUS: ICD-10-CM

## 2022-01-01 DIAGNOSIS — U07.1 ENCEPHALOPATHY DUE TO COVID-19 VIRUS: ICD-10-CM

## 2022-01-01 DIAGNOSIS — R20.2 TINGLING IN EXTREMITIES: ICD-10-CM

## 2022-01-01 DIAGNOSIS — I63.9 ACUTE CVA (CEREBROVASCULAR ACCIDENT) (HCC): ICD-10-CM

## 2022-01-01 DIAGNOSIS — G93.49 ENCEPHALOPATHY DUE TO COVID-19 VIRUS: ICD-10-CM

## 2022-01-01 DIAGNOSIS — D72.825 BANDEMIA: ICD-10-CM

## 2022-01-01 DIAGNOSIS — R41.3 MEMORY LOSS: ICD-10-CM

## 2022-01-01 DIAGNOSIS — Z51.5 PALLIATIVE CARE ENCOUNTER: ICD-10-CM

## 2022-01-01 DIAGNOSIS — R50.9 PERSISTENT FEVER: ICD-10-CM

## 2022-01-01 DIAGNOSIS — Z95.0 CARDIAC PACEMAKER IN SITU: Primary | ICD-10-CM

## 2022-01-01 DIAGNOSIS — J69.0 ASPIRATION PNEUMONIA OF BOTH UPPER LOBES, UNSPECIFIED ASPIRATION PNEUMONIA TYPE (HCC): ICD-10-CM

## 2022-01-01 DIAGNOSIS — G89.29 CHRONIC BILATERAL LOW BACK PAIN WITHOUT SCIATICA: ICD-10-CM

## 2022-01-01 DIAGNOSIS — R41.82 ACUTE ALTERATION IN MENTAL STATUS: ICD-10-CM

## 2022-01-01 DIAGNOSIS — M17.11 PRIMARY OSTEOARTHRITIS OF RIGHT KNEE: Primary | ICD-10-CM

## 2022-01-01 DIAGNOSIS — I65.02 VERTEBRAL ARTERY OCCLUSION, LEFT: ICD-10-CM

## 2022-01-01 DIAGNOSIS — M17.11 PRIMARY OSTEOARTHRITIS OF RIGHT KNEE: ICD-10-CM

## 2022-01-01 DIAGNOSIS — T17.900A PULMONARY ASPIRATION, INITIAL ENCOUNTER: ICD-10-CM

## 2022-01-01 DIAGNOSIS — I48.91 ATRIAL FIBRILLATION, UNSPECIFIED TYPE (HCC): ICD-10-CM

## 2022-01-01 LAB
25(OH)D3 SERPL-MCNC: 75.5 NG/ML (ref 30–100)
ALBUMIN SERPL-MCNC: 2.1 G/DL (ref 3.5–5)
ALBUMIN SERPL-MCNC: 2.4 G/DL (ref 3.5–5)
ALBUMIN SERPL-MCNC: 2.6 G/DL (ref 3.5–5)
ALBUMIN SERPL-MCNC: 3.2 G/DL (ref 3.5–5)
ALBUMIN SERPL-MCNC: 3.3 G/DL (ref 3.5–5)
ALBUMIN SERPL-MCNC: 3.4 G/DL (ref 3.5–5)
ALBUMIN SERPL-MCNC: 3.6 G/DL (ref 3.5–5)
ALBUMIN SERPL-MCNC: 3.8 G/DL (ref 3.5–5)
ALBUMIN/GLOB SERPL: 0.6 {RATIO} (ref 1.1–2.2)
ALBUMIN/GLOB SERPL: 0.8 {RATIO} (ref 1.1–2.2)
ALBUMIN/GLOB SERPL: 1 {RATIO} (ref 1.1–2.2)
ALBUMIN/GLOB SERPL: 1.1 {RATIO} (ref 1.1–2.2)
ALBUMIN/GLOB SERPL: 1.3 {RATIO} (ref 1.1–2.2)
ALP SERPL-CCNC: 74 U/L (ref 45–117)
ALP SERPL-CCNC: 76 U/L (ref 45–117)
ALP SERPL-CCNC: 76 U/L (ref 45–117)
ALP SERPL-CCNC: 78 U/L (ref 45–117)
ALP SERPL-CCNC: 78 U/L (ref 45–117)
ALP SERPL-CCNC: 84 U/L (ref 45–117)
ALP SERPL-CCNC: 92 U/L (ref 45–117)
ALP SERPL-CCNC: 97 U/L (ref 45–117)
ALT SERPL-CCNC: 28 U/L (ref 12–78)
ALT SERPL-CCNC: 32 U/L (ref 12–78)
ALT SERPL-CCNC: 35 U/L (ref 12–78)
ALT SERPL-CCNC: 63 U/L (ref 12–78)
ALT SERPL-CCNC: 63 U/L (ref 12–78)
ALT SERPL-CCNC: 67 U/L (ref 12–78)
ALT SERPL-CCNC: 79 U/L (ref 12–78)
ALT SERPL-CCNC: 94 U/L (ref 12–78)
AMMONIA PLAS-SCNC: 10 UMOL/L
ANA SER QL: NEGATIVE
ANION GAP SERPL CALC-SCNC: 10 MMOL/L (ref 5–15)
ANION GAP SERPL CALC-SCNC: 3 MMOL/L (ref 5–15)
ANION GAP SERPL CALC-SCNC: 4 MMOL/L (ref 5–15)
ANION GAP SERPL CALC-SCNC: 5 MMOL/L (ref 5–15)
ANION GAP SERPL CALC-SCNC: 6 MMOL/L (ref 5–15)
ANION GAP SERPL CALC-SCNC: 6 MMOL/L (ref 5–15)
ANION GAP SERPL CALC-SCNC: 7 MMOL/L (ref 5–15)
ANION GAP SERPL CALC-SCNC: 8 MMOL/L (ref 5–15)
ANION GAP SERPL CALC-SCNC: 9 MMOL/L (ref 5–15)
ANION GAP SERPL CALC-SCNC: 9 MMOL/L (ref 5–15)
APPEARANCE UR: ABNORMAL
APPEARANCE UR: ABNORMAL
APPEARANCE UR: CLEAR
APPEARANCE UR: CLEAR
AST SERPL-CCNC: 32 U/L (ref 15–37)
AST SERPL-CCNC: 35 U/L (ref 15–37)
AST SERPL-CCNC: 35 U/L (ref 15–37)
AST SERPL-CCNC: 39 U/L (ref 15–37)
AST SERPL-CCNC: 42 U/L (ref 15–37)
AST SERPL-CCNC: 45 U/L (ref 15–37)
AST SERPL-CCNC: 51 U/L (ref 15–37)
AST SERPL-CCNC: 82 U/L (ref 15–37)
ATRIAL RATE: 63 BPM
ATRIAL RATE: 71 BPM
B PERT DNA SPEC QL NAA+PROBE: NOT DETECTED
BACTERIA SPEC CULT: NORMAL
BACTERIA URNS QL MICRO: NEGATIVE /HPF
BASOPHILS # BLD: 0 K/UL (ref 0–0.1)
BASOPHILS # BLD: 0.1 K/UL (ref 0–0.1)
BASOPHILS # BLD: 0.1 K/UL (ref 0–0.1)
BASOPHILS NFR BLD: 0 % (ref 0–1)
BASOPHILS NFR BLD: 1 % (ref 0–1)
BILIRUB DIRECT SERPL-MCNC: 0.2 MG/DL (ref 0–0.2)
BILIRUB SERPL-MCNC: 0.6 MG/DL (ref 0.2–1)
BILIRUB SERPL-MCNC: 0.8 MG/DL (ref 0.2–1)
BILIRUB SERPL-MCNC: 0.8 MG/DL (ref 0.2–1)
BILIRUB SERPL-MCNC: 0.9 MG/DL (ref 0.2–1)
BILIRUB SERPL-MCNC: 1.2 MG/DL (ref 0.2–1)
BILIRUB SERPL-MCNC: 1.2 MG/DL (ref 0.2–1)
BILIRUB UR QL CFM: NEGATIVE
BILIRUB UR QL: NEGATIVE
BORDETELLA PARAPERTUSSIS PCR, BORPAR: NOT DETECTED
BUN SERPL-MCNC: 10 MG/DL (ref 6–20)
BUN SERPL-MCNC: 11 MG/DL (ref 6–20)
BUN SERPL-MCNC: 11 MG/DL (ref 6–20)
BUN SERPL-MCNC: 12 MG/DL (ref 6–20)
BUN SERPL-MCNC: 12 MG/DL (ref 6–20)
BUN SERPL-MCNC: 13 MG/DL (ref 6–20)
BUN SERPL-MCNC: 14 MG/DL (ref 6–20)
BUN SERPL-MCNC: 14 MG/DL (ref 6–20)
BUN SERPL-MCNC: 16 MG/DL (ref 6–20)
BUN SERPL-MCNC: 20 MG/DL (ref 6–20)
BUN SERPL-MCNC: 21 MG/DL (ref 6–20)
BUN SERPL-MCNC: 27 MG/DL (ref 6–20)
BUN SERPL-MCNC: 27 MG/DL (ref 6–20)
BUN SERPL-MCNC: 31 MG/DL (ref 6–20)
BUN SERPL-MCNC: 32 MG/DL (ref 6–20)
BUN SERPL-MCNC: 38 MG/DL (ref 6–20)
BUN SERPL-MCNC: 4 MG/DL (ref 6–20)
BUN SERPL-MCNC: 44 MG/DL (ref 6–20)
BUN SERPL-MCNC: 49 MG/DL (ref 6–20)
BUN SERPL-MCNC: 49 MG/DL (ref 6–20)
BUN SERPL-MCNC: 50 MG/DL (ref 6–20)
BUN SERPL-MCNC: 54 MG/DL (ref 6–20)
BUN SERPL-MCNC: 6 MG/DL (ref 6–20)
BUN SERPL-MCNC: 6 MG/DL (ref 6–20)
BUN SERPL-MCNC: 7 MG/DL (ref 6–20)
BUN SERPL-MCNC: 9 MG/DL (ref 6–20)
BUN/CREAT SERPL: 10 (ref 12–20)
BUN/CREAT SERPL: 12 (ref 12–20)
BUN/CREAT SERPL: 13 (ref 12–20)
BUN/CREAT SERPL: 15 (ref 12–20)
BUN/CREAT SERPL: 15 (ref 12–20)
BUN/CREAT SERPL: 16 (ref 12–20)
BUN/CREAT SERPL: 16 (ref 12–20)
BUN/CREAT SERPL: 17 (ref 12–20)
BUN/CREAT SERPL: 18 (ref 12–20)
BUN/CREAT SERPL: 21 (ref 12–20)
BUN/CREAT SERPL: 22 (ref 12–20)
BUN/CREAT SERPL: 24 (ref 12–20)
BUN/CREAT SERPL: 28 (ref 12–20)
BUN/CREAT SERPL: 29 (ref 12–20)
BUN/CREAT SERPL: 31 (ref 12–20)
BUN/CREAT SERPL: 36 (ref 12–20)
BUN/CREAT SERPL: 38 (ref 12–20)
BUN/CREAT SERPL: 38 (ref 12–20)
BUN/CREAT SERPL: 39 (ref 12–20)
BUN/CREAT SERPL: 40 (ref 12–20)
BUN/CREAT SERPL: 44 (ref 12–20)
BUN/CREAT SERPL: 5 (ref 12–20)
BUN/CREAT SERPL: 7 (ref 12–20)
BUN/CREAT SERPL: 7 (ref 12–20)
BUN/CREAT SERPL: 8 (ref 12–20)
C PNEUM DNA SPEC QL NAA+PROBE: NOT DETECTED
CALCIUM SERPL-MCNC: 7.4 MG/DL (ref 8.5–10.1)
CALCIUM SERPL-MCNC: 8.2 MG/DL (ref 8.5–10.1)
CALCIUM SERPL-MCNC: 8.3 MG/DL (ref 8.5–10.1)
CALCIUM SERPL-MCNC: 8.4 MG/DL (ref 8.5–10.1)
CALCIUM SERPL-MCNC: 8.5 MG/DL (ref 8.5–10.1)
CALCIUM SERPL-MCNC: 8.5 MG/DL (ref 8.5–10.1)
CALCIUM SERPL-MCNC: 8.6 MG/DL (ref 8.5–10.1)
CALCIUM SERPL-MCNC: 8.7 MG/DL (ref 8.5–10.1)
CALCIUM SERPL-MCNC: 8.8 MG/DL (ref 8.5–10.1)
CALCIUM SERPL-MCNC: 8.9 MG/DL (ref 8.5–10.1)
CALCIUM SERPL-MCNC: 9 MG/DL (ref 8.5–10.1)
CALCIUM SERPL-MCNC: 9.1 MG/DL (ref 8.5–10.1)
CALCIUM SERPL-MCNC: 9.1 MG/DL (ref 8.5–10.1)
CALCIUM SERPL-MCNC: 9.2 MG/DL (ref 8.5–10.1)
CALCIUM SERPL-MCNC: 9.6 MG/DL (ref 8.5–10.1)
CALCIUM SERPL-MCNC: 9.6 MG/DL (ref 8.5–10.1)
CALCULATED P AXIS, ECG09: 47 DEGREES
CALCULATED R AXIS, ECG10: -61 DEGREES
CALCULATED R AXIS, ECG10: -63 DEGREES
CALCULATED T AXIS, ECG11: 103 DEGREES
CALCULATED T AXIS, ECG11: 97 DEGREES
CHLORIDE SERPL-SCNC: 100 MMOL/L (ref 97–108)
CHLORIDE SERPL-SCNC: 102 MMOL/L (ref 97–108)
CHLORIDE SERPL-SCNC: 103 MMOL/L (ref 97–108)
CHLORIDE SERPL-SCNC: 103 MMOL/L (ref 97–108)
CHLORIDE SERPL-SCNC: 104 MMOL/L (ref 97–108)
CHLORIDE SERPL-SCNC: 105 MMOL/L (ref 97–108)
CHLORIDE SERPL-SCNC: 106 MMOL/L (ref 97–108)
CHLORIDE SERPL-SCNC: 107 MMOL/L (ref 97–108)
CHLORIDE SERPL-SCNC: 108 MMOL/L (ref 97–108)
CHLORIDE SERPL-SCNC: 109 MMOL/L (ref 97–108)
CHLORIDE SERPL-SCNC: 109 MMOL/L (ref 97–108)
CHLORIDE SERPL-SCNC: 110 MMOL/L (ref 97–108)
CHLORIDE SERPL-SCNC: 111 MMOL/L (ref 97–108)
CHLORIDE SERPL-SCNC: 112 MMOL/L (ref 97–108)
CHLORIDE SERPL-SCNC: 113 MMOL/L (ref 97–108)
CHLORIDE SERPL-SCNC: 114 MMOL/L (ref 97–108)
CHLORIDE SERPL-SCNC: 115 MMOL/L (ref 97–108)
CHLORIDE UR-SCNC: 14 MMOL/L
CHOLEST SERPL-MCNC: 111 MG/DL
CHOLEST SERPL-MCNC: 135 MG/DL
CK SERPL-CCNC: 391 U/L (ref 39–308)
CK SERPL-CCNC: 86 U/L (ref 39–308)
CO2 SERPL-SCNC: 21 MMOL/L (ref 21–32)
CO2 SERPL-SCNC: 22 MMOL/L (ref 21–32)
CO2 SERPL-SCNC: 23 MMOL/L (ref 21–32)
CO2 SERPL-SCNC: 24 MMOL/L (ref 21–32)
CO2 SERPL-SCNC: 25 MMOL/L (ref 21–32)
CO2 SERPL-SCNC: 26 MMOL/L (ref 21–32)
CO2 SERPL-SCNC: 27 MMOL/L (ref 21–32)
CO2 SERPL-SCNC: 28 MMOL/L (ref 21–32)
CO2 SERPL-SCNC: 28 MMOL/L (ref 21–32)
CO2 SERPL-SCNC: 29 MMOL/L (ref 21–32)
CO2 SERPL-SCNC: 30 MMOL/L (ref 21–32)
COLOR UR: ABNORMAL
COLOR UR: NORMAL
COVID-19 RAPID TEST, COVR: DETECTED
COVID-19 RAPID TEST, COVR: DETECTED
CREAT SERPL-MCNC: 0.61 MG/DL (ref 0.7–1.3)
CREAT SERPL-MCNC: 0.67 MG/DL (ref 0.7–1.3)
CREAT SERPL-MCNC: 0.71 MG/DL (ref 0.7–1.3)
CREAT SERPL-MCNC: 0.73 MG/DL (ref 0.7–1.3)
CREAT SERPL-MCNC: 0.75 MG/DL (ref 0.7–1.3)
CREAT SERPL-MCNC: 0.8 MG/DL (ref 0.7–1.3)
CREAT SERPL-MCNC: 0.82 MG/DL (ref 0.7–1.3)
CREAT SERPL-MCNC: 0.83 MG/DL (ref 0.7–1.3)
CREAT SERPL-MCNC: 0.85 MG/DL (ref 0.7–1.3)
CREAT SERPL-MCNC: 0.87 MG/DL (ref 0.7–1.3)
CREAT SERPL-MCNC: 0.88 MG/DL (ref 0.7–1.3)
CREAT SERPL-MCNC: 0.88 MG/DL (ref 0.7–1.3)
CREAT SERPL-MCNC: 0.89 MG/DL (ref 0.7–1.3)
CREAT SERPL-MCNC: 0.9 MG/DL (ref 0.7–1.3)
CREAT SERPL-MCNC: 0.91 MG/DL (ref 0.7–1.3)
CREAT SERPL-MCNC: 0.92 MG/DL (ref 0.7–1.3)
CREAT SERPL-MCNC: 0.93 MG/DL (ref 0.7–1.3)
CREAT SERPL-MCNC: 0.94 MG/DL (ref 0.7–1.3)
CREAT SERPL-MCNC: 0.95 MG/DL (ref 0.7–1.3)
CREAT SERPL-MCNC: 0.96 MG/DL (ref 0.7–1.3)
CREAT SERPL-MCNC: 0.96 MG/DL (ref 0.7–1.3)
CREAT SERPL-MCNC: 0.97 MG/DL (ref 0.7–1.3)
CREAT SERPL-MCNC: 0.98 MG/DL (ref 0.7–1.3)
CREAT SERPL-MCNC: 1.01 MG/DL (ref 0.7–1.3)
CREAT SERPL-MCNC: 1.08 MG/DL (ref 0.7–1.3)
CREAT SERPL-MCNC: 1.12 MG/DL (ref 0.7–1.3)
CREAT SERPL-MCNC: 1.12 MG/DL (ref 0.7–1.3)
CREAT SERPL-MCNC: 1.38 MG/DL (ref 0.7–1.3)
CREAT SERPL-MCNC: 1.76 MG/DL (ref 0.7–1.3)
CREAT SERPL-MCNC: 2.06 MG/DL (ref 0.7–1.3)
CREAT UR-MCNC: 121 MG/DL
CRP SERPL HS-MCNC: >9.5 MG/L
CRP SERPL-MCNC: 17.2 MG/DL (ref 0–0.6)
D DIMER PPP FEU-MCNC: 1.93 MG/L FEU (ref 0–0.65)
DIAGNOSIS, 93000: NORMAL
DIAGNOSIS, 93000: NORMAL
DIFFERENTIAL METHOD BLD: ABNORMAL
DIFFERENTIAL METHOD BLD: NORMAL
ECHO AV AREA PEAK VELOCITY: 2.5 CM2
ECHO AV AREA VTI: 2.7 CM2
ECHO AV AREA/BSA PEAK VELOCITY: 1.3 CM2/M2
ECHO AV AREA/BSA VTI: 1.4 CM2/M2
ECHO AV MEAN GRADIENT: 2 MMHG
ECHO AV MEAN VELOCITY: 0.7 M/S
ECHO AV PEAK GRADIENT: 3 MMHG
ECHO AV PEAK VELOCITY: 0.9 M/S
ECHO AV VELOCITY RATIO: 0.89
ECHO AV VTI: 13.6 CM
ECHO EST RA PRESSURE: 10 MMHG
ECHO LA DIAMETER INDEX: 1.73 CM/M2
ECHO LA DIAMETER: 3.4 CM
ECHO LA VOL 4C: 59 ML (ref 18–58)
ECHO LA VOLUME INDEX A4C: 30 ML/M2 (ref 16–34)
ECHO LV E' LATERAL VELOCITY: 6 CM/S
ECHO LV E' SEPTAL VELOCITY: 4 CM/S
ECHO LV FRACTIONAL SHORTENING: 24 % (ref 28–44)
ECHO LV INTERNAL DIMENSION DIASTOLE INDEX: 1.73 CM/M2
ECHO LV INTERNAL DIMENSION DIASTOLIC: 3.4 CM (ref 4.2–5.9)
ECHO LV INTERNAL DIMENSION SYSTOLIC INDEX: 1.33 CM/M2
ECHO LV INTERNAL DIMENSION SYSTOLIC: 2.6 CM
ECHO LV IVSD: 0.9 CM (ref 0.6–1)
ECHO LV MASS 2D: 84.9 G (ref 88–224)
ECHO LV MASS INDEX 2D: 43.3 G/M2 (ref 49–115)
ECHO LV POSTERIOR WALL DIASTOLIC: 0.9 CM (ref 0.6–1)
ECHO LV RELATIVE WALL THICKNESS RATIO: 0.53
ECHO LVOT AREA: 2.8 CM2
ECHO LVOT AV VTI INDEX: 0.94
ECHO LVOT DIAM: 1.9 CM
ECHO LVOT MEAN GRADIENT: 1 MMHG
ECHO LVOT PEAK GRADIENT: 2 MMHG
ECHO LVOT PEAK VELOCITY: 0.8 M/S
ECHO LVOT STROKE VOLUME INDEX: 18.5 ML/M2
ECHO LVOT SV: 36.3 ML
ECHO LVOT VTI: 12.8 CM
ECHO MV A VELOCITY: 0.12 M/S
ECHO MV AREA PHT: 5.1 CM2
ECHO MV AREA VTI: 1.7 CM2
ECHO MV E DECELERATION TIME (DT): 163.9 MS
ECHO MV E VELOCITY: 0.76 M/S
ECHO MV E/A RATIO: 6.33
ECHO MV E/E' LATERAL: 12.67
ECHO MV E/E' RATIO (AVERAGED): 15.83
ECHO MV E/E' SEPTAL: 19
ECHO MV LVOT VTI INDEX: 1.66
ECHO MV MAX VELOCITY: 0.7 M/S
ECHO MV MEAN GRADIENT: 1 MMHG
ECHO MV MEAN VELOCITY: 0.4 M/S
ECHO MV PEAK GRADIENT: 2 MMHG
ECHO MV PRESSURE HALF TIME (PHT): 43.4 MS
ECHO MV REGURGITANT PEAK GRADIENT: 81 MMHG
ECHO MV REGURGITANT PEAK VELOCITY: 4.5 M/S
ECHO MV VTI: 21.2 CM
ECHO PULMONARY ARTERY END DIASTOLIC PRESSURE: 6 MMHG
ECHO PV MAX VELOCITY: 0.7 M/S
ECHO PV PEAK GRADIENT: 2 MMHG
ECHO PV REGURGITANT MAX VELOCITY: 1.2 M/S
ECHO RIGHT VENTRICULAR SYSTOLIC PRESSURE (RVSP): 30 MMHG
ECHO RV FREE WALL PEAK S': 11 CM/S
ECHO RV TAPSE: 1.2 CM (ref 1.7–?)
ECHO TV REGURGITANT MAX VELOCITY: 2.23 M/S
ECHO TV REGURGITANT PEAK GRADIENT: 20 MMHG
EOSINOPHIL # BLD: 0 K/UL (ref 0–0.4)
EOSINOPHIL # BLD: 0.1 K/UL (ref 0–0.4)
EOSINOPHIL # BLD: 0.2 K/UL (ref 0–0.4)
EOSINOPHIL # BLD: 0.7 K/UL (ref 0–0.4)
EOSINOPHIL #/AREA URNS HPF: NEGATIVE /[HPF]
EOSINOPHIL NFR BLD: 0 % (ref 0–7)
EOSINOPHIL NFR BLD: 1 % (ref 0–7)
EOSINOPHIL NFR BLD: 2 % (ref 0–7)
EOSINOPHIL NFR BLD: 3 % (ref 0–7)
EOSINOPHIL NFR BLD: 4 % (ref 0–7)
EPITH CASTS URNS QL MICRO: ABNORMAL /LPF
EPITH CASTS URNS QL MICRO: NORMAL /LPF
ERYTHROCYTE [DISTWIDTH] IN BLOOD BY AUTOMATED COUNT: 14.5 % (ref 11.5–14.5)
ERYTHROCYTE [DISTWIDTH] IN BLOOD BY AUTOMATED COUNT: 14.6 % (ref 11.5–14.5)
ERYTHROCYTE [DISTWIDTH] IN BLOOD BY AUTOMATED COUNT: 14.8 % (ref 11.5–14.5)
ERYTHROCYTE [DISTWIDTH] IN BLOOD BY AUTOMATED COUNT: 14.9 % (ref 11.5–14.5)
ERYTHROCYTE [DISTWIDTH] IN BLOOD BY AUTOMATED COUNT: 15 % (ref 11.5–14.5)
ERYTHROCYTE [DISTWIDTH] IN BLOOD BY AUTOMATED COUNT: 15 % (ref 11.5–14.5)
ERYTHROCYTE [DISTWIDTH] IN BLOOD BY AUTOMATED COUNT: 15.2 % (ref 11.5–14.5)
ERYTHROCYTE [DISTWIDTH] IN BLOOD BY AUTOMATED COUNT: 15.3 % (ref 11.5–14.5)
ERYTHROCYTE [DISTWIDTH] IN BLOOD BY AUTOMATED COUNT: 15.3 % (ref 11.5–14.5)
ERYTHROCYTE [DISTWIDTH] IN BLOOD BY AUTOMATED COUNT: 15.4 % (ref 11.5–14.5)
ERYTHROCYTE [DISTWIDTH] IN BLOOD BY AUTOMATED COUNT: 15.5 % (ref 11.5–14.5)
ERYTHROCYTE [SEDIMENTATION RATE] IN BLOOD: 7 MM/HR (ref 0–20)
EST. AVERAGE GLUCOSE BLD GHB EST-MCNC: 120 MG/DL
EST. AVERAGE GLUCOSE BLD GHB EST-MCNC: 134 MG/DL
FERRITIN SERPL-MCNC: 1119 NG/ML (ref 26–388)
FLUAV H1 2009 PAND RNA SPEC QL NAA+PROBE: NOT DETECTED
FLUAV H1 RNA SPEC QL NAA+PROBE: NOT DETECTED
FLUAV H3 RNA SPEC QL NAA+PROBE: NOT DETECTED
FLUAV SUBTYP SPEC NAA+PROBE: NOT DETECTED
FLUBV RNA SPEC QL NAA+PROBE: NOT DETECTED
FOLATE SERPL-MCNC: 9.3 NG/ML (ref 5–21)
GLOBULIN SER CALC-MCNC: 2.6 G/DL (ref 2–4)
GLOBULIN SER CALC-MCNC: 2.9 G/DL (ref 2–4)
GLOBULIN SER CALC-MCNC: 2.9 G/DL (ref 2–4)
GLOBULIN SER CALC-MCNC: 3 G/DL (ref 2–4)
GLOBULIN SER CALC-MCNC: 3.1 G/DL (ref 2–4)
GLOBULIN SER CALC-MCNC: 3.1 G/DL (ref 2–4)
GLOBULIN SER CALC-MCNC: 3.2 G/DL (ref 2–4)
GLOBULIN SER CALC-MCNC: 3.7 G/DL (ref 2–4)
GLUCOSE BLD STRIP.AUTO-MCNC: 100 MG/DL (ref 65–117)
GLUCOSE BLD STRIP.AUTO-MCNC: 103 MG/DL (ref 65–117)
GLUCOSE BLD STRIP.AUTO-MCNC: 103 MG/DL (ref 65–117)
GLUCOSE BLD STRIP.AUTO-MCNC: 108 MG/DL (ref 65–117)
GLUCOSE BLD STRIP.AUTO-MCNC: 111 MG/DL (ref 65–117)
GLUCOSE BLD STRIP.AUTO-MCNC: 113 MG/DL (ref 65–117)
GLUCOSE BLD STRIP.AUTO-MCNC: 115 MG/DL (ref 65–117)
GLUCOSE BLD STRIP.AUTO-MCNC: 128 MG/DL (ref 65–117)
GLUCOSE BLD STRIP.AUTO-MCNC: 129 MG/DL (ref 65–117)
GLUCOSE BLD STRIP.AUTO-MCNC: 133 MG/DL (ref 65–117)
GLUCOSE BLD STRIP.AUTO-MCNC: 136 MG/DL (ref 65–117)
GLUCOSE BLD STRIP.AUTO-MCNC: 137 MG/DL (ref 65–117)
GLUCOSE BLD STRIP.AUTO-MCNC: 145 MG/DL (ref 65–117)
GLUCOSE BLD STRIP.AUTO-MCNC: 147 MG/DL (ref 65–117)
GLUCOSE BLD STRIP.AUTO-MCNC: 147 MG/DL (ref 65–117)
GLUCOSE BLD STRIP.AUTO-MCNC: 148 MG/DL (ref 65–117)
GLUCOSE BLD STRIP.AUTO-MCNC: 150 MG/DL (ref 65–117)
GLUCOSE BLD STRIP.AUTO-MCNC: 152 MG/DL (ref 65–117)
GLUCOSE BLD STRIP.AUTO-MCNC: 158 MG/DL (ref 65–117)
GLUCOSE BLD STRIP.AUTO-MCNC: 161 MG/DL (ref 65–117)
GLUCOSE BLD STRIP.AUTO-MCNC: 162 MG/DL (ref 65–117)
GLUCOSE BLD STRIP.AUTO-MCNC: 163 MG/DL (ref 65–117)
GLUCOSE BLD STRIP.AUTO-MCNC: 163 MG/DL (ref 65–117)
GLUCOSE BLD STRIP.AUTO-MCNC: 165 MG/DL (ref 65–117)
GLUCOSE BLD STRIP.AUTO-MCNC: 169 MG/DL (ref 65–117)
GLUCOSE BLD STRIP.AUTO-MCNC: 170 MG/DL (ref 65–117)
GLUCOSE BLD STRIP.AUTO-MCNC: 173 MG/DL (ref 65–117)
GLUCOSE BLD STRIP.AUTO-MCNC: 177 MG/DL (ref 65–117)
GLUCOSE BLD STRIP.AUTO-MCNC: 182 MG/DL (ref 65–117)
GLUCOSE BLD STRIP.AUTO-MCNC: 185 MG/DL (ref 65–117)
GLUCOSE BLD STRIP.AUTO-MCNC: 187 MG/DL (ref 65–117)
GLUCOSE BLD STRIP.AUTO-MCNC: 187 MG/DL (ref 65–117)
GLUCOSE BLD STRIP.AUTO-MCNC: 188 MG/DL (ref 65–117)
GLUCOSE BLD STRIP.AUTO-MCNC: 189 MG/DL (ref 65–117)
GLUCOSE BLD STRIP.AUTO-MCNC: 189 MG/DL (ref 65–117)
GLUCOSE BLD STRIP.AUTO-MCNC: 195 MG/DL (ref 65–117)
GLUCOSE BLD STRIP.AUTO-MCNC: 201 MG/DL (ref 65–117)
GLUCOSE BLD STRIP.AUTO-MCNC: 201 MG/DL (ref 65–117)
GLUCOSE BLD STRIP.AUTO-MCNC: 213 MG/DL (ref 65–117)
GLUCOSE BLD STRIP.AUTO-MCNC: 214 MG/DL (ref 65–117)
GLUCOSE BLD STRIP.AUTO-MCNC: 215 MG/DL (ref 65–117)
GLUCOSE BLD STRIP.AUTO-MCNC: 253 MG/DL (ref 65–117)
GLUCOSE BLD STRIP.AUTO-MCNC: 255 MG/DL (ref 65–117)
GLUCOSE BLD STRIP.AUTO-MCNC: 258 MG/DL (ref 65–117)
GLUCOSE BLD STRIP.AUTO-MCNC: 276 MG/DL (ref 65–117)
GLUCOSE BLD STRIP.AUTO-MCNC: 277 MG/DL (ref 65–117)
GLUCOSE BLD STRIP.AUTO-MCNC: 280 MG/DL (ref 65–117)
GLUCOSE BLD STRIP.AUTO-MCNC: 282 MG/DL (ref 65–117)
GLUCOSE BLD STRIP.AUTO-MCNC: 285 MG/DL (ref 65–117)
GLUCOSE BLD STRIP.AUTO-MCNC: 291 MG/DL (ref 65–117)
GLUCOSE BLD STRIP.AUTO-MCNC: 292 MG/DL (ref 65–117)
GLUCOSE BLD STRIP.AUTO-MCNC: 294 MG/DL (ref 65–117)
GLUCOSE BLD STRIP.AUTO-MCNC: 299 MG/DL (ref 65–117)
GLUCOSE BLD STRIP.AUTO-MCNC: 299 MG/DL (ref 65–117)
GLUCOSE BLD STRIP.AUTO-MCNC: 300 MG/DL (ref 65–117)
GLUCOSE BLD STRIP.AUTO-MCNC: 311 MG/DL (ref 65–117)
GLUCOSE BLD STRIP.AUTO-MCNC: 329 MG/DL (ref 65–117)
GLUCOSE BLD STRIP.AUTO-MCNC: 334 MG/DL (ref 65–117)
GLUCOSE BLD STRIP.AUTO-MCNC: 338 MG/DL (ref 65–117)
GLUCOSE BLD STRIP.AUTO-MCNC: 360 MG/DL (ref 65–117)
GLUCOSE BLD STRIP.AUTO-MCNC: 404 MG/DL (ref 65–117)
GLUCOSE BLD STRIP.AUTO-MCNC: 415 MG/DL (ref 65–117)
GLUCOSE BLD STRIP.AUTO-MCNC: 67 MG/DL (ref 65–117)
GLUCOSE BLD STRIP.AUTO-MCNC: 90 MG/DL (ref 65–117)
GLUCOSE SERPL-MCNC: 100 MG/DL (ref 65–100)
GLUCOSE SERPL-MCNC: 102 MG/DL (ref 65–100)
GLUCOSE SERPL-MCNC: 106 MG/DL (ref 65–100)
GLUCOSE SERPL-MCNC: 107 MG/DL (ref 65–100)
GLUCOSE SERPL-MCNC: 109 MG/DL (ref 65–100)
GLUCOSE SERPL-MCNC: 112 MG/DL (ref 65–100)
GLUCOSE SERPL-MCNC: 125 MG/DL (ref 65–100)
GLUCOSE SERPL-MCNC: 137 MG/DL (ref 65–100)
GLUCOSE SERPL-MCNC: 146 MG/DL (ref 65–100)
GLUCOSE SERPL-MCNC: 151 MG/DL (ref 65–100)
GLUCOSE SERPL-MCNC: 164 MG/DL (ref 65–100)
GLUCOSE SERPL-MCNC: 167 MG/DL (ref 65–100)
GLUCOSE SERPL-MCNC: 168 MG/DL (ref 65–100)
GLUCOSE SERPL-MCNC: 171 MG/DL (ref 65–100)
GLUCOSE SERPL-MCNC: 182 MG/DL (ref 65–100)
GLUCOSE SERPL-MCNC: 183 MG/DL (ref 65–100)
GLUCOSE SERPL-MCNC: 188 MG/DL (ref 65–100)
GLUCOSE SERPL-MCNC: 191 MG/DL (ref 65–100)
GLUCOSE SERPL-MCNC: 191 MG/DL (ref 65–100)
GLUCOSE SERPL-MCNC: 195 MG/DL (ref 65–100)
GLUCOSE SERPL-MCNC: 203 MG/DL (ref 65–100)
GLUCOSE SERPL-MCNC: 236 MG/DL (ref 65–100)
GLUCOSE SERPL-MCNC: 252 MG/DL (ref 65–100)
GLUCOSE SERPL-MCNC: 252 MG/DL (ref 65–100)
GLUCOSE SERPL-MCNC: 291 MG/DL (ref 65–100)
GLUCOSE SERPL-MCNC: 389 MG/DL (ref 65–100)
GLUCOSE SERPL-MCNC: 62 MG/DL (ref 65–100)
GLUCOSE SERPL-MCNC: 76 MG/DL (ref 65–100)
GLUCOSE SERPL-MCNC: 91 MG/DL (ref 65–100)
GLUCOSE SERPL-MCNC: 99 MG/DL (ref 65–100)
GLUCOSE UR STRIP.AUTO-MCNC: 250 MG/DL
GLUCOSE UR STRIP.AUTO-MCNC: NEGATIVE MG/DL
HADV DNA SPEC QL NAA+PROBE: NOT DETECTED
HBA1C MFR BLD: 5.8 % (ref 4–5.6)
HBA1C MFR BLD: 6.3 % (ref 4–5.6)
HCOV 229E RNA SPEC QL NAA+PROBE: NOT DETECTED
HCOV HKU1 RNA SPEC QL NAA+PROBE: NOT DETECTED
HCOV NL63 RNA SPEC QL NAA+PROBE: NOT DETECTED
HCOV OC43 RNA SPEC QL NAA+PROBE: NOT DETECTED
HCT VFR BLD AUTO: 28 % (ref 36.6–50.3)
HCT VFR BLD AUTO: 28.4 % (ref 36.6–50.3)
HCT VFR BLD AUTO: 29.3 % (ref 36.6–50.3)
HCT VFR BLD AUTO: 29.6 % (ref 36.6–50.3)
HCT VFR BLD AUTO: 30.2 % (ref 36.6–50.3)
HCT VFR BLD AUTO: 31.6 % (ref 36.6–50.3)
HCT VFR BLD AUTO: 31.8 % (ref 36.6–50.3)
HCT VFR BLD AUTO: 32.2 % (ref 36.6–50.3)
HCT VFR BLD AUTO: 32.5 % (ref 36.6–50.3)
HCT VFR BLD AUTO: 35.6 % (ref 36.6–50.3)
HCT VFR BLD AUTO: 37.5 % (ref 36.6–50.3)
HCT VFR BLD AUTO: 38 % (ref 36.6–50.3)
HCT VFR BLD AUTO: 39.2 % (ref 36.6–50.3)
HCT VFR BLD AUTO: 39.7 % (ref 36.6–50.3)
HCT VFR BLD AUTO: 40.2 % (ref 36.6–50.3)
HCT VFR BLD AUTO: 41 % (ref 36.6–50.3)
HCT VFR BLD AUTO: 41.6 % (ref 36.6–50.3)
HCT VFR BLD AUTO: 42 % (ref 36.6–50.3)
HCT VFR BLD AUTO: 42.7 % (ref 36.6–50.3)
HCT VFR BLD AUTO: 43.2 % (ref 36.6–50.3)
HCT VFR BLD AUTO: 44 % (ref 36.6–50.3)
HCYS SERPL-SCNC: 21.9 UMOL/L (ref 3.7–13.9)
HDLC SERPL-MCNC: 41 MG/DL
HDLC SERPL-MCNC: 56 MG/DL
HDLC SERPL: 2.4 {RATIO} (ref 0–5)
HDLC SERPL: 2.7 {RATIO} (ref 0–5)
HGB BLD-MCNC: 10.6 G/DL (ref 12.1–17)
HGB BLD-MCNC: 10.7 G/DL (ref 12.1–17)
HGB BLD-MCNC: 11 G/DL (ref 12.1–17)
HGB BLD-MCNC: 11.1 G/DL (ref 12.1–17)
HGB BLD-MCNC: 12.4 G/DL (ref 12.1–17)
HGB BLD-MCNC: 12.9 G/DL (ref 12.1–17)
HGB BLD-MCNC: 13.2 G/DL (ref 12.1–17)
HGB BLD-MCNC: 13.3 G/DL (ref 12.1–17)
HGB BLD-MCNC: 13.9 G/DL (ref 12.1–17)
HGB BLD-MCNC: 14.1 G/DL (ref 12.1–17)
HGB BLD-MCNC: 14.3 G/DL (ref 12.1–17)
HGB BLD-MCNC: 14.3 G/DL (ref 12.1–17)
HGB BLD-MCNC: 14.5 G/DL (ref 12.1–17)
HGB BLD-MCNC: 14.8 G/DL (ref 12.1–17)
HGB BLD-MCNC: 14.8 G/DL (ref 12.1–17)
HGB BLD-MCNC: 15.4 G/DL (ref 12.1–17)
HGB BLD-MCNC: 9.1 G/DL (ref 12.1–17)
HGB BLD-MCNC: 9.2 G/DL (ref 12.1–17)
HGB BLD-MCNC: 9.4 G/DL (ref 12.1–17)
HGB BLD-MCNC: 9.5 G/DL (ref 12.1–17)
HGB BLD-MCNC: 9.7 G/DL (ref 12.1–17)
HGB BLD-MCNC: 9.8 G/DL (ref 12.1–17)
HGB BLD-MCNC: 9.8 G/DL (ref 12.1–17)
HGB UR QL STRIP: ABNORMAL
HGB UR QL STRIP: NEGATIVE
HMPV RNA SPEC QL NAA+PROBE: NOT DETECTED
HPIV1 RNA SPEC QL NAA+PROBE: NOT DETECTED
HPIV2 RNA SPEC QL NAA+PROBE: NOT DETECTED
HPIV3 RNA SPEC QL NAA+PROBE: NOT DETECTED
HPIV4 RNA SPEC QL NAA+PROBE: NOT DETECTED
HYALINE CASTS URNS QL MICRO: ABNORMAL /LPF (ref 0–2)
HYALINE CASTS URNS QL MICRO: NORMAL /LPF (ref 0–2)
IMM GRANULOCYTES # BLD AUTO: 0 K/UL (ref 0–0.04)
IMM GRANULOCYTES # BLD AUTO: 0.1 K/UL (ref 0–0.04)
IMM GRANULOCYTES # BLD AUTO: 0.3 K/UL (ref 0–0.04)
IMM GRANULOCYTES NFR BLD AUTO: 0 % (ref 0–0.5)
IMM GRANULOCYTES NFR BLD AUTO: 0 % (ref 0–0.5)
IMM GRANULOCYTES NFR BLD AUTO: 1 % (ref 0–0.5)
IMM GRANULOCYTES NFR BLD AUTO: 2 % (ref 0–0.5)
INR PPP: 1 (ref 0.9–1.1)
INR PPP: 1.1 (ref 0.9–1.1)
INTERPRETATION, NEU2LT: NORMAL
KETONES UR QL STRIP.AUTO: ABNORMAL MG/DL
KETONES UR QL STRIP.AUTO: NEGATIVE MG/DL
L PNEUMO1 AG UR QL IA: NEGATIVE
LACTATE SERPL-SCNC: 1.7 MMOL/L (ref 0.4–2)
LDLC SERPL CALC-MCNC: 52.4 MG/DL (ref 0–100)
LDLC SERPL CALC-MCNC: 64.2 MG/DL (ref 0–100)
LEFT CCA DIST DIAS: 12.9 CM/S
LEFT CCA DIST SYS: 89.8 CM/S
LEFT CCA PROX DIAS: 10.8 CM/S
LEFT CCA PROX SYS: 85.4 CM/S
LEFT ECA DIAS: 12.9 CM/S
LEFT ECA SYS: 177.5 CM/S
LEFT ICA DIST DIAS: 10.8 CM/S
LEFT ICA DIST SYS: 98.5 CM/S
LEFT ICA MID DIAS: 17.3 CM/S
LEFT ICA MID SYS: 67.8 CM/S
LEFT ICA PROX DIAS: 19.5 CM/S
LEFT ICA PROX SYS: 91.9 CM/S
LEFT ICA/CCA SYS: 1.1 NO UNITS
LEFT VERTEBRAL DIAS: 0 CM/S
LEFT VERTEBRAL SYS: 72.2 CM/S
LEUKOCYTE ESTERASE UR QL STRIP.AUTO: ABNORMAL
LEUKOCYTE ESTERASE UR QL STRIP.AUTO: NEGATIVE
LYMPHOCYTES # BLD: 0.4 K/UL (ref 0.8–3.5)
LYMPHOCYTES # BLD: 0.5 K/UL (ref 0.8–3.5)
LYMPHOCYTES # BLD: 0.5 K/UL (ref 0.8–3.5)
LYMPHOCYTES # BLD: 0.6 K/UL (ref 0.8–3.5)
LYMPHOCYTES # BLD: 0.9 K/UL (ref 0.8–3.5)
LYMPHOCYTES # BLD: 1 K/UL (ref 0.8–3.5)
LYMPHOCYTES # BLD: 1 K/UL (ref 0.8–3.5)
LYMPHOCYTES # BLD: 1.1 K/UL (ref 0.8–3.5)
LYMPHOCYTES # BLD: 1.2 K/UL (ref 0.8–3.5)
LYMPHOCYTES # BLD: 1.3 K/UL (ref 0.8–3.5)
LYMPHOCYTES # BLD: 1.4 K/UL (ref 0.8–3.5)
LYMPHOCYTES # BLD: 1.4 K/UL (ref 0.8–3.5)
LYMPHOCYTES NFR BLD: 10 % (ref 12–49)
LYMPHOCYTES NFR BLD: 13 % (ref 12–49)
LYMPHOCYTES NFR BLD: 14 % (ref 12–49)
LYMPHOCYTES NFR BLD: 16 % (ref 12–49)
LYMPHOCYTES NFR BLD: 18 % (ref 12–49)
LYMPHOCYTES NFR BLD: 21 % (ref 12–49)
LYMPHOCYTES NFR BLD: 23 % (ref 12–49)
LYMPHOCYTES NFR BLD: 24 % (ref 12–49)
LYMPHOCYTES NFR BLD: 25 % (ref 12–49)
LYMPHOCYTES NFR BLD: 3 % (ref 12–49)
LYMPHOCYTES NFR BLD: 4 % (ref 12–49)
LYMPHOCYTES NFR BLD: 6 % (ref 12–49)
LYMPHOCYTES NFR BLD: 7 % (ref 12–49)
M PNEUMO DNA SPEC QL NAA+PROBE: NOT DETECTED
M PNEUMO IGM SER IA-ACNC: REACTIVE
MAGNESIUM SERPL-MCNC: 1.8 MG/DL (ref 1.6–2.4)
MAGNESIUM SERPL-MCNC: 1.9 MG/DL (ref 1.6–2.4)
MAGNESIUM SERPL-MCNC: 1.9 MG/DL (ref 1.6–2.4)
MAGNESIUM SERPL-MCNC: 2 MG/DL (ref 1.6–2.4)
MAGNESIUM SERPL-MCNC: 2.1 MG/DL (ref 1.6–2.4)
MAGNESIUM SERPL-MCNC: 2.2 MG/DL (ref 1.6–2.4)
MAGNESIUM SERPL-MCNC: 2.3 MG/DL (ref 1.6–2.4)
MAGNESIUM SERPL-MCNC: 2.4 MG/DL (ref 1.6–2.4)
MAGNESIUM SERPL-MCNC: 2.5 MG/DL (ref 1.6–2.4)
MAGNESIUM SERPL-MCNC: 2.7 MG/DL (ref 1.6–2.4)
MAGNESIUM SERPL-MCNC: 2.7 MG/DL (ref 1.6–2.4)
MCH RBC QN AUTO: 30.9 PG (ref 26–34)
MCH RBC QN AUTO: 31.4 PG (ref 26–34)
MCH RBC QN AUTO: 31.4 PG (ref 26–34)
MCH RBC QN AUTO: 31.6 PG (ref 26–34)
MCH RBC QN AUTO: 31.6 PG (ref 26–34)
MCH RBC QN AUTO: 31.7 PG (ref 26–34)
MCH RBC QN AUTO: 31.8 PG (ref 26–34)
MCH RBC QN AUTO: 31.9 PG (ref 26–34)
MCH RBC QN AUTO: 32.1 PG (ref 26–34)
MCH RBC QN AUTO: 32.2 PG (ref 26–34)
MCH RBC QN AUTO: 32.4 PG (ref 26–34)
MCH RBC QN AUTO: 32.5 PG (ref 26–34)
MCH RBC QN AUTO: 32.6 PG (ref 26–34)
MCHC RBC AUTO-ENTMCNC: 31.8 G/DL (ref 30–36.5)
MCHC RBC AUTO-ENTMCNC: 32 G/DL (ref 30–36.5)
MCHC RBC AUTO-ENTMCNC: 32.1 G/DL (ref 30–36.5)
MCHC RBC AUTO-ENTMCNC: 32.4 G/DL (ref 30–36.5)
MCHC RBC AUTO-ENTMCNC: 32.9 G/DL (ref 30–36.5)
MCHC RBC AUTO-ENTMCNC: 33.1 G/DL (ref 30–36.5)
MCHC RBC AUTO-ENTMCNC: 33.1 G/DL (ref 30–36.5)
MCHC RBC AUTO-ENTMCNC: 33.2 G/DL (ref 30–36.5)
MCHC RBC AUTO-ENTMCNC: 33.5 G/DL (ref 30–36.5)
MCHC RBC AUTO-ENTMCNC: 33.7 G/DL (ref 30–36.5)
MCHC RBC AUTO-ENTMCNC: 33.8 G/DL (ref 30–36.5)
MCHC RBC AUTO-ENTMCNC: 34 G/DL (ref 30–36.5)
MCHC RBC AUTO-ENTMCNC: 34.3 G/DL (ref 30–36.5)
MCHC RBC AUTO-ENTMCNC: 34.4 G/DL (ref 30–36.5)
MCHC RBC AUTO-ENTMCNC: 34.4 G/DL (ref 30–36.5)
MCHC RBC AUTO-ENTMCNC: 34.8 G/DL (ref 30–36.5)
MCHC RBC AUTO-ENTMCNC: 34.9 G/DL (ref 30–36.5)
MCHC RBC AUTO-ENTMCNC: 34.9 G/DL (ref 30–36.5)
MCHC RBC AUTO-ENTMCNC: 35 G/DL (ref 30–36.5)
MCHC RBC AUTO-ENTMCNC: 35.1 G/DL (ref 30–36.5)
MCHC RBC AUTO-ENTMCNC: 35.2 G/DL (ref 30–36.5)
MCV RBC AUTO: 90.7 FL (ref 80–99)
MCV RBC AUTO: 90.7 FL (ref 80–99)
MCV RBC AUTO: 91.7 FL (ref 80–99)
MCV RBC AUTO: 91.8 FL (ref 80–99)
MCV RBC AUTO: 91.9 FL (ref 80–99)
MCV RBC AUTO: 92.4 FL (ref 80–99)
MCV RBC AUTO: 93.1 FL (ref 80–99)
MCV RBC AUTO: 93.2 FL (ref 80–99)
MCV RBC AUTO: 93.4 FL (ref 80–99)
MCV RBC AUTO: 93.5 FL (ref 80–99)
MCV RBC AUTO: 93.5 FL (ref 80–99)
MCV RBC AUTO: 93.9 FL (ref 80–99)
MCV RBC AUTO: 93.9 FL (ref 80–99)
MCV RBC AUTO: 94.1 FL (ref 80–99)
MCV RBC AUTO: 94.9 FL (ref 80–99)
MCV RBC AUTO: 95.5 FL (ref 80–99)
MCV RBC AUTO: 97.2 FL (ref 80–99)
MCV RBC AUTO: 97.4 FL (ref 80–99)
MCV RBC AUTO: 97.7 FL (ref 80–99)
MCV RBC AUTO: 97.9 FL (ref 80–99)
MCV RBC AUTO: 98.4 FL (ref 80–99)
METAMYELOCYTES NFR BLD MANUAL: 3 %
METHOD, NEU3LT: NORMAL
MONOCYTES # BLD: 0.5 K/UL (ref 0–1)
MONOCYTES # BLD: 0.5 K/UL (ref 0–1)
MONOCYTES # BLD: 0.6 K/UL (ref 0–1)
MONOCYTES # BLD: 0.6 K/UL (ref 0–1)
MONOCYTES # BLD: 0.7 K/UL (ref 0–1)
MONOCYTES # BLD: 0.7 K/UL (ref 0–1)
MONOCYTES # BLD: 0.8 K/UL (ref 0–1)
MONOCYTES # BLD: 0.8 K/UL (ref 0–1)
MONOCYTES # BLD: 1.1 K/UL (ref 0–1)
MONOCYTES # BLD: 1.1 K/UL (ref 0–1)
MONOCYTES # BLD: 1.3 K/UL (ref 0–1)
MONOCYTES # BLD: 1.3 K/UL (ref 0–1)
MONOCYTES # BLD: 1.5 K/UL (ref 0–1)
MONOCYTES # BLD: 1.5 K/UL (ref 0–1)
MONOCYTES # BLD: 1.6 K/UL (ref 0–1)
MONOCYTES NFR BLD: 10 % (ref 5–13)
MONOCYTES NFR BLD: 11 % (ref 5–13)
MONOCYTES NFR BLD: 12 % (ref 5–13)
MONOCYTES NFR BLD: 13 % (ref 5–13)
MONOCYTES NFR BLD: 14 % (ref 5–13)
MONOCYTES NFR BLD: 15 % (ref 5–13)
MONOCYTES NFR BLD: 16 % (ref 5–13)
MONOCYTES NFR BLD: 7 % (ref 5–13)
MONOCYTES NFR BLD: 8 % (ref 5–13)
MONOCYTES NFR BLD: 9 % (ref 5–13)
MYELOCYTES NFR BLD MANUAL: 1 %
NEURONAL CELL AB, NEU1LT: 9 UNITS (ref 0–54)
NEUTS SEG # BLD: 10.5 K/UL (ref 1.8–8)
NEUTS SEG # BLD: 13.2 K/UL (ref 1.8–8)
NEUTS SEG # BLD: 13.4 K/UL (ref 1.8–8)
NEUTS SEG # BLD: 2.5 K/UL (ref 1.8–8)
NEUTS SEG # BLD: 2.5 K/UL (ref 1.8–8)
NEUTS SEG # BLD: 3.1 K/UL (ref 1.8–8)
NEUTS SEG # BLD: 3.2 K/UL (ref 1.8–8)
NEUTS SEG # BLD: 3.2 K/UL (ref 1.8–8)
NEUTS SEG # BLD: 3.9 K/UL (ref 1.8–8)
NEUTS SEG # BLD: 4 K/UL (ref 1.8–8)
NEUTS SEG # BLD: 5.1 K/UL (ref 1.8–8)
NEUTS SEG # BLD: 6.5 K/UL (ref 1.8–8)
NEUTS SEG # BLD: 6.7 K/UL (ref 1.8–8)
NEUTS SEG # BLD: 8.3 K/UL (ref 1.8–8)
NEUTS SEG # BLD: 9.5 K/UL (ref 1.8–8)
NEUTS SEG NFR BLD: 57 % (ref 32–75)
NEUTS SEG NFR BLD: 60 % (ref 32–75)
NEUTS SEG NFR BLD: 61 % (ref 32–75)
NEUTS SEG NFR BLD: 65 % (ref 32–75)
NEUTS SEG NFR BLD: 66 % (ref 32–75)
NEUTS SEG NFR BLD: 67 % (ref 32–75)
NEUTS SEG NFR BLD: 68 % (ref 32–75)
NEUTS SEG NFR BLD: 72 % (ref 32–75)
NEUTS SEG NFR BLD: 74 % (ref 32–75)
NEUTS SEG NFR BLD: 75 % (ref 32–75)
NEUTS SEG NFR BLD: 76 % (ref 32–75)
NEUTS SEG NFR BLD: 77 % (ref 32–75)
NEUTS SEG NFR BLD: 81 % (ref 32–75)
NEUTS SEG NFR BLD: 82 % (ref 32–75)
NEUTS SEG NFR BLD: 83 % (ref 32–75)
NITRITE UR QL STRIP.AUTO: NEGATIVE
NRBC # BLD: 0 K/UL (ref 0–0.01)
NRBC BLD-RTO: 0 PER 100 WBC
PH UR STRIP: 5.5 [PH] (ref 5–8)
PH UR STRIP: 6.5 [PH] (ref 5–8)
PH UR STRIP: 7.5 [PH] (ref 5–8)
PH UR STRIP: 8.5 [PH] (ref 5–8)
PHOSPHATE SERPL-MCNC: 1.7 MG/DL (ref 2.6–4.7)
PHOSPHATE SERPL-MCNC: 2 MG/DL (ref 2.6–4.7)
PHOSPHATE SERPL-MCNC: 2.1 MG/DL (ref 2.6–4.7)
PHOSPHATE SERPL-MCNC: 2.5 MG/DL (ref 2.6–4.7)
PHOSPHATE SERPL-MCNC: 2.5 MG/DL (ref 2.6–4.7)
PHOSPHATE SERPL-MCNC: 2.6 MG/DL (ref 2.6–4.7)
PHOSPHATE SERPL-MCNC: 2.7 MG/DL (ref 2.6–4.7)
PHOSPHATE SERPL-MCNC: 2.9 MG/DL (ref 2.6–4.7)
PHOSPHATE SERPL-MCNC: 3 MG/DL (ref 2.6–4.7)
PHOSPHATE SERPL-MCNC: 3.2 MG/DL (ref 2.6–4.7)
PHOSPHATE SERPL-MCNC: 3.3 MG/DL (ref 2.6–4.7)
PLATELET # BLD AUTO: 107 K/UL (ref 150–400)
PLATELET # BLD AUTO: 113 K/UL (ref 150–400)
PLATELET # BLD AUTO: 128 K/UL (ref 150–400)
PLATELET # BLD AUTO: 145 K/UL (ref 150–400)
PLATELET # BLD AUTO: 157 K/UL (ref 150–400)
PLATELET # BLD AUTO: 159 K/UL (ref 150–400)
PLATELET # BLD AUTO: 162 K/UL (ref 150–400)
PLATELET # BLD AUTO: 163 K/UL (ref 150–400)
PLATELET # BLD AUTO: 167 K/UL (ref 150–400)
PLATELET # BLD AUTO: 183 K/UL (ref 150–400)
PLATELET # BLD AUTO: 230 K/UL (ref 150–400)
PLATELET # BLD AUTO: 246 K/UL (ref 150–400)
PLATELET # BLD AUTO: 336 K/UL (ref 150–400)
PLATELET # BLD AUTO: 349 K/UL (ref 150–400)
PLATELET # BLD AUTO: 361 K/UL (ref 150–400)
PLATELET # BLD AUTO: 369 K/UL (ref 150–400)
PLATELET # BLD AUTO: 383 K/UL (ref 150–400)
PLATELET # BLD AUTO: 388 K/UL (ref 150–400)
PLATELET # BLD AUTO: 393 K/UL (ref 150–400)
PLATELET # BLD AUTO: 393 K/UL (ref 150–400)
PLATELET # BLD AUTO: 415 K/UL (ref 150–400)
PMV BLD AUTO: 10.2 FL (ref 8.9–12.9)
PMV BLD AUTO: 10.3 FL (ref 8.9–12.9)
PMV BLD AUTO: 10.5 FL (ref 8.9–12.9)
PMV BLD AUTO: 10.6 FL (ref 8.9–12.9)
PMV BLD AUTO: 10.7 FL (ref 8.9–12.9)
PMV BLD AUTO: 10.8 FL (ref 8.9–12.9)
PMV BLD AUTO: 10.9 FL (ref 8.9–12.9)
PMV BLD AUTO: 11.4 FL (ref 8.9–12.9)
POTASSIUM SERPL-SCNC: 3.5 MMOL/L (ref 3.5–5.1)
POTASSIUM SERPL-SCNC: 3.6 MMOL/L (ref 3.5–5.1)
POTASSIUM SERPL-SCNC: 3.6 MMOL/L (ref 3.5–5.1)
POTASSIUM SERPL-SCNC: 3.7 MMOL/L (ref 3.5–5.1)
POTASSIUM SERPL-SCNC: 3.8 MMOL/L (ref 3.5–5.1)
POTASSIUM SERPL-SCNC: 3.8 MMOL/L (ref 3.5–5.1)
POTASSIUM SERPL-SCNC: 3.9 MMOL/L (ref 3.5–5.1)
POTASSIUM SERPL-SCNC: 4.1 MMOL/L (ref 3.5–5.1)
POTASSIUM SERPL-SCNC: 4.1 MMOL/L (ref 3.5–5.1)
POTASSIUM SERPL-SCNC: 4.2 MMOL/L (ref 3.5–5.1)
POTASSIUM SERPL-SCNC: 4.2 MMOL/L (ref 3.5–5.1)
POTASSIUM SERPL-SCNC: 4.3 MMOL/L (ref 3.5–5.1)
POTASSIUM SERPL-SCNC: 4.5 MMOL/L (ref 3.5–5.1)
POTASSIUM SERPL-SCNC: 4.6 MMOL/L (ref 3.5–5.1)
POTASSIUM SERPL-SCNC: 4.7 MMOL/L (ref 3.5–5.1)
POTASSIUM SERPL-SCNC: 4.7 MMOL/L (ref 3.5–5.1)
POTASSIUM SERPL-SCNC: 4.8 MMOL/L (ref 3.5–5.1)
POTASSIUM SERPL-SCNC: 4.9 MMOL/L (ref 3.5–5.1)
POTASSIUM SERPL-SCNC: 5 MMOL/L (ref 3.5–5.1)
POTASSIUM SERPL-SCNC: 5 MMOL/L (ref 3.5–5.1)
POTASSIUM SERPL-SCNC: 5.3 MMOL/L (ref 3.5–5.1)
PROCALCITONIN SERPL-MCNC: 0.36 NG/ML
PROCALCITONIN SERPL-MCNC: 0.45 NG/ML
PROCALCITONIN SERPL-MCNC: 0.52 NG/ML
PROCALCITONIN SERPL-MCNC: 0.85 NG/ML
PROT SERPL-MCNC: 5.2 G/DL (ref 6.4–8.2)
PROT SERPL-MCNC: 5.5 G/DL (ref 6.4–8.2)
PROT SERPL-MCNC: 5.8 G/DL (ref 6.4–8.2)
PROT SERPL-MCNC: 6.1 G/DL (ref 6.4–8.2)
PROT SERPL-MCNC: 6.2 G/DL (ref 6.4–8.2)
PROT SERPL-MCNC: 6.5 G/DL (ref 6.4–8.2)
PROT SERPL-MCNC: 6.8 G/DL (ref 6.4–8.2)
PROT SERPL-MCNC: 6.8 G/DL (ref 6.4–8.2)
PROT UR STRIP-MCNC: 30 MG/DL
PROT UR STRIP-MCNC: 30 MG/DL
PROT UR STRIP-MCNC: ABNORMAL MG/DL
PROT UR STRIP-MCNC: NEGATIVE MG/DL
PROTHROMBIN TIME: 10.9 SEC (ref 9–11.1)
PROTHROMBIN TIME: 11.3 SEC (ref 9–11.1)
Q-T INTERVAL, ECG07: 412 MS
Q-T INTERVAL, ECG07: 440 MS
QRS DURATION, ECG06: 158 MS
QRS DURATION, ECG06: 160 MS
QTC CALCULATION (BEZET), ECG08: 447 MS
QTC CALCULATION (BEZET), ECG08: 475 MS
RBC # BLD AUTO: 2.88 M/UL (ref 4.1–5.7)
RBC # BLD AUTO: 2.9 M/UL (ref 4.1–5.7)
RBC # BLD AUTO: 3 M/UL (ref 4.1–5.7)
RBC # BLD AUTO: 3.04 M/UL (ref 4.1–5.7)
RBC # BLD AUTO: 3.07 M/UL (ref 4.1–5.7)
RBC # BLD AUTO: 3.33 M/UL (ref 4.1–5.7)
RBC # BLD AUTO: 3.37 M/UL (ref 4.1–5.7)
RBC # BLD AUTO: 3.4 M/UL (ref 4.1–5.7)
RBC # BLD AUTO: 3.46 M/UL (ref 4.1–5.7)
RBC # BLD AUTO: 3.82 M/UL (ref 4.1–5.7)
RBC # BLD AUTO: 4.01 M/UL (ref 4.1–5.7)
RBC # BLD AUTO: 4.19 M/UL (ref 4.1–5.7)
RBC # BLD AUTO: 4.21 M/UL (ref 4.1–5.7)
RBC # BLD AUTO: 4.33 M/UL (ref 4.1–5.7)
RBC # BLD AUTO: 4.38 M/UL (ref 4.1–5.7)
RBC # BLD AUTO: 4.42 M/UL (ref 4.1–5.7)
RBC # BLD AUTO: 4.52 M/UL (ref 4.1–5.7)
RBC # BLD AUTO: 4.57 M/UL (ref 4.1–5.7)
RBC # BLD AUTO: 4.57 M/UL (ref 4.1–5.7)
RBC # BLD AUTO: 4.6 M/UL (ref 4.1–5.7)
RBC # BLD AUTO: 4.76 M/UL (ref 4.1–5.7)
RBC #/AREA URNS HPF: >100 /HPF (ref 0–5)
RBC #/AREA URNS HPF: ABNORMAL /HPF (ref 0–5)
RBC #/AREA URNS HPF: ABNORMAL /HPF (ref 0–5)
RBC #/AREA URNS HPF: NORMAL /HPF (ref 0–5)
RBC MORPH BLD: ABNORMAL
RIGHT CCA DIST DIAS: 17.3 CM/S
RIGHT CCA DIST SYS: 74.4 CM/S
RIGHT CCA PROX DIAS: 12.9 CM/S
RIGHT CCA PROX SYS: 74.4 CM/S
RIGHT ECA DIAS: 26.1 CM/S
RIGHT ECA SYS: 157.8 CM/S
RIGHT ICA DIST DIAS: 17.3 CM/S
RIGHT ICA DIST SYS: 70 CM/S
RIGHT ICA MID DIAS: 12.9 CM/S
RIGHT ICA MID SYS: 89.8 CM/S
RIGHT ICA PROX DIAS: 19.5 CM/S
RIGHT ICA PROX SYS: 133.6 CM/S
RIGHT ICA/CCA SYS: 1.8 NO UNITS
RIGHT VERTEBRAL DIAS: 10.8 CM/S
RIGHT VERTEBRAL SYS: 59.1 CM/S
RSV RNA SPEC QL NAA+PROBE: NOT DETECTED
RV+EV RNA SPEC QL NAA+PROBE: NOT DETECTED
SARS-COV-2 PCR, COVPCR: DETECTED
SARS-COV-2, COV2: NORMAL
SARS-COV-2, XPLCVT: DETECTED
SARS-COV-2, XPLCVT: NOT DETECTED
SERVICE CMNT-IMP: ABNORMAL
SERVICE CMNT-IMP: NORMAL
SODIUM SERPL-SCNC: 134 MMOL/L (ref 136–145)
SODIUM SERPL-SCNC: 135 MMOL/L (ref 136–145)
SODIUM SERPL-SCNC: 136 MMOL/L (ref 136–145)
SODIUM SERPL-SCNC: 137 MMOL/L (ref 136–145)
SODIUM SERPL-SCNC: 138 MMOL/L (ref 136–145)
SODIUM SERPL-SCNC: 139 MMOL/L (ref 136–145)
SODIUM SERPL-SCNC: 140 MMOL/L (ref 136–145)
SODIUM SERPL-SCNC: 141 MMOL/L (ref 136–145)
SODIUM SERPL-SCNC: 141 MMOL/L (ref 136–145)
SODIUM SERPL-SCNC: 142 MMOL/L (ref 136–145)
SODIUM SERPL-SCNC: 143 MMOL/L (ref 136–145)
SODIUM SERPL-SCNC: 144 MMOL/L (ref 136–145)
SODIUM SERPL-SCNC: 146 MMOL/L (ref 136–145)
SODIUM UR-SCNC: 8 MMOL/L
SOURCE, COVRS: ABNORMAL
SOURCE, COVRS: NORMAL
SP GR UR REFRACTOMETRY: 1.02
SP GR UR REFRACTOMETRY: 1.03
SP GR UR REFRACTOMETRY: 1.03
SP GR UR REFRACTOMETRY: <1.005 (ref 1–1.03)
SPECIMEN SOURCE: NORMAL
TRIGL SERPL-MCNC: 74 MG/DL (ref ?–150)
TRIGL SERPL-MCNC: 88 MG/DL (ref ?–150)
TROPONIN-HIGH SENSITIVITY: 13 NG/L (ref 0–76)
TROPONIN-HIGH SENSITIVITY: 9 NG/L (ref 0–76)
TSH SERPL DL<=0.05 MIU/L-ACNC: 0.32 UIU/ML (ref 0.36–3.74)
TSH SERPL DL<=0.05 MIU/L-ACNC: 0.47 UIU/ML (ref 0.36–3.74)
UA: UC IF INDICATED,UAUC: ABNORMAL
UA: UC IF INDICATED,UAUC: ABNORMAL
UA: UC IF INDICATED,UAUC: NORMAL
UR CULT HOLD, URHOLD: NORMAL
UROBILINOGEN UR QL STRIP.AUTO: 0.2 EU/DL (ref 0.2–1)
UROBILINOGEN UR QL STRIP.AUTO: 1 EU/DL (ref 0.2–1)
UROBILINOGEN UR QL STRIP.AUTO: 1 EU/DL (ref 0.2–1)
UROBILINOGEN UR QL STRIP.AUTO: 2 EU/DL (ref 0.2–1)
VANCOMYCIN SERPL-MCNC: 13 UG/ML
VAS LEFT SUBCLAVIAN PROX EDV: 0 CM/S
VAS LEFT SUBCLAVIAN PROX PSV: 91.9 CM/S
VAS RIGHT SUBCLAVIAN PROX EDV: 0 CM/S
VAS RIGHT SUBCLAVIAN PROX PSV: 184.1 CM/S
VENTRICULAR RATE, ECG03: 70 BPM
VENTRICULAR RATE, ECG03: 71 BPM
VIT B1 BLD-SCNC: 185.1 NMOL/L (ref 66.5–200)
VIT B12 SERPL-MCNC: 1152 PG/ML (ref 193–986)
VIT B12 SERPL-MCNC: 450 PG/ML (ref 193–986)
VLDLC SERPL CALC-MCNC: 14.8 MG/DL
VLDLC SERPL CALC-MCNC: 17.6 MG/DL
WBC # BLD AUTO: 10.7 K/UL (ref 4.1–11.1)
WBC # BLD AUTO: 12.3 K/UL (ref 4.1–11.1)
WBC # BLD AUTO: 12.6 K/UL (ref 4.1–11.1)
WBC # BLD AUTO: 14.1 K/UL (ref 4.1–11.1)
WBC # BLD AUTO: 14.5 K/UL (ref 4.1–11.1)
WBC # BLD AUTO: 14.6 K/UL (ref 4.1–11.1)
WBC # BLD AUTO: 15.3 K/UL (ref 4.1–11.1)
WBC # BLD AUTO: 15.9 K/UL (ref 4.1–11.1)
WBC # BLD AUTO: 16.2 K/UL (ref 4.1–11.1)
WBC # BLD AUTO: 16.3 K/UL (ref 4.1–11.1)
WBC # BLD AUTO: 16.8 K/UL (ref 4.1–11.1)
WBC # BLD AUTO: 17 K/UL (ref 4.1–11.1)
WBC # BLD AUTO: 17.6 K/UL (ref 4.1–11.1)
WBC # BLD AUTO: 3.7 K/UL (ref 4.1–11.1)
WBC # BLD AUTO: 4.1 K/UL (ref 4.1–11.1)
WBC # BLD AUTO: 4.2 K/UL (ref 4.1–11.1)
WBC # BLD AUTO: 5.3 K/UL (ref 4.1–11.1)
WBC # BLD AUTO: 5.4 K/UL (ref 4.1–11.1)
WBC # BLD AUTO: 6 K/UL (ref 4.1–11.1)
WBC # BLD AUTO: 6.1 K/UL (ref 4.1–11.1)
WBC # BLD AUTO: 7 K/UL (ref 4.1–11.1)
WBC # BLD AUTO: 9 K/UL (ref 4.1–11.1)
WBC # BLD AUTO: 9.9 K/UL (ref 4.1–11.1)
WBC MORPH BLD: ABNORMAL
WBC URNS QL MICRO: ABNORMAL /HPF (ref 0–4)
WBC URNS QL MICRO: NORMAL /HPF (ref 0–4)

## 2022-01-01 PROCEDURE — 99285 EMERGENCY DEPT VISIT HI MDM: CPT

## 2022-01-01 PROCEDURE — 36415 COLL VENOUS BLD VENIPUNCTURE: CPT

## 2022-01-01 PROCEDURE — 74011636637 HC RX REV CODE- 636/637: Performed by: INTERNAL MEDICINE

## 2022-01-01 PROCEDURE — 94760 N-INVAS EAR/PLS OXIMETRY 1: CPT

## 2022-01-01 PROCEDURE — 74011000250 HC RX REV CODE- 250: Performed by: SPECIALIST

## 2022-01-01 PROCEDURE — 80076 HEPATIC FUNCTION PANEL: CPT

## 2022-01-01 PROCEDURE — 74011250637 HC RX REV CODE- 250/637: Performed by: INTERNAL MEDICINE

## 2022-01-01 PROCEDURE — 97530 THERAPEUTIC ACTIVITIES: CPT

## 2022-01-01 PROCEDURE — HOSPICE MEDICATION HC HH HOSPICE MEDICATION

## 2022-01-01 PROCEDURE — 74011000250 HC RX REV CODE- 250: Performed by: INTERNAL MEDICINE

## 2022-01-01 PROCEDURE — 2709999900 HC NON-CHARGEABLE SUPPLY: Performed by: SPECIALIST

## 2022-01-01 PROCEDURE — 80202 ASSAY OF VANCOMYCIN: CPT

## 2022-01-01 PROCEDURE — 97165 OT EVAL LOW COMPLEX 30 MIN: CPT

## 2022-01-01 PROCEDURE — 65270000046 HC RM TELEMETRY

## 2022-01-01 PROCEDURE — 84100 ASSAY OF PHOSPHORUS: CPT

## 2022-01-01 PROCEDURE — 74011250636 HC RX REV CODE- 250/636: Performed by: INTERNAL MEDICINE

## 2022-01-01 PROCEDURE — 83735 ASSAY OF MAGNESIUM: CPT

## 2022-01-01 PROCEDURE — G0155 HHCP-SVS OF CSW,EA 15 MIN: HCPCS

## 2022-01-01 PROCEDURE — 74011250637 HC RX REV CODE- 250/637: Performed by: PSYCHIATRY & NEUROLOGY

## 2022-01-01 PROCEDURE — 99233 SBSQ HOSP IP/OBS HIGH 50: CPT | Performed by: INTERNAL MEDICINE

## 2022-01-01 PROCEDURE — 74011250636 HC RX REV CODE- 250/636: Performed by: HOSPITALIST

## 2022-01-01 PROCEDURE — 82962 GLUCOSE BLOOD TEST: CPT

## 2022-01-01 PROCEDURE — 83090 ASSAY OF HOMOCYSTEINE: CPT

## 2022-01-01 PROCEDURE — 85027 COMPLETE CBC AUTOMATED: CPT

## 2022-01-01 PROCEDURE — 74011000258 HC RX REV CODE- 258: Performed by: INTERNAL MEDICINE

## 2022-01-01 PROCEDURE — 80048 BASIC METABOLIC PNL TOTAL CA: CPT

## 2022-01-01 PROCEDURE — G0299 HHS/HOSPICE OF RN EA 15 MIN: HCPCS

## 2022-01-01 PROCEDURE — 97116 GAIT TRAINING THERAPY: CPT

## 2022-01-01 PROCEDURE — 85025 COMPLETE CBC W/AUTO DIFF WBC: CPT

## 2022-01-01 PROCEDURE — U0005 INFEC AGEN DETEC AMPLI PROBE: HCPCS

## 2022-01-01 PROCEDURE — 84145 PROCALCITONIN (PCT): CPT

## 2022-01-01 PROCEDURE — A9576 INJ PROHANCE MULTIPACK: HCPCS | Performed by: INTERNAL MEDICINE

## 2022-01-01 PROCEDURE — 71045 X-RAY EXAM CHEST 1 VIEW: CPT

## 2022-01-01 PROCEDURE — 81001 URINALYSIS AUTO W/SCOPE: CPT

## 2022-01-01 PROCEDURE — 74011250637 HC RX REV CODE- 250/637: Performed by: NURSE PRACTITIONER

## 2022-01-01 PROCEDURE — 85610 PROTHROMBIN TIME: CPT

## 2022-01-01 PROCEDURE — 82550 ASSAY OF CK (CPK): CPT

## 2022-01-01 PROCEDURE — 70450 CT HEAD/BRAIN W/O DYE: CPT

## 2022-01-01 PROCEDURE — 85379 FIBRIN DEGRADATION QUANT: CPT

## 2022-01-01 PROCEDURE — 93005 ELECTROCARDIOGRAM TRACING: CPT

## 2022-01-01 PROCEDURE — 74011250636 HC RX REV CODE- 250/636: Performed by: STUDENT IN AN ORGANIZED HEALTH CARE EDUCATION/TRAINING PROGRAM

## 2022-01-01 PROCEDURE — G0156 HHCP-SVS OF AIDE,EA 15 MIN: HCPCS

## 2022-01-01 PROCEDURE — 87449 NOS EACH ORGANISM AG IA: CPT

## 2022-01-01 PROCEDURE — 74011250636 HC RX REV CODE- 250/636: Performed by: NURSE PRACTITIONER

## 2022-01-01 PROCEDURE — 2709999900 HC NON-CHARGEABLE SUPPLY: Performed by: COLON & RECTAL SURGERY

## 2022-01-01 PROCEDURE — 87040 BLOOD CULTURE FOR BACTERIA: CPT

## 2022-01-01 PROCEDURE — 83036 HEMOGLOBIN GLYCOSYLATED A1C: CPT

## 2022-01-01 PROCEDURE — 97161 PT EVAL LOW COMPLEX 20 MIN: CPT

## 2022-01-01 PROCEDURE — 95816 EEG AWAKE AND DROWSY: CPT | Performed by: PSYCHIATRY & NEUROLOGY

## 2022-01-01 PROCEDURE — 92526 ORAL FUNCTION THERAPY: CPT

## 2022-01-01 PROCEDURE — 72148 MRI LUMBAR SPINE W/O DYE: CPT

## 2022-01-01 PROCEDURE — 3336500001 HSPC ELECTION

## 2022-01-01 PROCEDURE — 93880 EXTRACRANIAL BILAT STUDY: CPT | Performed by: PSYCHIATRY & NEUROLOGY

## 2022-01-01 PROCEDURE — 74011636637 HC RX REV CODE- 636/637

## 2022-01-01 PROCEDURE — 80061 LIPID PANEL: CPT

## 2022-01-01 PROCEDURE — 70551 MRI BRAIN STEM W/O DYE: CPT

## 2022-01-01 PROCEDURE — 71250 CT THORAX DX C-: CPT

## 2022-01-01 PROCEDURE — 97112 NEUROMUSCULAR REEDUCATION: CPT

## 2022-01-01 PROCEDURE — 74011250637 HC RX REV CODE- 250/637: Performed by: EMERGENCY MEDICINE

## 2022-01-01 PROCEDURE — 77010033678 HC OXYGEN DAILY

## 2022-01-01 PROCEDURE — 80053 COMPREHEN METABOLIC PANEL: CPT

## 2022-01-01 PROCEDURE — 74011636637 HC RX REV CODE- 636/637: Performed by: NURSE PRACTITIONER

## 2022-01-01 PROCEDURE — 86738 MYCOPLASMA ANTIBODY: CPT

## 2022-01-01 PROCEDURE — 93306 TTE W/DOPPLER COMPLETE: CPT | Performed by: INTERNAL MEDICINE

## 2022-01-01 PROCEDURE — 95819 EEG AWAKE AND ASLEEP: CPT | Performed by: PSYCHIATRY & NEUROLOGY

## 2022-01-01 PROCEDURE — 99223 1ST HOSP IP/OBS HIGH 75: CPT | Performed by: PSYCHIATRY & NEUROLOGY

## 2022-01-01 PROCEDURE — 4A03X5D MEASUREMENT OF ARTERIAL FLOW, INTRACRANIAL, EXTERNAL APPROACH: ICD-10-PCS | Performed by: INTERNAL MEDICINE

## 2022-01-01 PROCEDURE — 97166 OT EVAL MOD COMPLEX 45 MIN: CPT

## 2022-01-01 PROCEDURE — 82570 ASSAY OF URINE CREATININE: CPT

## 2022-01-01 PROCEDURE — 0651 HSPC ROUTINE HOME CARE

## 2022-01-01 PROCEDURE — 82140 ASSAY OF AMMONIA: CPT

## 2022-01-01 PROCEDURE — 0042T CT CODE NEURO PERF W CBF: CPT

## 2022-01-01 PROCEDURE — 97162 PT EVAL MOD COMPLEX 30 MIN: CPT

## 2022-01-01 PROCEDURE — 70553 MRI BRAIN STEM W/O & W/DYE: CPT

## 2022-01-01 PROCEDURE — 93296 REM INTERROG EVL PM/IDS: CPT | Performed by: INTERNAL MEDICINE

## 2022-01-01 PROCEDURE — 74011250636 HC RX REV CODE- 250/636: Performed by: EMERGENCY MEDICINE

## 2022-01-01 PROCEDURE — 92610 EVALUATE SWALLOWING FUNCTION: CPT

## 2022-01-01 PROCEDURE — 74011250636 HC RX REV CODE- 250/636: Performed by: ANESTHESIOLOGY

## 2022-01-01 PROCEDURE — 92507 TX SP LANG VOICE COMM INDIV: CPT

## 2022-01-01 PROCEDURE — 93294 REM INTERROG EVL PM/LDLS PM: CPT | Performed by: INTERNAL MEDICINE

## 2022-01-01 PROCEDURE — 74176 CT ABD & PELVIS W/O CONTRAST: CPT

## 2022-01-01 PROCEDURE — 83605 ASSAY OF LACTIC ACID: CPT

## 2022-01-01 PROCEDURE — 99223 1ST HOSP IP/OBS HIGH 75: CPT | Performed by: INTERNAL MEDICINE

## 2022-01-01 PROCEDURE — 77030012058: Performed by: SPECIALIST

## 2022-01-01 PROCEDURE — 74011000636 HC RX REV CODE- 636: Performed by: EMERGENCY MEDICINE

## 2022-01-01 PROCEDURE — 97535 SELF CARE MNGMENT TRAINING: CPT

## 2022-01-01 PROCEDURE — 95816 EEG AWAKE AND DROWSY: CPT | Performed by: INTERNAL MEDICINE

## 2022-01-01 PROCEDURE — 82607 VITAMIN B-12: CPT

## 2022-01-01 PROCEDURE — 82728 ASSAY OF FERRITIN: CPT

## 2022-01-01 PROCEDURE — 99233 SBSQ HOSP IP/OBS HIGH 50: CPT | Performed by: PSYCHIATRY & NEUROLOGY

## 2022-01-01 PROCEDURE — 74011250636 HC RX REV CODE- 250/636: Performed by: PSYCHIATRY & NEUROLOGY

## 2022-01-01 PROCEDURE — 70496 CT ANGIOGRAPHY HEAD: CPT

## 2022-01-01 PROCEDURE — 77030005122 HC CATH GASTMY PEG BSC -B: Performed by: SPECIALIST

## 2022-01-01 PROCEDURE — 85652 RBC SED RATE AUTOMATED: CPT

## 2022-01-01 PROCEDURE — 84484 ASSAY OF TROPONIN QUANT: CPT

## 2022-01-01 PROCEDURE — 92611 MOTION FLUOROSCOPY/SWALLOW: CPT

## 2022-01-01 PROCEDURE — 82306 VITAMIN D 25 HYDROXY: CPT

## 2022-01-01 PROCEDURE — 76060000031 HC ANESTHESIA FIRST 0.5 HR: Performed by: COLON & RECTAL SURGERY

## 2022-01-01 PROCEDURE — 97112 NEUROMUSCULAR REEDUCATION: CPT | Performed by: OCCUPATIONAL THERAPIST

## 2022-01-01 PROCEDURE — 0DH64UZ INSERTION OF FEEDING DEVICE INTO STOMACH, PERCUTANEOUS ENDOSCOPIC APPROACH: ICD-10-PCS | Performed by: SPECIALIST

## 2022-01-01 PROCEDURE — 86038 ANTINUCLEAR ANTIBODIES: CPT

## 2022-01-01 PROCEDURE — 87205 SMEAR GRAM STAIN: CPT

## 2022-01-01 PROCEDURE — 93880 EXTRACRANIAL BILAT STUDY: CPT

## 2022-01-01 PROCEDURE — 99232 SBSQ HOSP IP/OBS MODERATE 35: CPT | Performed by: PSYCHIATRY & NEUROLOGY

## 2022-01-01 PROCEDURE — 84300 ASSAY OF URINE SODIUM: CPT

## 2022-01-01 PROCEDURE — 97110 THERAPEUTIC EXERCISES: CPT

## 2022-01-01 PROCEDURE — 74011000250 HC RX REV CODE- 250: Performed by: ANESTHESIOLOGY

## 2022-01-01 PROCEDURE — 83520 IMMUNOASSAY QUANT NOS NONAB: CPT

## 2022-01-01 PROCEDURE — 84443 ASSAY THYROID STIM HORMONE: CPT

## 2022-01-01 PROCEDURE — 0202U NFCT DS 22 TRGT SARS-COV-2: CPT

## 2022-01-01 PROCEDURE — 74011250636 HC RX REV CODE- 250/636: Performed by: SPECIALIST

## 2022-01-01 PROCEDURE — 82746 ASSAY OF FOLIC ACID SERUM: CPT

## 2022-01-01 PROCEDURE — 74011250636 HC RX REV CODE- 250/636: Performed by: COLON & RECTAL SURGERY

## 2022-01-01 PROCEDURE — 74230 X-RAY XM SWLNG FUNCJ C+: CPT

## 2022-01-01 PROCEDURE — 92523 SPEECH SOUND LANG COMPREHEN: CPT

## 2022-01-01 PROCEDURE — 74011000250 HC RX REV CODE- 250: Performed by: HOSPITALIST

## 2022-01-01 PROCEDURE — 84425 ASSAY OF VITAMIN B-1: CPT

## 2022-01-01 PROCEDURE — 73721 MRI JNT OF LWR EXTRE W/O DYE: CPT

## 2022-01-01 PROCEDURE — 76040000008: Performed by: SPECIALIST

## 2022-01-01 PROCEDURE — 82436 ASSAY OF URINE CHLORIDE: CPT

## 2022-01-01 PROCEDURE — 92526 ORAL FUNCTION THERAPY: CPT | Performed by: SPEECH-LANGUAGE PATHOLOGIST

## 2022-01-01 PROCEDURE — 87635 SARS-COV-2 COVID-19 AMP PRB: CPT

## 2022-01-01 PROCEDURE — 86140 C-REACTIVE PROTEIN: CPT

## 2022-01-01 PROCEDURE — 99358 PROLONG SERVICE W/O CONTACT: CPT | Performed by: INTERNAL MEDICINE

## 2022-01-01 PROCEDURE — 93306 TTE W/DOPPLER COMPLETE: CPT

## 2022-01-01 PROCEDURE — 86141 C-REACTIVE PROTEIN HS: CPT

## 2022-01-01 PROCEDURE — 76060000033 HC ANESTHESIA 1 TO 1.5 HR: Performed by: SPECIALIST

## 2022-01-01 PROCEDURE — 3331090004 HSPC SERVICE INTENSITY ADD-ON

## 2022-01-01 PROCEDURE — 97530 THERAPEUTIC ACTIVITIES: CPT | Performed by: OCCUPATIONAL THERAPIST

## 2022-01-01 PROCEDURE — 76040000019: Performed by: COLON & RECTAL SURGERY

## 2022-01-01 PROCEDURE — 74018 RADEX ABDOMEN 1 VIEW: CPT

## 2022-01-01 RX ORDER — KETOROLAC TROMETHAMINE 30 MG/ML
15 INJECTION, SOLUTION INTRAMUSCULAR; INTRAVENOUS
Status: COMPLETED | OUTPATIENT
Start: 2022-01-01 | End: 2022-01-01

## 2022-01-01 RX ORDER — NALOXONE HYDROCHLORIDE 0.4 MG/ML
0.4 INJECTION, SOLUTION INTRAMUSCULAR; INTRAVENOUS; SUBCUTANEOUS
Status: DISCONTINUED | OUTPATIENT
Start: 2022-01-01 | End: 2022-01-01 | Stop reason: HOSPADM

## 2022-01-01 RX ORDER — LORAZEPAM 2 MG/ML
2 CONCENTRATE ORAL
Status: DISCONTINUED | OUTPATIENT
Start: 2022-01-01 | End: 2022-01-01 | Stop reason: HOSPADM

## 2022-01-01 RX ORDER — ACETAMINOPHEN 325 MG/1
650 TABLET ORAL
Qty: 60 TABLET | Refills: 0 | Status: SHIPPED | OUTPATIENT
Start: 2022-01-01 | End: 2022-01-01

## 2022-01-01 RX ORDER — ACETAMINOPHEN 325 MG/1
650 TABLET ORAL
Status: DISCONTINUED | OUTPATIENT
Start: 2022-01-01 | End: 2022-01-01 | Stop reason: HOSPADM

## 2022-01-01 RX ORDER — NYSTATIN 100000 [USP'U]/G
POWDER TOPICAL 2 TIMES DAILY
Status: DISCONTINUED | OUTPATIENT
Start: 2022-01-01 | End: 2022-01-01

## 2022-01-01 RX ORDER — HALOPERIDOL 0.5 MG/1
0.5 TABLET ORAL
Status: DISCONTINUED | OUTPATIENT
Start: 2022-01-01 | End: 2022-01-01 | Stop reason: HOSPADM

## 2022-01-01 RX ORDER — PROPOFOL 10 MG/ML
INJECTION, EMULSION INTRAVENOUS AS NEEDED
Status: DISCONTINUED | OUTPATIENT
Start: 2022-01-01 | End: 2022-01-01 | Stop reason: HOSPADM

## 2022-01-01 RX ORDER — INSULIN GLARGINE 100 [IU]/ML
30 INJECTION, SOLUTION SUBCUTANEOUS
Status: DISCONTINUED | OUTPATIENT
Start: 2022-01-01 | End: 2022-01-01

## 2022-01-01 RX ORDER — SODIUM CHLORIDE 0.9 % (FLUSH) 0.9 %
5-40 SYRINGE (ML) INJECTION AS NEEDED
Status: DISCONTINUED | OUTPATIENT
Start: 2022-01-01 | End: 2022-01-01 | Stop reason: HOSPADM

## 2022-01-01 RX ORDER — SODIUM CHLORIDE 9 MG/ML
75 INJECTION, SOLUTION INTRAVENOUS CONTINUOUS
Status: DISCONTINUED | OUTPATIENT
Start: 2022-01-01 | End: 2022-01-01

## 2022-01-01 RX ORDER — ADHESIVE BANDAGE
30 BANDAGE TOPICAL DAILY PRN
Status: DISCONTINUED | OUTPATIENT
Start: 2022-01-01 | End: 2022-01-01 | Stop reason: HOSPADM

## 2022-01-01 RX ORDER — CARVEDILOL 3.12 MG/1
6.25 TABLET ORAL 2 TIMES DAILY WITH MEALS
Status: DISCONTINUED | OUTPATIENT
Start: 2022-01-01 | End: 2022-01-01 | Stop reason: HOSPADM

## 2022-01-01 RX ORDER — HALOPERIDOL 5 MG/ML
2 INJECTION INTRAMUSCULAR ONCE
Status: COMPLETED | OUTPATIENT
Start: 2022-01-01 | End: 2022-01-01

## 2022-01-01 RX ORDER — SODIUM CHLORIDE 9 MG/ML
50 INJECTION, SOLUTION INTRAVENOUS CONTINUOUS
Status: DISPENSED | OUTPATIENT
Start: 2022-01-01 | End: 2022-01-01

## 2022-01-01 RX ORDER — EPINEPHRINE 0.1 MG/ML
1 INJECTION INTRACARDIAC; INTRAVENOUS
Status: DISCONTINUED | OUTPATIENT
Start: 2022-01-01 | End: 2022-01-01 | Stop reason: HOSPADM

## 2022-01-01 RX ORDER — PANTOPRAZOLE SODIUM 40 MG/1
40 TABLET, DELAYED RELEASE ORAL
Status: DISCONTINUED | OUTPATIENT
Start: 2022-01-01 | End: 2022-01-01 | Stop reason: HOSPADM

## 2022-01-01 RX ORDER — CEFAZOLIN SODIUM 1 G/3ML
INJECTION, POWDER, FOR SOLUTION INTRAMUSCULAR; INTRAVENOUS
Status: DISPENSED
Start: 2022-01-01 | End: 2022-01-01

## 2022-01-01 RX ORDER — TAMSULOSIN HYDROCHLORIDE 0.4 MG/1
0.4 CAPSULE ORAL DAILY
Status: DISCONTINUED | OUTPATIENT
Start: 2022-01-01 | End: 2022-01-01 | Stop reason: HOSPADM

## 2022-01-01 RX ORDER — SODIUM CHLORIDE 0.9 % (FLUSH) 0.9 %
5-40 SYRINGE (ML) INJECTION EVERY 8 HOURS
Status: DISCONTINUED | OUTPATIENT
Start: 2022-01-01 | End: 2022-01-01 | Stop reason: HOSPADM

## 2022-01-01 RX ORDER — INSULIN GLARGINE 100 [IU]/ML
38 INJECTION, SOLUTION SUBCUTANEOUS
Status: DISCONTINUED | OUTPATIENT
Start: 2022-01-01 | End: 2022-01-01

## 2022-01-01 RX ORDER — LABETALOL HYDROCHLORIDE 5 MG/ML
5 INJECTION, SOLUTION INTRAVENOUS
Status: DISCONTINUED | OUTPATIENT
Start: 2022-01-01 | End: 2022-01-01 | Stop reason: HOSPADM

## 2022-01-01 RX ORDER — DEXTROSE MONOHYDRATE AND SODIUM CHLORIDE 5; .45 G/100ML; G/100ML
75 INJECTION, SOLUTION INTRAVENOUS CONTINUOUS
Status: DISCONTINUED | OUTPATIENT
Start: 2022-01-01 | End: 2022-01-01

## 2022-01-01 RX ORDER — HALOPERIDOL 5 MG/ML
2 INJECTION INTRAMUSCULAR ONCE
Status: DISCONTINUED | OUTPATIENT
Start: 2022-01-01 | End: 2022-01-01

## 2022-01-01 RX ORDER — DIPHENHYDRAMINE HYDROCHLORIDE 50 MG/ML
12.5 INJECTION, SOLUTION INTRAMUSCULAR; INTRAVENOUS ONCE
Status: COMPLETED | OUTPATIENT
Start: 2022-01-01 | End: 2022-01-01

## 2022-01-01 RX ORDER — INSULIN LISPRO 100 [IU]/ML
4 INJECTION, SOLUTION INTRAVENOUS; SUBCUTANEOUS 3 TIMES DAILY
Status: DISCONTINUED | OUTPATIENT
Start: 2022-01-01 | End: 2022-01-01

## 2022-01-01 RX ORDER — INSULIN GLARGINE 100 [IU]/ML
20 INJECTION, SOLUTION SUBCUTANEOUS
Status: DISCONTINUED | OUTPATIENT
Start: 2022-01-01 | End: 2022-01-01

## 2022-01-01 RX ORDER — TRAMADOL HYDROCHLORIDE 50 MG/1
50 TABLET ORAL
Status: DISCONTINUED | OUTPATIENT
Start: 2022-01-01 | End: 2022-01-01

## 2022-01-01 RX ORDER — MICONAZOLE NITRATE 20 MG/G
CREAM TOPICAL 2 TIMES DAILY
Status: DISCONTINUED | OUTPATIENT
Start: 2022-01-01 | End: 2022-01-01

## 2022-01-01 RX ORDER — INSULIN GLARGINE 100 [IU]/ML
15 INJECTION, SOLUTION SUBCUTANEOUS ONCE
Status: COMPLETED | OUTPATIENT
Start: 2022-01-01 | End: 2022-01-01

## 2022-01-01 RX ORDER — CLONAZEPAM 0.5 MG/1
0.5 TABLET ORAL ONCE
Status: COMPLETED | OUTPATIENT
Start: 2022-01-01 | End: 2022-01-01

## 2022-01-01 RX ORDER — ATROPINE SULFATE 0.1 MG/ML
0.5 INJECTION INTRAVENOUS
Status: DISCONTINUED | OUTPATIENT
Start: 2022-01-01 | End: 2022-01-01 | Stop reason: HOSPADM

## 2022-01-01 RX ORDER — SODIUM CHLORIDE 0.9 % (FLUSH) 0.9 %
5-40 SYRINGE (ML) INJECTION EVERY 8 HOURS
Status: DISCONTINUED | OUTPATIENT
Start: 2022-01-01 | End: 2022-01-01

## 2022-01-01 RX ORDER — GLYCOPYRROLATE 0.2 MG/ML
0.2 INJECTION INTRAMUSCULAR; INTRAVENOUS
Status: DISCONTINUED | OUTPATIENT
Start: 2022-01-01 | End: 2022-01-01 | Stop reason: HOSPADM

## 2022-01-01 RX ORDER — METFORMIN HYDROCHLORIDE 500 MG/1
500 TABLET ORAL 2 TIMES DAILY WITH MEALS
COMMUNITY
End: 2022-01-01

## 2022-01-01 RX ORDER — LIDOCAINE HYDROCHLORIDE 20 MG/ML
INJECTION, SOLUTION EPIDURAL; INFILTRATION; INTRACAUDAL; PERINEURAL AS NEEDED
Status: DISCONTINUED | OUTPATIENT
Start: 2022-01-01 | End: 2022-01-01 | Stop reason: HOSPADM

## 2022-01-01 RX ORDER — ASPIRIN 81 MG/1
162 TABLET ORAL DAILY
Status: DISCONTINUED | OUTPATIENT
Start: 2022-01-01 | End: 2022-01-01

## 2022-01-01 RX ORDER — LORAZEPAM 2 MG/ML
1 CONCENTRATE ORAL
Status: DISCONTINUED | OUTPATIENT
Start: 2022-01-01 | End: 2022-01-01

## 2022-01-01 RX ORDER — INSULIN GLARGINE 100 [IU]/ML
43 INJECTION, SOLUTION SUBCUTANEOUS
Status: DISCONTINUED | OUTPATIENT
Start: 2022-01-01 | End: 2022-01-01

## 2022-01-01 RX ORDER — GUAIFENESIN 100 MG/5ML
162 LIQUID (ML) ORAL DAILY
Status: DISCONTINUED | OUTPATIENT
Start: 2022-01-01 | End: 2022-01-01

## 2022-01-01 RX ORDER — CALCIUM GLUCONATE 20 MG/ML
1 INJECTION, SOLUTION INTRAVENOUS ONCE
Status: COMPLETED | OUTPATIENT
Start: 2022-01-01 | End: 2022-01-01

## 2022-01-01 RX ORDER — CYCLOBENZAPRINE HCL 10 MG
5 TABLET ORAL
Status: DISCONTINUED | OUTPATIENT
Start: 2022-01-01 | End: 2022-01-01

## 2022-01-01 RX ORDER — MAGNESIUM SULFATE 100 %
4 CRYSTALS MISCELLANEOUS AS NEEDED
Status: DISCONTINUED | OUTPATIENT
Start: 2022-01-01 | End: 2022-01-01 | Stop reason: HOSPADM

## 2022-01-01 RX ORDER — TAMSULOSIN HYDROCHLORIDE 0.4 MG/1
0.4 CAPSULE ORAL DAILY
Status: DISCONTINUED | OUTPATIENT
Start: 2022-01-01 | End: 2022-01-01

## 2022-01-01 RX ORDER — INSULIN GLARGINE 100 [IU]/ML
15 INJECTION, SOLUTION SUBCUTANEOUS
Status: DISCONTINUED | OUTPATIENT
Start: 2022-01-01 | End: 2022-01-01

## 2022-01-01 RX ORDER — VANCOMYCIN 2 GRAM/500 ML IN 0.9 % SODIUM CHLORIDE INTRAVENOUS
2000 ONCE
Status: COMPLETED | OUTPATIENT
Start: 2022-01-01 | End: 2022-01-01

## 2022-01-01 RX ORDER — GUAIFENESIN 100 MG/5ML
324 LIQUID (ML) ORAL
Status: COMPLETED | OUTPATIENT
Start: 2022-01-01 | End: 2022-01-01

## 2022-01-01 RX ORDER — INSULIN LISPRO 100 [IU]/ML
INJECTION, SOLUTION INTRAVENOUS; SUBCUTANEOUS EVERY 6 HOURS
Status: DISCONTINUED | OUTPATIENT
Start: 2022-01-01 | End: 2022-01-01

## 2022-01-01 RX ORDER — ONDANSETRON 2 MG/ML
4 INJECTION INTRAMUSCULAR; INTRAVENOUS
Status: DISCONTINUED | OUTPATIENT
Start: 2022-01-01 | End: 2022-01-01 | Stop reason: HOSPADM

## 2022-01-01 RX ORDER — CYCLOBENZAPRINE HCL 10 MG
10 TABLET ORAL
Status: DISCONTINUED | OUTPATIENT
Start: 2022-01-01 | End: 2022-01-01 | Stop reason: HOSPADM

## 2022-01-01 RX ORDER — DIPHENHYDRAMINE HYDROCHLORIDE 50 MG/ML
25 INJECTION, SOLUTION INTRAMUSCULAR; INTRAVENOUS ONCE
Status: COMPLETED | OUTPATIENT
Start: 2022-01-01 | End: 2022-01-01

## 2022-01-01 RX ORDER — ENOXAPARIN SODIUM 100 MG/ML
40 INJECTION SUBCUTANEOUS EVERY 24 HOURS
Status: DISCONTINUED | OUTPATIENT
Start: 2022-01-01 | End: 2022-01-01

## 2022-01-01 RX ORDER — VANCOMYCIN HYDROCHLORIDE
1250
Status: DISCONTINUED | OUTPATIENT
Start: 2022-01-01 | End: 2022-01-01

## 2022-01-01 RX ORDER — LIDOCAINE 40 MG/G
CREAM TOPICAL
Status: DISCONTINUED | OUTPATIENT
Start: 2022-01-01 | End: 2022-01-01 | Stop reason: HOSPADM

## 2022-01-01 RX ORDER — MORPHINE SULFATE 20 MG/ML
5 SOLUTION ORAL
Status: DISCONTINUED | OUTPATIENT
Start: 2022-01-01 | End: 2022-01-01 | Stop reason: HOSPADM

## 2022-01-01 RX ORDER — PRAVASTATIN SODIUM 10 MG/1
20 TABLET ORAL
Status: DISCONTINUED | OUTPATIENT
Start: 2022-01-01 | End: 2022-01-01 | Stop reason: HOSPADM

## 2022-01-01 RX ORDER — MORPHINE SULFATE 2 MG/ML
2 INJECTION, SOLUTION INTRAMUSCULAR; INTRAVENOUS
Status: DISCONTINUED | OUTPATIENT
Start: 2022-01-01 | End: 2022-01-01 | Stop reason: HOSPADM

## 2022-01-01 RX ORDER — GUAIFENESIN 100 MG/5ML
162 LIQUID (ML) ORAL
Status: COMPLETED | OUTPATIENT
Start: 2022-01-01 | End: 2022-01-01

## 2022-01-01 RX ORDER — CARVEDILOL 3.12 MG/1
3.12 TABLET ORAL DAILY
Status: DISCONTINUED | OUTPATIENT
Start: 2022-01-01 | End: 2022-01-01 | Stop reason: SDUPTHER

## 2022-01-01 RX ORDER — DIAZEPAM 10 MG/2ML
3-5 INJECTION INTRAMUSCULAR
Status: DISCONTINUED | OUTPATIENT
Start: 2022-01-01 | End: 2022-01-01

## 2022-01-01 RX ORDER — LANOLIN ALCOHOL/MO/W.PET/CERES
10 CREAM (GRAM) TOPICAL
Status: DISCONTINUED | OUTPATIENT
Start: 2022-01-01 | End: 2022-01-01

## 2022-01-01 RX ORDER — ATORVASTATIN CALCIUM 20 MG/1
20 TABLET, FILM COATED ORAL
Status: DISCONTINUED | OUTPATIENT
Start: 2022-01-01 | End: 2022-01-01

## 2022-01-01 RX ORDER — DEXTROSE MONOHYDRATE 100 MG/ML
0-250 INJECTION, SOLUTION INTRAVENOUS AS NEEDED
Status: DISCONTINUED | OUTPATIENT
Start: 2022-01-01 | End: 2022-01-01

## 2022-01-01 RX ORDER — ENOXAPARIN SODIUM 100 MG/ML
40 INJECTION SUBCUTANEOUS ONCE
Status: COMPLETED | OUTPATIENT
Start: 2022-01-01 | End: 2022-01-01

## 2022-01-01 RX ORDER — ESCITALOPRAM OXALATE 10 MG/1
10 TABLET ORAL
Status: DISCONTINUED | OUTPATIENT
Start: 2022-01-01 | End: 2022-01-01

## 2022-01-01 RX ORDER — SODIUM CHLORIDE 9 MG/ML
50 INJECTION, SOLUTION INTRAVENOUS CONTINUOUS
Status: DISCONTINUED | OUTPATIENT
Start: 2022-01-01 | End: 2022-01-01 | Stop reason: HOSPADM

## 2022-01-01 RX ORDER — ACETAMINOPHEN 650 MG/1
650 SUPPOSITORY RECTAL
Status: DISCONTINUED | OUTPATIENT
Start: 2022-01-01 | End: 2022-01-01 | Stop reason: HOSPADM

## 2022-01-01 RX ORDER — LANOLIN ALCOHOL/MO/W.PET/CERES
3 CREAM (GRAM) TOPICAL
Status: DISCONTINUED | OUTPATIENT
Start: 2022-01-01 | End: 2022-01-01

## 2022-01-01 RX ORDER — METRONIDAZOLE 500 MG/100ML
500 INJECTION, SOLUTION INTRAVENOUS EVERY 12 HOURS
Status: DISCONTINUED | OUTPATIENT
Start: 2022-01-01 | End: 2022-01-01 | Stop reason: ALTCHOICE

## 2022-01-01 RX ORDER — PREGABALIN 75 MG/1
75 CAPSULE ORAL 2 TIMES DAILY
Qty: 180 CAPSULE | Refills: 1 | Status: SHIPPED | OUTPATIENT
Start: 2022-01-01 | End: 2022-01-01

## 2022-01-01 RX ORDER — POLYETHYLENE GLYCOL 3350 17 G/17G
17 POWDER, FOR SOLUTION ORAL
Status: DISCONTINUED | OUTPATIENT
Start: 2022-01-01 | End: 2022-01-01 | Stop reason: HOSPADM

## 2022-01-01 RX ORDER — MORPHINE SULFATE 20 MG/ML
5 SOLUTION ORAL
Status: DISCONTINUED | OUTPATIENT
Start: 2022-01-01 | End: 2022-01-01

## 2022-01-01 RX ORDER — METHOTREXATE 2.5 MG/1
15 TABLET ORAL
COMMUNITY
End: 2022-01-01

## 2022-01-01 RX ORDER — ASPIRIN 81 MG/1
81 TABLET ORAL DAILY
Status: DISCONTINUED | OUTPATIENT
Start: 2022-01-01 | End: 2022-01-01

## 2022-01-01 RX ORDER — MORPHINE SULFATE 2 MG/ML
1 INJECTION, SOLUTION INTRAMUSCULAR; INTRAVENOUS
Status: DISCONTINUED | OUTPATIENT
Start: 2022-01-01 | End: 2022-01-01

## 2022-01-01 RX ORDER — SODIUM BICARBONATE 1 MEQ/ML
50 SYRINGE (ML) INTRAVENOUS ONCE
Status: COMPLETED | OUTPATIENT
Start: 2022-01-01 | End: 2022-01-01

## 2022-01-01 RX ORDER — INSULIN LISPRO 100 [IU]/ML
5 INJECTION, SOLUTION INTRAVENOUS; SUBCUTANEOUS ONCE
Status: COMPLETED | OUTPATIENT
Start: 2022-01-01 | End: 2022-01-01

## 2022-01-01 RX ORDER — QUETIAPINE FUMARATE 25 MG/1
25 TABLET, FILM COATED ORAL
Status: DISCONTINUED | OUTPATIENT
Start: 2022-01-01 | End: 2022-01-01 | Stop reason: HOSPADM

## 2022-01-01 RX ORDER — ONDANSETRON 4 MG/1
4 TABLET, ORALLY DISINTEGRATING ORAL
Status: DISCONTINUED | OUTPATIENT
Start: 2022-01-01 | End: 2022-01-01 | Stop reason: HOSPADM

## 2022-01-01 RX ORDER — DEXTROMETHORPHAN/PSEUDOEPHED 2.5-7.5/.8
1.2 DROPS ORAL
Status: DISCONTINUED | OUTPATIENT
Start: 2022-01-01 | End: 2022-01-01

## 2022-01-01 RX ORDER — CARVEDILOL 3.12 MG/1
3.12 TABLET ORAL DAILY
Status: DISCONTINUED | OUTPATIENT
Start: 2022-01-01 | End: 2022-01-01

## 2022-01-01 RX ORDER — METHOTREXATE 2.5 MG/1
15 TABLET ORAL
Status: DISCONTINUED | OUTPATIENT
Start: 2022-01-01 | End: 2022-01-01 | Stop reason: HOSPADM

## 2022-01-01 RX ORDER — MORPHINE SULFATE 2 MG/ML
2 INJECTION, SOLUTION INTRAMUSCULAR; INTRAVENOUS
Status: DISCONTINUED | OUTPATIENT
Start: 2022-01-01 | End: 2022-01-01

## 2022-01-01 RX ORDER — FLUMAZENIL 0.1 MG/ML
0.2 INJECTION INTRAVENOUS
Status: DISCONTINUED | OUTPATIENT
Start: 2022-01-01 | End: 2022-01-01 | Stop reason: HOSPADM

## 2022-01-01 RX ORDER — ASPIRIN 81 MG/1
81 TABLET ORAL DAILY
Status: DISCONTINUED | OUTPATIENT
Start: 2022-01-01 | End: 2022-01-01 | Stop reason: HOSPADM

## 2022-01-01 RX ORDER — INSULIN LISPRO 100 [IU]/ML
9 INJECTION, SOLUTION INTRAVENOUS; SUBCUTANEOUS ONCE
Status: COMPLETED | OUTPATIENT
Start: 2022-01-01 | End: 2022-01-01

## 2022-01-01 RX ORDER — DEXTROMETHORPHAN/PSEUDOEPHED 2.5-7.5/.8
1.2 DROPS ORAL
Status: DISCONTINUED | OUTPATIENT
Start: 2022-01-01 | End: 2022-01-01 | Stop reason: HOSPADM

## 2022-01-01 RX ADMIN — VANCOMYCIN HYDROCHLORIDE 2000 MG: 10 INJECTION, POWDER, LYOPHILIZED, FOR SOLUTION INTRAVENOUS at 02:16

## 2022-01-01 RX ADMIN — Medication 2 UNITS: at 17:28

## 2022-01-01 RX ADMIN — SODIUM CHLORIDE, PRESERVATIVE FREE 10 ML: 5 INJECTION INTRAVENOUS at 16:21

## 2022-01-01 RX ADMIN — ESCITALOPRAM OXALATE 10 MG: 10 TABLET ORAL at 17:04

## 2022-01-01 RX ADMIN — Medication 4 UNITS: at 08:29

## 2022-01-01 RX ADMIN — APIXABAN 5 MG: 5 TABLET, FILM COATED ORAL at 09:12

## 2022-01-01 RX ADMIN — MORPHINE SULFATE 2 MG: 2 INJECTION, SOLUTION INTRAMUSCULAR; INTRAVENOUS at 12:32

## 2022-01-01 RX ADMIN — BUSPIRONE HYDROCHLORIDE 15 MG: 10 TABLET ORAL at 17:16

## 2022-01-01 RX ADMIN — SODIUM CHLORIDE, PRESERVATIVE FREE 10 ML: 5 INJECTION INTRAVENOUS at 22:06

## 2022-01-01 RX ADMIN — CEFEPIME 2 G: 2 INJECTION, POWDER, FOR SOLUTION INTRAVENOUS at 21:47

## 2022-01-01 RX ADMIN — SODIUM CHLORIDE, PRESERVATIVE FREE 10 ML: 5 INJECTION INTRAVENOUS at 05:24

## 2022-01-01 RX ADMIN — CARVEDILOL 3.12 MG: 3.12 TABLET, FILM COATED ORAL at 10:02

## 2022-01-01 RX ADMIN — CARVEDILOL 3.12 MG: 3.12 TABLET, FILM COATED ORAL at 09:11

## 2022-01-01 RX ADMIN — Medication 4 UNITS: at 18:49

## 2022-01-01 RX ADMIN — SODIUM CHLORIDE, PRESERVATIVE FREE 10 ML: 5 INJECTION INTRAVENOUS at 21:37

## 2022-01-01 RX ADMIN — APIXABAN 5 MG: 5 TABLET, FILM COATED ORAL at 22:27

## 2022-01-01 RX ADMIN — CARVEDILOL 3.12 MG: 3.12 TABLET, FILM COATED ORAL at 09:34

## 2022-01-01 RX ADMIN — APIXABAN 5 MG: 5 TABLET, FILM COATED ORAL at 17:28

## 2022-01-01 RX ADMIN — ASPIRIN 162 MG: 81 TABLET, CHEWABLE ORAL at 10:09

## 2022-01-01 RX ADMIN — Medication 2 UNITS: at 12:40

## 2022-01-01 RX ADMIN — SODIUM CHLORIDE, PRESERVATIVE FREE 10 ML: 5 INJECTION INTRAVENOUS at 17:26

## 2022-01-01 RX ADMIN — TAMSULOSIN HYDROCHLORIDE 0.4 MG: 0.4 CAPSULE ORAL at 08:57

## 2022-01-01 RX ADMIN — SODIUM CHLORIDE, PRESERVATIVE FREE 10 ML: 5 INJECTION INTRAVENOUS at 05:36

## 2022-01-01 RX ADMIN — CARVEDILOL 3.12 MG: 3.12 TABLET, FILM COATED ORAL at 09:26

## 2022-01-01 RX ADMIN — ASPIRIN 162 MG: 81 TABLET, CHEWABLE ORAL at 09:42

## 2022-01-01 RX ADMIN — ATORVASTATIN CALCIUM 20 MG: 20 TABLET, FILM COATED ORAL at 21:09

## 2022-01-01 RX ADMIN — BUSPIRONE HYDROCHLORIDE 15 MG: 10 TABLET ORAL at 17:36

## 2022-01-01 RX ADMIN — ASPIRIN 81 MG: 81 TABLET, COATED ORAL at 08:30

## 2022-01-01 RX ADMIN — SODIUM CHLORIDE, PRESERVATIVE FREE 10 ML: 5 INJECTION INTRAVENOUS at 17:18

## 2022-01-01 RX ADMIN — APIXABAN 5 MG: 5 TABLET, FILM COATED ORAL at 08:32

## 2022-01-01 RX ADMIN — BUSPIRONE HYDROCHLORIDE 15 MG: 10 TABLET ORAL at 08:32

## 2022-01-01 RX ADMIN — LIDOCAINE HYDROCHLORIDE 100 MG: 20 INJECTION, SOLUTION EPIDURAL; INFILTRATION; INTRACAUDAL; PERINEURAL at 13:33

## 2022-01-01 RX ADMIN — ACETAMINOPHEN 650 MG: 325 TABLET ORAL at 01:58

## 2022-01-01 RX ADMIN — VANCOMYCIN HYDROCHLORIDE 1250 MG: 10 INJECTION, POWDER, LYOPHILIZED, FOR SOLUTION INTRAVENOUS at 10:38

## 2022-01-01 RX ADMIN — APIXABAN 5 MG: 5 TABLET, FILM COATED ORAL at 17:20

## 2022-01-01 RX ADMIN — SODIUM CHLORIDE, PRESERVATIVE FREE 10 ML: 5 INJECTION INTRAVENOUS at 05:38

## 2022-01-01 RX ADMIN — Medication 2 UNITS: at 05:37

## 2022-01-01 RX ADMIN — ASPIRIN 162 MG: 81 TABLET, CHEWABLE ORAL at 11:05

## 2022-01-01 RX ADMIN — APIXABAN 5 MG: 5 TABLET, FILM COATED ORAL at 17:11

## 2022-01-01 RX ADMIN — BUSPIRONE HYDROCHLORIDE 15 MG: 10 TABLET ORAL at 21:09

## 2022-01-01 RX ADMIN — Medication 5 UNITS: at 00:58

## 2022-01-01 RX ADMIN — MORPHINE SULFATE 2 MG: 2 INJECTION, SOLUTION INTRAMUSCULAR; INTRAVENOUS at 17:29

## 2022-01-01 RX ADMIN — POTASSIUM PHOSPHATE, MONOBASIC POTASSIUM PHOSPHATE, DIBASIC: 224; 236 INJECTION, SOLUTION, CONCENTRATE INTRAVENOUS at 10:29

## 2022-01-01 RX ADMIN — NYSTATIN: 100000 POWDER TOPICAL at 03:26

## 2022-01-01 RX ADMIN — SODIUM CHLORIDE, PRESERVATIVE FREE 10 ML: 5 INJECTION INTRAVENOUS at 14:01

## 2022-01-01 RX ADMIN — APIXABAN 5 MG: 5 TABLET, FILM COATED ORAL at 11:05

## 2022-01-01 RX ADMIN — SODIUM CHLORIDE, PRESERVATIVE FREE 10 ML: 5 INJECTION INTRAVENOUS at 16:37

## 2022-01-01 RX ADMIN — HALOPERIDOL LACTATE 2 MG: 5 INJECTION, SOLUTION INTRAMUSCULAR at 23:11

## 2022-01-01 RX ADMIN — Medication 2 UNITS: at 06:01

## 2022-01-01 RX ADMIN — METHOTREXATE 15 MG: 2.5 TABLET ORAL at 08:30

## 2022-01-01 RX ADMIN — METRONIDAZOLE 500 MG: 500 INJECTION, SOLUTION INTRAVENOUS at 12:20

## 2022-01-01 RX ADMIN — DIAZEPAM 5 MG: 5 INJECTION, SOLUTION INTRAMUSCULAR; INTRAVENOUS at 14:50

## 2022-01-01 RX ADMIN — ATORVASTATIN CALCIUM 20 MG: 20 TABLET, FILM COATED ORAL at 21:50

## 2022-01-01 RX ADMIN — APIXABAN 5 MG: 5 TABLET, FILM COATED ORAL at 09:26

## 2022-01-01 RX ADMIN — ESCITALOPRAM OXALATE 10 MG: 10 TABLET ORAL at 17:12

## 2022-01-01 RX ADMIN — ATORVASTATIN CALCIUM 20 MG: 20 TABLET, FILM COATED ORAL at 21:47

## 2022-01-01 RX ADMIN — MICONAZOLE NITRATE: 20 CREAM TOPICAL at 22:06

## 2022-01-01 RX ADMIN — CARVEDILOL 3.12 MG: 3.12 TABLET, FILM COATED ORAL at 09:57

## 2022-01-01 RX ADMIN — GADOTERIDOL 15 ML: 279.3 INJECTION, SOLUTION INTRAVENOUS at 11:33

## 2022-01-01 RX ADMIN — BUSPIRONE HYDROCHLORIDE 15 MG: 10 TABLET ORAL at 09:34

## 2022-01-01 RX ADMIN — MICONAZOLE NITRATE: 20 CREAM TOPICAL at 09:11

## 2022-01-01 RX ADMIN — NYSTATIN: 100000 POWDER TOPICAL at 08:42

## 2022-01-01 RX ADMIN — ATORVASTATIN CALCIUM 20 MG: 20 TABLET, FILM COATED ORAL at 22:40

## 2022-01-01 RX ADMIN — SODIUM CHLORIDE, PRESERVATIVE FREE 10 ML: 5 INJECTION INTRAVENOUS at 14:00

## 2022-01-01 RX ADMIN — MELATONIN 10.5 MG: at 21:27

## 2022-01-01 RX ADMIN — DEXTROSE AND SODIUM CHLORIDE 75 ML/HR: 5; 450 INJECTION, SOLUTION INTRAVENOUS at 10:18

## 2022-01-01 RX ADMIN — TRAMADOL HYDROCHLORIDE 50 MG: 50 TABLET ORAL at 12:57

## 2022-01-01 RX ADMIN — CEFEPIME 2 G: 2 INJECTION, POWDER, FOR SOLUTION INTRAVENOUS at 19:52

## 2022-01-01 RX ADMIN — MORPHINE SULFATE 1 MG: 2 INJECTION, SOLUTION INTRAMUSCULAR; INTRAVENOUS at 21:21

## 2022-01-01 RX ADMIN — ENOXAPARIN SODIUM 40 MG: 100 INJECTION SUBCUTANEOUS at 08:56

## 2022-01-01 RX ADMIN — SODIUM CHLORIDE, PRESERVATIVE FREE 10 ML: 5 INJECTION INTRAVENOUS at 05:23

## 2022-01-01 RX ADMIN — METRONIDAZOLE 500 MG: 500 INJECTION, SOLUTION INTRAVENOUS at 23:42

## 2022-01-01 RX ADMIN — ESCITALOPRAM OXALATE 10 MG: 10 TABLET ORAL at 18:20

## 2022-01-01 RX ADMIN — MORPHINE SULFATE 2 MG: 2 INJECTION, SOLUTION INTRAMUSCULAR; INTRAVENOUS at 17:09

## 2022-01-01 RX ADMIN — SODIUM PHOSPHATE, MONOBASIC, MONOHYDRATE: 276; 142 INJECTION, SOLUTION INTRAVENOUS at 16:25

## 2022-01-01 RX ADMIN — BUSPIRONE HYDROCHLORIDE 15 MG: 10 TABLET ORAL at 17:37

## 2022-01-01 RX ADMIN — CEFEPIME 1 G: 1 INJECTION, POWDER, FOR SOLUTION INTRAMUSCULAR; INTRAVENOUS at 12:03

## 2022-01-01 RX ADMIN — BUSPIRONE HYDROCHLORIDE 15 MG: 10 TABLET ORAL at 20:44

## 2022-01-01 RX ADMIN — MORPHINE SULFATE 2 MG: 2 INJECTION, SOLUTION INTRAMUSCULAR; INTRAVENOUS at 17:01

## 2022-01-01 RX ADMIN — Medication 2 UNITS: at 17:26

## 2022-01-01 RX ADMIN — BUSPIRONE HYDROCHLORIDE 15 MG: 10 TABLET ORAL at 09:42

## 2022-01-01 RX ADMIN — METRONIDAZOLE 500 MG: 500 INJECTION, SOLUTION INTRAVENOUS at 14:00

## 2022-01-01 RX ADMIN — MELATONIN 10.5 MG: at 21:26

## 2022-01-01 RX ADMIN — Medication 2 MG: at 09:41

## 2022-01-01 RX ADMIN — BUSPIRONE HYDROCHLORIDE 15 MG: 10 TABLET ORAL at 21:48

## 2022-01-01 RX ADMIN — ACETAMINOPHEN 650 MG: 325 TABLET ORAL at 21:54

## 2022-01-01 RX ADMIN — SODIUM CHLORIDE, PRESERVATIVE FREE 10 ML: 5 INJECTION INTRAVENOUS at 21:09

## 2022-01-01 RX ADMIN — BUSPIRONE HYDROCHLORIDE 15 MG: 10 TABLET ORAL at 17:12

## 2022-01-01 RX ADMIN — ATORVASTATIN CALCIUM 20 MG: 20 TABLET, FILM COATED ORAL at 22:03

## 2022-01-01 RX ADMIN — Medication 4 UNITS: at 08:54

## 2022-01-01 RX ADMIN — ASPIRIN 162 MG: 81 TABLET, CHEWABLE ORAL at 08:32

## 2022-01-01 RX ADMIN — METRONIDAZOLE 500 MG: 500 INJECTION, SOLUTION INTRAVENOUS at 12:57

## 2022-01-01 RX ADMIN — CARVEDILOL 3.12 MG: 3.12 TABLET, FILM COATED ORAL at 08:58

## 2022-01-01 RX ADMIN — Medication 4 UNITS: at 12:33

## 2022-01-01 RX ADMIN — APIXABAN 5 MG: 5 TABLET, FILM COATED ORAL at 21:24

## 2022-01-01 RX ADMIN — ATORVASTATIN CALCIUM 20 MG: 20 TABLET, FILM COATED ORAL at 22:39

## 2022-01-01 RX ADMIN — BUSPIRONE HYDROCHLORIDE 15 MG: 10 TABLET ORAL at 21:37

## 2022-01-01 RX ADMIN — SODIUM CHLORIDE, PRESERVATIVE FREE 10 ML: 5 INJECTION INTRAVENOUS at 23:43

## 2022-01-01 RX ADMIN — CARVEDILOL 3.12 MG: 3.12 TABLET, FILM COATED ORAL at 08:30

## 2022-01-01 RX ADMIN — CARVEDILOL 3.12 MG: 3.12 TABLET, FILM COATED ORAL at 08:32

## 2022-01-01 RX ADMIN — SODIUM CHLORIDE, PRESERVATIVE FREE 10 ML: 5 INJECTION INTRAVENOUS at 13:55

## 2022-01-01 RX ADMIN — PANTOPRAZOLE SODIUM 40 MG: 40 TABLET, DELAYED RELEASE ORAL at 08:30

## 2022-01-01 RX ADMIN — BUSPIRONE HYDROCHLORIDE 15 MG: 10 TABLET ORAL at 10:34

## 2022-01-01 RX ADMIN — NYSTATIN: 100000 POWDER TOPICAL at 18:08

## 2022-01-01 RX ADMIN — DIPHENHYDRAMINE HYDROCHLORIDE 12.5 MG: 50 INJECTION, SOLUTION INTRAMUSCULAR; INTRAVENOUS at 23:15

## 2022-01-01 RX ADMIN — TAMSULOSIN HYDROCHLORIDE 0.4 MG: 0.4 CAPSULE ORAL at 09:42

## 2022-01-01 RX ADMIN — SODIUM CHLORIDE, PRESERVATIVE FREE 10 ML: 5 INJECTION INTRAVENOUS at 05:13

## 2022-01-01 RX ADMIN — MELATONIN 10.5 MG: at 22:17

## 2022-01-01 RX ADMIN — Medication 3 UNITS: at 05:25

## 2022-01-01 RX ADMIN — Medication 2 UNITS: at 06:25

## 2022-01-01 RX ADMIN — Medication 5 UNITS: at 12:42

## 2022-01-01 RX ADMIN — ATORVASTATIN CALCIUM 20 MG: 20 TABLET, FILM COATED ORAL at 21:15

## 2022-01-01 RX ADMIN — CYCLOBENZAPRINE 10 MG: 10 TABLET, FILM COATED ORAL at 21:34

## 2022-01-01 RX ADMIN — NYSTATIN: 100000 POWDER TOPICAL at 17:37

## 2022-01-01 RX ADMIN — TAMSULOSIN HYDROCHLORIDE 0.4 MG: 0.4 CAPSULE ORAL at 09:30

## 2022-01-01 RX ADMIN — ESCITALOPRAM OXALATE 10 MG: 10 TABLET ORAL at 17:21

## 2022-01-01 RX ADMIN — CARVEDILOL 3.12 MG: 3.12 TABLET, FILM COATED ORAL at 10:40

## 2022-01-01 RX ADMIN — SODIUM CHLORIDE 75 ML/HR: 9 INJECTION, SOLUTION INTRAVENOUS at 20:48

## 2022-01-01 RX ADMIN — INSULIN GLARGINE 15 UNITS: 100 INJECTION, SOLUTION SUBCUTANEOUS at 22:43

## 2022-01-01 RX ADMIN — NYSTATIN: 100000 POWDER TOPICAL at 22:07

## 2022-01-01 RX ADMIN — BUSPIRONE HYDROCHLORIDE 15 MG: 10 TABLET ORAL at 21:27

## 2022-01-01 RX ADMIN — WATER 5 MG: 1 INJECTION INTRAMUSCULAR; INTRAVENOUS; SUBCUTANEOUS at 23:59

## 2022-01-01 RX ADMIN — BUSPIRONE HYDROCHLORIDE 15 MG: 10 TABLET ORAL at 17:10

## 2022-01-01 RX ADMIN — CARVEDILOL 6.25 MG: 3.12 TABLET, FILM COATED ORAL at 08:30

## 2022-01-01 RX ADMIN — APIXABAN 5 MG: 5 TABLET, FILM COATED ORAL at 09:42

## 2022-01-01 RX ADMIN — MICONAZOLE NITRATE: 20 CREAM TOPICAL at 09:36

## 2022-01-01 RX ADMIN — SODIUM CHLORIDE, PRESERVATIVE FREE 10 ML: 5 INJECTION INTRAVENOUS at 17:35

## 2022-01-01 RX ADMIN — CARVEDILOL 3.12 MG: 3.12 TABLET, FILM COATED ORAL at 10:52

## 2022-01-01 RX ADMIN — Medication 4 UNITS: at 17:40

## 2022-01-01 RX ADMIN — ESCITALOPRAM OXALATE 10 MG: 10 TABLET ORAL at 17:26

## 2022-01-01 RX ADMIN — Medication 2 UNITS: at 13:07

## 2022-01-01 RX ADMIN — Medication 3 UNITS: at 12:10

## 2022-01-01 RX ADMIN — SODIUM CHLORIDE 250 ML: 9 INJECTION, SOLUTION INTRAVENOUS at 10:56

## 2022-01-01 RX ADMIN — MICONAZOLE NITRATE: 20 CREAM TOPICAL at 13:08

## 2022-01-01 RX ADMIN — SODIUM CHLORIDE, PRESERVATIVE FREE 10 ML: 5 INJECTION INTRAVENOUS at 22:17

## 2022-01-01 RX ADMIN — ASPIRIN 324 MG: 81 TABLET, CHEWABLE ORAL at 16:28

## 2022-01-01 RX ADMIN — TAMSULOSIN HYDROCHLORIDE 0.4 MG: 0.4 CAPSULE ORAL at 10:40

## 2022-01-01 RX ADMIN — ATORVASTATIN CALCIUM 20 MG: 20 TABLET, FILM COATED ORAL at 21:44

## 2022-01-01 RX ADMIN — CEFEPIME 2 G: 2 INJECTION, POWDER, FOR SOLUTION INTRAVENOUS at 09:16

## 2022-01-01 RX ADMIN — CLONAZEPAM 0.5 MG: 0.5 TABLET ORAL at 21:57

## 2022-01-01 RX ADMIN — Medication 7 UNITS: at 11:58

## 2022-01-01 RX ADMIN — MORPHINE SULFATE 2 MG: 2 INJECTION, SOLUTION INTRAMUSCULAR; INTRAVENOUS at 09:41

## 2022-01-01 RX ADMIN — NYSTATIN: 100000 POWDER TOPICAL at 19:01

## 2022-01-01 RX ADMIN — SODIUM CHLORIDE 75 ML/HR: 9 INJECTION, SOLUTION INTRAVENOUS at 14:50

## 2022-01-01 RX ADMIN — APIXABAN 5 MG: 5 TABLET, FILM COATED ORAL at 18:32

## 2022-01-01 RX ADMIN — BUSPIRONE HYDROCHLORIDE 15 MG: 10 TABLET ORAL at 21:32

## 2022-01-01 RX ADMIN — SODIUM CHLORIDE, PRESERVATIVE FREE 10 ML: 5 INJECTION INTRAVENOUS at 23:13

## 2022-01-01 RX ADMIN — ACETAMINOPHEN 650 MG: 325 TABLET ORAL at 04:43

## 2022-01-01 RX ADMIN — ESCITALOPRAM OXALATE 10 MG: 10 TABLET ORAL at 16:37

## 2022-01-01 RX ADMIN — VANCOMYCIN HYDROCHLORIDE 1250 MG: 10 INJECTION, POWDER, LYOPHILIZED, FOR SOLUTION INTRAVENOUS at 05:21

## 2022-01-01 RX ADMIN — HALOPERIDOL 0.5 MG: 0.5 TABLET ORAL at 08:09

## 2022-01-01 RX ADMIN — CEFEPIME 2 G: 2 INJECTION, POWDER, FOR SOLUTION INTRAVENOUS at 10:25

## 2022-01-01 RX ADMIN — BUSPIRONE HYDROCHLORIDE 15 MG: 10 TABLET ORAL at 16:37

## 2022-01-01 RX ADMIN — SODIUM CHLORIDE, PRESERVATIVE FREE 10 ML: 5 INJECTION INTRAVENOUS at 05:48

## 2022-01-01 RX ADMIN — PRAVASTATIN SODIUM 20 MG: 10 TABLET ORAL at 21:34

## 2022-01-01 RX ADMIN — BUSPIRONE HYDROCHLORIDE 15 MG: 10 TABLET ORAL at 21:24

## 2022-01-01 RX ADMIN — SODIUM CHLORIDE 50 ML/HR: 9 INJECTION, SOLUTION INTRAVENOUS at 13:25

## 2022-01-01 RX ADMIN — CEFEPIME 1 G: 1 INJECTION, POWDER, FOR SOLUTION INTRAMUSCULAR; INTRAVENOUS at 22:19

## 2022-01-01 RX ADMIN — CEFEPIME 1 G: 1 INJECTION, POWDER, FOR SOLUTION INTRAMUSCULAR; INTRAVENOUS at 13:11

## 2022-01-01 RX ADMIN — Medication 3 UNITS: at 18:00

## 2022-01-01 RX ADMIN — ACETAMINOPHEN 650 MG: 325 TABLET ORAL at 08:32

## 2022-01-01 RX ADMIN — BUSPIRONE HYDROCHLORIDE 15 MG: 10 TABLET ORAL at 18:02

## 2022-01-01 RX ADMIN — BUSPIRONE HYDROCHLORIDE 15 MG: 10 TABLET ORAL at 16:26

## 2022-01-01 RX ADMIN — ASPIRIN 162 MG: 81 TABLET, CHEWABLE ORAL at 09:07

## 2022-01-01 RX ADMIN — CEFEPIME 1 G: 1 INJECTION, POWDER, FOR SOLUTION INTRAMUSCULAR; INTRAVENOUS at 17:22

## 2022-01-01 RX ADMIN — INSULIN GLARGINE 38 UNITS: 100 INJECTION, SOLUTION SUBCUTANEOUS at 21:48

## 2022-01-01 RX ADMIN — BUSPIRONE HYDROCHLORIDE 15 MG: 10 TABLET ORAL at 09:58

## 2022-01-01 RX ADMIN — BUSPIRONE HYDROCHLORIDE 15 MG: 10 TABLET ORAL at 08:27

## 2022-01-01 RX ADMIN — Medication 4 UNITS: at 17:35

## 2022-01-01 RX ADMIN — ATORVASTATIN CALCIUM 20 MG: 20 TABLET, FILM COATED ORAL at 20:23

## 2022-01-01 RX ADMIN — SODIUM CHLORIDE, PRESERVATIVE FREE 10 ML: 5 INJECTION INTRAVENOUS at 17:13

## 2022-01-01 RX ADMIN — BUSPIRONE HYDROCHLORIDE 15 MG: 10 TABLET ORAL at 22:27

## 2022-01-01 RX ADMIN — TAMSULOSIN HYDROCHLORIDE 0.4 MG: 0.4 CAPSULE ORAL at 09:07

## 2022-01-01 RX ADMIN — APIXABAN 5 MG: 5 TABLET, FILM COATED ORAL at 17:16

## 2022-01-01 RX ADMIN — APIXABAN 5 MG: 5 TABLET, FILM COATED ORAL at 17:36

## 2022-01-01 RX ADMIN — INSULIN GLARGINE 38 UNITS: 100 INJECTION, SOLUTION SUBCUTANEOUS at 21:53

## 2022-01-01 RX ADMIN — ATORVASTATIN CALCIUM 20 MG: 20 TABLET, FILM COATED ORAL at 21:24

## 2022-01-01 RX ADMIN — BUSPIRONE HYDROCHLORIDE 15 MG: 10 TABLET ORAL at 10:09

## 2022-01-01 RX ADMIN — BUSPIRONE HYDROCHLORIDE 15 MG: 10 TABLET ORAL at 17:28

## 2022-01-01 RX ADMIN — SODIUM CHLORIDE, PRESERVATIVE FREE 10 ML: 5 INJECTION INTRAVENOUS at 21:50

## 2022-01-01 RX ADMIN — APIXABAN 5 MG: 5 TABLET, FILM COATED ORAL at 18:02

## 2022-01-01 RX ADMIN — ESCITALOPRAM OXALATE 10 MG: 10 TABLET ORAL at 17:36

## 2022-01-01 RX ADMIN — ASPIRIN 162 MG: 81 TABLET, CHEWABLE ORAL at 08:58

## 2022-01-01 RX ADMIN — METRONIDAZOLE 500 MG: 500 INJECTION, SOLUTION INTRAVENOUS at 12:08

## 2022-01-01 RX ADMIN — BUSPIRONE HYDROCHLORIDE 15 MG: 10 TABLET ORAL at 17:26

## 2022-01-01 RX ADMIN — BUSPIRONE HYDROCHLORIDE 15 MG: 10 TABLET ORAL at 09:07

## 2022-01-01 RX ADMIN — MELATONIN 10.5 MG: at 22:02

## 2022-01-01 RX ADMIN — BUSPIRONE HYDROCHLORIDE 15 MG: 10 TABLET ORAL at 08:58

## 2022-01-01 RX ADMIN — ATORVASTATIN CALCIUM 20 MG: 20 TABLET, FILM COATED ORAL at 21:23

## 2022-01-01 RX ADMIN — BUSPIRONE HYDROCHLORIDE 15 MG: 10 TABLET ORAL at 21:49

## 2022-01-01 RX ADMIN — AZITHROMYCIN MONOHYDRATE 500 MG: 500 INJECTION, POWDER, LYOPHILIZED, FOR SOLUTION INTRAVENOUS at 01:14

## 2022-01-01 RX ADMIN — SODIUM CHLORIDE, PRESERVATIVE FREE 10 ML: 5 INJECTION INTRAVENOUS at 23:01

## 2022-01-01 RX ADMIN — SODIUM CHLORIDE, PRESERVATIVE FREE 10 ML: 5 INJECTION INTRAVENOUS at 05:46

## 2022-01-01 RX ADMIN — ESCITALOPRAM OXALATE 10 MG: 10 TABLET ORAL at 17:10

## 2022-01-01 RX ADMIN — HALOPERIDOL 0.5 MG: 0.5 TABLET ORAL at 21:32

## 2022-01-01 RX ADMIN — MICONAZOLE NITRATE: 20 CREAM TOPICAL at 21:55

## 2022-01-01 RX ADMIN — TRAMADOL HYDROCHLORIDE 50 MG: 50 TABLET ORAL at 16:24

## 2022-01-01 RX ADMIN — APIXABAN 5 MG: 5 TABLET, FILM COATED ORAL at 18:21

## 2022-01-01 RX ADMIN — ASPIRIN 162 MG: 81 TABLET, CHEWABLE ORAL at 08:30

## 2022-01-01 RX ADMIN — APIXABAN 5 MG: 5 TABLET, FILM COATED ORAL at 18:23

## 2022-01-01 RX ADMIN — CARVEDILOL 3.12 MG: 3.12 TABLET, FILM COATED ORAL at 08:26

## 2022-01-01 RX ADMIN — CEFEPIME 2 G: 2 INJECTION, POWDER, FOR SOLUTION INTRAVENOUS at 21:27

## 2022-01-01 RX ADMIN — CARVEDILOL 6.25 MG: 3.12 TABLET, FILM COATED ORAL at 17:24

## 2022-01-01 RX ADMIN — Medication 2 UNITS: at 12:57

## 2022-01-01 RX ADMIN — BUSPIRONE HYDROCHLORIDE 15 MG: 10 TABLET ORAL at 17:11

## 2022-01-01 RX ADMIN — CARVEDILOL 3.12 MG: 3.12 TABLET, FILM COATED ORAL at 09:15

## 2022-01-01 RX ADMIN — METRONIDAZOLE 500 MG: 500 INJECTION, SOLUTION INTRAVENOUS at 12:12

## 2022-01-01 RX ADMIN — CYCLOBENZAPRINE 5 MG: 10 TABLET, FILM COATED ORAL at 20:25

## 2022-01-01 RX ADMIN — APIXABAN 5 MG: 5 TABLET, FILM COATED ORAL at 08:30

## 2022-01-01 RX ADMIN — Medication 2 UNITS: at 05:24

## 2022-01-01 RX ADMIN — Medication 2 UNITS: at 12:09

## 2022-01-01 RX ADMIN — ASPIRIN 81 MG: 81 TABLET, COATED ORAL at 09:55

## 2022-01-01 RX ADMIN — NYSTATIN: 100000 POWDER TOPICAL at 18:21

## 2022-01-01 RX ADMIN — APIXABAN 5 MG: 5 TABLET, FILM COATED ORAL at 17:27

## 2022-01-01 RX ADMIN — CYCLOBENZAPRINE 10 MG: 10 TABLET, FILM COATED ORAL at 04:44

## 2022-01-01 RX ADMIN — APIXABAN 5 MG: 5 TABLET, FILM COATED ORAL at 08:09

## 2022-01-01 RX ADMIN — BUSPIRONE HYDROCHLORIDE 15 MG: 10 TABLET ORAL at 21:54

## 2022-01-01 RX ADMIN — ATORVASTATIN CALCIUM 20 MG: 20 TABLET, FILM COATED ORAL at 22:26

## 2022-01-01 RX ADMIN — BUSPIRONE HYDROCHLORIDE 15 MG: 10 TABLET ORAL at 09:05

## 2022-01-01 RX ADMIN — SODIUM CHLORIDE, PRESERVATIVE FREE 10 ML: 5 INJECTION INTRAVENOUS at 05:19

## 2022-01-01 RX ADMIN — APIXABAN 5 MG: 5 TABLET, FILM COATED ORAL at 10:02

## 2022-01-01 RX ADMIN — Medication 2 UNITS: at 14:05

## 2022-01-01 RX ADMIN — VANCOMYCIN HYDROCHLORIDE 750 MG: 750 INJECTION, POWDER, LYOPHILIZED, FOR SOLUTION INTRAVENOUS at 16:15

## 2022-01-01 RX ADMIN — NYSTATIN: 100000 POWDER TOPICAL at 09:43

## 2022-01-01 RX ADMIN — ESCITALOPRAM OXALATE 10 MG: 10 TABLET ORAL at 17:16

## 2022-01-01 RX ADMIN — NYSTATIN: 100000 POWDER TOPICAL at 09:46

## 2022-01-01 RX ADMIN — BUSPIRONE HYDROCHLORIDE 15 MG: 10 TABLET ORAL at 09:12

## 2022-01-01 RX ADMIN — CARVEDILOL 3.12 MG: 3.12 TABLET, FILM COATED ORAL at 08:57

## 2022-01-01 RX ADMIN — APIXABAN 5 MG: 5 TABLET, FILM COATED ORAL at 09:15

## 2022-01-01 RX ADMIN — Medication 5 UNITS: at 17:21

## 2022-01-01 RX ADMIN — CARVEDILOL 6.25 MG: 3.12 TABLET, FILM COATED ORAL at 09:55

## 2022-01-01 RX ADMIN — SODIUM CHLORIDE, PRESERVATIVE FREE 10 ML: 5 INJECTION INTRAVENOUS at 17:40

## 2022-01-01 RX ADMIN — DEXTROSE MONOHYDRATE 125 ML: 100 INJECTION, SOLUTION INTRAVENOUS at 09:59

## 2022-01-01 RX ADMIN — SODIUM CHLORIDE, PRESERVATIVE FREE 10 ML: 5 INJECTION INTRAVENOUS at 05:26

## 2022-01-01 RX ADMIN — SODIUM CHLORIDE, PRESERVATIVE FREE 10 ML: 5 INJECTION INTRAVENOUS at 22:18

## 2022-01-01 RX ADMIN — Medication 4 UNITS: at 17:28

## 2022-01-01 RX ADMIN — SODIUM CHLORIDE, PRESERVATIVE FREE 10 ML: 5 INJECTION INTRAVENOUS at 18:20

## 2022-01-01 RX ADMIN — Medication 3 UNITS: at 00:54

## 2022-01-01 RX ADMIN — MICONAZOLE NITRATE: 20 CREAM TOPICAL at 10:06

## 2022-01-01 RX ADMIN — SODIUM CHLORIDE, PRESERVATIVE FREE 10 ML: 5 INJECTION INTRAVENOUS at 20:23

## 2022-01-01 RX ADMIN — DEXTROSE AND SODIUM CHLORIDE 75 ML/HR: 5; 450 INJECTION, SOLUTION INTRAVENOUS at 20:41

## 2022-01-01 RX ADMIN — METRONIDAZOLE 500 MG: 500 INJECTION, SOLUTION INTRAVENOUS at 14:24

## 2022-01-01 RX ADMIN — TRAMADOL HYDROCHLORIDE 50 MG: 50 TABLET ORAL at 17:16

## 2022-01-01 RX ADMIN — ACETAMINOPHEN 650 MG: 325 TABLET ORAL at 20:28

## 2022-01-01 RX ADMIN — TAMSULOSIN HYDROCHLORIDE 0.4 MG: 0.4 CAPSULE ORAL at 09:12

## 2022-01-01 RX ADMIN — TAMSULOSIN HYDROCHLORIDE 0.4 MG: 0.4 CAPSULE ORAL at 10:36

## 2022-01-01 RX ADMIN — VANCOMYCIN HYDROCHLORIDE 750 MG: 750 INJECTION, POWDER, LYOPHILIZED, FOR SOLUTION INTRAVENOUS at 01:58

## 2022-01-01 RX ADMIN — CEFEPIME 1 G: 1 INJECTION, POWDER, FOR SOLUTION INTRAMUSCULAR; INTRAVENOUS at 18:18

## 2022-01-01 RX ADMIN — APIXABAN 5 MG: 5 TABLET, FILM COATED ORAL at 10:05

## 2022-01-01 RX ADMIN — BUSPIRONE HYDROCHLORIDE 15 MG: 10 TABLET ORAL at 22:02

## 2022-01-01 RX ADMIN — MICONAZOLE NITRATE: 20 CREAM TOPICAL at 08:42

## 2022-01-01 RX ADMIN — TAMSULOSIN HYDROCHLORIDE 0.4 MG: 0.4 CAPSULE ORAL at 11:24

## 2022-01-01 RX ADMIN — BUSPIRONE HYDROCHLORIDE 15 MG: 10 TABLET ORAL at 21:23

## 2022-01-01 RX ADMIN — NYSTATIN: 100000 POWDER TOPICAL at 13:14

## 2022-01-01 RX ADMIN — Medication 4 UNITS: at 17:27

## 2022-01-01 RX ADMIN — Medication 15 UNITS: at 14:02

## 2022-01-01 RX ADMIN — SODIUM CHLORIDE, PRESERVATIVE FREE 10 ML: 5 INJECTION INTRAVENOUS at 14:09

## 2022-01-01 RX ADMIN — ASPIRIN 162 MG: 81 TABLET, CHEWABLE ORAL at 09:34

## 2022-01-01 RX ADMIN — APIXABAN 5 MG: 5 TABLET, FILM COATED ORAL at 10:40

## 2022-01-01 RX ADMIN — CARVEDILOL 3.12 MG: 3.12 TABLET, FILM COATED ORAL at 09:12

## 2022-01-01 RX ADMIN — ASPIRIN 162 MG: 81 TABLET, CHEWABLE ORAL at 09:11

## 2022-01-01 RX ADMIN — ESCITALOPRAM OXALATE 10 MG: 10 TABLET ORAL at 18:02

## 2022-01-01 RX ADMIN — ASPIRIN 162 MG: 81 TABLET, CHEWABLE ORAL at 09:57

## 2022-01-01 RX ADMIN — MELATONIN 10.5 MG: at 21:44

## 2022-01-01 RX ADMIN — BUSPIRONE HYDROCHLORIDE 15 MG: 10 TABLET ORAL at 21:58

## 2022-01-01 RX ADMIN — BUSPIRONE HYDROCHLORIDE 15 MG: 10 TABLET ORAL at 17:20

## 2022-01-01 RX ADMIN — METRONIDAZOLE 500 MG: 500 INJECTION, SOLUTION INTRAVENOUS at 12:43

## 2022-01-01 RX ADMIN — CARVEDILOL 6.25 MG: 3.12 TABLET, FILM COATED ORAL at 08:09

## 2022-01-01 RX ADMIN — METRONIDAZOLE 500 MG: 500 INJECTION, SOLUTION INTRAVENOUS at 23:22

## 2022-01-01 RX ADMIN — ASPIRIN 162 MG: 81 TABLET, CHEWABLE ORAL at 10:04

## 2022-01-01 RX ADMIN — BUSPIRONE HYDROCHLORIDE 15 MG: 10 TABLET ORAL at 22:25

## 2022-01-01 RX ADMIN — SODIUM CHLORIDE, PRESERVATIVE FREE 10 ML: 5 INJECTION INTRAVENOUS at 22:23

## 2022-01-01 RX ADMIN — HALOPERIDOL 0.5 MG: 0.5 TABLET ORAL at 21:18

## 2022-01-01 RX ADMIN — TAMSULOSIN HYDROCHLORIDE 0.4 MG: 0.4 CAPSULE ORAL at 10:52

## 2022-01-01 RX ADMIN — APIXABAN 5 MG: 5 TABLET, FILM COATED ORAL at 09:55

## 2022-01-01 RX ADMIN — BUSPIRONE HYDROCHLORIDE 15 MG: 10 TABLET ORAL at 08:30

## 2022-01-01 RX ADMIN — BUSPIRONE HYDROCHLORIDE 15 MG: 10 TABLET ORAL at 17:06

## 2022-01-01 RX ADMIN — ASPIRIN 162 MG: 81 TABLET, CHEWABLE ORAL at 10:52

## 2022-01-01 RX ADMIN — APIXABAN 5 MG: 5 TABLET, FILM COATED ORAL at 11:24

## 2022-01-01 RX ADMIN — CEFEPIME 2 G: 2 INJECTION, POWDER, FOR SOLUTION INTRAVENOUS at 19:46

## 2022-01-01 RX ADMIN — SODIUM CHLORIDE, PRESERVATIVE FREE 10 ML: 5 INJECTION INTRAVENOUS at 06:37

## 2022-01-01 RX ADMIN — MICONAZOLE NITRATE: 20 CREAM TOPICAL at 22:22

## 2022-01-01 RX ADMIN — CARVEDILOL 3.12 MG: 3.12 TABLET, FILM COATED ORAL at 10:34

## 2022-01-01 RX ADMIN — MELATONIN 10.5 MG: at 21:47

## 2022-01-01 RX ADMIN — APIXABAN 5 MG: 5 TABLET, FILM COATED ORAL at 17:06

## 2022-01-01 RX ADMIN — ACETAMINOPHEN 650 MG: 160 SOLUTION ORAL at 11:03

## 2022-01-01 RX ADMIN — ASPIRIN 162 MG: 81 TABLET, CHEWABLE ORAL at 09:12

## 2022-01-01 RX ADMIN — POTASSIUM PHOSPHATE, MONOBASIC POTASSIUM PHOSPHATE, DIBASIC: 224; 236 INJECTION, SOLUTION, CONCENTRATE INTRAVENOUS at 11:59

## 2022-01-01 RX ADMIN — CEFEPIME 2 G: 2 INJECTION, POWDER, FOR SOLUTION INTRAVENOUS at 10:05

## 2022-01-01 RX ADMIN — ESCITALOPRAM OXALATE 10 MG: 10 TABLET ORAL at 17:20

## 2022-01-01 RX ADMIN — PRAVASTATIN SODIUM 20 MG: 10 TABLET ORAL at 21:18

## 2022-01-01 RX ADMIN — BUSPIRONE HYDROCHLORIDE 15 MG: 10 TABLET ORAL at 09:15

## 2022-01-01 RX ADMIN — DEXTROSE AND SODIUM CHLORIDE 75 ML/HR: 5; 450 INJECTION, SOLUTION INTRAVENOUS at 20:43

## 2022-01-01 RX ADMIN — APIXABAN 5 MG: 5 TABLET, FILM COATED ORAL at 18:51

## 2022-01-01 RX ADMIN — MORPHINE SULFATE 2 MG: 2 INJECTION, SOLUTION INTRAMUSCULAR; INTRAVENOUS at 13:02

## 2022-01-01 RX ADMIN — ACETAMINOPHEN 650 MG: 325 TABLET ORAL at 22:58

## 2022-01-01 RX ADMIN — CALCIUM GLUCONATE 1000 MG: 20 INJECTION, SOLUTION INTRAVENOUS at 17:32

## 2022-01-01 RX ADMIN — BUSPIRONE HYDROCHLORIDE 15 MG: 10 TABLET ORAL at 21:14

## 2022-01-01 RX ADMIN — MICONAZOLE NITRATE: 20 CREAM TOPICAL at 22:04

## 2022-01-01 RX ADMIN — ESCITALOPRAM OXALATE 10 MG: 10 TABLET ORAL at 16:17

## 2022-01-01 RX ADMIN — SODIUM CHLORIDE, PRESERVATIVE FREE 10 ML: 5 INJECTION INTRAVENOUS at 05:17

## 2022-01-01 RX ADMIN — TAMSULOSIN HYDROCHLORIDE 0.4 MG: 0.4 CAPSULE ORAL at 10:04

## 2022-01-01 RX ADMIN — SODIUM CHLORIDE 75 ML/HR: 9 INJECTION, SOLUTION INTRAVENOUS at 10:54

## 2022-01-01 RX ADMIN — BUSPIRONE HYDROCHLORIDE 15 MG: 10 TABLET ORAL at 17:08

## 2022-01-01 RX ADMIN — MELATONIN 3 MG: at 21:08

## 2022-01-01 RX ADMIN — TAMSULOSIN HYDROCHLORIDE 0.4 MG: 0.4 CAPSULE ORAL at 08:27

## 2022-01-01 RX ADMIN — CEFEPIME 2 G: 2 INJECTION, POWDER, FOR SOLUTION INTRAVENOUS at 08:51

## 2022-01-01 RX ADMIN — BUSPIRONE HYDROCHLORIDE 15 MG: 10 TABLET ORAL at 16:24

## 2022-01-01 RX ADMIN — CEFEPIME 1 G: 1 INJECTION, POWDER, FOR SOLUTION INTRAMUSCULAR; INTRAVENOUS at 01:55

## 2022-01-01 RX ADMIN — APIXABAN 5 MG: 5 TABLET, FILM COATED ORAL at 17:10

## 2022-01-01 RX ADMIN — SODIUM CHLORIDE, PRESERVATIVE FREE 10 ML: 5 INJECTION INTRAVENOUS at 21:17

## 2022-01-01 RX ADMIN — NYSTATIN: 100000 POWDER TOPICAL at 19:12

## 2022-01-01 RX ADMIN — APIXABAN 5 MG: 5 TABLET, FILM COATED ORAL at 10:52

## 2022-01-01 RX ADMIN — Medication 4 UNITS: at 10:05

## 2022-01-01 RX ADMIN — BUSPIRONE HYDROCHLORIDE 15 MG: 10 TABLET ORAL at 22:17

## 2022-01-01 RX ADMIN — NYSTATIN: 100000 POWDER TOPICAL at 09:42

## 2022-01-01 RX ADMIN — VANCOMYCIN HYDROCHLORIDE 1250 MG: 10 INJECTION, POWDER, LYOPHILIZED, FOR SOLUTION INTRAVENOUS at 17:22

## 2022-01-01 RX ADMIN — BUSPIRONE HYDROCHLORIDE 15 MG: 10 TABLET ORAL at 10:40

## 2022-01-01 RX ADMIN — Medication 5 UNITS: at 18:24

## 2022-01-01 RX ADMIN — MELATONIN 10.5 MG: at 22:39

## 2022-01-01 RX ADMIN — BUSPIRONE HYDROCHLORIDE 15 MG: 10 TABLET ORAL at 08:56

## 2022-01-01 RX ADMIN — SODIUM CHLORIDE, PRESERVATIVE FREE 10 ML: 5 INJECTION INTRAVENOUS at 22:26

## 2022-01-01 RX ADMIN — Medication 5 UNITS: at 00:57

## 2022-01-01 RX ADMIN — SODIUM CHLORIDE, PRESERVATIVE FREE 10 ML: 5 INJECTION INTRAVENOUS at 21:28

## 2022-01-01 RX ADMIN — MICONAZOLE NITRATE: 20 CREAM TOPICAL at 21:28

## 2022-01-01 RX ADMIN — CARVEDILOL 3.12 MG: 3.12 TABLET, FILM COATED ORAL at 11:05

## 2022-01-01 RX ADMIN — ACETAMINOPHEN 650 MG: 160 SOLUTION ORAL at 23:58

## 2022-01-01 RX ADMIN — MELATONIN 10.5 MG: at 21:45

## 2022-01-01 RX ADMIN — CEFEPIME 1 G: 1 INJECTION, POWDER, FOR SOLUTION INTRAMUSCULAR; INTRAVENOUS at 10:46

## 2022-01-01 RX ADMIN — SODIUM CHLORIDE, PRESERVATIVE FREE 10 ML: 5 INJECTION INTRAVENOUS at 17:22

## 2022-01-01 RX ADMIN — ASPIRIN 81 MG: 81 TABLET, COATED ORAL at 08:09

## 2022-01-01 RX ADMIN — METRONIDAZOLE 500 MG: 500 INJECTION, SOLUTION INTRAVENOUS at 00:54

## 2022-01-01 RX ADMIN — SODIUM CHLORIDE, PRESERVATIVE FREE 10 ML: 5 INJECTION INTRAVENOUS at 10:29

## 2022-01-01 RX ADMIN — BUSPIRONE HYDROCHLORIDE 15 MG: 10 TABLET ORAL at 18:20

## 2022-01-01 RX ADMIN — BUSPIRONE HYDROCHLORIDE 15 MG: 10 TABLET ORAL at 22:40

## 2022-01-01 RX ADMIN — BUSPIRONE HYDROCHLORIDE 15 MG: 10 TABLET ORAL at 21:47

## 2022-01-01 RX ADMIN — ENOXAPARIN SODIUM 40 MG: 100 INJECTION SUBCUTANEOUS at 09:11

## 2022-01-01 RX ADMIN — METRONIDAZOLE 500 MG: 500 INJECTION, SOLUTION INTRAVENOUS at 00:41

## 2022-01-01 RX ADMIN — CYCLOBENZAPRINE 5 MG: 10 TABLET, FILM COATED ORAL at 21:15

## 2022-01-01 RX ADMIN — INSULIN GLARGINE 15 UNITS: 100 INJECTION, SOLUTION SUBCUTANEOUS at 22:42

## 2022-01-01 RX ADMIN — TRAMADOL HYDROCHLORIDE 50 MG: 50 TABLET ORAL at 12:12

## 2022-01-01 RX ADMIN — BUSPIRONE HYDROCHLORIDE 15 MG: 10 TABLET ORAL at 17:17

## 2022-01-01 RX ADMIN — TAMSULOSIN HYDROCHLORIDE 0.4 MG: 0.4 CAPSULE ORAL at 10:02

## 2022-01-01 RX ADMIN — Medication 4 UNITS: at 09:01

## 2022-01-01 RX ADMIN — APIXABAN 5 MG: 5 TABLET, FILM COATED ORAL at 17:26

## 2022-01-01 RX ADMIN — ESCITALOPRAM OXALATE 10 MG: 10 TABLET ORAL at 17:38

## 2022-01-01 RX ADMIN — METRONIDAZOLE 500 MG: 500 INJECTION, SOLUTION INTRAVENOUS at 12:21

## 2022-01-01 RX ADMIN — PANTOPRAZOLE SODIUM 40 MG: 40 TABLET, DELAYED RELEASE ORAL at 08:09

## 2022-01-01 RX ADMIN — Medication 4 UNITS: at 14:05

## 2022-01-01 RX ADMIN — ASPIRIN 162 MG: 81 TABLET, CHEWABLE ORAL at 09:15

## 2022-01-01 RX ADMIN — APIXABAN 5 MG: 5 TABLET, FILM COATED ORAL at 08:58

## 2022-01-01 RX ADMIN — Medication 3 UNITS: at 00:55

## 2022-01-01 RX ADMIN — APIXABAN 5 MG: 5 TABLET, FILM COATED ORAL at 18:28

## 2022-01-01 RX ADMIN — APIXABAN 5 MG: 5 TABLET, FILM COATED ORAL at 09:58

## 2022-01-01 RX ADMIN — Medication 3 UNITS: at 12:39

## 2022-01-01 RX ADMIN — CYCLOBENZAPRINE 5 MG: 10 TABLET, FILM COATED ORAL at 01:58

## 2022-01-01 RX ADMIN — ASPIRIN 162 MG: 81 TABLET, COATED ORAL at 09:30

## 2022-01-01 RX ADMIN — AZITHROMYCIN MONOHYDRATE 500 MG: 500 INJECTION, POWDER, LYOPHILIZED, FOR SOLUTION INTRAVENOUS at 17:53

## 2022-01-01 RX ADMIN — CARVEDILOL 3.12 MG: 3.12 TABLET, FILM COATED ORAL at 09:58

## 2022-01-01 RX ADMIN — ATORVASTATIN CALCIUM 20 MG: 20 TABLET, FILM COATED ORAL at 22:17

## 2022-01-01 RX ADMIN — SODIUM CHLORIDE, PRESERVATIVE FREE 10 ML: 5 INJECTION INTRAVENOUS at 18:29

## 2022-01-01 RX ADMIN — BUSPIRONE HYDROCHLORIDE 15 MG: 10 TABLET ORAL at 11:05

## 2022-01-01 RX ADMIN — MORPHINE SULFATE 2 MG: 2 INJECTION, SOLUTION INTRAMUSCULAR; INTRAVENOUS at 17:36

## 2022-01-01 RX ADMIN — CEFEPIME 1 G: 1 INJECTION, POWDER, FOR SOLUTION INTRAMUSCULAR; INTRAVENOUS at 00:55

## 2022-01-01 RX ADMIN — BUSPIRONE HYDROCHLORIDE 15 MG: 10 TABLET ORAL at 17:27

## 2022-01-01 RX ADMIN — SODIUM CHLORIDE, PRESERVATIVE FREE 10 ML: 5 INJECTION INTRAVENOUS at 03:24

## 2022-01-01 RX ADMIN — DIAZEPAM 5 MG: 5 INJECTION, SOLUTION INTRAMUSCULAR; INTRAVENOUS at 23:38

## 2022-01-01 RX ADMIN — TAMSULOSIN HYDROCHLORIDE 0.4 MG: 0.4 CAPSULE ORAL at 08:32

## 2022-01-01 RX ADMIN — APIXABAN 5 MG: 5 TABLET, FILM COATED ORAL at 09:05

## 2022-01-01 RX ADMIN — SODIUM CHLORIDE, PRESERVATIVE FREE 10 ML: 5 INJECTION INTRAVENOUS at 05:49

## 2022-01-01 RX ADMIN — NYSTATIN: 100000 POWDER TOPICAL at 10:23

## 2022-01-01 RX ADMIN — MELATONIN 10.5 MG: at 21:14

## 2022-01-01 RX ADMIN — VANCOMYCIN HYDROCHLORIDE 750 MG: 750 INJECTION, POWDER, LYOPHILIZED, FOR SOLUTION INTRAVENOUS at 13:44

## 2022-01-01 RX ADMIN — AZITHROMYCIN MONOHYDRATE 500 MG: 500 INJECTION, POWDER, LYOPHILIZED, FOR SOLUTION INTRAVENOUS at 17:29

## 2022-01-01 RX ADMIN — ACETAMINOPHEN 650 MG: 325 TABLET ORAL at 11:09

## 2022-01-01 RX ADMIN — SODIUM CHLORIDE, PRESERVATIVE FREE 10 ML: 5 INJECTION INTRAVENOUS at 15:05

## 2022-01-01 RX ADMIN — MELATONIN 10.5 MG: at 21:48

## 2022-01-01 RX ADMIN — ACETAMINOPHEN 650 MG: 650 SUPPOSITORY RECTAL at 09:12

## 2022-01-01 RX ADMIN — PROPOFOL 120 MG: 10 INJECTION, EMULSION INTRAVENOUS at 13:50

## 2022-01-01 RX ADMIN — TRAMADOL HYDROCHLORIDE 50 MG: 50 TABLET ORAL at 17:12

## 2022-01-01 RX ADMIN — HALOPERIDOL 0.5 MG: 0.5 TABLET ORAL at 21:34

## 2022-01-01 RX ADMIN — ASPIRIN 162 MG: 81 TABLET, CHEWABLE ORAL at 10:56

## 2022-01-01 RX ADMIN — CARVEDILOL 6.25 MG: 3.12 TABLET, FILM COATED ORAL at 18:23

## 2022-01-01 RX ADMIN — MICONAZOLE NITRATE: 20 CREAM TOPICAL at 23:43

## 2022-01-01 RX ADMIN — PANTOPRAZOLE SODIUM 40 MG: 40 TABLET, DELAYED RELEASE ORAL at 09:55

## 2022-01-01 RX ADMIN — CEFEPIME 2 G: 2 INJECTION, POWDER, FOR SOLUTION INTRAVENOUS at 21:41

## 2022-01-01 RX ADMIN — MELATONIN 10.5 MG: at 21:57

## 2022-01-01 RX ADMIN — CEFEPIME 1 G: 1 INJECTION, POWDER, FOR SOLUTION INTRAMUSCULAR; INTRAVENOUS at 01:27

## 2022-01-01 RX ADMIN — ESCITALOPRAM OXALATE 10 MG: 10 TABLET ORAL at 16:26

## 2022-01-01 RX ADMIN — HALOPERIDOL LACTATE 2 MG: 5 INJECTION, SOLUTION INTRAMUSCULAR at 21:25

## 2022-01-01 RX ADMIN — ATORVASTATIN CALCIUM 20 MG: 20 TABLET, FILM COATED ORAL at 20:25

## 2022-01-01 RX ADMIN — SODIUM CHLORIDE, PRESERVATIVE FREE 10 ML: 5 INJECTION INTRAVENOUS at 22:39

## 2022-01-01 RX ADMIN — APIXABAN 5 MG: 5 TABLET, FILM COATED ORAL at 17:17

## 2022-01-01 RX ADMIN — ESCITALOPRAM OXALATE 10 MG: 10 TABLET ORAL at 16:25

## 2022-01-01 RX ADMIN — APIXABAN 5 MG: 5 TABLET, FILM COATED ORAL at 09:30

## 2022-01-01 RX ADMIN — BUSPIRONE HYDROCHLORIDE 15 MG: 10 TABLET ORAL at 09:26

## 2022-01-01 RX ADMIN — CARVEDILOL 3.12 MG: 3.12 TABLET, FILM COATED ORAL at 11:09

## 2022-01-01 RX ADMIN — ASPIRIN 162 MG: 81 TABLET, CHEWABLE ORAL at 08:56

## 2022-01-01 RX ADMIN — Medication 2 UNITS: at 06:36

## 2022-01-01 RX ADMIN — Medication 4 UNITS: at 17:26

## 2022-01-01 RX ADMIN — APIXABAN 5 MG: 5 TABLET, FILM COATED ORAL at 17:24

## 2022-01-01 RX ADMIN — INSULIN GLARGINE 30 UNITS: 100 INJECTION, SOLUTION SUBCUTANEOUS at 22:25

## 2022-01-01 RX ADMIN — SODIUM CHLORIDE, PRESERVATIVE FREE 10 ML: 5 INJECTION INTRAVENOUS at 21:56

## 2022-01-01 RX ADMIN — CEFEPIME 2 G: 2 INJECTION, POWDER, FOR SOLUTION INTRAVENOUS at 09:03

## 2022-01-01 RX ADMIN — ESCITALOPRAM OXALATE 10 MG: 10 TABLET ORAL at 17:06

## 2022-01-01 RX ADMIN — Medication 2 UNITS: at 17:35

## 2022-01-01 RX ADMIN — BUSPIRONE HYDROCHLORIDE 15 MG: 10 TABLET ORAL at 20:46

## 2022-01-01 RX ADMIN — ASPIRIN 162 MG: 81 TABLET, CHEWABLE ORAL at 10:40

## 2022-01-01 RX ADMIN — CARVEDILOL 3.12 MG: 3.12 TABLET, FILM COATED ORAL at 10:33

## 2022-01-01 RX ADMIN — TAMSULOSIN HYDROCHLORIDE 0.4 MG: 0.4 CAPSULE ORAL at 08:09

## 2022-01-01 RX ADMIN — ASPIRIN 162 MG: 81 TABLET, CHEWABLE ORAL at 10:34

## 2022-01-01 RX ADMIN — Medication 4 UNITS: at 08:58

## 2022-01-01 RX ADMIN — Medication 4 UNITS: at 12:40

## 2022-01-01 RX ADMIN — METRONIDAZOLE 500 MG: 500 INJECTION, SOLUTION INTRAVENOUS at 12:34

## 2022-01-01 RX ADMIN — ESCITALOPRAM OXALATE 10 MG: 10 TABLET ORAL at 17:14

## 2022-01-01 RX ADMIN — IOPAMIDOL 100 ML: 755 INJECTION, SOLUTION INTRAVENOUS at 09:48

## 2022-01-01 RX ADMIN — Medication 4 UNITS: at 09:15

## 2022-01-01 RX ADMIN — PROPOFOL 120 MG: 10 INJECTION, EMULSION INTRAVENOUS at 15:36

## 2022-01-01 RX ADMIN — ASPIRIN 162 MG: 81 TABLET, CHEWABLE ORAL at 08:26

## 2022-01-01 RX ADMIN — HALOPERIDOL LACTATE 2 MG: 5 INJECTION, SOLUTION INTRAMUSCULAR at 23:47

## 2022-01-01 RX ADMIN — BUSPIRONE HYDROCHLORIDE 15 MG: 10 TABLET ORAL at 10:52

## 2022-01-01 RX ADMIN — APIXABAN 5 MG: 5 TABLET, FILM COATED ORAL at 10:33

## 2022-01-01 RX ADMIN — SODIUM CHLORIDE, PRESERVATIVE FREE 10 ML: 5 INJECTION INTRAVENOUS at 13:03

## 2022-01-01 RX ADMIN — APIXABAN 5 MG: 5 TABLET, FILM COATED ORAL at 17:13

## 2022-01-01 RX ADMIN — MORPHINE SULFATE 2 MG: 2 INJECTION, SOLUTION INTRAMUSCULAR; INTRAVENOUS at 11:36

## 2022-01-01 RX ADMIN — INSULIN GLARGINE 15 UNITS: 100 INJECTION, SOLUTION SUBCUTANEOUS at 00:09

## 2022-01-01 RX ADMIN — CARVEDILOL 3.12 MG: 3.12 TABLET, FILM COATED ORAL at 10:09

## 2022-01-01 RX ADMIN — CEFEPIME 2 G: 2 INJECTION, POWDER, FOR SOLUTION INTRAVENOUS at 09:35

## 2022-01-01 RX ADMIN — CARVEDILOL 6.25 MG: 3.12 TABLET, FILM COATED ORAL at 18:32

## 2022-01-01 RX ADMIN — MORPHINE SULFATE 2 MG: 2 INJECTION, SOLUTION INTRAMUSCULAR; INTRAVENOUS at 09:08

## 2022-01-01 RX ADMIN — BUSPIRONE HYDROCHLORIDE 15 MG: 10 TABLET ORAL at 21:43

## 2022-01-01 RX ADMIN — Medication 4 UNITS: at 12:58

## 2022-01-01 RX ADMIN — METRONIDAZOLE 500 MG: 500 INJECTION, SOLUTION INTRAVENOUS at 00:31

## 2022-01-01 RX ADMIN — SODIUM CHLORIDE, PRESERVATIVE FREE 10 ML: 5 INJECTION INTRAVENOUS at 06:26

## 2022-01-01 RX ADMIN — APIXABAN 5 MG: 5 TABLET, FILM COATED ORAL at 08:27

## 2022-01-01 RX ADMIN — Medication 3 UNITS: at 05:48

## 2022-01-01 RX ADMIN — ATORVASTATIN CALCIUM 20 MG: 20 TABLET, FILM COATED ORAL at 20:43

## 2022-01-01 RX ADMIN — AZITHROMYCIN MONOHYDRATE 500 MG: 500 INJECTION, POWDER, LYOPHILIZED, FOR SOLUTION INTRAVENOUS at 18:55

## 2022-01-01 RX ADMIN — TAMSULOSIN HYDROCHLORIDE 0.4 MG: 0.4 CAPSULE ORAL at 09:26

## 2022-01-01 RX ADMIN — APIXABAN 5 MG: 5 TABLET, FILM COATED ORAL at 09:57

## 2022-01-01 RX ADMIN — TAMSULOSIN HYDROCHLORIDE 0.4 MG: 0.4 CAPSULE ORAL at 10:34

## 2022-01-01 RX ADMIN — SODIUM CHLORIDE 75 ML/HR: 9 INJECTION, SOLUTION INTRAVENOUS at 05:39

## 2022-01-01 RX ADMIN — SODIUM CHLORIDE, PRESERVATIVE FREE 10 ML: 5 INJECTION INTRAVENOUS at 06:38

## 2022-01-01 RX ADMIN — TRAMADOL HYDROCHLORIDE 50 MG: 50 TABLET ORAL at 10:34

## 2022-01-01 RX ADMIN — INSULIN GLARGINE 20 UNITS: 100 INJECTION, SOLUTION SUBCUTANEOUS at 22:22

## 2022-01-01 RX ADMIN — Medication 9 UNITS: at 06:00

## 2022-01-01 RX ADMIN — ATORVASTATIN CALCIUM 20 MG: 20 TABLET, FILM COATED ORAL at 21:37

## 2022-01-01 RX ADMIN — BUSPIRONE HYDROCHLORIDE 15 MG: 10 TABLET ORAL at 17:21

## 2022-01-01 RX ADMIN — NYSTATIN: 100000 POWDER TOPICAL at 11:10

## 2022-01-01 RX ADMIN — CYCLOBENZAPRINE 5 MG: 10 TABLET, FILM COATED ORAL at 22:17

## 2022-01-01 RX ADMIN — ATORVASTATIN CALCIUM 20 MG: 20 TABLET, FILM COATED ORAL at 20:46

## 2022-01-01 RX ADMIN — SODIUM CHLORIDE, PRESERVATIVE FREE 10 ML: 5 INJECTION INTRAVENOUS at 16:15

## 2022-01-01 RX ADMIN — Medication 4 UNITS: at 13:08

## 2022-01-01 RX ADMIN — ESCITALOPRAM OXALATE 10 MG: 10 TABLET ORAL at 18:28

## 2022-01-01 RX ADMIN — SODIUM CHLORIDE, PRESERVATIVE FREE 10 ML: 5 INJECTION INTRAVENOUS at 06:09

## 2022-01-01 RX ADMIN — ATORVASTATIN CALCIUM 20 MG: 20 TABLET, FILM COATED ORAL at 21:54

## 2022-01-01 RX ADMIN — METRONIDAZOLE 500 MG: 500 INJECTION, SOLUTION INTRAVENOUS at 00:14

## 2022-01-01 RX ADMIN — INSULIN GLARGINE 38 UNITS: 100 INJECTION, SOLUTION SUBCUTANEOUS at 22:01

## 2022-01-01 RX ADMIN — CEFEPIME 2 G: 2 INJECTION, POWDER, FOR SOLUTION INTRAVENOUS at 20:20

## 2022-01-01 RX ADMIN — ESCITALOPRAM OXALATE 10 MG: 10 TABLET ORAL at 17:09

## 2022-01-01 RX ADMIN — CARVEDILOL 3.12 MG: 3.12 TABLET, FILM COATED ORAL at 09:42

## 2022-01-01 RX ADMIN — BUSPIRONE HYDROCHLORIDE 15 MG: 10 TABLET ORAL at 11:09

## 2022-01-01 RX ADMIN — MORPHINE SULFATE 1 MG: 2 INJECTION, SOLUTION INTRAMUSCULAR; INTRAVENOUS at 18:16

## 2022-01-01 RX ADMIN — BUSPIRONE HYDROCHLORIDE 15 MG: 10 TABLET ORAL at 22:38

## 2022-01-01 RX ADMIN — ASPIRIN 162 MG: 81 TABLET, CHEWABLE ORAL at 10:02

## 2022-01-01 RX ADMIN — MELATONIN 10.5 MG: at 21:32

## 2022-01-01 RX ADMIN — TAMSULOSIN HYDROCHLORIDE 0.4 MG: 0.4 CAPSULE ORAL at 08:30

## 2022-01-01 RX ADMIN — CEFEPIME 2 G: 2 INJECTION, POWDER, FOR SOLUTION INTRAVENOUS at 22:25

## 2022-01-01 RX ADMIN — BUSPIRONE HYDROCHLORIDE 15 MG: 10 TABLET ORAL at 10:04

## 2022-01-01 RX ADMIN — APIXABAN 5 MG: 5 TABLET, FILM COATED ORAL at 10:34

## 2022-01-01 RX ADMIN — DIPHENHYDRAMINE HYDROCHLORIDE 25 MG: 50 INJECTION, SOLUTION INTRAMUSCULAR; INTRAVENOUS at 23:18

## 2022-01-01 RX ADMIN — MORPHINE SULFATE 2 MG: 2 INJECTION, SOLUTION INTRAMUSCULAR; INTRAVENOUS at 12:33

## 2022-01-01 RX ADMIN — CEFEPIME 2 G: 2 INJECTION, POWDER, FOR SOLUTION INTRAVENOUS at 20:24

## 2022-01-01 RX ADMIN — METRONIDAZOLE 500 MG: 500 INJECTION, SOLUTION INTRAVENOUS at 23:52

## 2022-01-01 RX ADMIN — CEFEPIME 2 G: 2 INJECTION, POWDER, FOR SOLUTION INTRAVENOUS at 08:30

## 2022-01-01 RX ADMIN — Medication 3 UNITS: at 06:51

## 2022-01-01 RX ADMIN — Medication 5 UNITS: at 17:20

## 2022-01-01 RX ADMIN — CARVEDILOL 3.12 MG: 3.12 TABLET, FILM COATED ORAL at 11:24

## 2022-01-01 RX ADMIN — IOPAMIDOL 100 ML: 755 INJECTION, SOLUTION INTRAVENOUS at 14:24

## 2022-01-01 RX ADMIN — Medication 4 UNITS: at 23:29

## 2022-01-01 RX ADMIN — APIXABAN 5 MG: 5 TABLET, FILM COATED ORAL at 20:46

## 2022-01-01 RX ADMIN — ATORVASTATIN CALCIUM 20 MG: 20 TABLET, FILM COATED ORAL at 22:27

## 2022-01-01 RX ADMIN — METRONIDAZOLE 500 MG: 500 INJECTION, SOLUTION INTRAVENOUS at 00:10

## 2022-01-01 RX ADMIN — ATORVASTATIN CALCIUM 20 MG: 20 TABLET, FILM COATED ORAL at 21:32

## 2022-01-01 RX ADMIN — ASPIRIN 81 MG: 81 TABLET, COATED ORAL at 11:24

## 2022-01-01 RX ADMIN — INSULIN GLARGINE 38 UNITS: 100 INJECTION, SOLUTION SUBCUTANEOUS at 23:22

## 2022-01-01 RX ADMIN — MELATONIN 10.5 MG: at 22:25

## 2022-01-01 RX ADMIN — INSULIN GLARGINE 20 UNITS: 100 INJECTION, SOLUTION SUBCUTANEOUS at 00:37

## 2022-01-01 RX ADMIN — Medication 4 UNITS: at 10:25

## 2022-01-01 RX ADMIN — BUSPIRONE HYDROCHLORIDE 15 MG: 10 TABLET ORAL at 16:12

## 2022-01-01 RX ADMIN — ATORVASTATIN CALCIUM 20 MG: 20 TABLET, FILM COATED ORAL at 21:25

## 2022-01-01 RX ADMIN — MELATONIN 3 MG: at 22:39

## 2022-01-01 RX ADMIN — BUSPIRONE HYDROCHLORIDE 15 MG: 10 TABLET ORAL at 20:23

## 2022-01-01 RX ADMIN — METRONIDAZOLE 500 MG: 500 INJECTION, SOLUTION INTRAVENOUS at 00:20

## 2022-01-01 RX ADMIN — ASPIRIN 162 MG: 81 TABLET, CHEWABLE ORAL at 09:58

## 2022-01-01 RX ADMIN — Medication 4 UNITS: at 12:10

## 2022-01-01 RX ADMIN — Medication 4 UNITS: at 10:02

## 2022-01-01 RX ADMIN — MICONAZOLE NITRATE: 20 CREAM TOPICAL at 21:37

## 2022-01-01 RX ADMIN — MICONAZOLE NITRATE: 20 CREAM TOPICAL at 09:06

## 2022-01-01 RX ADMIN — MORPHINE SULFATE 2 MG: 2 INJECTION, SOLUTION INTRAMUSCULAR; INTRAVENOUS at 17:27

## 2022-01-01 RX ADMIN — APIXABAN 5 MG: 5 TABLET, FILM COATED ORAL at 17:21

## 2022-01-01 RX ADMIN — MORPHINE SULFATE 1 MG: 2 INJECTION, SOLUTION INTRAMUSCULAR; INTRAVENOUS at 09:28

## 2022-01-01 RX ADMIN — BUSPIRONE HYDROCHLORIDE 15 MG: 10 TABLET ORAL at 16:16

## 2022-01-01 RX ADMIN — APIXABAN 5 MG: 5 TABLET, FILM COATED ORAL at 09:34

## 2022-01-01 RX ADMIN — TAMSULOSIN HYDROCHLORIDE 0.4 MG: 0.4 CAPSULE ORAL at 08:58

## 2022-01-01 RX ADMIN — APIXABAN 5 MG: 5 TABLET, FILM COATED ORAL at 17:12

## 2022-01-01 RX ADMIN — SODIUM CHLORIDE, PRESERVATIVE FREE 10 ML: 5 INJECTION INTRAVENOUS at 21:47

## 2022-01-01 RX ADMIN — TAMSULOSIN HYDROCHLORIDE 0.4 MG: 0.4 CAPSULE ORAL at 11:09

## 2022-01-01 RX ADMIN — CARVEDILOL 6.25 MG: 3.12 TABLET, FILM COATED ORAL at 11:10

## 2022-01-01 RX ADMIN — Medication 2 UNITS: at 01:20

## 2022-01-01 RX ADMIN — SODIUM CHLORIDE, PRESERVATIVE FREE 10 ML: 5 INJECTION INTRAVENOUS at 05:09

## 2022-01-01 RX ADMIN — ASPIRIN 162 MG: 81 TABLET, COATED ORAL at 09:25

## 2022-01-01 RX ADMIN — CARVEDILOL 3.12 MG: 3.12 TABLET, FILM COATED ORAL at 10:04

## 2022-01-01 RX ADMIN — BUSPIRONE HYDROCHLORIDE 15 MG: 10 TABLET ORAL at 17:13

## 2022-01-01 RX ADMIN — CEFEPIME 1 G: 1 INJECTION, POWDER, FOR SOLUTION INTRAMUSCULAR; INTRAVENOUS at 10:30

## 2022-01-01 RX ADMIN — BUSPIRONE HYDROCHLORIDE 15 MG: 10 TABLET ORAL at 21:44

## 2022-01-01 RX ADMIN — TAMSULOSIN HYDROCHLORIDE 0.4 MG: 0.4 CAPSULE ORAL at 09:11

## 2022-01-01 RX ADMIN — ATORVASTATIN CALCIUM 20 MG: 20 TABLET, FILM COATED ORAL at 21:27

## 2022-01-01 RX ADMIN — MICONAZOLE NITRATE: 20 CREAM TOPICAL at 08:00

## 2022-01-01 RX ADMIN — Medication 4 UNITS: at 17:15

## 2022-01-01 RX ADMIN — SODIUM CHLORIDE, PRESERVATIVE FREE 10 ML: 5 INJECTION INTRAVENOUS at 00:55

## 2022-01-01 RX ADMIN — TAMSULOSIN HYDROCHLORIDE 0.4 MG: 0.4 CAPSULE ORAL at 09:58

## 2022-01-01 RX ADMIN — TAMSULOSIN HYDROCHLORIDE 0.4 MG: 0.4 CAPSULE ORAL at 09:55

## 2022-01-01 RX ADMIN — ATORVASTATIN CALCIUM 20 MG: 20 TABLET, FILM COATED ORAL at 21:58

## 2022-01-01 RX ADMIN — SODIUM CHLORIDE, PRESERVATIVE FREE 10 ML: 5 INJECTION INTRAVENOUS at 22:00

## 2022-01-01 RX ADMIN — SODIUM BICARBONATE 50 MEQ: 84 INJECTION, SOLUTION INTRAVENOUS at 17:33

## 2022-01-01 RX ADMIN — SODIUM CHLORIDE, PRESERVATIVE FREE 10 ML: 5 INJECTION INTRAVENOUS at 05:06

## 2022-01-01 RX ADMIN — ACETAMINOPHEN 650 MG: 325 TABLET ORAL at 21:32

## 2022-01-01 RX ADMIN — CEFEPIME 1 G: 1 INJECTION, POWDER, FOR SOLUTION INTRAMUSCULAR; INTRAVENOUS at 01:38

## 2022-01-01 RX ADMIN — APIXABAN 5 MG: 5 TABLET, FILM COATED ORAL at 21:15

## 2022-01-01 RX ADMIN — CARVEDILOL 3.12 MG: 3.12 TABLET, FILM COATED ORAL at 09:04

## 2022-01-01 RX ADMIN — Medication 2 UNITS: at 02:12

## 2022-01-01 RX ADMIN — Medication 4 UNITS: at 12:08

## 2022-01-01 RX ADMIN — HALOPERIDOL LACTATE 2 MG: 5 INJECTION, SOLUTION INTRAMUSCULAR at 04:13

## 2022-01-01 RX ADMIN — CYCLOBENZAPRINE 5 MG: 10 TABLET, FILM COATED ORAL at 21:44

## 2022-01-01 RX ADMIN — BUSPIRONE HYDROCHLORIDE 15 MG: 10 TABLET ORAL at 09:57

## 2022-01-01 RX ADMIN — APIXABAN 5 MG: 5 TABLET, FILM COATED ORAL at 18:20

## 2022-01-01 RX ADMIN — CYCLOBENZAPRINE 5 MG: 10 TABLET, FILM COATED ORAL at 21:23

## 2022-01-01 RX ADMIN — SODIUM CHLORIDE, PRESERVATIVE FREE 10 ML: 5 INJECTION INTRAVENOUS at 14:24

## 2022-01-01 RX ADMIN — ESCITALOPRAM OXALATE 10 MG: 10 TABLET ORAL at 18:21

## 2022-01-01 RX ADMIN — BUSPIRONE HYDROCHLORIDE 15 MG: 10 TABLET ORAL at 20:25

## 2022-01-01 RX ADMIN — BUSPIRONE HYDROCHLORIDE 15 MG: 10 TABLET ORAL at 09:11

## 2022-01-01 RX ADMIN — LIDOCAINE HYDROCHLORIDE 40 MG: 20 INJECTION, SOLUTION EPIDURAL; INFILTRATION; INTRACAUDAL; PERINEURAL at 15:24

## 2022-01-01 RX ADMIN — ATORVASTATIN CALCIUM 20 MG: 20 TABLET, FILM COATED ORAL at 21:45

## 2022-01-01 RX ADMIN — MELATONIN 10.5 MG: at 21:54

## 2022-01-01 RX ADMIN — ESCITALOPRAM OXALATE 10 MG: 10 TABLET ORAL at 17:11

## 2022-01-01 RX ADMIN — APIXABAN 5 MG: 5 TABLET, FILM COATED ORAL at 10:09

## 2022-01-01 RX ADMIN — Medication 9 UNITS: at 06:47

## 2022-01-01 RX ADMIN — CEFEPIME 1 G: 1 INJECTION, POWDER, FOR SOLUTION INTRAMUSCULAR; INTRAVENOUS at 18:57

## 2022-01-01 RX ADMIN — CEFEPIME 2 G: 2 INJECTION, POWDER, FOR SOLUTION INTRAVENOUS at 10:02

## 2022-01-01 RX ADMIN — CEFEPIME 1 G: 1 INJECTION, POWDER, FOR SOLUTION INTRAMUSCULAR; INTRAVENOUS at 09:26

## 2022-01-01 RX ADMIN — CYCLOBENZAPRINE 5 MG: 10 TABLET, FILM COATED ORAL at 21:09

## 2022-01-01 RX ADMIN — BUSPIRONE HYDROCHLORIDE 15 MG: 10 TABLET ORAL at 18:21

## 2022-01-01 RX ADMIN — MELATONIN 3 MG: at 20:23

## 2022-01-01 RX ADMIN — SODIUM CHLORIDE, PRESERVATIVE FREE 10 ML: 5 INJECTION INTRAVENOUS at 06:35

## 2022-01-01 RX ADMIN — WATER 2 G: 1 INJECTION INTRAMUSCULAR; INTRAVENOUS; SUBCUTANEOUS at 13:26

## 2022-01-01 RX ADMIN — BUSPIRONE HYDROCHLORIDE 15 MG: 10 TABLET ORAL at 10:02

## 2022-01-01 RX ADMIN — APIXABAN 5 MG: 5 TABLET, FILM COATED ORAL at 09:07

## 2022-01-01 RX ADMIN — Medication 4 UNITS: at 12:42

## 2022-01-01 RX ADMIN — Medication 5 UNITS: at 12:27

## 2022-01-01 RX ADMIN — AZITHROMYCIN MONOHYDRATE 500 MG: 500 INJECTION, POWDER, LYOPHILIZED, FOR SOLUTION INTRAVENOUS at 17:24

## 2022-01-01 RX ADMIN — VANCOMYCIN HYDROCHLORIDE 750 MG: 750 INJECTION, POWDER, LYOPHILIZED, FOR SOLUTION INTRAVENOUS at 01:24

## 2022-01-01 RX ADMIN — ASPIRIN 162 MG: 81 TABLET, CHEWABLE ORAL at 09:09

## 2022-01-01 RX ADMIN — ACETAMINOPHEN 650 MG: 325 TABLET ORAL at 12:42

## 2022-01-01 RX ADMIN — Medication 30 UNITS: at 10:22

## 2022-01-01 RX ADMIN — HALOPERIDOL LACTATE 2 MG: 5 INJECTION, SOLUTION INTRAMUSCULAR at 23:14

## 2022-01-01 RX ADMIN — BUSPIRONE HYDROCHLORIDE 15 MG: 10 TABLET ORAL at 21:45

## 2022-01-01 RX ADMIN — BUSPIRONE HYDROCHLORIDE 15 MG: 10 TABLET ORAL at 18:28

## 2022-01-01 RX ADMIN — CYCLOBENZAPRINE 10 MG: 10 TABLET, FILM COATED ORAL at 21:58

## 2022-01-01 RX ADMIN — CARVEDILOL 3.12 MG: 3.12 TABLET, FILM COATED ORAL at 09:30

## 2022-01-01 RX ADMIN — Medication 7 UNITS: at 12:20

## 2022-01-01 RX ADMIN — ESCITALOPRAM OXALATE 10 MG: 10 TABLET ORAL at 17:27

## 2022-01-01 RX ADMIN — ATORVASTATIN CALCIUM 20 MG: 20 TABLET, FILM COATED ORAL at 21:48

## 2022-01-01 RX ADMIN — SODIUM CHLORIDE 50 ML/HR: 9 INJECTION, SOLUTION INTRAVENOUS at 15:26

## 2022-01-01 RX ADMIN — SODIUM PHOSPHATE, MONOBASIC, MONOHYDRATE: 276; 142 INJECTION, SOLUTION INTRAVENOUS at 08:57

## 2022-01-01 RX ADMIN — KETOROLAC TROMETHAMINE 15 MG: 30 INJECTION, SOLUTION INTRAMUSCULAR at 11:58

## 2022-01-01 RX ADMIN — MELATONIN 10.5 MG: at 21:22

## 2022-01-01 RX ADMIN — SODIUM CHLORIDE 75 ML/HR: 9 INJECTION, SOLUTION INTRAVENOUS at 09:30

## 2022-01-01 RX ADMIN — MORPHINE SULFATE 2 MG: 2 INJECTION, SOLUTION INTRAMUSCULAR; INTRAVENOUS at 08:58

## 2022-01-01 RX ADMIN — Medication 3 UNITS: at 06:49

## 2022-01-01 RX ADMIN — CARVEDILOL 3.12 MG: 3.12 TABLET, FILM COATED ORAL at 09:07

## 2022-01-01 RX ADMIN — SODIUM CHLORIDE, PRESERVATIVE FREE 10 ML: 5 INJECTION INTRAVENOUS at 06:31

## 2022-01-01 RX ADMIN — ACETAMINOPHEN 650 MG: 325 TABLET ORAL at 03:36

## 2022-01-01 RX ADMIN — TAMSULOSIN HYDROCHLORIDE 0.4 MG: 0.4 CAPSULE ORAL at 09:34

## 2022-01-01 RX ADMIN — Medication 7 UNITS: at 12:52

## 2022-01-01 RX ADMIN — Medication 3 UNITS: at 06:35

## 2022-01-01 RX ADMIN — SODIUM CHLORIDE, PRESERVATIVE FREE 10 ML: 5 INJECTION INTRAVENOUS at 21:53

## 2022-01-01 RX ADMIN — Medication 2 UNITS: at 05:47

## 2022-01-01 RX ADMIN — Medication 2 UNITS: at 17:27

## 2022-01-01 RX ADMIN — TAMSULOSIN HYDROCHLORIDE 0.4 MG: 0.4 CAPSULE ORAL at 09:57

## 2022-01-01 RX ADMIN — MICONAZOLE NITRATE: 20 CREAM TOPICAL at 10:45

## 2022-01-01 RX ADMIN — SODIUM CHLORIDE, PRESERVATIVE FREE 10 ML: 5 INJECTION INTRAVENOUS at 22:43

## 2022-01-01 RX ADMIN — APIXABAN 5 MG: 5 TABLET, FILM COATED ORAL at 16:25

## 2022-01-01 RX ADMIN — ASPIRIN 162 MG: 81 TABLET, CHEWABLE ORAL at 11:09

## 2022-01-01 RX ADMIN — ACETAMINOPHEN 650 MG: 325 TABLET ORAL at 03:58

## 2022-01-01 RX ADMIN — Medication 3 UNITS: at 05:19

## 2022-01-01 RX ADMIN — BUSPIRONE HYDROCHLORIDE 15 MG: 10 TABLET ORAL at 17:04

## 2022-01-01 RX ADMIN — ACETAMINOPHEN 650 MG: 650 SUPPOSITORY RECTAL at 00:28

## 2022-01-01 RX ADMIN — APIXABAN 5 MG: 5 TABLET, FILM COATED ORAL at 11:09

## 2022-01-01 RX ADMIN — ESCITALOPRAM OXALATE 10 MG: 10 TABLET ORAL at 17:28

## 2022-03-20 NOTE — PROGRESS NOTES
1. Have you been to the ER, urgent care clinic since your last visit? Hospitalized since your last visit? No    2. Have you seen or consulted any other health care providers outside of the [address] [place] [address] since your last visit? Include any pap smears or colon screening. Yes When: [facility] Prostate issues     Patient has no cardiac complaints requesting surgical clearance [facility]. Renal Cancer   AMBULATORY CARE:   Renal cancer  begins in the kidney or ureters  The ureters are the tubes that connect your kidneys to your bladder  Urine is made in the kidneys, collects in the bladder, and is emptied from your body through your urethra  Common symptoms include the following:   · Pink, red, or brown urine    · Abdominal pain or pain in your side    · A lump or growth in your abdomen or side    · Swollen legs or feet, or swelling in your scrotum (males)    · Loss of appetite or weight loss    · Fatigue, fever, or night sweats    Call your local emergency number (911 in the [address] [facility]), or have someone call if:   · You have trouble breathing  · You feel lightheaded, short of breath, and have chest pain  · You cough up blood  Seek care immediately if:   · Your arm or leg feels warm, tender, and painful  It may look swollen and red  · You cannot urinate  · You have severe fatigue or confusion  Call your urologist or oncologist if:   · You have a fever  · Your pain does not go away after you take pain medicine  · You have new or worsening pain  · You have swelling in your legs or feet  · You lose more weight than your healthcare provider said is okay  · You have questions or concerns about your condition or care  Treatment for renal cancer  may include any of the following:  · Surgery  is the main treatment for renal cancer  Your entire kidney may be removed, or only the part where the tumor is found  Lymph nodes or other tissues that contain cancer cells may also be removed  · Procedures  may be used to kill the cancer cells  Examples include cryosurgery, radiofrequency ablation, and arterial embolization  Ask your healthcare provider for more information about these procedures  · Targeted therapy  is medicine given to target and kill cancer cells  It may shrink a kidney tumor or slow its growth      · Immunotherapy  is medicine to help your immune system fight the cancer cells  Self-care:   · Do not smoke  Nicotine can damage blood vessels and make it more difficult to manage renal cancer  Smoking also increases your risk for new or returning cancer  Ask your healthcare provider for information if you currently smoke and need help to quit  E-cigarettes or smokeless tobacco still contain nicotine  Talk to your healthcare provider before you use these products  · Do not drink alcohol  Alcohol can damage your kidneys and make it hard to manage renal cancer  Alcohol can also increase your risk for dehydration  · Drink liquids as directed  Liquids will help prevent constipation and fluid loss caused by vomiting or diarrhea  Ask your healthcare provider how much liquid to drink each day and which liquids are best for you  · Eat a variety of healthy foods  Healthy foods include fruits, vegetables, whole-grain breads, low-fat dairy products, beans, lean meats, and fish  Your healthcare provider may recommend that you limit red meat  You need to eat enough calories to help prevent weight loss and increase your energy level  You also need protein to give you strength  If you do not feel hungry, eat small amounts often instead of large meals  Your healthcare provider or dietitian to help you plan your meals  · Exercise as directed  Exercise can increase your energy level and appetite  Ask your healthcare provider how much exercise you need and which exercises are best for you  For more information and support: It may be difficult for you and your family to go through cancer and cancer treatments  Join a support group or talk with others who have gone through treatment  · Denise Lara [address] [place] [address]  Phone: [phone]  Web Address: http://[employer]/  org  · [address] [place] [address], [address] [place] [address]  Phone: [phone]  Web Address: https://www tracey com/  gov    Follow up with your urologist or oncologist as directed: You will need to see your urologist or oncologist for treatment or tests  Write down your questions so you remember to ask them during your visits  © Copyright [employer] 2022 Information is for End User's use only and may not be sold, redistributed or otherwise used for commercial purposes  All illustrations and images included in CareNotes® are the copyrighted property of [employer]  or [facility] Octavio Mascorro   The above information is an  only  It is not intended as medical advice for individual conditions or treatments  Talk to your doctor, nurse or pharmacist before following any medical regimen to see if it is safe and effective for you

## 2022-03-22 NOTE — PERIOP NOTES
See anesthesia note and MAR for medications given during procedure. Received report from anesthesia staff on vital signs and status of patient.     Endoscope was pre-cleaned at the bedside immediately following procedure by  and HEATHER Goff

## 2022-03-22 NOTE — DISCHARGE INSTRUCTIONS
Tash Rosenberg  [id number]  1945    COLON DISCHARGE INSTRUCTIONS  Discomfort:  Redness at IV site- apply warm compress to area; if redness or soreness persist- contact your physician  There may be a slight amount of blood passed from the rectum  Gaseous discomfort- walking, belching will help relieve any discomfort  You may not operate a vehicle for 12 hours  You may not engage in an occupation involving machinery or appliances for rest of today  You may not drink alcoholic beverages for at least 12 hours  Avoid making any critical decisions for at least 24 hour  DIET:   High fiber diet. - however -  remember your colon is empty and a heavy meal will produce gas. Avoid these foods:  vegetables, fried / greasy foods, carbonated drinks for today    MEDICATIONS:  resume       ACTIVITY:  You may resume your normal daily activities it is recommended that you spend the remainder of the day resting -  avoid any strenuous activity. CALL M.D. ANY SIGN OF:   Increasing pain, nausea, vomiting  Abdominal distension (swelling)  New increased bleeding (oral or rectal)  Fever (chills)  Pain in chest area  Bloody discharge from nose or mouth  Shortness of breath     Follow-up Instructions:   Call Justo Morales MD if any questions or problems. Telephone # [phone]  Biopsy results will be available in  7 to10 days  Should have a repeat colonoscopy in 10 years. COLONOSCOPY FINDINGS:  Your colonoscopy showed: sigmoid diverticulosis, hemorrhoids and skin tags. Patient Education   Patient Education        Hemorrhoids: Care Instructions  Overview     Hemorrhoids are swollen veins that develop in the anal canal. Bleeding during bowel movements, itching, and rectal pain are the most common symptoms. Hemorrhoids can be uncomfortable at times, but rarely are they a serious problem. Most of the time, you can treat them with simple changes to your diet and bowel habits.  These changes include eating more fiber and not straining to pass stools. Most hemorrhoids don't need surgery or other treatment unless they are very large and painful or bleed a lot. Follow-up care is a key part of your treatment and safety. Be sure to make and go to all appointments, and call your doctor if you are having problems. It's also a good idea to know your test results and keep a list of the medicines you take. How can you care for yourself at home? · Sit in a few inches of warm water (sitz bath) 3 times a day and after bowel movements. The warm water helps with pain and itching. · Put ice on your anal area several times a day for 10 minutes at a time. Put a thin cloth between the ice and your skin. Follow this by placing a warm, wet towel on the area for another 10 to 20 minutes. · Take pain medicines exactly as directed. ? If the doctor gave you a prescription medicine for pain, take it as prescribed. ? If you are not taking a prescription pain medicine, ask your doctor if you can take an over-the-counter medicine. · Keep the anal area clean, but be gentle. Use water and a fragrance-free soap, or use baby wipes or medicated pads such as Tucks. · Wear cotton underwear and loose clothing to decrease moisture in the anal area. · Eat more fiber. Include foods such as whole-grain breads and cereals, raw vegetables, raw and dried fruits, and beans. · Drink plenty of fluids. If you have kidney, heart, or liver disease and have to limit fluids, talk with your doctor before you increase the amount of fluids you drink. · Use a stool softener that contains bran or psyllium. You can save money by buying bran or psyllium (available in bulk at most health food stores) and sprinkling it on foods or stirring it into fruit juice. Or you can use a product such as Metamucil or Hydrocil. · Practice healthy bowel habits. ? Go to the bathroom as soon as you have the urge. ? Avoid straining to pass stools.  Relax and give yourself time to let things happen naturally. ? Do not hold your breath while passing stools. ? Do not read while sitting on the toilet. Get off the toilet as soon as you have finished. · Take your medicines exactly as prescribed. Call your doctor if you think you are having a problem with your medicine. When should you call for help? Call 911 anytime you think you may need emergency care. For example, call if:    · You pass maroon or very bloody stools. Call your doctor now or seek immediate medical care if:    · You have increased pain.     · You have increased bleeding. Watch closely for changes in your health, and be sure to contact your doctor if:    · Your symptoms have not improved after 3 or 4 days. Where can you learn more? Go to http://www.fox.com/  Enter F228 in the search box to learn more about \"Hemorrhoids: Care Instructions. \"  Current as of: September 8, 2021               Content Version: 13.2  © 2006-2022 [employer]. Care instructions adapted under license by [employer] (which disclaims liability or warranty for this information). If you have questions about a medical condition or this instruction, always ask your healthcare professional. [place] [address] any warranty or liability for your use of this information. Learning About Diverticulosis and Diverticulitis  What are diverticulosis and diverticulitis? In diverticulosis and diverticulitis, pouches called diverticula form in the wall of the large intestine, or colon. · In diverticulosis, the pouches do not cause any pain or other symptoms. · In diverticulitis, the pouches get inflamed or infected and cause symptoms. Doctors aren't sure what causes these pouches in the colon. But they think that a low-fiber diet may play a role. Without fiber to add bulk to the stool, the colon has to work harder than normal to push the stool forward.  The pressure from this may cause pouches to form in weak spots along the colon. Some people with diverticulosis get diverticulitis. But experts don't know why this happens. What are the symptoms? · In diverticulosis, most people don't have symptoms. But pouches sometimes bleed. · In diverticulitis, symptoms may last from a few hours to a week or more. They include:  ? Belly pain. This is usually in the lower left side. It is sometimes worse when you move. This is the most common symptom. ? Fever and chills. ? Bloating and gas. ? Diarrhea or constipation. ? Nausea and sometimes vomiting.  ? Not feeling like eating. How can you prevent diverticulitis? You may be able to lower your chance of getting diverticulitis. You can do this by taking steps to prevent constipation. · Eat fruits, vegetables, beans, and whole grains every day. These foods are high in fiber. · Drink plenty of fluids. If you have kidney, heart, or liver disease and have to limit fluids, talk with your doctor before you increase the amount of fluids you drink. · Get at least 30 minutes of exercise on most days of the week. Walking is a good choice. You also may want to do other activities, such as running, swimming, cycling, or playing tennis or team sports. · Take a fiber supplement, such as Citrucel or Metamucil, every day if needed. Read and follow all instructions on the label. · Schedule time each day for a bowel movement. Having a daily routine may help. Take your time and do not strain when having a bowel movement. Some people avoid nuts, seeds, berries, and popcorn. They believe that these foods might get trapped in the diverticula and cause pain. But there is no proof that these foods cause diverticulitis or make it worse. How are these problems treated? · The best way to treat diverticulosis is to avoid constipation. · Treatment for diverticulitis includes antibiotics. It often includes a change in your diet.  You may need only liquids at first. Your doctor may suggest pain medicines for pain or belly cramps. In some cases, surgery may be needed. Follow-up care is a key part of your treatment and safety. Be sure to make and go to all appointments, and call your doctor if you are having problems. It's also a good idea to know your test results and keep a list of the medicines you take. Where can you learn more? Go to http://www.gray.com/  Enter K104 in the search box to learn more about \"Learning About Diverticulosis and Diverticulitis. \"  Current as of: September 8, 2021               Content Version: 13.2  © 4566-0149 [employer]. Care instructions adapted under license by [employer] (which disclaims liability or warranty for this information). If you have questions about a medical condition or this instruction, always ask your healthcare professional. [facility] [address] any warranty or liability for your use of this information.

## 2022-03-22 NOTE — OP NOTES
[phone]    Colonoscopy Procedure Note      Indications: Routine screening    : Lance Tomas MD    Staff: Endoscopy Krystin Darnellgers: Keisha Osborn  Endoscopy Technician-2: Ivet Schmidt  Endoscopy RN-1: Anju Owusu    Referring Provider: Sonia Sosa MD     Anesthesia/Sedation: MAC provided by Anesthesia    Pre-Procedure Exam:  Airway: clear   Heart: normal S1and S2    Lungs: clear bilateral  Abdomen: soft, nontender, bowel sounds present and normal in all quadrants   Mental Status: awake, alert, and oriented to person, place, and time      Procedure in Detail:  Informed consent was obtained for the procedure, including sedation. Risks of perforation, hemorrhage, adverse drug reaction, and aspiration were discussed. The patient was placed in the left lateral decubitus position. Based on the pre-procedure assessment, including review of the patient's medical history, medications, allergies, and review of systems, he had been deemed to be an appropriate candidate for moderate sedation; he was therefore sedated with the medications listed above. The patient was monitored continuously with ECG tracing, pulse oximetry, blood pressure monitoring, and direct observations. A rectal examination was performed. The [id number] was inserted into the rectum and advanced under direct vision to the cecum, which was identified by the ileocecal valve and appendiceal orifice. The quality of the colonic preparation was excellent. A careful inspection was made as the colonoscope was withdrawn, including a retroflexed view of the rectum; findings and interventions are described below. Appropriate photodocumentation was obtained.     Findings:   Rectum:     - Protruding lesions:     -External Hemorrhoids and     -Internal Hemorrhoids  Sigmoid:     - Excavated lesions:     - Diverticulosis  Descending Colon:   Normal  Transverse Colon:   Normal  Ascending Colon:   Normal  Cecum: Normal          Specimens: * No specimens in log *     EBL: None    Complications: None; patient tolerated the procedure well. Attending Attestation: I performed the procedure. Recommendations:   - For colon cancer screening in this average-risk patient, colonoscopy may be repeated in 10 years. Discharge Disposition: Home in the company of a  when able to ambulate.     Erica Hi MD  3/22/2022 3:39 PM

## 2022-03-22 NOTE — ANESTHESIA PREPROCEDURE EVALUATION
Anesthetic History   No history of anesthetic complications            Review of Systems / Medical History  Patient summary reviewed and pertinent labs reviewed    Pulmonary  Within defined limits                 Neuro/Psych   Within defined limits           Cardiovascular    Hypertension        Dysrhythmias : atrial fibrillation  PAD (carotid stenosis) and hyperlipidemia  Pertinent negatives: Cardiac stents: Paroxysmal.  Exercise tolerance: >4 METS  Comments: EF 40-45%  EKG 6/8/21: Electronic ventricular pacemaker   Pacemaker ECG   GI/Hepatic/Renal     GERD: well controlled           Endo/Other        Arthritis     Other Findings   Comments: Hx Diverticulosis           Physical Exam    Airway  Mallampati: I  TM Distance: < 4 cm  Neck ROM: normal range of motion   Mouth opening: Normal     Cardiovascular    Rhythm: irregular  Rate: abnormal      Pertinent negatives: No murmur  Comments: bradycardia Dental    Dentition: Bridges  Comments: Upper right   Pulmonary  Breath sounds clear to auscultation               Abdominal  GI exam deferred       Other Findings            Anesthetic Plan    ASA: 3  Anesthesia type: total IV anesthesia and general          Induction: Intravenous  Anesthetic plan and risks discussed with: Patient      Propofol MAC
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
## 2022-03-22 NOTE — ROUTINE PROCESS
Adama Chapin  1945  [id number]    Situation:  Verbal report received from: Luellen Mcburney  Procedure: Procedure(s):  COLONOSCOPY    Background:    Preoperative diagnosis: COLON SCREENING  Postoperative diagnosis: Diverticulosis, Hemorrhoids     :  Dr. Niharika Scherer  Assistant(s): Endoscopy Technician-1: Екатерина Liu  Endoscopy Technician-2: Gilbert Lilly  Endoscopy RN-1: Ezequiel Aldridge    Specimens: * No specimens in log *  H. Pylori  no    Assessment:  Intra-procedure medications     Anesthesia gave intra-procedure sedation and medications, see anesthesia flow sheet yes    Intravenous fluids: NS@ KVO     Vital signs stable     Abdominal assessment: round and soft     Recommendation:  Discharge patient per MD order. Family   Permission to share finding with family or friend yes    Endoscopy discharge instructions have been reviewed and given to patient. The patient verbalized understanding and acceptance of instructions.

## 2022-03-22 NOTE — ANESTHESIA POSTPROCEDURE EVALUATION
Procedure(s):  COLONOSCOPY.    total IV anesthesia    Anesthesia Post Evaluation        Patient location during evaluation: PACU  Note status: Adequate. Level of consciousness: responsive to verbal stimuli and sleepy but conscious  Pain management: satisfactory to patient  Airway patency: patent  Anesthetic complications: no  Cardiovascular status: acceptable  Respiratory status: acceptable  Hydration status: acceptable  Comments: +Post-Anesthesia Evaluation and Assessment    Patient: Nusrat Kang MRN: [id number]  SSN: [id number]   YOB: 1945  Age: 68 y.o. Sex: male      Cardiovascular Function/Vital Signs    BP (!) 115/55   Pulse 73   Temp 36.4 °C (97.6 °F)   Resp 11   Ht 5' 10\" (1.778 m)   Wt 78 kg (172 lb)   SpO2 100%   BMI 24.68 kg/m²     Patient is status post Procedure(s):  COLONOSCOPY. Nausea/Vomiting: Controlled. Postoperative hydration reviewed and adequate. Pain:  Pain Scale 1: Numeric (0 - 10) (03/22/22 1551)  Pain Intensity 1: 0 (03/22/22 1551)   Managed. Neurological Status: At baseline. Mental Status and Level of Consciousness: Arousable. Pulmonary Status:   O2 Device: None (Room air) (03/22/22 1551)   Adequate oxygenation and airway patent. Complications related to anesthesia: None    Post-anesthesia assessment completed. No concerns. Signed By: Luh Tristan DO    3/22/2022  Post anesthesia nausea and vomiting:  controlled      INITIAL Post-op Vital signs:   Vitals Value Taken Time   /68 03/22/22 1554   Temp 36.4 °C (97.6 °F) 03/22/22 1546   Pulse 73 03/22/22 1555   Resp 19 03/22/22 1555   SpO2 100 % 03/22/22 1555   Vitals shown include unvalidated device data.

## 2022-07-08 PROBLEM — I63.9 STROKE (HCC): Status: ACTIVE | Noted: 2022-01-01
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
## 2022-07-08 NOTE — PROGRESS NOTES
Speech pathology  Patient just arrived to neuro unit and is undergoing nurse assessment. Note patient passed MICHAEL swallow screen and is on a diet. MRI pending. SLP will plan to follow up tomorrow pending MRI results to determine if formal evaluation is indicated.   Bobbi Birmingham M.S. CCC-SLP
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
## 2022-07-08 NOTE — PROGRESS NOTES
Speech Pathology Note    Chart reviewed and spoke with RN who indicated that patient is receiving EEG at this time. Will defer and follow up as able. Thank you.     Luis Jordan, SLP

## 2022-07-08 NOTE — ED NOTES
This rn at [facility] on pt arrival. Pt was taken to ct after seeing the provider. Ct scan completed at approx [address]. Pt a&ox2.  VS were not completed until returing to ED room at 0955** this rn remained with pt at all times

## 2022-07-08 NOTE — ED NOTES
Pt continues to rest in bed. No new complaints att. Pt pulled up in bed. Pt assisted with urinal att. Family wife remains at bedside. Stoke assessments in computer per protocol.

## 2022-07-08 NOTE — PROCEDURES
EEG REPORT    Patient Name: Clara Jameson  : 1945  Age: 68 y.o. Ordering physician: Dr. Aubrey Najera  Date of EE2022  14:09 - 14:29  Diagnosis: encephalopathy  Interpreting physician: Carolina Tafoya D.O. FAAN    Procedure: EEG    CLINICAL INDICATION: The patient is a 68 y.o. male who is being evaluated for baseline electro cerebral activities and to rule out seizure focus. Current Facility-Administered Medications   Medication Dose Route Frequency    acetaminophen (TYLENOL) tablet 650 mg  650 mg Oral Q4H PRN    Or    acetaminophen (TYLENOL) solution 650 mg  650 mg Per NG tube Q4H PRN    Or    acetaminophen (TYLENOL) suppository 650 mg  650 mg Rectal Q4H PRN    labetaloL (NORMODYNE;TRANDATE) injection 5 mg  5 mg IntraVENous Q10MIN PRN    magnesium hydroxide (MILK OF MAGNESIA) 400 mg/5 mL oral suspension 30 mL  30 mL Oral DAILY PRN           DESCRIPTION OF PROCEDURE:     This is a digitally recorded electroencephalogram  Electrodes were applied in accordance with the international 10-20 system of electrode placement. 18 channels of scalp EEG are recorded  A channel was used for EoG  Another channel was used for ECG   The data is stored digitally and reviewed in reformatted montages for optimal display  EEG  was reviewed in both bipolar and referential montages    Description of Activity: The background of this recording contains no well-formed alpha activity seen. There was a mixture of diffuse theta and delta activity identified throughout the study. Throughout the recording, there were no clear areas of focal slowing nor spike or spike-and-wave discharges seen. Hyperventilation was not performed. Photic stimulation produced no response in the posterior head regions. During the recording, the patient did not achieve stage II sleep        Clinical Interpretation:     This EEG, performed during wakefulness, is abnormal. There is mild generalized slowing as seen in encephalopathies. There is no focal asymmetry, seizures or epileptiform discharges seen. Clinical correlation is recommended        BRIDGER Sorto

## 2022-07-09 NOTE — PROGRESS NOTES
Occupational Therapy    Orders received, seen for OT evaluation, patient near baseline with high level balance deficits otherwise setup/S for ADLs with wife in room. Expect good gains and no OT needed at discharge. Formal note to follow  Carli Wilkinson.  MS Malini OTR/L

## 2022-07-09 NOTE — PROGRESS NOTES
Problem: TIA/CVA Stroke: 0-24 hours  Goal: Activity/Safety  Outcome: Progressing Towards Goal  Goal: Consults, if ordered  Outcome: Progressing Towards Goal  Goal: Diagnostic Test/Procedures  Outcome: Progressing Towards Goal  Goal: Nutrition/Diet  Outcome: Progressing Towards Goal  Goal: Medications  Outcome: Progressing Towards Goal  Goal: Respiratory  Outcome: Progressing Towards Goal  Goal: Treatments/Interventions/Procedures  Outcome: Progressing Towards Goal  Goal: Psychosocial  Outcome: Progressing Towards Goal  Goal: *Hemodynamically stable  Outcome: Progressing Towards Goal  Goal: *Neurologically stable  Description: Absence of additional neurological deficits    Outcome: Progressing Towards Goal  Goal: *Verbalizes anxiety and depression are reduced or absent  Outcome: Progressing Towards Goal  Goal: *Absence of Signs of Aspiration on Current Diet  Outcome: Progressing Towards Goal     Problem: Patient Education: Go to Patient Education Activity  Goal: Patient/Family Education  Outcome: Progressing Towards Goal

## 2022-07-09 NOTE — PROGRESS NOTES
End of Shift Note    Bedside shift change report given to Irwin Szymanski (oncoming nurse) by Emre Dean RN (offgoing nurse). Report included the following information SBAR, Kardex, Intake/Output and MAR    Shift worked:  7p-7a     Shift summary and any significant changes:     Patient restless and confused throughout night. Telesitter ordered. Patient medicated x1 for back pain     Concerns for physician to address:  none     Zone phone for oncoming shift:   [phone]       Activity:  Activity Level: Up with Assistance  Number times ambulated in hallways past shift: 0  Number of times OOB to chair past shift: 0    Cardiac:   Cardiac Monitoring: Yes      Cardiac Rhythm: Ventricular Paced    Access:   Current line(s): PIV     Genitourinary:   Urinary status: voiding    Respiratory:      Chronic home O2 use?: NO  Incentive spirometer at bedside: NO       GI:  Last Bowel Movement Date: 07/07/22  Current diet:  ADULT DIET Regular; Low Fat/Low Chol/High Fiber/JUANY  ADULT ORAL NUTRITION SUPPLEMENT Breakfast; Standard High Calorie/High Protein  Passing flatus: YES  Tolerating current diet: YES       Pain Management:   Patient states pain is manageable on current regimen: YES    Skin:  Ghulam Score: 20  Interventions: turn team    Patient Safety:  Fall Score:  Total Score: 4  Interventions: gripper socks  High Fall Risk: Yes    Length of Stay:  Expected LOS: 2d 7h  Actual LOS: 1      Emre Dean RN
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
## 2022-07-09 NOTE — PROGRESS NOTES
Pharmacist Consult    Consult reason:   Remdesivir criteria for use evaluation for utilization in COVID-19    Plan:   Patient does NOT meet criteria for use due to no supplemental oxygen

## 2022-07-09 NOTE — PROGRESS NOTES
Comprehensive Nutrition Assessment    Type and Reason for Visit: Initial,Positive nutrition screen    Nutrition Recommendations/Plan:   1. Continue Cardiac diet. 2. Continue Ensure Enlive shakes with meals. 3. RD ordered a thiamine check. 4. Please document % meals and supplements consumed in flowsheet I/O's under intake. Nutrition Assessment:     7/9: Chart reviewed; med noted for acute metabolic encephalopathy, CVA on admission, COVID+. Hx of dementia. Nephrology ordered a B12 level to r/o other causes of dementia. RD added a thiamine level as well. Pt currently receiving a Cardiac diet + Ensure Enlive shakes. Weight trends demonstrate a mild weight loss but not significant for timeframe. No data found. No data found. Last Weight Metric  Weight Loss Metrics 7/8/2022 3/22/2022 7/30/2021 7/27/2021 6/8/2021 5/17/2021 12/24/2020   Today's Wt - 172 lb 174 lb 174 lb 174 lb 9.6 oz 185 lb 176 lb   BMI 24.68 kg/m2 24.68 kg/m2 24.97 kg/m2 24.97 kg/m2 25.05 kg/m2 26.54 kg/m2 25.25 kg/m2     Nutrition Related Findings:    BM: 7/7; Labs: reviewed; Meds: reviewed Wound Type: None    Current Nutrition Intake & Therapies:        ADULT DIET Regular; Low Fat/Low Chol/High Fiber/JUANY  ADULT ORAL NUTRITION SUPPLEMENT Breakfast; Standard High Calorie/High Protein    Anthropometric Measures:  Height: 5' 10\" (177.8 cm)  Ideal Body Weight (IBW): 166 lbs (75 kg)     Current Body Wt:  78 kg (171 lb 15.3 oz), 103.6 % IBW. Current BMI (kg/m2): 24.7                          BMI Category: Normal weight (BMI 22.0-24.9) age over 72    Estimated Daily Nutrient Needs:  Energy Requirements Based On: Formula  Weight Used for Energy Requirements: Current  Energy (kcal/day): 2000 (BMR x 1. 3AF)  Weight Used for Protein Requirements: Current  Protein (g/day): 78 (1.0 g/kg bw)  Method Used for Fluid Requirements: 1 ml/kcal  Fluid (ml/day): 2000 ml/day    Nutrition Diagnosis:   · Inadequate protein-energy intake related to cognitive or neurological impairment as evidenced by  (mild weight loss prior to admission)      Nutrition Interventions:   Food and/or Nutrient Delivery: Continue current diet,Continue oral nutrition supplement,Other (specify) (RD ordered Thiamine check)  Nutrition Education/Counseling: No recommendations at this time  Coordination of Nutrition Care: Continue to monitor while inpatient       Goals:     Goals: PO intake 50% or greater,by next RD assessment       Nutrition Monitoring and Evaluation:   Behavioral-Environmental Outcomes: None identified  Food/Nutrient Intake Outcomes: Food and nutrient intake,Supplement intake  Physical Signs/Symptoms Outcomes: Biochemical data,Skin,Weight    Discharge Planning:    Continue current diet    Simba Cabello RD  Contact:

## 2022-07-09 NOTE — PROGRESS NOTES
Speech Pathology Note    Chart reviewed and spoke with RN. RN reports patient tolerating current Regular/Thin Liquid diet without difficulty. Chest XR showed \"Lungs are clear of an acute process. No acute abnormality identified. \" Head CT showed \"There is extensive low-density in the periventricular white matter extending into subcortical white matter which is nonspecific but could represent extensive microvascular disease. No hemorrhage or mass is identified. \" MRI pending. Per chart review, family reports patient's \"mental status seems to be back to his baseline. \" Will sign off. Please re-consult if further needs arise. Thank you.     Lali Knox M.S., CCC-SLP

## 2022-07-09 NOTE — PROGRESS NOTES
End of Shift Note    Bedside shift change report given to Pilo RN (oncoming nurse) by Caryn Baumann RN (offgoing nurse). Report included the following information SBAR    Shift worked:  day   Shift summary and any significant changes:     Pt arrived on unit. COVID swab ordered and sent       Concerns for physician to address:  See above   Zone phone for oncoming shift:   [phone]     Patient Information  Yovana Arroyo  68 y.o.  7/8/2022  9:07 AM by Андрей Jacome DO.  Yovana Arroyo was admitted from Home    Problem List  Patient Active Problem List    Diagnosis Date Noted    Stroke [facility]) 07/08/2022    Weakness of both legs 06/01/2021    Bilateral lower extremity pain 06/01/2021    Leg pain 05/30/2021    Dilated cardiomyopathy ([address] 12/18/2020    Permanent atrial fibrillation ([address] 12/18/2020    Tachycardia 12/18/2020    A-fib ([address] 12/18/2020    PAF (paroxysmal atrial fibrillation) ([address] 05/16/2018    Age-related cataract 08/01/2017    Allergic rhinitis 06/28/2017    Diverticulosis 06/28/2017    Urinary retention 06/28/2017    PVD (peripheral vascular disease) ([address] 06/28/2017    On statin therapy 06/28/2017    Low back pain 06/28/2017    Insomnia 06/28/2017    HTN (hypertension) 06/28/2017    Hyperlipidemia 06/28/2017    Glucose intolerance (impaired glucose tolerance) 06/28/2017    GERD (gastroesophageal reflux disease) 06/28/2017    ED (erectile dysfunction) 06/28/2017    DJD (degenerative joint disease) 06/28/2017    ASVD (arteriosclerotic vascular disease) 06/28/2017     Past Medical History:   Diagnosis Date    Allergic rhinitis 6/28/2017    Arrhythmia     Paroxysmal Afib- Dr. Elías Gresham ASVD (arteriosclerotic vascular disease) 06/28/2017    Story: carotid stenosis followed by Vasc Surg    Atrial fibrillation ([address]     Congestive heart failure ([address]     Diverticulosis 6/28/2017    Comments: on colonoscopy 12/01    DJD (degenerative joint disease) 6/28/2017    ED (erectile dysfunction) 6/28/2017    GERD (gastroesophageal reflux disease) 6/28/2017    Glucose intolerance (impaired glucose tolerance) 06/28/2017    HTN (hypertension) 6/28/2017    Hyperlipidemia 6/28/2017    Insomnia 6/28/2017    Low back pain 6/28/2017    On statin therapy 6/28/2017    Pacemaker 2020    [facility]VD (per[facility]) [facility] 6/28/2017    Rheumatoid arthritis ([facility]     Urinary retention 6/28/2017       Core Measures:  CVA: Yes Yes  CHF:No No  PNA:No No    Activity:  Activity Level: Up with Assistance  Number times ambulated in hallways past shift: 0  Number of times OOB to chair past shift: 0    Cardiac:   Cardiac Monitoring: Yes      Cardiac Rhythm: Ventricular Paced    Access:   Current line(s): PIV   Central Line? No Placement date  Reason Medically Necessary   PICC LINE? No Placement date Reason Medically Necessary     Genitourinary:   Urinary status: voiding  Urinary Catheter? No Placement Date Reason Medically Necessary     Respiratory:      Chronic home O2 use?: NO  Incentive spirometer at bedside: NO       GI:     Current diet:  ADULT DIET Regular; Low Fat/Low Chol/High Fiber/JUANY  ADULT ORAL NUTRITION SUPPLEMENT Breakfast; Standard High Calorie/High Protein  Passing flatus: YES  Tolerating current diet: YES       Pain Management:   Patient states pain is manageable on current regimen: YES    Skin:  Ghulam Score: 20  Interventions: limit briefs and nutritional support     Patient Safety:  Fall Score:  Total Score: 4  Interventions: bed/chair alarm and pt to call before getting OOB  High Fall Risk: Yes  @Rollbelt  @dexterity to release roll belt  Yes/No ( must document dexterity  here by stating Yes or No here, otherwise this is a restraint and must follow restraint documentation policy.)    DVT prophylaxis:  DVT prophylaxis Med- No  DVT prophylaxis SCD or ISAI- No     Wounds: (If Applicable)  Wounds- No  Location     Active Consults:  IP CONSULT TO NEUROLOGY    Length of Stay:  Expected LOS: 2d 7h  Actual LOS: 0  Discharge Plan: No TBD      Rose Baez, RN

## 2022-07-09 NOTE — PROGRESS NOTES
Hospitalist Progress Note    NAME: Beryle Sprague   :  1945   MRN:  [id number]       Assessment / Plan:  acute metabolic encephalopathy, poa, likely due to progression of natural disease/dementia, and tia (resolving left facial droop). Card monitoring, neuro consult, aspirin/eliquis continue, mri of brain. pt/ot. Physical therapy at least 2 days/week in the home await mri of brain for discharge. bp is in good range.     Low platelets, poa, monitor and stop anticoag as needed.     Acute Ambulatory dysfunction, poa-multifactorial from dementia, short term memory loss, chronic low back pain/stenosis, acuity from [place]. Plan as above.     Left shoulder pain     Severe osteoarthritis     H/o cad with pacemaker placement mild concentric hypertrophy. Low normal systolic function. Severe (grade 3) left ventricular diastolic dysfunction, moderately dilated left atrium. mildly dilated right atrium,mild mitral valve regurgitation is pressent     intractable acute on chronic low back pain associated with bilateral leg pain and leg weakness     Degenerative changes primarily at C6-C7 resulting in mild central stenosis and  mild to severe bilateral foraminal stenosis.      Postsurgical and degenerative changes as most pronounced at L3-4.   Repeat MRI here showed: No change     2022 colonoscopy -External Hemorrhoids and     -Internal Hemorrhoids  Sigmoid Excavated lesions:     - Diverticulosis           Code Status: full  Surrogate Decision Maker:    [address]     Spouse Home Ph:  Work Ph:  Mobile Ph: [phone]            DVT Prophylaxis: eliquis  GI Prophylaxis: not indicated     Baseline: last year could not get out of bed due to lumbar ddd with home pt/ot at discharge. Wife cooks for him, pt does not drive         Subjective:     Chief Complaint / Reason for Physician Visit  No changes. Discussed with RN events overnight.      Review of Systems:  Symptom Y/N Comments  Symptom Y/N Comments   Fever/Chills n Chest Pain     Poor Appetite n   Edema     Cough    Abdominal Pain     Sputum    Joint Pain     SOB/HANNA    Pruritis/Rash     Nausea/vomit    Tolerating PT/OT     Diarrhea    Tolerating Diet     Constipation    Other       Could NOT obtain due to:      Objective:     VITALS:   Last 24hrs VS reviewed since prior progress note. Most recent are:  Patient Vitals for the past 24 hrs:   Temp Pulse Resp BP SpO2   07/09/22 1831 -- 71 -- 128/64 --   07/09/22 1115 -- 75 18 (!) 143/107 100 %   07/09/22 0700 -- 70 -- 125/64 99 %   07/09/22 0330 98.2 °F (36.8 °C) 74 16 (!) 155/72 100 %   07/09/22 0000 98.1 °F (36.7 °C) 80 16 (!) 160/84 99 %   07/08/22 2050 98 °F (36.7 °C) 87 16 (!) 171/73 100 %       Intake/Output Summary (Last 24 hours) at 7/9/2022 1835  Last data filed at 7/9/2022 1115  Gross per 24 hour   Intake --   Output 200 ml   Net -200 ml        I had a face to face encounter and independently examined this patient on 7/9/2022, as outlined below:  PHYSICAL EXAM:  General: WD, WN. Alert, cooperative, no acute distress    Resp:  CTA bilaterally, no wheezing or rales. No accessory muscle use  CV:  Regular  rhythm,  No edema  GI:  Soft, Non distended, Non tender. +Bowel sounds  Neurologic:  Alert and oriented X 3, normal speech, answers questions. Psych:   . Not anxious nor agitated  Skin:  No rashes. No jaundice    Reviewed most current lab test results and cultures  YES  Reviewed most current radiology test results   YES  Review and summation of old records today    NO  Reviewed patient's current orders and MAR    YES  PMH/SH reviewed - no change compared to H&P  ________________________________________________________________________  Care Plan discussed with:    Comments   Patient x    Family      RN x    Care Manager     Consultant                        Multidiciplinary team rounds were held today with , nursing, pharmacist and clinical coordinator.   Patient's plan of care was discussed; medications were reviewed and discharge planning was addressed. ________________________________________________________________________  Total NON critical care TIME:  19   Minutes    Total CRITICAL CARE TIME Spent:   Minutes non procedure based      Comments   >50% of visit spent in counseling and coordination of care     ________________________________________________________________________  Enzo Hand DO     Procedures: see electronic medical records for all procedures/Xrays and details which were not copied into this note but were reviewed prior to creation of Plan. LABS:  I reviewed today's most current labs and imaging studies.   Pertinent labs include:  Recent Labs     07/09/22 0248 07/08/22 0929   WBC 3.7* 4.1   HGB 14.8 14.3   HCT 43.2 41.6   * 113*     Recent Labs     07/09/22 0248 07/08/22 0929    136   K 3.8 4.1    104   CO2 26 27   * 106*   BUN 11 11   CREA 0.94 1.08   CA 8.7 9.0   MG 2.2  --    PHOS 2.6  --    ALB  --  3.4*   TBILI  --  0.6   ALT  --  63   INR  --  1.1       Signed: Enzo Hand DO
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
## 2022-07-10 NOTE — PROGRESS NOTES
Does not remember directions. Remains in room with tele setter and nurses frequently in room to have pt.  Stay in bed

## 2022-07-10 NOTE — PROGRESS NOTES
End of Shift Note    Bedside shift change report given to  Veronica Cagle RN(oncoming nurse) by Ryan Zavala RN (offgoing nurse). Report included the following information SBAR, Kardex, ED Summary, Intake/Output and Cardiac Rhythm vent. paced    Shift worked:  7p -7a     Shift summary and any significant changes:     no changes    Memory loss and balance is poor  O2 sat on room air is 100%     Concerns for physician to address:       Zone phone for oncoming shift:         Activity:  Activity Level: Up with Assistance  Number times ambulated in hallways past shift: 0  Number of times OOB to chair past shift: 1    Cardiac:   Cardiac Monitoring: Yes      Cardiac Rhythm: Ventricular Paced    Access:   Current line(s): PIV     Genitourinary:   Urinary status: voiding    Respiratory:   O2 Device: None (Room air)  Chronic home O2 use?: NO  Incentive spirometer at bedside: N/A       GI:  Last Bowel Movement Date: 07/07/22  Current diet:  ADULT DIET Regular; Low Fat/Low Chol/High Fiber/JUANY  ADULT ORAL NUTRITION SUPPLEMENT Breakfast; Standard High Calorie/High Protein  Passing flatus: YES  Tolerating current diet: YES       Pain Management:   Patient states pain is manageable on current regimen: YES    Skin:  Ghulam Score: 20  Interventions: limit briefs and nutritional support     Patient Safety:  Fall Score:  Total Score: 5  Interventions: bed/chair alarm, assistive device (walker, cane, etc), gripper socks, pt to call before getting OOB, stay with me (per policy) and tele sitter   High Fall Risk: Yes    Length of Stay:  Expected LOS: 2d 7h  Actual LOS: 2      Ryan Zavala RN

## 2022-07-10 NOTE — PROGRESS NOTES
Transition of Care Plan:    RUR: 10%  Disposition: Home with [facility] PT   Follow up appointments: PCP & specialists as indicated  DME needed: None - has cane at home  Transportation at Discharge: Spouse  Keys or means to access home: Yes    IM Medicare Letter: Needed at d/c  Is patient a BCPI-A Bundle: N/A          If yes, was Bundle Letter given?: N/A   Is patient a  and connected with the [address]? Yes               If yes, was [place] transfer form completed and VA notified? Caregiver Contact: Rosaline Peterson, spouse ([phone])  Discharge Caregiver contacted prior to discharge? Yes  Care Conference needed?: No    Reason for Admission:  Stroke                   RUR Score: 10%                    Plan for utilizing home health: PT/OT recommendations for [facility] PT. FOC offered. Referral sent to [facility] for PT. PCP: First and Last name:  Triston Garcia MD     Name of [place]   Are you a current patient: Yes/No: Yes   Approximate date of last visit: 5/26/22   Can you participate in a virtual visit with your PCP: Yes                    Current Advanced Directive/Advance Care Plan: Full Code     Advance Care Planning     General Advance Care Planning (ACP) Conversation    Date of Conversation: 7/10/22  Conducted with: Constanza Patino (spouse)    Healthcare Decision Maker:     Primary Decision Maker: Siva Workman - Spouse - [phone]  Click here to complete [address] including selection of the Healthcare Decision Maker Relationship (ie \"Primary\")    Content/Action Overview:   Reviewed DNR/DNI and patient remains full code. No ACP on file. Due to confusion, pt unable to complete ACP at this time. Spouse believes that he has completed one through the [facility].    Length of Voluntary ACP Conversation in minutes: <16 minutes  Sandra Horton               Transition of Care Plan:            - [address] Appointments  - 2nd IM Letter  - Spouse to transport home    67 YO White Male admitted on 7/8/22 for Stroke. [place] [address] w/ bilateral handrails[place] with spouse in [place], [address] Has 2 sons that live in [place]. Also has brother, sister, and one of the sons living next door. Hx of HH [facility]). Denies hx of SNF, IPR. Ambulate with cane, no other DME. Spouse reported that physically pt is able to complete ADL's, but needs some cueing/reminders due to declining cognitive status. Preferred Rx is [facility] ([address]). Has [facility] Medicare. Due to pt's cognitive status and COVID-19 infection, called pt's spouse (Emre Salazar, [phone]) to complete assessment, verify demographic info. PT/OT consults recommending [facility] PT at d/c. Spouse agreeable. FOC offered. [facility] selected due to previous good experience. Referral submitted to [facility] via [employer], awaiting approval at this time. Spouse reported tentative plan for MRI tomorrow and pt may be discharged afterwards, but have to do cardiology work-up/prep first due to his pacemaker. Spouse to transport home at d/c.          12:21PM [facility] accepted for PT at d/c. Placed on AVS.     CM will continue to remain available to assist with d/c planning      Care Management Interventions  PCP Verified by CM: Yes  Palliative Care Criteria Met (RRAT>21 & CHF Dx)?: No  Mode of Transport at Discharge:  Other (see comment) (spouse)  Transition of Care Consult (CM Consult): Discharge [address]: No  Reason Outside [place]: Patient already serviced by other home care/hospice agency  Discharge Durable Medical Equipment: No (cane at home)  Health Maintenance Reviewed: Yes  Physical Therapy Consult: Yes  Occupational Therapy Consult: Yes  Speech Therapy Consult: Yes  Support Systems: Spouse/Significant Other,Caregiver/Home Care Staff,Child(brandon)  Confirm Follow Up Transport: Family  The Plan for Transition of Care is Related to the Following Treatment Goals : HH PT, Follow-up Appointments  The Patient and/or Patient Representative was Provided with a Choice of Provider and Agrees with the Discharge Plan?: Yes  Name of the Patient Representative Who was Provided with a Choice of Provider and Agrees with the Discharge Plan: Tyler Slacedo (spouse)  Freedom of Choice List was Provided with Basic Dialogue that Supports the Patient's Individualized Plan of Care/Goals, Treatment Preferences and Shares the Quality Data Associated with the Providers?: Yes  Discharge Location  Patient Expects to be Discharged to[de-identified] Home with home health    Shahram Rivera, [address] [facility]  [phone]

## 2022-07-10 NOTE — PROGRESS NOTES
When ambulates is unsteady and uses cane but needs constant verbal direction and tactile directing. Voided large amount in toilet and returned to bed.   VSS

## 2022-07-10 NOTE — PROGRESS NOTES
Hospitalist Progress Note    NAME: Moisesirene Das   :  1945   MRN:  [id number]       Assessment / Plan:  acute metabolic encephalopathy, poa, likely due to progression of natural disease/dementia, and tia (resolving left facial droop). Card monitoring, neuro consult, aspirin/eliquis continue, mri of brain delayed, since he needs a tech pressent during mri due to pacemaker. pt/ot. Physical therapy at least 2 days/week in the home await mri of brain for discharge. bp is in good range. covd positive, droplet isolation. Not a candidate for treatment since not on oxygen supplement.     Low platelets, poa, monitor and stop anticoag as needed.     Acute Ambulatory dysfunction, poa-multifactorial from dementia, short term memory loss, chronic low back pain/stenosis, acuity from [place]. Plan as above.     Left shoulder pain     Severe osteoarthritis     H/o cad with pacemaker placement mild concentric hypertrophy. Low normal systolic function. Severe (grade 3) left ventricular diastolic dysfunction, moderately dilated left atrium. mildly dilated right atrium,mild mitral valve regurgitation is pressent     intractable acute on chronic low back pain associated with bilateral leg pain and leg weakness     Degenerative changes primarily at C6-C7 resulting in mild central stenosis and  mild to severe bilateral foraminal stenosis.      Postsurgical and degenerative changes as most pronounced at L3-4.   Repeat MRI here showed: No change     2022 colonoscopy -External Hemorrhoids and     -Internal Hemorrhoids  Sigmoid Excavated lesions:     - Diverticulosis           Code Status: full  Surrogate Decision Maker:    [facility]     Spouse Home Ph:  Work Ph:  Mobile Ph: [phone]            DVT Prophylaxis: eliquis  GI Prophylaxis: not indicated     Baseline: last year could not get out of bed due to lumbar ddd with home pt/ot at discharge.  Wife cooks for him, pt does not drive         Subjective:     Chief Complaint / Reason for Physician Visit  No changes. Await mri on Monday. Discussed with RN events overnight. Review of Systems:  Symptom Y/N Comments  Symptom Y/N Comments   Fever/Chills n   Chest Pain     Poor Appetite n   Edema     Cough    Abdominal Pain     Sputum    Joint Pain     SOB/HANNA    Pruritis/Rash     Nausea/vomit    Tolerating PT/OT     Diarrhea    Tolerating Diet     Constipation    Other       Could NOT obtain due to:      Objective:     VITALS:   Last 24hrs VS reviewed since prior progress note. Most recent are:  Patient Vitals for the past 24 hrs:   Temp Pulse Resp BP SpO2   07/10/22 0732 97.6 °F (36.4 °C) 70 18 129/73 100 %   07/10/22 0341 97.3 °F (36.3 °C) 74 20 124/84 100 %   07/10/22 0004 97.6 °F (36.4 °C) 78 16 (!) 153/67 100 %   07/09/22 2019 98.6 °F (37 °C) 82 16 (!) 143/62 98 %   07/09/22 1831 -- 71 18 128/64 99 %       Intake/Output Summary (Last 24 hours) at 7/10/2022 1356  Last data filed at 7/10/2022 0819  Gross per 24 hour   Intake --   Output 200 ml   Net -200 ml        PHYSICAL EXAM:  General:  Alert, cooperative, no acute distress , wife at bedside. Resp:  Coarse To auscultation bilaterally, no wheezing or rales. No accessory muscle use  CV:  Regular  rhythm,  No edema  GI:  Soft, Non distended, Non tender. +Bowel sounds  Neurologic:   normal speech, answers questions. Psych:   . Not anxious nor agitated  Skin:  No rashes.   No jaundice    Reviewed most current lab test results and cultures  YES  Reviewed most current radiology test results   YES  Review and summation of old records today    NO  Reviewed patient's current orders and MAR    YES  PMH/SH reviewed - no change compared to H&P  ________________________________________________________________________  Care Plan discussed with:    Comments   Patient x    Family  x    RN x    Care Manager     Consultant                        Multidiciplinary team rounds were held today with , nursing, pharmacist and clinical coordinator. Patient's plan of care was discussed; medications were reviewed and discharge planning was addressed. ________________________________________________________________________  Total NON critical care TIME:  18   Minutes    Total CRITICAL CARE TIME Spent:   Minutes non procedure based      Comments   >50% of visit spent in counseling and coordination of care     ________________________________________________________________________  Preston Bellamy DO     Procedures: see electronic medical records for all procedures/Xrays and details which were not copied into this note but were reviewed prior to creation of Plan. LABS:  I reviewed today's most current labs and imaging studies.   Pertinent labs include:  Recent Labs     07/09/22  0248 07/08/22 0929   WBC 3.7* 4.1   HGB 14.8 14.3   HCT 43.2 41.6   * 113*     Recent Labs     07/10/22  0400 07/09/22  0248 07/08/22 0929    137 136   K 4.3 3.8 4.1    104 104   CO2 25 26 27   GLU 91 102* 106*   BUN 12 11 11   CREA 0.91 0.94 1.08   CA 9.0 8.7 9.0   MG  --  2.2  --    PHOS  --  2.6  --    ALB 3.6  --  3.4*   TBILI 0.6  --  0.6   ALT 67  --  63   INR  --   --  1.1       Signed: Preston Bellamy DO

## 2022-07-11 NOTE — PROGRESS NOTES
Hospitalist Progress Note    Subjective:   Daily Progress Note: 7/11/2022 3:10 PM    Hospital Course:  68year old Male presented to ED after his wife noticed him sliding out of bed on h is side upon awakening. Wife called EMS and EMS noted to have left facial droop. Awaiting MRI and ECHO. Subjective:  Patient wants to go home. Patient's wife by the patient's bedside. She says that she is taking care of patient at home and will be able to provide transport at the time of discharge.        Current Facility-Administered Medications   Medication Dose Route Frequency    aspirin delayed-release tablet 81 mg  81 mg Oral DAILY    cyclobenzaprine (FLEXERIL) tablet 10 mg  10 mg Oral TID PRN    apixaban (ELIQUIS) tablet 5 mg  5 mg Oral BID    lidocaine (XYLOCAINE) 4 % cream   Topical DAILY PRN    methotrexate (RHEUMATREX) tablet 15 mg  15 mg Oral every Monday    pantoprazole (PROTONIX) tablet 40 mg  40 mg Oral ACB    polyethylene glycol (MIRALAX) packet 17 g  17 g Oral DAILY PRN    pravastatin (PRAVACHOL) tablet 20 mg  20 mg Oral QHS    tamsulosin (FLOMAX) capsule 0.4 mg  0.4 mg Oral DAILY    carvediloL (COREG) tablet 6.25 mg  6.25 mg Oral BID WITH MEALS    haloperidoL (HALDOL) tablet 0.5 mg  0.5 mg Oral Q8H PRN    acetaminophen (TYLENOL) tablet 650 mg  650 mg Oral Q4H PRN    Or    acetaminophen (TYLENOL) solution 650 mg  650 mg Per NG tube Q4H PRN    Or    acetaminophen (TYLENOL) suppository 650 mg  650 mg Rectal Q4H PRN    labetaloL (NORMODYNE;TRANDATE) injection 5 mg  5 mg IntraVENous Q10MIN PRN    magnesium hydroxide (MILK OF MAGNESIA) 400 mg/5 mL oral suspension 30 mL  30 mL Oral DAILY PRN        Review of Systems  Constitutional: No fevers, No chills, No sweats, No fatigue, No Weakness  Eyes: No redness  Ears, nose, mouth, throat, and face: No nasal congestion, No sore throat, No voice change  Respiratory: No Shortness of Breath, No cough, No wheezing  Cardiovascular: No chest pain, No palpitations, No extremity edema  Gastrointestinal: No nausea, No vomiting, No diarrhea, No abdominal pain  Genitourinary: No frequency, No dysuria, No hematuria  Integument/breast: No skin lesion(s)   Neurological: has Confusion, No headaches, No dizziness      Objective:     Visit Vitals  /72   Pulse 70   Temp 97.8 °F (36.6 °C)   Resp 16   Ht 5' 10\" (1.778 m)   SpO2 100%   BMI 24.68 kg/m²      O2 Device: None (Room air)    Temp (24hrs), Av.9 °F (36.6 °C), Min:97.6 °F (36.4 °C), Max:98 °F (36.7 °C)      No intake/output data recorded.  1901 -  0700  In: -   Out: 200 [Urine:200]    PHYSICAL EXAM:  Constitutional: No acute distress, pleasantly confused. Skin: Extremities and face reveal no rashes. HEENT: Sclerae anicteric. Extra-occular muscles are intact. No oral ulcers. The neck is supple and no masses. Cardiovascular: Regular rate and rhythm. Respiratory:  Clear breath sounds bilaterally with no wheezes, rales, or rhonchi. GI: Abdomen nondistended, soft, and nontender. Normal active bowel sounds. Musculoskeletal: No pitting edema of the lower legs. Able to move all ext  Neurological:  Patient is awake and alert, pleasantly confused. Cranial nerves II-XII grossly intact  Psychiatric: Mood appears appropriate       Data Review    Recent Results (from the past 24 hour(s))   HEPATIC FUNCTION PANEL    Collection Time: 22  3:13 AM   Result Value Ref Range    Protein, total 6.2 (L) 6.4 - 8.2 g/dL    Albumin 3.3 (L) 3.5 - 5.0 g/dL    Globulin 2.9 2.0 - 4.0 g/dL    A-G Ratio 1.1 1.1 - 2.2      Bilirubin, total 0.6 0.2 - 1.0 MG/DL    Bilirubin, direct 0.2 0.0 - 0.2 MG/DL    Alk.  phosphatase 76 45 - 117 U/L    AST (SGOT) 45 (H) 15 - 37 U/L    ALT (SGPT) 63 12 - 78 U/L   METABOLIC PANEL, BASIC    Collection Time: 22  3:13 AM   Result Value Ref Range    Sodium 137 136 - 145 mmol/L    Potassium 4.3 3.5 - 5.1 mmol/L    Chloride 105 97 - 108 mmol/L    CO2 27 21 - 32 mmol/L    Anion gap 5 5 - 15 mmol/L    Glucose 109 (H) 65 - 100 mg/dL    BUN 16 6 - 20 MG/DL    Creatinine 0.96 0.70 - 1.30 MG/DL    BUN/Creatinine ratio 17 12 - 20      GFR est AA >60 >60 ml/min/1.73m2    GFR est non-AA >60 >60 ml/min/1.73m2    Calcium 8.7 8.5 - 10.1 MG/DL         Assessment / Plan:  Left facial droop:  TIA vs stroke  Pending MRI brain and ECHO  Appreciate neurology consult  Physical therapy recommends home with home health therapy. HbA1C 5.8  LDL: 64.2  Blood pressure well controlled  Continue aspirin, statin and eliquis. COVID- positive:  Droplet isolation  On room air    Ambulatory dysfunction:  Multifactorial dementia, chronic low back pain      Left shoulder pain     Severe osteoarthritis     H/o cad with pacemaker placement mild concentric hypertrophy. Low normal systolic function. Severe (grade 3) left ventricular diastolic dysfunction, moderately dilated left atrium. mildly dilated right atrium,mild mitral valve regurgitation is pressent     intractable acute on chronic low back pain associated with bilateral leg pain and leg weakness     Degenerative changes primarily at C6-C7 resulting in mild central stenosis and  mild to severe bilateral foraminal stenosis.     Postsurgical and degenerative changes as most pronounced at L3-4.   Repeat MRI here showed: No change     March 2022 colonoscopy -External Hemorrhoids and     -Internal Hemorrhoids  Sigmoid Excavated lesions:     - Diverticulosis          18.5 - 24.9 Normal weight / Body mass index is 24.68 kg/m². Code status: Full  Prophylaxis: eliquis  Recommended Disposition:  PT, OT, RN       Time spent 35 minutes involving direct patient care as well as reviewing patient's labs and coordination of care with nursing staff     Care Plan discussed with: Patient/Family/RN/Case Management        Total time spent with patient: 35 minutes.

## 2022-07-11 NOTE — PROGRESS NOTES
Transition of Care Plan:     RUR: 10% - \"low risk\"  Disposition: Home with [facility] (PT) & f/u apts  Follow up appointments: PCP & specialists as indicated  DME needed: None - has cane at home  Transportation at Discharge: Pt's wife present at bedside to provide transportation at d/c  [place] or means to access home: Pt has access to his home    IM Medicare Letter: 2nd IM needed prior to d/c  Is patient a BCPI-A Bundle: N/A                     If yes, was Bundle Letter given?: N/A   Is patient a [place] and connected with the [place]? Yes - d/c summary to be faxed to [facility] for reference               Caregiver Contact: Pt's wife Nimco Dials: [phone])  Discharge Caregiver contacted prior to discharge? To be contacted prior to d/c  Care Conference needed?: No    Initial note: CM acknowledged d/c. Chart reviewed. Pt will d/c today pending completion of echo & MRI. Pt will d/c home with [facility] f/u apts; [facility] informed of d/c today. [facility] information reflected in AVS for reference. CM assistant attempted to secure PCP f/u apt for d/c; pt is a Eliecer Obregon for primary care needs. [facility] to contact pt directly to schedule f/u apt for d/c. CM will fax pt's d/c summary to the [facility] for reference (f: [phone]). CM will review plan for d/c with pt & wife Nimco Burger) via telephone secondary to COVID-19 positive status. Wife will transport at d/c; wife at bedside. CM will continue to follow & remain accessible for d/c planning. Care Management Interventions  PCP Verified by CM: Yes  Palliative Care Criteria Met (RRAT>21 & CHF Dx)?: No  Mode of Transport at Discharge:  Other (see comment) (spouse)  Transition of Care Consult (CM Consult): Discharge [address]: No  Reason Outside [place]: Patient already serviced by other home care/hospice agency  Discharge Durable Medical Equipment: No (cane at home)  Health Maintenance Reviewed: Yes  Physical Therapy Consult: Yes  Occupational Therapy Consult: Yes  Speech Therapy Consult: Yes  Support Systems: Spouse/Significant Other,Caregiver/Home Care Staff,Child(brandon)  Confirm Follow Up Transport: Family  The Plan for Transition of Care is Related to the Following Treatment Goals :  PT, Follow-up Appointments  The Patient and/or Patient Representative was Provided with a Choice of Provider and Agrees with the Discharge Plan?: Yes  Name of the Patient Representative Who was Provided with a Choice of Provider and Agrees with the Discharge Plan: Fernando De (spouse)  Freedom of Choice List was Provided with Basic Dialogue that Supports the Patient's Individualized Plan of Care/Goals, Treatment Preferences and Shares the Quality Data Associated with the Providers?: Yes  Discharge Location  Patient Expects to be Discharged to[de-identified] Home with home health [facility] (PT) & f/u apts)    Norval Lesches, [address] [facility], [address] [facility]

## 2022-07-11 NOTE — PROGRESS NOTES
Attempted to schedule hospital follow up PCP appointment. Sent a message to PCP office to find patient an appointment. [facility] staff will call patient to schedule appt per office protocol.  Divina Farfan

## 2022-07-11 NOTE — PROGRESS NOTES
Problem: Patient Education: Go to Patient Education Activity  Goal: Patient/Family Education  Outcome: Progressing Towards Goal     Problem: TIA/CVA Stroke: 0-24 hours  Goal: Off Pathway (Use only if patient is Off Pathway)  Outcome: Progressing Towards Goal  Goal: Activity/Safety  Outcome: Progressing Towards Goal  Goal: Consults, if ordered  Outcome: Progressing Towards Goal  Goal: Diagnostic Test/Procedures  Outcome: Progressing Towards Goal  Goal: Nutrition/Diet  Outcome: Progressing Towards Goal  Goal: Discharge Planning  Outcome: Progressing Towards Goal  Goal: Medications  Outcome: Progressing Towards Goal  Goal: Respiratory  Outcome: Progressing Towards Goal  Goal: Treatments/Interventions/Procedures  Outcome: Progressing Towards Goal  Goal: Minimize risk of bleeding post-thrombolytic infusion  Outcome: Progressing Towards Goal  Goal: Monitor for complications post-thrombolytic infusion  Outcome: Progressing Towards Goal  Goal: Psychosocial  Outcome: Progressing Towards Goal  Goal: *Hemodynamically stable  Outcome: Progressing Towards Goal  Goal: *Neurologically stable  Description: Absence of additional neurological deficits    Outcome: Progressing Towards Goal  Goal: *Verbalizes anxiety and depression are reduced or absent  Outcome: Progressing Towards Goal  Goal: *Absence of Signs of Aspiration on Current Diet  Outcome: Progressing Towards Goal  Goal: *Absence of deep venous thrombosis signs and symptoms(Stroke Metric)  Outcome: Progressing Towards Goal  Goal: *Ability to perform ADLs and demonstrates progressive mobility and function  Outcome: Progressing Towards Goal  Goal: *Stroke education started(Stroke Metric)  Outcome: Progressing Towards Goal  Goal: *Dysphagia screen performed(Stroke Metric)  Outcome: Progressing Towards Goal  Goal: *Rehab consulted(Stroke Metric)  Outcome: Progressing Towards Goal     Problem: TIA/CVA Stroke: Day 2 Until Discharge  Goal: Off Pathway (Use only if patient is Off Pathway)  Outcome: Progressing Towards Goal  Goal: Activity/Safety  Outcome: Progressing Towards Goal  Goal: Diagnostic Test/Procedures  Outcome: Progressing Towards Goal  Goal: Nutrition/Diet  Outcome: Progressing Towards Goal  Goal: Discharge Planning  Outcome: Progressing Towards Goal  Goal: Medications  Outcome: Progressing Towards Goal  Goal: Respiratory  Outcome: Progressing Towards Goal  Goal: Treatments/Interventions/Procedures  Outcome: Progressing Towards Goal  Goal: Psychosocial  Outcome: Progressing Towards Goal  Goal: *Verbalizes anxiety and depression are reduced or absent  Outcome: Progressing Towards Goal  Goal: *Absence of aspiration  Outcome: Progressing Towards Goal  Goal: *Absence of deep venous thrombosis signs and symptoms(Stroke Metric)  Outcome: Progressing Towards Goal  Goal: *Optimal pain control at patient's stated goal  Outcome: Progressing Towards Goal  Goal: *Tolerating diet  Outcome: Progressing Towards Goal  Goal: *Ability to perform ADLs and demonstrates progressive mobility and function  Outcome: Progressing Towards Goal  Goal: *Stroke education continued(Stroke Metric)  Outcome: Progressing Towards Goal     Problem: Ischemic Stroke: Discharge Outcomes  Goal: *Verbalizes anxiety and depression are reduced or absent  Outcome: Progressing Towards Goal  Goal: *Verbalize understanding of risk factor modification(Stroke Metric)  Outcome: Progressing Towards Goal  Goal: *Hemodynamically stable  Outcome: Progressing Towards Goal  Goal: *Absence of aspiration pneumonia  Outcome: Progressing Towards Goal  Goal: *Aware of needed dietary changes  Outcome: Progressing Towards Goal  Goal: *Verbalize understanding of prescribed medications including anti-coagulants, anti-lipid, and/or anti-platelets(Stroke Metric)  Outcome: Progressing Towards Goal  Goal: *Tolerating diet  Outcome: Progressing Towards Goal  Goal: *Aware of follow-up diagnostics related to anticoagulants  Outcome: Progressing Towards Goal  Goal: *Ability to perform ADLs and demonstrates progressive mobility and function  Outcome: Progressing Towards Goal  Goal: *Absence of DVT(Stroke Metric)  Outcome: Progressing Towards Goal  Goal: *Absence of aspiration  Outcome: Progressing Towards Goal  Goal: *Optimal pain control at patient's stated goal  Outcome: Progressing Towards Goal  Goal: *Home safety concerns addressed  Outcome: Progressing Towards Goal  Goal: *Describes available resources and support systems  Outcome: Progressing Towards Goal  Goal: *Verbalizes understanding of activation of EMS(911) for stroke symptoms(Stroke Metric)  Outcome: Progressing Towards Goal  Goal: *Understands and describes signs and symptoms to report to providers(Stroke Metric)  Outcome: Progressing Towards Goal  Goal: *Neurolgocially stable (absence of additional neurological deficits)  Outcome: Progressing Towards Goal  Goal: *Verbalizes importance of follow-up with primary care physician(Stroke Metric)  Outcome: Progressing Towards Goal  Goal: *Smoking cessation discussed,if applicable(Stroke Metric)  Outcome: Progressing Towards Goal  Goal: *Depression screening completed(Stroke Metric)  Outcome: Progressing Towards Goal     Problem: General Medical Care Plan  Goal: *Vital signs within specified parameters  Outcome: Progressing Towards Goal  Goal: *Labs within defined limits  Outcome: Progressing Towards Goal  Goal: *Absence of infection signs and symptoms  Outcome: Progressing Towards Goal  Goal: *Optimal pain control at patient's stated goal  Outcome: Progressing Towards Goal  Goal: *Skin integrity maintained  Outcome: Progressing Towards Goal  Goal: *Fluid volume balance  Outcome: Progressing Towards Goal  Goal: *Optimize nutritional status  Outcome: Progressing Towards Goal  Goal: *Anxiety reduced or absent  Outcome: Progressing Towards Goal  Goal: *Progressive mobility and function (eg: ADL's)  Outcome: Progressing Towards Goal     Problem: Patient Education: Go to Patient Education Activity  Goal: Patient/Family Education  Outcome: Progressing Towards Goal     Problem: Falls - Risk of  Goal: *Absence of Falls  Description: Document Matthew Oliva Fall Risk and appropriate interventions in the flowsheet.   Outcome: Progressing Towards Goal  Note: Fall Risk Interventions:  Mobility Interventions: Bed/chair exit alarm,Patient to call before getting OOB    Mentation Interventions: Bed/chair exit alarm,Door open when patient unattended    Medication Interventions: Teach patient to arise slowly,Patient to call before getting OOB,Bed/chair exit alarm    Elimination Interventions: Urinal in reach,Bed/chair exit alarm,Call light in reach,Patient to call for help with toileting needs    History of Falls Interventions: Bed/chair exit alarm         Problem: Patient Education: Go to Patient Education Activity  Goal: Patient/Family Education  Outcome: Progressing Towards Goal

## 2022-07-12 NOTE — PROGRESS NOTES
No further CM needs identified. CM notified pt's nurse of d/c. Transition of Care Plan:     RUR: 10% - \"low risk\"  Disposition: Home with [facility] [address] (PT) & f/u apts  Follow up appointments: PCP & specialists as indicated  DME needed: None - has cane at home  Transportation at Discharge: Pt's wife present at bedside to provide transportation at d/c  [place] or means to access home: Pt has access to his home    IM Medicare Letter: 2nd IM provided/signed 7/12/22; copy on chart  Is patient a BCPI-A Bundle: N/A                     If yes, was Bundle Letter given?: N/A   Is patient a [place] and [facility] with [facility]? Yes - d/c summary to be faxed to [facility] for reference               Caregiver Contact: Pt's wife Zuri Venegas: [phone])  Discharge Caregiver contacted prior to discharge? Wife present at bedside the day of d/c (7/12/22); wife agreeable to the d/c plan  Care Conference needed?: No     Initial note: CM acknowledged d/c. Chart reviewed. Pt will d/c home with [facility] (PT) & f/u apts. CM assistant attempted to secure PCP f/u apt for d/c; pt is a Elie[facility] Ob[facility] for primary care needs. [facility] to contact pt directly to schedule f/u apt for d/c. CM will fax pt's d/c summary to the [facility] for reference (f: [phone]). CM contacted pt's wife Zuri Venegas) to review plan for d/c secondary to COVID positive status. CM confirmed wife is agreeable to the d/c plan; wife reported [facility] has already made contact with her. Wife will transport at d/c. 2nd IM reviewed via telephone, nurse took document into room to be signed by pt, copy placed on chart. CM will remain accessible for consult if additional needs arise prior to d/c. Care Management Interventions  PCP Verified by CM: Yes  Palliative Care Criteria Met (RRAT>21 & CHF Dx)?: No  Mode of Transport at Discharge:  Other (see comment) (spouse)  Transition of Care Consult (CM Consult): Discharge [facility] Home Care: No  Reason Outside [place]: Patient already serviced by other home care/hospice agency  Discharge Durable Medical Equipment: No (cane at home)  Health Maintenance Reviewed: Yes  Physical Therapy Consult: Yes  Occupational Therapy Consult: Yes  Speech Therapy Consult: Yes  Support Systems: Spouse/Significant Other,Caregiver/Home Care Staff,Child(brandon)  Confirm Follow Up Transport: Family  The Plan for Transition of Care is Related to the Following Treatment Goals :  PT, Follow-up Appointments  The Patient and/or Patient Representative was Provided with a Choice of Provider and Agrees with the Discharge Plan?: Yes  Name of the Patient Representative Who was Provided with a Choice of Provider and Agrees with the Discharge Plan: [place] Shankar (spouse)  Freedom of Choice List was Provided with Basic Dialogue that Supports the Patient's Individualized Plan of Care/Goals, Treatment Preferences and Shares the Quality Data Associated with the Providers?: Yes  Discharge Location  Patient Expects to be Discharged to[de-identified] Home with home health [facility] (PT) & f/u apts)    Marni Cisneros, [address] [facility], [address] [facility]

## 2022-07-12 NOTE — PROGRESS NOTES
Hospitalist Progress Note    Subjective:   Daily Progress Note: 7/12/2022 3:10 PM    Hospital Course:  68year old Male presented to ED after his wife noticed him sliding out of bed on h is side upon awakening. Wife called EMS and EMS noted to have left facial droop. Awaiting MRI and ECHO.      Subjective:  Seen and evaluated at bedside, overnight events reviewed, patient currently has no new complaints      Current Facility-Administered Medications   Medication Dose Route Frequency    aspirin delayed-release tablet 81 mg  81 mg Oral DAILY    cyclobenzaprine (FLEXERIL) tablet 10 mg  10 mg Oral TID PRN    apixaban (ELIQUIS) tablet 5 mg  5 mg Oral BID    lidocaine (XYLOCAINE) 4 % cream   Topical DAILY PRN    methotrexate (RHEUMATREX) tablet 15 mg  15 mg Oral every Monday    pantoprazole (PROTONIX) tablet 40 mg  40 mg Oral ACB    polyethylene glycol (MIRALAX) packet 17 g  17 g Oral DAILY PRN    pravastatin (PRAVACHOL) tablet 20 mg  20 mg Oral QHS    tamsulosin (FLOMAX) capsule 0.4 mg  0.4 mg Oral DAILY    carvediloL (COREG) tablet 6.25 mg  6.25 mg Oral BID WITH MEALS    haloperidoL (HALDOL) tablet 0.5 mg  0.5 mg Oral Q8H PRN    acetaminophen (TYLENOL) tablet 650 mg  650 mg Oral Q4H PRN    Or    acetaminophen (TYLENOL) solution 650 mg  650 mg Per NG tube Q4H PRN    Or    acetaminophen (TYLENOL) suppository 650 mg  650 mg Rectal Q4H PRN    labetaloL (NORMODYNE;TRANDATE) injection 5 mg  5 mg IntraVENous Q10MIN PRN    magnesium hydroxide (MILK OF MAGNESIA) 400 mg/5 mL oral suspension 30 mL  30 mL Oral DAILY PRN        Review of Systems  Constitutional: No fevers, No chills, No sweats, No fatigue, No Weakness  Eyes: No redness  Ears, nose, mouth, throat, and face: No nasal congestion, No sore throat, No voice change  Respiratory: No Shortness of Breath, No cough, No wheezing  Cardiovascular: No chest pain, No palpitations, No extremity edema  Gastrointestinal: No nausea, No vomiting, No diarrhea, No abdominal pain  Genitourinary: No frequency, No dysuria, No hematuria  Integument/breast: No skin lesion(s)   Neurological: has Confusion, No headaches, No dizziness      Objective:     Visit Vitals  /79 (BP 1 Location: Left upper arm, BP Patient Position: Sitting)   Pulse 70   Temp 97.6 °F (36.4 °C)   Resp 18   Ht 5' 10\" (1.778 m)   Wt 78 kg (171 lb 15.3 oz)   SpO2 96%   BMI 24.67 kg/m²      O2 Device: None (Room air)    Temp (24hrs), Av.1 °F (36.7 °C), Min:97.6 °F (36.4 °C), Max:98.6 °F (37 °C)      No intake/output data recorded. No intake/output data recorded. PHYSICAL EXAM:  Constitutional: No acute distress, pleasantly confused. Skin: Extremities and face reveal no rashes. HEENT: Sclerae anicteric. Extra-occular muscles are intact. No oral ulcers. The neck is supple and no masses. Cardiovascular: Regular rate and rhythm. Respiratory:  Clear breath sounds bilaterally with no wheezes, rales, or rhonchi. GI: Abdomen nondistended, soft, and nontender. Normal active bowel sounds. Musculoskeletal: No pitting edema of the lower legs. Able to move all ext  Neurological:  Patient is awake and alert, pleasantly confused.   Cranial nerves II-XII grossly intact  Psychiatric: Mood appears appropriate       Data Review    Recent Results (from the past 24 hour(s))   ECHO ADULT COMPLETE    Collection Time: 22  3:31 PM   Result Value Ref Range    IVSd 0.9 0.6 - 1.0 cm    LVIDd 3.4 (A) 4.2 - 5.9 cm    LVIDs 2.6 cm    LVOT Diameter 1.9 cm    LVPWd 0.9 0.6 - 1.0 cm    LVOT Peak Gradient 2 mmHg    LVOT Mean Gradient 1 mmHg    LVOT SV 36.3 ml    LVOT Peak Velocity 0.8 m/s    LVOT VTI 12.8 cm    RVSP 30 mmHg    RV Free Wall Peak S' 11 cm/s    LA Diameter 3.4 cm    LA Volume 4C 59 (A) 18 - 58 mL    Est. RA Pressure 10 mmHg    AV Area by Peak Velocity 2.5 cm2    AV Area by VTI 2.7 cm2    AV Peak Gradient 3 mmHg    AV Mean Gradient 2 mmHg    AV Peak Velocity 0.9 m/s    AV Mean Velocity 0.7 m/s    AV VTI 13.6 cm    MV A Velocity 0.12 m/s    MV E Wave Deceleration Time 163.9 ms    MV E Velocity 0.76 m/s    LV E' Lateral Velocity 6 cm/s    LV E' Septal Velocity 4 cm/s    MV PHT 43.4 ms    MV Area by PHT 5.1 cm2    MV Area by VTI 1.7 cm2    MR Peak Gradient 81 mmHg    MR Peak Velocity 4.5 m/s    MV Peak Gradient 2 mmHg    MV Mean Gradient 1 mmHg    MV Max Velocity 0.7 m/s    MV Mean Velocity 0.4 m/s    MV VTI 21.2 cm    Pulmonary Artery EDP 6 mmHg    IN Max Velocity 1.2 m/s    PV Peak Gradient 2 mmHg    PV Max Velocity 0.7 m/s    TAPSE 1.2 (A) 1.7 cm    TR Peak Gradient 20 mmHg    TR Max Velocity 2.23 m/s    Fractional Shortening 2D 24 28 - 44 %    LVIDd Index 1.73 cm/m2    LVIDs Index 1.33 cm/m2    LV RWT Ratio 0.53     LV Mass 2D 84.9 (A) 88 - 224 g    LV Mass 2D Index 43.3 (A) 49 - 115 g/m2    MV E/A 6.33     E/E' Ratio (Averaged) 15.83     E/E' Lateral 12.67     E/E' Septal 19.00     LVOT Stroke Volume Index 18.5 mL/m2    LVOT Area 2.8 cm2    LA Volume Index 4C 30 16 - 34 mL/m2    LA Size Index 1.73 cm/m2    AV Velocity Ratio 0.89     LVOT:AV VTI Index 0.94     MARYCHUY/BSA VTI 1.4 cm2/m2    MARYCHUY/BSA Peak Velocity 1.3 cm2/m2    MV:LVOT VTI Index 1.66    HEPATIC FUNCTION PANEL    Collection Time: 07/12/22  1:23 AM   Result Value Ref Range    Protein, total 6.1 (L) 6.4 - 8.2 g/dL    Albumin 3.2 (L) 3.5 - 5.0 g/dL    Globulin 2.9 2.0 - 4.0 g/dL    A-G Ratio 1.1 1.1 - 2.2      Bilirubin, total 0.8 0.2 - 1.0 MG/DL    Bilirubin, direct 0.2 0.0 - 0.2 MG/DL    Alk.  phosphatase 78 45 - 117 U/L    AST (SGOT) 82 (H) 15 - 37 U/L    ALT (SGPT) 94 (H) 12 - 78 U/L   METABOLIC PANEL, BASIC    Collection Time: 07/12/22  1:23 AM   Result Value Ref Range    Sodium 136 136 - 145 mmol/L    Potassium 3.9 3.5 - 5.1 mmol/L    Chloride 106 97 - 108 mmol/L    CO2 27 21 - 32 mmol/L    Anion gap 3 (L) 5 - 15 mmol/L    Glucose 125 (H) 65 - 100 mg/dL    BUN 14 6 - 20 MG/DL    Creatinine 0.80 0.70 - 1.30 MG/DL    BUN/Creatinine ratio 18 12 - 20 GFR est AA >60 >60 ml/min/1.73m2    GFR est non-AA >60 >60 ml/min/1.73m2    Calcium 8.3 (L) 8.5 - 10.1 MG/DL         Assessment / Plan:  Left facial droop:  TIA vs stroke  Pending MRI brain, patient does have a pacemaker, will clarify with cardiology if compatible with MRI  Appreciate neurology consult  Physical therapy recommends home with home health therapy. HbA1C 5.8  LDL: 64.2  Blood pressure well controlled  Continue aspirin, statin and eliquis. COVID- positive:  Droplet isolation  On room air    Ambulatory dysfunction:  Multifactorial dementia, chronic low back pain      Left shoulder pain     Severe osteoarthritis     H/o cad with pacemaker placement mild concentric hypertrophy. Low normal systolic function. Severe (grade 3) left ventricular diastolic dysfunction, moderately dilated left atrium. mildly dilated right atrium,mild mitral valve regurgitation is pressent     intractable acute on chronic low back pain associated with bilateral leg pain and leg weakness     Degenerative changes primarily at C6-C7 resulting in mild central stenosis and  mild to severe bilateral foraminal stenosis.     Postsurgical and degenerative changes as most pronounced at L3-4.   Repeat MRI here showed: No change     March 2022 colonoscopy -External Hemorrhoids and     -Internal Hemorrhoids  Sigmoid Excavated lesions:     - Diverticulosis    Disposition- pending MRI brain, patient will be discharged home with home health, if patient cannot receive MRI brain we will obtain neurology clearance prior to patient being discharged      18.5 - 24.9 Normal weight / Body mass index is 24.67 kg/m². Code status: Full  Prophylaxis: eliquis  Recommended Disposition:  PT, OT, RN       Time spent 35 minutes involving direct patient care as well as reviewing patient's labs and coordination of care with nursing staff     Care Plan discussed with: Patient/Family/RN/Case Management        Total time spent with patient: 35 minutes.

## 2022-07-12 NOTE — DISCHARGE SUMMARY
Hospitalist Discharge Summary     Patient ID:  Alysia Keller  [id number]  89 y.o.  1945  7/8/2022    PCP on record: Dayla Phoenix, MD    Admit date: 7/8/2022  Discharge date and time: 7/12/2022    DISCHARGE DIAGNOSIS:    Facial droop/TIA versus CVA/COVID-positive/ambulatory dysfunction/left shoulder pain/severe osteoarthritis/history of CAD with permanent pacemaker placement/intractable acute on chronic lower back pain/degenerative changes of C6-C7    CONSULTATIONS:  IP CONSULT TO NEUROLOGY    Excerpted HPI from H&P of [facility] DO [place]:  Yvette Garcia is a 68 y.o.  male who presents with above. Wife noticed he slid out of bed on his side upon awakening. She called 911. EMS noted left facial droop which progressivley became better.      Pt volunteered he has leg pain in the back, nothing makes it worse or better, starts in low back and radiates to back of legs, is intermittent but worse upon awakening this am.  Pt states he cannot walk because of pain. Pt denies bowel or bladder incontinence     We were asked to admit for work up and evaluation of the above problems. ______________________________________________________________________  DISCHARGE SUMMARY/HOSPITAL COURSE:  for full details see H&P, daily progress notes, labs, consult notes.    Patient was subsequently admitted to [facility] for further evaluation as well as management, patient was eval by neurology, patient received a transthoracic echo as well as MRI brain, patient's medications were optimized, patient was evaluated by physical therapy as well as Occupational Therapy following which patient was deemed stable for discharge home with home health with close outpatient follow-up with primary care physician as well as neurology, plan was explained the patient in details agreeable to current plan.          _______________________________________________________________________  Patient seen and examined by me on discharge day. Pertinent Findings:  Gen:    Not in distress  Chest: Clear lungs  CVS:   Regular rhythm, s1/s2 no m/r/g  No edema  Abd:  Soft, not distended, not tender  Neuro:  Alert, Oriented x 4  _______________________________________________________________________  DISCHARGE MEDICATIONS:   Current Discharge Medication List      START taking these medications    Details   acetaminophen (TYLENOL) 325 mg tablet Take 2 Tablets by mouth every four (4) hours as needed for Fever (For temp greater than or equal to 38.5 C or 101.3 F (Unless hepatic failure or contrindicated). Give first line for fever. ). Qty: 60 Tablet, Refills: 0  Start date: 7/12/2022         CONTINUE these medications which have NOT CHANGED    Details   calcium carb/vit D3/minerals (CALCIUM-VITAMIN D PO) Take  by mouth two (2) times a day. carvediloL (COREG) 3.125 mg tablet Take 1 Tablet by mouth two (2) times daily (with meals). Qty: 180 Tablet, Refills: 1      Eliquis 5 mg tablet Take 1 Tablet by mouth two (2) times a day. tamsulosin (FLOMAX) 0.4 mg capsule Take 0.4 mg by mouth daily. lidocaine (XYLOCAINE) 5 % ointment Apply  to affected area as needed for Pain.      pravastatin (PRAVACHOL) 20 mg tablet Take 20 mg by mouth nightly. omeprazole (PRILOSEC) 20 mg capsule Take 20 mg by mouth daily. Indications: 1 (one) Capsule DR daily      aspirin delayed-release 81 mg tablet Take 81 mg by mouth daily. methotrexate (RHEUMATREX) 2.5 mg tablet Take 15 mg by mouth every Monday. cyclobenzaprine (FLEXERIL) 10 mg tablet Take 10 mg by mouth three (3) times daily as needed. polyethylene glycol (MIRALAX) 17 gram packet Take 17 g by mouth daily as needed for Constipation. STOP taking these medications       metFORMIN (GLUCOPHAGE) 500 mg tablet Comments:   Reason for Stopping:                 Patient Follow Up Instructions:    Activity: PT/OT per Home Health  Diet: Cardiac Diet  Wound Care: None needed    Follow-up with PCP/Neurology in 2 weeks. Follow-up tests/labs As per above physicians  Follow-up Information     Follow up With Specialties Details Why Contact Info    Mehdi Ayon MD Family Medicine Schedule an appointment as soon as possible for a visit in 1 week hospital follow-up [address] [facility] [address]      [address] [place] [address]  This is your post-acute home healthcare provider for PT. If you do not hear from them within 24-48 hours, please give them a call. [address], [address]    Elana Aguirre, DO Neurology In 1 week  [address]  [facility]  [phone]          ________________________________________________________________    Risk of deterioration: Low    Condition at Discharge:  Stable  __________________________________________________________________    Disposition  Home with family and home health services    ____________________________________________________________________    Code Status: Full Code  ___________________________________________________________________      Total time in minutes spent coordinating this discharge (includes going over instructions, follow-up, prescriptions, and preparing report for sign off to her PCP) :  45 minutes    Signed:   Mariam Willson MD

## 2022-07-15 NOTE — PROGRESS NOTES
Physician Progress Note      Monique Gray  CSN #:                  [id number]  :                       1945  ADMIT DATE:       2022 9:07 AM  DISCH DATE:        2022 3:49 PM  RESPONDING  PROVIDER #:        Marilou Lang MD          QUERY TEXT:    Pt admitted with resolving facial droop. Pt noted to have Hx. Dementia and DJD. 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 If possible, please document in progress notes and discharge summary the relationship, if any, between *** and ***. The medical record reflects the following:  Risk Factors: Hx: PAF; CHF; HTN; HLD; PPM; Dementia; OA  Clinical Indicators: 98 71 22 135/110 100%. Armorel Bound Armorel Bound CT Head: There is extensive low-density in the periventricular white matter extending into subcortical white matter. ... Armorel Bound CTA Head: Occlusion of the left vertebral artery at the V3-V4 junction. ... MRI Brain: No acute intracranial abnormality. Armorel Bound [facility] Bound Echo: Left Ventricle: Normal left ventricular systolic function with a visually estimated EF of 45 - 50%. Left ventricle is smaller than normal. Normal wall thickness. Normal wall motion. .. Armorel Bound EEG: This EEG, performed during wakefulness, is abnormal. There is mild generalized slowing as seen in encephalopathies. There is no focal asymmetry, seizures or epileptiform discharges seen. Treatment: Card monitoring, neuro consult, aspirin/eliquis continue, mri of brain. pt/ot.  ; Echo ; EEG    Thank you,    Rhina SUTTON  Options provided:  -- Facial droop  due to TIA  -- Facial droop due to CVA  -- Facial droop due to Progression of dementia  -- Facial droop due to chronic low back pain/stenosis  -- Facial droop  due to unknown etiology  -- Other - I will add my own diagnosis  -- Disagree - Not applicable / Not valid  -- Disagree - Clinically unable to determine / Unknown  -- Refer to Clinical Documentation Reviewer    PROVIDER RESPONSE TEXT:    This patient has facial droop due to TIA.     Query created by: Oliva Tuttle on 7/14/2022 8:00 PM      Electronically signed by:  Reza Han MD 7/15/2022 7:21 PM

## 2022-07-25 PROBLEM — I63.9 CVA (CEREBRAL VASCULAR ACCIDENT) (HCC): Status: ACTIVE | Noted: 2022-01-01
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
## 2022-07-25 NOTE — ED NOTES
Assumed care of pt from high acuity waiting room, pt placed on monitor x 3 and family at bedside. Per family pt has become confused x past two days and has been \"dragging\" R foot while ambulating. Pt additionally, pt has had some incontinence which is not his baseline     1535 Pt passed dysphagia screening and provided food/water per MD. MD at bedside     1640 Patient to be transferred to Neuro Room # [phone]. Report given to RN on Maegan Alva for routine progression of care. Report consisted of the following information SBAR, Kardex, ED Summary, MAR, and Recent Results. Patient to be transferred to receiving unit by: transport w/ tele box  (RN or tech name). Outstanding consults needed: No     Next labs due:  pending labs    The following personal items will be sent with the patient during transfer to the floor: All valuables:    Cardiac monitoring ordered: Yes    The following CURRENT information was reported to the receiving RN:    Code status: Prior at time of transfer    Last set of vital signs:  Vital Signs  Level of Consciousness: Alert (0) (07/25/22 1629)  Temp: 97.3 °F (36.3 °C) (07/25/22 1154)  Temp Source: Oral (07/25/22 1154)  Pulse (Heart Rate): 70 (07/25/22 1629)  Resp Rate: 18 (07/25/22 1629)  BP: (!) 169/73 (07/25/22 1629)  MAP (Calculated): 105 (07/25/22 1629)  MEWS Score: 1 (07/25/22 1154)         Oxygen Therapy  O2 Sat (%): 97 % (07/25/22 1629)  O2 Device: None (Room air) (07/25/22 1634)      Last pain assessment:  Pain 1  Pain Scale 1: Numeric (0 - 10)  Pain Intensity 1: 0  Patient Stated Pain Goal: 0      Wounds: No     Urinary catheter: external catheter  Is there a ch order: No     LDAs:            Opportunity for questions and clarification was provided.     Cely Chadwick RN

## 2022-07-25 NOTE — PROGRESS NOTES
End of Shift Note    Bedside shift change report given to Emely Austin RN (oncoming nurse) by Alethea Razo RN (offgoing nurse).   Report included the following information SBAR, Kardex, MAR, and Recent Results    Shift worked:  Days     Shift summary and any significant changes:     New stroke r/o admit to the unit       Concerns for physician to address:     Zone phone for oncoming shift:        Patient Information  Yo Monroy  68 y.o.  7/25/2022 12:04 PM by Bharat Farrar MD. Yo Monroy was admitted from Home    Problem List  Patient Active Problem List    Diagnosis Date Noted    CVA (cerebral vascular accident) ([address] 07/25/2022    Stroke ([address] 07/08/2022    Weakness of both legs 06/01/2021    Bilateral lower extremity pain 06/01/2021    Leg pain 05/30/2021    Dilated cardiomyopathy ([address] 12/18/2020    Permanent atrial fibrillation ([address] 12/18/2020    Tachycardia 12/18/2020    A-fib ([address] 12/18/2020    PAF (paroxysmal atrial fibrillation) ([address] 05/16/2018    Age-related cataract 08/01/2017    Allergic rhinitis 06/28/2017    Diverticulosis 06/28/2017    Urinary retention 06/28/2017    PVD (peripheral vascular disease) ([address] 06/28/2017    On statin therapy 06/28/2017    Low back pain 06/28/2017    Insomnia 06/28/2017    HTN (hypertension) 06/28/2017    Hyperlipidemia 06/28/2017    Glucose intolerance (impaired glucose tolerance) 06/28/2017    GERD (gastroesophageal reflux disease) 06/28/2017    ED (erectile dysfunction) 06/28/2017    DJD (degenerative joint disease) 06/28/2017    ASVD (arteriosclerotic vascular disease) 06/28/2017     Past Medical History:   Diagnosis Date    Allergic rhinitis 6/28/2017    Arrhythmia     Paroxysmal Afib- Dr. Kris Zavala    ASVD (arteriosclerotic vascular disease) 06/28/2017    Story: carotid stenosis followed by Vasc Surg    Atrial fibrillation ([address]     Congestive heart failure ([address]     Diverticulosis 6/28/2017    Comments: on colonoscopy 12/01    DJD (degenerative joint disease) 6/28/2017    ED (erectile dysfunction) 6/28/2017    GERD (gastroesophageal reflux disease) 6/28/2017    Glucose intolerance (impaired glucose tolerance) 06/28/2017    HTN (hypertension) 6/28/2017    Hyperlipidemia 6/28/2017    Insomnia 6/28/2017    Low back pain 6/28/2017    On statin therapy 6/28/2017    Pacemaker 2020    PVD (peripheral vascular disease) ([facility] 6/28/2017    Rheumatoid arthritis ([facility] [address]     Urinary retention 6/28/2017       Core Measures:  CVA: Yes Yes  CHF:No No  PNA:No No    Activity:  Activity Level: Up with Assistance  Number times ambulated in hallways past shift: 0  Number of times OOB to chair past shift: 0    Cardiac:   Cardiac Monitoring: Yes      Cardiac Rhythm: Ventricular Paced    Access:   Current line(s): PIV   Central Line? No Placement date  Reason Medically Necessary     PICC LINE? No Placement date Reason Medically Necessary       Genitourinary:   Urinary status: voiding and incontinent  Urinary Catheter? No Placement Date  Reason Medically Necessary       Respiratory:   O2 Device: None (Room air)  Chronic home O2 use?: NO  Incentive spirometer at bedside: NO       GI:  Last Bowel Movement Date: 07/25/22  Current diet:  ADULT DIET Regular  Passing flatus: YES  Tolerating current diet: YES       Pain Management:   Patient states pain is manageable on current regimen: YES    Skin:  Ghulam Score: 16  Interventions: float heels, increase time out of bed, and PT/OT consult    Patient Safety:  Fall Score:  Total Score: 4  Interventions: bed/chair alarm, gripper socks, pt to call before getting OOB, and stay with me (per policy)  High Fall Risk: Yes  @Rollbelt  @dexterity to release roll belt  Yes/No ( must document dexterity  here by stating Yes or No here, otherwise this is a restraint and must follow restraint documentation policy.)    DVT prophylaxis:  DVT prophylaxis Med- Yes  DVT prophylaxis SCD or ISAI- No     Wounds: (If Applicable)  Wounds- No  Location     Active Consults:  IP CONSULT TO HOSPITALIST  IP CONSULT TO NEUROLOGY    Length of Stay:  Expected LOS: - - -  Actual LOS: 0  Discharge Plan: Yes       Tristan Valle RN
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
## 2022-07-25 NOTE — ROUTINE PROCESS

## 2022-07-25 NOTE — ED TRIAGE NOTES
Dr Richar Mcneil in triage evaluating pt. Pt is not a code stroke at this time, but does need immediate imaging.

## 2022-07-26 PROBLEM — R41.82 ACUTE ALTERATION IN MENTAL STATUS: Status: ACTIVE | Noted: 2022-01-01

## 2022-07-26 PROBLEM — R55 CONVULSIVE SYNCOPE: Status: ACTIVE | Noted: 2022-01-01

## 2022-07-26 PROBLEM — Z86.73 HISTORY OF STROKE: Status: ACTIVE | Noted: 2022-01-01

## 2022-07-26 PROBLEM — I65.23 BILATERAL CAROTID ARTERY STENOSIS: Status: ACTIVE | Noted: 2022-01-01

## 2022-07-26 NOTE — PROGRESS NOTES
Readmission Assessment  Number of days since last admission?: 8-30 days  Previous disposition: Home with Home Health  Who is being interviewed?: Caregiver  What was the patient's/caregiver's perception as to why they think they needed to return back to the hospital?: Other (Comment) (Appt was not made with Neuro prior to DC, stated she was told to call and make one, wife unable to get at timely appointment)  Did you visit your Primary Care Physician after you left the hospital, before you returned this time?: No  Why weren't you able to visit your PCP?: Did not have an appointment  Did you see a specialist, such as Cardiac, Pulmonary, Orthopedic Physician, etc. after you left the hospital?: No (unable to get a timely appointment with neuro)  Who advised the patient to return to the hospital?: Caregiver  Does the patient report anything that got in the way of taking their medications?: No
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
## 2022-07-26 NOTE — PROGRESS NOTES
Occupational Therapy    Attempted to see patient for OT evaluation however pt currently ANJANA for testing. Will defer and continue to follow.      Olesya Tellez, OTADDISON, OTR/L
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
## 2022-07-26 NOTE — PROGRESS NOTES
End of Shift Note    Bedside shift change report given to [address] [place] [address] Alex, RN (oncoming nurse) by Lauren Morley RN (offgoing nurse). Report included the following information SBAR, Kardex, MAR, and Recent Results    Shift worked:  7p-7a     Shift summary and any significant changes:     New stroke r/o admit to the unit. Pending MRI, neuro consult, pt/pt eval. Pt very confused and impulsive throughout the night. Many attempts to get out of bed. Haldol IM given once.         Concerns for physician to address:  Lipitor ordered but medication is listsed as an allergy please verify   Zone phone for oncoming shift:   [phone]     Patient Information  Keo Peña  68 y.o.  7/25/2022 12:04 PM by Margarita Deutsch MD. Keo Peña was admitted from Home    Problem List  Patient Active Problem List    Diagnosis Date Noted    CVA (cerebral vascular accident) ([address] 07/25/2022    Stroke ([address] 07/08/2022    Weakness of both legs 06/01/2021    Bilateral lower extremity pain 06/01/2021    Leg pain 05/30/2021    Dilated cardiomyopathy ([address] 12/18/2020    Permanent atrial fibrillation ([address] 12/18/2020    Tachycardia 12/18/2020    A-fib ([address] 12/18/2020    PAF (paroxysmal atrial fibrillation) ([address] 05/16/2018    Age-related cataract 08/01/2017    Allergic rhinitis 06/28/2017    Diverticulosis 06/28/2017    Urinary retention 06/28/2017    PVD (peripheral vascular disease) ([address] 06/28/2017    On statin therapy 06/28/2017    Low back pain 06/28/2017    Insomnia 06/28/2017    HTN (hypertension) 06/28/2017    Hyperlipidemia 06/28/2017    Glucose intolerance (impaired glucose tolerance) 06/28/2017    GERD (gastroesophageal reflux disease) 06/28/2017    ED (erectile dysfunction) 06/28/2017    DJD (degenerative joint disease) 06/28/2017    ASVD (arteriosclerotic vascular disease) 06/28/2017     Past Medical History:   Diagnosis Date    Allergic rhinitis 6/28/2017    Arrhythmia     Paroxysmal Afib- Dr. Simeon Tatum    ASVD (arteriosclerotic vascular disease) 06/28/2017    Story: carotid stenosis followed by Vasc Surg    Atrial fibrillation ([facility]     Congestive heart failure ([facility]     Diverticulosis 6/28/2017    Comments: on colonoscopy 12/01    DJD (degenerative joint disease) 6/28/2017    ED (erectile dysfunction) 6/28/2017    GERD (gastroesophageal reflux disease) 6/28/2017    Glucose intolerance (impaired glucose tolerance) 06/28/2017    HTN (hypertension) 6/28/2017    Hyperlipidemia 6/28/2017    Insomnia 6/28/2017    Low back pain 6/28/2017    On statin therapy 6/28/2017    Pacemaker 2020    PVD (peripheral vascular disease) ([facility] 6/28/2017    Rheumatoid arthritis ([facility]     Urinary retention 6/28/2017       Core Measures:  CVA: Yes Yes  CHF:No No  PNA:No No    Activity:  Activity Level: Up with Assistance  Number times ambulated in hallways past shift: 0  Number of times OOB to chair past shift: 0    Cardiac:   Cardiac Monitoring: Yes      Cardiac Rhythm: Ventricular Paced    Access:   Current line(s): PIV   Central Line? No Placement date  Reason Medically Necessary     PICC LINE? No Placement date Reason Medically Necessary       Genitourinary:   Urinary status: voiding and incontinent  Urinary Catheter? No Placement Date  Reason Medically Necessary       Respiratory:   O2 Device: None (Room air)  Chronic home O2 use?: NO  Incentive spirometer at bedside: NO       GI:  Last Bowel Movement Date: 07/25/22  Current diet:  ADULT DIET Regular  Passing flatus: YES  Tolerating current diet: YES       Pain Management:   Patient states pain is manageable on current regimen: YES    Skin:  Ghulam Score: 17  Interventions: float heels, increase time out of bed, and PT/OT consult    Patient Safety:  Fall Score:  Total Score: 4  Interventions: bed/chair alarm, gripper socks, pt to call before getting OOB, and stay with me (per policy)  High Fall Risk: Yes  @Rollbelt  @dexterity to release roll belt  Yes pt able to undo when wanted    DVT prophylaxis:  DVT prophylaxis Med- Yes  DVT prophylaxis SCD or ISAI- No     Wounds: (If Applicable)  Wounds- No  Location     Active Consults:  IP CONSULT TO HOSPITALIST  IP CONSULT TO NEUROLOGY    Length of Stay:  Expected LOS: - - -  Actual LOS: 1  Discharge Plan: Yes       Lauren Morley RN
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
## 2022-07-26 NOTE — PROGRESS NOTES
Care Management Interventions  PCP Verified by CM: Yes ([facility] at [place])  Mode of Transport at Discharge: BLS  Transition of [address] (CM Consult): Acute Rehab  Discharge Durable Medical Equipment:  (wife stated the patient will needs shower hand rails at home )  Physical Therapy Consult: Yes  Occupational Therapy Consult: Yes  Speech Therapy Consult: Yes  Support Systems: Spouse/Significant Other  Confirm Follow Up Transport: Family  The Plan for Transition of Care is Related to the Following Treatment Goals : wife would like the patient to go to [facility] for rehab.   The Patient and/or Patient Representative was Provided with a Choice of Provider and Agrees with the Discharge Plan?: Yes  Name of the Patient Representative Who was Provided with a Choice of Provider and Agrees with the Discharge Plan: Wong Holden 17 of Choice List was Provided with Basic Dialogue that Supports the Patient's Individualized Plan of Care/Goals, Treatment Preferences and Shares the Quality Data Associated with the Providers?: Yes  Discharge Location  Patient Expects to be Discharged to[de-identified] Skilled nursing facility

## 2022-07-26 NOTE — PROGRESS NOTES
Hospitalist Progress Note    NAME: Jon Rosenthal   :  1945   MRN:  [id number]       Assessment / Plan:  AMS  POA  Rule out CVA, ? Vascular dementia  Right leg weakness  History of HTN/PAF     -Recent admission in 2022, for left facial droop, suspected TIA, had MRI done which was negative for CVA. EEG was done which was negative.  -Presented to ED for 1 week of sudden cognitive decline, confusion. Dragging of right leg. No focal deficits on exam     -CT head and neck showed multifocal moderate to severe stenosis with occlusion of distal left vertebral artery at V3-V4 junction  -CT head negative for bleed  -Telemetry  -Carotid duplex ordered  -Continue aspirin 81 mg daily, Eliquis 5 mg twice daily  -c/w statin (not currently high intensity)  -Awaiting MRI brain/EEG, neuro eval, PT/ST/OT. Get MRI lumbar spine  -Vit B12, folate, homocysteine, sed rate, TSH, vit D, ALAN, antineuronal cell AB all ordered. Blood cultures ordered  -Had echocardiogram last admission which was negative for PFO  -No obvious infectious etiology       History of CAD  S/p pacemaker  History of grade 3 diastolic dysfunction  C/w coreg     Recurrent fall  Chronic back pain  -check orthostats  -cont flexeril  -MRI lumbar spine ordered     BPH  C/w flomax        Code Status: full code  Surrogate Decision Maker:wife     DVT Prophylaxis: eliquis  GI Prophylaxis: not indicated     Baseline: Walks      18.5 - 24.9 Normal weight / Body mass index is 24.67 kg/m². Estimated discharge date:   Barriers: Neuro eval, MRI, blood work up      Recommended Disposition: SNF/LTC     Subjective:     Chief Complaint / Reason for Physician Visit  Patient seen and examined at the bedside. No acute overnight events. Wife at bedside. Patient's condition appears to be worsening every day. He says \"yes\" when name is called. Has difficulty following commands. Discussed with RN events overnight.      Review of Systems:  Symptom Y/N Comments Symptom Y/N Comments   Fever/Chills    Chest Pain     Poor Appetite    Edema     Cough    Abdominal Pain     Sputum    Joint Pain     SOB/HANNA    Pruritis/Rash     Nausea/vomit    Tolerating PT/OT     Diarrhea    Tolerating Diet     Constipation    Other       Could NOT obtain due to:      Objective:     VITALS:   Last 24hrs VS reviewed since prior progress note. Most recent are:  Patient Vitals for the past 24 hrs:   Temp Pulse Resp BP SpO2   07/26/22 0853 97.5 °F (36.4 °C) -- -- -- --   07/26/22 0341 98.1 °F (36.7 °C) 70 18 133/70 99 %   07/25/22 2355 98 °F (36.7 °C) 71 18 135/72 99 %   07/25/22 1739 97.8 °F (36.6 °C) 71 16 130/76 100 %   07/25/22 1645 -- 70 15 (!) 142/66 98 %   07/25/22 1629 -- 70 18 (!) 169/73 97 %   07/25/22 1600 -- 70 -- -- --   07/25/22 1154 97.3 °F (36.3 °C) 76 16 132/71 100 %       Intake/Output Summary (Last 24 hours) at 7/26/2022 0858  Last data filed at 7/26/2022 0524  Gross per 24 hour   Intake --   Output 500 ml   Net -500 ml        I had a face to face encounter and independently examined this patient on 7/26/2022, as outlined below:  PHYSICAL EXAM:  General: WD, WN. Awake, , no acute distress    EENT:  EOMI. PERRLA. Anicteric sclerae. MMM  Resp:  CTA bilaterally, no wheezing or rales. No accessory muscle use  CV:  Regular  rhythm,  No edema  GI:  Soft, Non distended, Non tender. +Bowel sounds  Neurologic:  Alert, aphasia. Does not follow all commands. Psych:   Poor  insight. Not anxious nor agitated  Skin:  No rashes.   No jaundice    Reviewed most current lab test results and cultures  YES  Reviewed most current radiology test results   YES  Review and summation of old records today    NO  Reviewed patient's current orders and MAR    YES  PMH/SH reviewed - no change compared to H&P  ________________________________________________________________________  Care Plan discussed with:    Comments   Patient     Family      RN     Care Manager     Consultant Multidiciplinary team rounds were held today with , nursing, pharmacist and clinical coordinator. Patient's plan of care was discussed; medications were reviewed and discharge planning was addressed. ________________________________________________________________________  Total NON critical care TIME:  35   Minutes    Total CRITICAL CARE TIME Spent:   Minutes non procedure based      Comments   >50% of visit spent in counseling and coordination of care     ________________________________________________________________________  Sanchez Keller MD     Procedures: see electronic medical records for all procedures/Xrays and details which were not copied into this note but were reviewed prior to creation of Plan. LABS:  I reviewed today's most current labs and imaging studies.   Pertinent labs include:  Recent Labs     07/26/22  0249 07/25/22  1259   WBC 5.3 6.0   HGB 13.3 14.5   HCT 38.0 42.7   * 145*     Recent Labs     07/26/22  0249 07/25/22  1259    140   K 3.9 4.5   * 107   CO2 25 29   GLU 99 107*   BUN 10 12   CREA 0.67* 0.97   CA 8.9 9.6   ALB  --  3.8   TBILI  --  1.2*   ALT  --  79*   INR  --  1.0       Signed: Sanchez Keller MD
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
## 2022-07-26 NOTE — PROGRESS NOTES
Speech path  Patient is currently off the floor. We will follow up  when he is available.   Wendi Hatch, SLP

## 2022-07-27 PROBLEM — I63.312 THROMBOTIC STROKE INVOLVING LEFT MIDDLE CEREBRAL ARTERY (HCC): Status: ACTIVE | Noted: 2022-01-01

## 2022-07-27 NOTE — PROGRESS NOTES
End of Shift Note     Bedside shift change report given to [id number] [facility] (oncoming nurse) by [address] (offgoing nurse). Report included the following information SBAR, Kardex, MAR, and Recent Results     Shift worked: 7a-7p      Shift summary and any significant changes:    Patient confused all attemptng to climb out of bed. Medicated with benadryl x1. Concerns for physician to address: Needs pm meds for severe confusion.     Zone phone for oncoming shift:   [phone]      Patient Information  Haley Sheffield  68 y.o.  7/25/2022 12:04 PM by Raquel Bowden MD. Haley Sheffield was admitted from Home     Problem List       Patient Active Problem List     Diagnosis Date Noted    CVA (cerebral vascular accident) ([address] 07/25/2022    Stroke ([address] 07/08/2022    Weakness of both legs 06/01/2021    Bilateral lower extremity pain 06/01/2021    Leg pain 05/30/2021    Dilated cardiomyopathy ([address] 12/18/2020    Permanent atrial fibrillation ([address] 12/18/2020    Tachycardia 12/18/2020    A-fib ([address] 12/18/2020    PAF (paroxysmal atrial fibrillation) ([address] 05/16/2018    Age-related cataract 08/01/2017    Allergic rhinitis 06/28/2017    Diverticulosis 06/28/2017    Urinary retention 06/28/2017    PVD (peripheral vascular disease) ([address] 06/28/2017    On statin therapy 06/28/2017    Low back pain 06/28/2017    Insomnia 06/28/2017    HTN (hypertension) 06/28/2017    Hyperlipidemia 06/28/2017    Glucose intolerance (impaired glucose tolerance) 06/28/2017    GERD (gastroesophageal reflux disease) 06/28/2017    ED (erectile dysfunction) 06/28/2017    DJD (degenerative joint disease) 06/28/2017    ASVD (arteriosclerotic vascular disease) 06/28/2017           Past Medical History:   Diagnosis Date    Allergic rhinitis 6/28/2017    Arrhythmia       Paroxysmal Afib- Dr. [address]    ASVD (arteriosclerotic vascular disease) 06/28/2017     Story: carotid stenosis followed by Vasc Surg    Atrial fibrillation (HCC)      Congestive heart failure [facility])      Diverticulosis 6/28/2017     Comments: on colonoscopy 12/01    DJD (degenerative joint disease) 6/28/2017    ED (erectile dysfunction) 6/28/2017    GERD (gastroesophageal reflux disease) 6/28/2017    Glucose intolerance (impaired glucose tolerance) 06/28/2017    HTN (hypertension) 6/28/2017    Hyperlipidemia 6/28/2017    Insomnia 6/28/2017    Low back pain 6/28/2017    On statin therapy 6/28/2017    Pacemaker 2020    PVD (peripheral vascular disease) ([facility] 6/28/2017    Rheumatoid arthritis ([facility]      Urinary retention 6/28/2017         Core Measures:  CVA: Yes Yes  CHF:No No  PNA:No No     Activity:  Activity Level: Up with Assistance  Number times ambulated in hallways past shift: 0  Number of times OOB to chair past shift: 0     Cardiac:  Cardiac Monitoring: Yes      Cardiac Rhythm: Ventricular Paced     Access:   Current line(s): PIV   Central Line? No Placement date  Reason Medically Necessary     PICC LINE? No Placement date Reason Medically Necessary        Genitourinary:   Urinary status: voiding and incontinent  Urinary Catheter? No Placement Date  Reason Medically Necessary        Respiratory:   O2 Device: None (Room air)  Chronic home O2 use?: NO  Incentive spirometer at bedside: NO     GI:  Last Bowel Movement Date: 07/25/22  Current diet:  ADULT DIET Regular  Passing flatus: YES  Tolerating current diet: YES     Pain Management:  Patient states pain is manageable on current regimen: YES     Skin:  Ghulam Score: 17  Interventions: float heels, increase time out of bed, and PT/OT consult    Patient Safety:  Fall Score:  Total Score: 4  Interventions: bed/chair alarm, gripper socks, pt to call before getting OOB, and stay with me (per policy)  High Fall Risk: Yes  Rollbelt-yes  Pt has thedexterity to release rollbelt     DVT prophylaxis:  DVT prophylaxis Med- Yes  DVT prophylaxis SCD or ISAI- No     Wounds: (If Applicable)  Wounds- No  Location     Active Consults:  IP CONSULT TO HOSPITALIST  IP CONSULT TO NEUROLOGY     Length of Stay:  Expected LOS: - - -  Actual LOS: 1  Discharge Plan: Per PT recommendations 7/29/22

## 2022-07-27 NOTE — PROCEDURES
[address]  EEG    Name:  Tiffany Childers  MR#:  [id number]  :  1945  ACCOUNT #:  [de-identified]  DATE OF SERVICE:  2022      CLINICAL INDICATION:  This is a 60-year-old male with a history of confusion, sudden altered mental status. EEG to rule out seizures, rule out cortical abnormality, rule out encephalopathy. EEG CLASSIFICATION:  On this patient is dysrhythmia grade II generalized. DESCRIPTION OF THE RECORD:  This is a 16-channel EEG recording on the patient for altered mental status. This study contained a posteriorly located occipital alpha rhythm of 8-9 that was somewhat irregular, but did attenuate some with eye opening. Throughout the recording, there was no clear areas of focal slowing or spike or spike-and-wave discharges seen. There was moderate increase in some generalized 2-6 Hz activity seen. There were no runs of any dysrhythmic or electrographic abnormalities seen in the EEG recording. The patient did enter states of drowsiness and states of sleep with K complexes and sleep spindles seen in central head regions. Hyperventilation was not performed. Photic stimulation produced a minimal driving response in the posterior head region. INTERPRETATION:  This is a moderately abnormal electroencephalogram due to the generalized slowing seen most consistent with diffuse encephalopathy of toxic metabolic or degenerative type. No clear focal process or spike or spike-and-wave discharges seen. Clinical correlation recommended. Mirna Cid MD      [id number]  D:  2022 22:43  T:  2022 0:16  JOB #:  [id number]  CC:   Amie Kline MD

## 2022-07-27 NOTE — PROGRESS NOTES
Problem: Falls - Risk of  Goal: *Absence of Falls  Description: Document Sabino Goodwin Fall Risk and appropriate interventions in the flowsheet. Outcome: Progressing Towards Goal  Note: Fall Risk Interventions:  Mobility Interventions: Bed/chair exit alarm    Mentation Interventions: Bed/chair exit alarm, Door open when patient unattended, Adequate sleep, hydration, pain control, More frequent rounding, Reorient patient, Room close to nurse's station    Medication Interventions: Bed/chair exit alarm, Evaluate medications/consider consulting pharmacy, Patient to call before getting OOB    Elimination Interventions: Call light in reach, Bed/chair exit alarm, Toileting schedule/hourly rounds    History of Falls Interventions: Bed/chair exit alarm, Consult care management for discharge planning, Door open when patient unattended, Evaluate medications/consider consulting pharmacy, Room close to nurse's station, Vital signs minimum Q4HRs X 24 hrs (comment for end date), Assess for delayed presentation/identification of injury for 48 hrs (comment for end date)         Problem: Pressure Injury - Risk of  Goal: *Prevention of pressure injury  Description: Document Ghulam Scale and appropriate interventions in the flowsheet.   Outcome: Progressing Towards Goal  Note: Pressure Injury Interventions:  Sensory Interventions: Assess changes in LOC    Moisture Interventions: Absorbent underpads    Activity Interventions: PT/OT evaluation    Mobility Interventions: Float heels, HOB 30 degrees or less    Nutrition Interventions: Document food/fluid/supplement intake    Friction and Shear Interventions: Apply protective barrier, creams and emollients                Problem: TIA/CVA Stroke: 0-24 hours  Goal: Activity/Safety  Outcome: Progressing Towards Goal  Goal: Consults, if ordered  Outcome: Progressing Towards Goal  Goal: Diagnostic Test/Procedures  Outcome: Progressing Towards Goal  Goal: Nutrition/Diet  Outcome: Progressing Towards Goal  Goal: Discharge Planning  Outcome: Progressing Towards Goal  Goal: Medications  Outcome: Progressing Towards Goal  Goal: Respiratory  Outcome: Progressing Towards Goal  Goal: Treatments/Interventions/Procedures  Outcome: Progressing Towards Goal  Goal: Psychosocial  Outcome: Progressing Towards Goal  Goal: *Hemodynamically stable  Outcome: Progressing Towards Goal  Goal: *Neurologically stable  Description: Absence of additional neurological deficits    Outcome: Progressing Towards Goal  Goal: *Verbalizes anxiety and depression are reduced or absent  Outcome: Progressing Towards Goal  Goal: *Absence of Signs of Aspiration on Current Diet  Outcome: Progressing Towards Goal  Goal: *Absence of deep venous thrombosis signs and symptoms(Stroke Metric)  Outcome: Progressing Towards Goal  Goal: *Ability to perform ADLs and demonstrates progressive mobility and function  Outcome: Progressing Towards Goal  Goal: *Stroke education started(Stroke Metric)  Outcome: Progressing Towards Goal  Goal: *Dysphagia screen performed(Stroke Metric)  Outcome: Progressing Towards Goal  Goal: *Rehab consulted(Stroke Metric)  Outcome: Progressing Towards Goal

## 2022-07-27 NOTE — PROGRESS NOTES
Hospitalist Progress Note    NAME: Nisreen Payton   :  1945   MRN:  [id number]       Assessment / Plan:  AMS  POA  Rule out CVA, ? Vascular dementia  Right leg weakness  History of HTN/PAF     -Recent admission in 2022, for left facial droop, suspected TIA, had MRI done which was negative for CVA. EEG was done which was negative.  -Presented to ED for 1 week of sudden cognitive decline, confusion. Dragging of right leg. No focal deficits on exam     -CT head and neck showed multifocal moderate to severe stenosis with occlusion of distal left vertebral artery at V3-V4 junction  -CT head negative for bleed  -Telemetry  -Carotid duplex   There is moderate stenosis in the right ICA (50-69%). There is mild stenosis in the left ICA (<50%). The right vertebral is antegrade. The left vertebral is antegrade.    -aspirin increased to 162 mg daily, Eliquis 5 mg twice daily. -c/w statin (not currently high intensity)  -MRI brain showed 1. Acute left basal ganglia ischemic infarct. 2. Atrophy and white matter disease, remote infarcts stable otherwise. -MRI Lumbar spine showed 1. Postsurgical changes L4-5 L5-S1 stable. 2. Spinal canal stenosis L3-L4.  - Neuro following  PT/ST/OT. - SNF. On modified diet  -Vit B12, folate, homocysteine, sed rate, TSH, vit D, ALAN, antineuronal cell AB all ordered. Blood cultures ordered  -Had echocardiogram last admission which was negative for PFO  -No obvious infectious etiology       History of CAD  S/p pacemaker  History of grade 3 diastolic dysfunction  C/w coreg     Recurrent fall  Chronic back pain  -check orthostats  -cont flexeril  -MRI lumbar spine ordered     BPH  C/w flomax        Code Status: full code  Surrogate Decision Maker:wife     DVT Prophylaxis: eliquis  GI Prophylaxis: not indicated     Baseline: Walks      18.5 - 24.9 Normal weight / Body mass index is 24.67 kg/m².     Estimated discharge date:   Barriers: Neuro eval final recs, dispo      Recommended Disposition: SNF/LTC     Subjective:     Chief Complaint / Reason for Physician Visit  Patient seen and examined at the bedside. Wife at bedside. Patient a little more awake today. Does follow simple commands. Discussed with RN events overnight. Review of Systems:  Symptom Y/N Comments  Symptom Y/N Comments   Fever/Chills    Chest Pain     Poor Appetite    Edema     Cough    Abdominal Pain     Sputum    Joint Pain     SOB/HANNA    Pruritis/Rash     Nausea/vomit    Tolerating PT/OT     Diarrhea    Tolerating Diet     Constipation    Other       Could NOT obtain due to:      Objective:     VITALS:   Last 24hrs VS reviewed since prior progress note. Most recent are:  Patient Vitals for the past 24 hrs:   Temp Pulse Resp BP SpO2   07/27/22 0807 98.2 °F (36.8 °C) 70 18 135/62 98 %   07/27/22 0322 97.4 °F (36.3 °C) 78 18 122/63 99 %   07/26/22 2322 97.8 °F (36.6 °C) 71 18 (!) 145/65 99 %   07/26/22 2014 97.6 °F (36.4 °C) 72 18 (!) 169/78 99 %   07/26/22 1619 97.5 °F (36.4 °C) 70 18 (!) 152/69 99 %   07/26/22 1306 97.6 °F (36.4 °C) 70 16 133/64 100 %       No intake or output data in the 24 hours ending 07/27/22 6947       I had a face to face encounter and independently examined this patient on 7/27/2022, as outlined below:  PHYSICAL EXAM:  General: WD, WN. Awake, , no acute distress    EENT:  EOMI. PERRLA. Anicteric sclerae. MMM  Resp:  CTA bilaterally, no wheezing or rales. No accessory muscle use  CV:  Regular  rhythm,  No edema  GI:  Soft, Non distended, Non tender. +Bowel sounds  Neurologic:  Alert, aphasia. Does follow simple commands  Psych:   Poor  insight. Not anxious nor agitated  Skin:  No rashes.   No jaundice    Reviewed most current lab test results and cultures  YES  Reviewed most current radiology test results   YES  Review and summation of old records today    NO  Reviewed patient's current orders and MAR    YES  PMH/SH reviewed - no change compared to H&P  ________________________________________________________________________  Care Plan discussed with:    Comments   Patient     Family      RN     Care Manager     Consultant                        Multidiciplinary team rounds were held today with , nursing, pharmacist and clinical coordinator. Patient's plan of care was discussed; medications were reviewed and discharge planning was addressed. ________________________________________________________________________  Total NON critical care TIME:  35   Minutes    Total CRITICAL CARE TIME Spent:   Minutes non procedure based      Comments   >50% of visit spent in counseling and coordination of care     ________________________________________________________________________  Zayda Segal MD     Procedures: see electronic medical records for all procedures/Xrays and details which were not copied into this note but were reviewed prior to creation of Plan. LABS:  I reviewed today's most current labs and imaging studies.   Pertinent labs include:  Recent Labs     07/27/22 0422 07/26/22 0249 07/25/22  1259   WBC 6.1 5.3 6.0   HGB 15.4 13.3 14.5   HCT 44.0 38.0 42.7    128* 145*       Recent Labs     07/27/22 0422 07/26/22  0249 07/25/22  1259    144 140   K 3.9 3.9 4.5   * 112* 107   CO2 23 25 29    99 107*   BUN 13 10 12   CREA 0.83 0.67* 0.97   CA 9.6 8.9 9.6   MG 2.3  --   --    PHOS 2.5*  --   --    ALB  --   --  3.8   TBILI  --   --  1.2*   ALT  --   --  79*   INR  --   --  1.0         Signed: Zayda Segal MD

## 2022-07-27 NOTE — PROGRESS NOTES
Spiritual Care Partner Volunteer visited patient's wife at [address] in [facility] [address] on 7/27/2022   Documented by:     HARDEEP Mann, [facility], Staff [address] [place] [address] Avenue    [address] Paging Service  [phone]

## 2022-07-27 NOTE — PROGRESS NOTES
End of Shift Note     Bedside shift change report given to Joan Rush, RN (oncoming nurse) by Sherri Pablo, PASTOR (offgoing nurse).   Report included the following information SBAR, Kardex, MAR, and Recent Results     Shift worked:  Days      Shift summary and any significant changes:     More confused today, MD aware         Concerns for physician to address:     Zone phone for oncoming shift:        Patient Information  Addie Quijano  68 y.o.  7/25/2022 12:04 PM by Ed Console, MD. Addie Quijano was admitted from Home     Problem List       Patient Active Problem List     Diagnosis Date Noted    CVA (cerebral vascular accident) ([address] 07/25/2022    Stroke ([address] 07/08/2022    Weakness of both legs 06/01/2021    Bilateral lower extremity pain 06/01/2021    Leg pain 05/30/2021    Dilated cardiomyopathy ([address] 12/18/2020    Permanent atrial fibrillation ([address] 12/18/2020    Tachycardia 12/18/2020    A-fib ([address] 12/18/2020    PAF (paroxysmal atrial fibrillation) ([address] 05/16/2018    Age-related cataract 08/01/2017    Allergic rhinitis 06/28/2017    Diverticulosis 06/28/2017    Urinary retention 06/28/2017    PVD (peripheral vascular disease) ([address] 06/28/2017    On statin therapy 06/28/2017    Low back pain 06/28/2017    Insomnia 06/28/2017    HTN (hypertension) 06/28/2017    Hyperlipidemia 06/28/2017    Glucose intolerance (impaired glucose tolerance) 06/28/2017    GERD (gastroesophageal reflux disease) 06/28/2017    ED (erectile dysfunction) 06/28/2017    DJD (degenerative joint disease) 06/28/2017    ASVD (arteriosclerotic vascular disease) 06/28/2017           Past Medical History:   Diagnosis Date    Allergic rhinitis 6/28/2017    Arrhythmia       Paroxysmal Afib- Dr. Patricia Escamilla    ASVD (arteriosclerotic vascular disease) 06/28/2017     Story: carotid stenosis followed by Vasc Surg    Atrial fibrillation ([address]      Congestive heart failure ([address]      Diverticulosis 6/28/2017     Comments: on colonoscopy 12/01    DJD (degenerative joint disease) 6/28/2017    ED (erectile dysfunction) 6/28/2017    GERD (gastroesophageal reflux disease) 6/28/2017    Glucose intolerance (impaired glucose tolerance) 06/28/2017    HTN (hypertension) 6/28/2017    Hyperlipidemia 6/28/2017    Insomnia 6/28/2017    Low back pain 6/28/2017    On statin therapy 6/28/2017    Pacemaker 2020    PVD (peripheral vascular disease) [facility] 6/28/2017    Rheumatoid arthritis ([facility]      Urinary retention 6/28/2017         Core Measures:  CVA: Yes Yes  CHF:No No  PNA:No No     Activity:  Activity Level: Up with Assistance  Number times ambulated in hallways past shift: 0  Number of times OOB to chair past shift: 0     Cardiac:  Cardiac Monitoring: Yes      Cardiac Rhythm: Ventricular Paced     Access:   Current line(s): PIV   Central Line? No Placement date  Reason Medically Necessary     PICC LINE? No Placement date Reason Medically Necessary        Genitourinary:   Urinary status: voiding and incontinent  Urinary Catheter? No Placement Date  Reason Medically Necessary        Respiratory:   O2 Device: None (Room air)  Chronic home O2 use?: NO  Incentive spirometer at bedside: NO     GI:  Last Bowel Movement Date: 07/25/22  Current diet:  ADULT DIET Regular  Passing flatus: YES  Tolerating current diet: YES     Pain Management:  Patient states pain is manageable on current regimen: YES     Skin:  Ghulam Score: 16  Interventions: float heels, increase time out of bed, and PT/OT consult    Patient Safety:  Fall Score:  Total Score: 4  Interventions: bed/chair alarm, gripper socks, pt to call before getting OOB, and stay with me (per policy)  High Fall Risk: Yes  @Rollbelt  @dexterity to release roll belt  Yes/No ( must document dexterity  here by stating Yes or No here, otherwise this is a restraint and must follow restraint documentation policy.)     DVT prophylaxis:  DVT prophylaxis Med- Yes  DVT prophylaxis SCD or ISAI- No      Wounds: (If Applicable)  Wounds- No  Location     Active Consults:  IP CONSULT TO HOSPITALIST  IP CONSULT TO NEUROLOGY     Length of Stay:  Expected LOS: - - -  Actual LOS: 0  Discharge Plan: Yes         Sherri Pablo RN
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
## 2022-07-27 NOTE — PROGRESS NOTES
Problem: Mobility Impaired (Adult and Pediatric)  Goal: *Acute Goals and Plan of Care (Insert Text)  Description:   FUNCTIONAL STATUS PRIOR TO ADMISSION: Patient was modified independent using a rolling walker for functional mobility. Subjective hx provided by spouse at bedside as pt is disoriented to self, time, and situation    HOME SUPPORT PRIOR TO ADMISSION: The patient lived with wife and required assistance for bathing. Physical Therapy Goals  Initiated 7/26/2022  1. Patient will move from supine to sit and sit to supine  in bed with moderate assistance  within 7 day(s). 2.  Patient will transfer from bed to chair and chair to bed with moderate assistance  using the least restrictive device within 7 day(s). 3.  Patient will perform sit to stand with moderate assistance  within 7 day(s). 4.  Patient will ambulate with moderate assistance  for 15 feet with the least restrictive device within 7 day(s). 5.  Patient will improve Jimenez Balance score by 7 points within 7 days. Outcome: Progressing Towards Goal     PHYSICAL THERAPY TREATMENT  Patient: Yo Monroy (41 y.o. male)  Date: 7/27/2022  Diagnosis: CVA (cerebral vascular accident) [facility]) [I63.9] <principal problem not specified>      Precautions: Fall (AMS)  Chart, physical therapy assessment, plan of care and goals were reviewed. ASSESSMENT  Patient continues with skilled PT services and is progressing towards goals. Pt presents supine on room air with permission to mobilize from RN. Pt alert but disorientedx4, able to follow simple one-step commands throughout session. Supine > sit with ModAx2, increased time, verbal and tactile cueing. Seated balance improved today, pt able to sit upright with UE support with occasional Dee to maintain upright posture. Sit > stand with MinAx2 and RW. Ambulates 3ft to chair with ModA and RW. Requires simple verbal and tactile cueing throughout to weight shift and alternate steps.  Able to perform alternating UE reach in standing with RW for support. BP drops in standing to 92/81 mmHg. Demonstrates delayed processing when told to transfer stand > sit, requires simple instruction twice to sit down. LAQ performed in sitting, requires demonstrating and simple verbal commands to produce alternating movement. Able to performed bilateral ankle pumps simultaneously. BP improves to 116/71 mmHg. Pt given activity apron as it is documented he was restless overnight. Pt left seated in recliner chair with legs elevated and call bell within reach. Patient Vitals for the past 4 hrs:   Position BP   07/27/22 1200 sitting 116/70   07/27/22 1157 standing 92/81   07/27/22 1155 sitting 116/71       Will recommend IPR at baseline as pt continues to show progress with functional mobility and is well below his previous baseline of Angela with RW. Current Level of Function Impacting Discharge (mobility/balance): Min-MaxA    Other factors to consider for discharge: cognition         PLAN :  Patient continues to benefit from skilled intervention to address the above impairments. Continue treatment per established plan of care. to address goals. Recommendation for discharge: (in order for the patient to meet his/her long term goals)  Therapy 3 hours per day 5-7 days per week    This discharge recommendation:  Has been made in collaboration with the attending provider and/or case management    IF patient discharges home will need the following DME: shower chair       SUBJECTIVE:   Patient stated yes.     OBJECTIVE DATA SUMMARY:   Critical Behavior:  Neurologic State: Alert  Orientation Level: Oriented to person, Disoriented to place, Disoriented to situation, Disoriented to time  Cognition: Follows commands  Safety/Judgement: Decreased insight into deficits  Functional Mobility Training:  Bed Mobility:  Rolling:  Moderate assistance  Supine to Sit: Moderate assistance;Assist x2     Scooting: Maximum assistance Transfers:  Sit to Stand: Minimum assistance;Assist x2  Stand to Sit: Minimum assistance;Assist x2        Bed to Chair: Moderate assistance                    Balance:  Sitting: Impaired  Sitting - Static: Fair (occasional)  Sitting - Dynamic: Fair (occasional)  Standing: Impaired  Standing - Static: Fair;Constant support  Standing - Dynamic : Fair;Constant support  Ambulation/Gait Training:  Distance (ft): 3 Feet (ft)     Ambulation - Level of Assistance: Moderate assistance        Gait Abnormalities: Decreased step clearance        Base of Support: Narrowed     Speed/Paula: Pace decreased (<100 feet/min); Shuffled  Step Length: Left shortened;Right shortened                    Stairs: Therapeutic Exercises:   LAQ, ankle pumps  Pain Rating:      Activity Tolerance:   Good    After treatment patient left in no apparent distress:   Sitting in chair and Call bell within reach    COMMUNICATION/COLLABORATION:   The patients plan of care was discussed with: Occupational therapist, Registered nurse, and Case management. RE Conley   Time Calculation: 17 mins       Regarding student involvement in patient care:  A student participated in this treatment session. Per CMS Medicare statements and APTA guidelines I certify that the following was true:  1. I was present and directly observed the entire session. 2. I made all skilled judgments and clinical decisions regarding care. 3. I am the practitioner responsible for assessment, treatment, and documentation.

## 2022-07-27 NOTE — PROGRESS NOTES
Occupational Therapy    Attempted to see patient for OT session this AM. Patient currently ANJANA for testing. Will defer and continue to follow.      BROOKE Rodriguez, OTR/L

## 2022-07-27 NOTE — PROGRESS NOTES
Problem: Dysphagia (Adult)  Goal: *Acute Goals and Plan of Care (Insert Text)  Description: 7/26/2022  Speech path goals  1. Patient will participate with reeval of swallowing. Goal met 7/27.  2. Patient will participate with MBS to assess swallow physiology. Outcome: Progressing Towards Goal     Problem: Communication Impaired (Adult)  Goal: *Acute Goals and Plan of Care (Insert Text)  Description: 7/26/2022  Speech path goals  1. Patient will follow 2 step commands with 80% acc. 2. Patient will answer simple to mod level y/n questions with 80% acc  3. Patient will name basic ADL objects with 70% acc with mod cues. Outcome: Progressing Towards Goal   SPEECH LANGUAGE PATHOLOGY DYSPHAGIA AND SPEECH TREATMENT  Patient: Ashwin Ott (1 y.o. male)  Date: 7/27/2022  Diagnosis: CVA (cerebral vascular accident) ([facility] [address] [I63.9] <principal problem not specified>      Precautions: aspiration Fall (AMS)    ASSESSMENT:  The patient continues with mod to severe auditory comprehension deficits and verbal expression deficits /rec and exp aphasia secondary to L BG CVA. He follows one step commands but processing is slow. Y/n responses are not reliable. Counted to 5 slowly but did not state any VENKAT. Named with 30% acc basic ADL objects. Frequently made NR. Visual deficits are suspected as his gaze is variable. Swallow of purees and solids appeared to be grossly wnl but some discoordination was suspected. Swallow of liquids characterized by multiple swallows which could be due to oral or pharyngeal residue. He had an intermittent cough which did not seem aspiration related but concerning so an MBS is recommended. PLAN:  Recommendations and Planned Interventions:  Recommend MBS to assess swallowing physiology   Will need intensive speech therapy at discharge and while in this setting  Patient continues to benefit from skilled intervention to address the above impairments.   Continue treatment per established plan of care.  Discharge Recommendations: To Be Determined     SUBJECTIVE:   Patient stated 2 when asked how many children which is accurate. OBJECTIVE:   Cognitive and Communication Status:  Neurologic State: Alert  Orientation Level: Oriented to person, Disoriented to place, Disoriented to situation, Disoriented to time  Cognition: Follows commands  Perception:  (appears to have visual deficits with cues needed to look L and R)  Perseveration: Perseverates during conversation  Safety/Judgement: Decreased insight into deficits    Dysphagia Treatment and Interventions:  Oral Assessment:     P.O. Trials:  Patient Position: upright in bed  Vocal quality prior to P.O.: No impairment  Consistency Presented: Thin liquid;Puree;Mechanical soft  How Presented: Self-fed/presented;Straw;Cup/gulp     Bolus Acceptance: No impairment  Bolus Formation/Control: Impaired  Type of Impairment: Delayed  Propulsion: Delayed (# of seconds); Discoordination  Oral Residue: None  Initiation of Swallow: Delayed (# of seconds)  Laryngeal Elevation: Functional  Aspiration Signs/Symptoms: None  Pharyngeal Phase Characteristics: Double swallow;Multiple swallows; Suspected pharyngeal residue             Oral Phase Severity: Mild  Pharyngeal Phase Severity : Mild-moderate                      Speech Treatment and Interventions: Motor Speech:      Speech Characteristics: Neologisms; Word retrieval;Paraphasias     Dysarthric Characteristics: Imprecise  Interfering Components: Attention  Language Comprehension and Expression:  Auditory Comprehension   Response to Basic Yes/No Questions (%): 100 %  One-Step Basic Commands (%): 100 %  Effective Techniques: Repetition  Verbal Expression  Verbal Expression  Automatic Speech Task: Impaired (comment)  Confrontation (%): 40 %  Conversation: Non-fluent  Speech Characteristics: Neologisms; Word retrieval;Paraphasias  Interfering Components: Attention  Overall Impairment: Moderate-severe           Voice: wnl    Response & Tolerance to Activities:   Fair to good      Pain:  Pain Scale 1: Numeric (0 - 10)  Pain Intensity 1: 0       After treatment:   Patient left in no apparent distress in bed    COMMUNICATION/EDUCATION:   Educated his wife that he would need an MBS and has aphasia with reduced auditory comprehension. Trouble naming and expressing his needs. The patient's plan of care including recommendations, planned interventions, and recommended diet changes were discussed with: Registered nurse.        PRATIMA Resendez  Time Calculation: 20 mins
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
## 2022-07-27 NOTE — PROGRESS NOTES
End of Shift Note     Bedside shift change report given to Joses Reina (oncoming nurse) by Ubaldo Aleman RN (offgoing nurse). Report included the following information SBAR, Kardex, MAR, and Recent Results     Shift worked: 7a-7p      Shift summary and any significant changes:    Patient very tired and slept a lot today. Concerns for physician to address: Patient's wife states patient takes pravastatin which is listed as an allergy without any difficulty.    [employer] phone for oncoming shift:   [id number]      Patient Information  Moises Pippa  68 y.o.  7/25/2022 12:04 PM by Bradford Morrison MD. Moises Pippa was admitted from Home     Problem List       Patient Active Problem List     Diagnosis Date Noted    CVA (cerebral vascular accident) ([address] 07/25/2022    Stroke ([address] 07/08/2022    Weakness of both legs 06/01/2021    Bilateral lower extremity pain 06/01/2021    Leg pain 05/30/2021    Dilated cardiomyopathy ([address] 12/18/2020    Permanent atrial fibrillation ([address] 12/18/2020    Tachycardia 12/18/2020    A-fib ([address] 12/18/2020    PAF (paroxysmal atrial fibrillation) ([address] 05/16/2018    Age-related cataract 08/01/2017    Allergic rhinitis 06/28/2017    Diverticulosis 06/28/2017    Urinary retention 06/28/2017    PVD (peripheral vascular disease) ([address] 06/28/2017    On statin therapy 06/28/2017    Low back pain 06/28/2017    Insomnia 06/28/2017    HTN (hypertension) 06/28/2017    Hyperlipidemia 06/28/2017    Glucose intolerance (impaired glucose tolerance) 06/28/2017    GERD (gastroesophageal reflux disease) 06/28/2017    ED (erectile dysfunction) 06/28/2017    DJD (degenerative joint disease) 06/28/2017    ASVD (arteriosclerotic vascular disease) 06/28/2017           Past Medical History:   Diagnosis Date    Allergic rhinitis 6/28/2017    Arrhythmia       Paroxysmal Afib- Dr. Skye Verdugo    ASVD (arteriosclerotic vascular disease) 06/28/2017     Story: carotid stenosis followed by Vasc Surg    Atrial fibrillation ([facility]      Congestive heart failure ([facility]      Diverticulosis 6/28/2017     Comments: on colonoscopy 12/01    DJD (degenerative joint disease) 6/28/2017    ED (erectile dysfunction) 6/28/2017    GERD (gastroesophageal reflux disease) 6/28/2017    Glucose intolerance (impaired glucose tolerance) 06/28/2017    HTN (hypertension) 6/28/2017    Hyperlipidemia 6/28/2017    Insomnia 6/28/2017    Low back pain 6/28/2017    On statin therapy 6/28/2017    Pacemaker 2020    PVD (peripheral vascular disease) ([facility] 6/28/2017    Rheumatoid arthritis ([facility]      Urinary retention 6/28/2017         Core Measures:  CVA: Yes Yes  CHF:No No  PNA:No No     Activity:  Activity Level: Up with Assistance  Number times ambulated in hallways past shift: 0  Number of times OOB to chair past shift: 0     Cardiac:  Cardiac Monitoring: Yes      Cardiac Rhythm: Ventricular Paced     Access:   Current line(s): PIV   Central Line? No Placement date  Reason Medically Necessary     PICC LINE? No Placement date Reason Medically Necessary        Genitourinary:   Urinary status: voiding and incontinent  Urinary Catheter? No Placement Date  Reason Medically Necessary        Respiratory:   O2 Device: None (Room air)  Chronic home O2 use?: NO  Incentive spirometer at bedside: NO     GI:  Last Bowel Movement Date: 07/25/22  Current diet:  ADULT DIET Regular  Passing flatus: YES  Tolerating current diet: YES     Pain Management:  Patient states pain is manageable on current regimen: YES     Skin:  Ghulam Score: 17  Interventions: float heels, increase time out of bed, and PT/OT consult    Patient Safety:  Fall Score:  Total Score: 4  Interventions: bed/chair alarm, gripper socks, pt to call before getting OOB, and stay with me (per policy)  High Fall Risk: Yes  Rollbelt-yes  Pt has thedexterity to release rollbelt     DVT prophylaxis:  DVT prophylaxis Med- Yes  DVT prophylaxis SCD or ISAI- No     Wounds: (If Applicable)  Wounds- No  Location     Active Consults:  IP CONSULT TO HOSPITALIST  IP CONSULT TO NEUROLOGY     Length of Stay:  Expected LOS: - - -  Actual LOS: 1  Discharge Plan: Per PT recommendations 7/29/22

## 2022-07-27 NOTE — PROGRESS NOTES
Problem: Self Care Deficits Care Plan (Adult)  Goal: *Acute Goals and Plan of Care (Insert Text)  Description: FUNCTIONAL STATUS PRIOR TO ADMISSION: Patient was modified independent using a rolling walker for functional mobility. Hx falls and AMS. Wife provides transportation 2/2 decreased cognition at baseline. HOME SUPPORT: The patient lived with spouse who assisted with IADLs and driving. Family lives close by for support. Occupational Therapy Goals  Initiated 7/26/2022   1. Patient will perform upper body dressing with moderate assistance  within 7 day(s). 2.  Patient will perform lower body dressing with moderate assistance  within 7 day(s). 3.  Patient will perform bathing with moderate assistance  within 7 day(s). 4.  Patient will perform toilet transfers with moderate assistance  within 7 day(s). 5.  Patient will perform all aspects of toileting with moderate assistance  within 7 day(s). 6.  Patient will participate in upper extremity therapeutic exercise/activities with moderate assistance  within 7 day(s). 7.  Patient will utilize energy conservation techniques during functional activities with verbal cues within 7 day(s). 8.  Patient will improve their Fugl Jamil score by 5 points in prep for ADLs within 7 days. Outcome: Progressing Towards Goal   OCCUPATIONAL THERAPY TREATMENT  Patient: Messi Izaguirre (59 y.o. male)  Date: 7/27/2022  Diagnosis: CVA (cerebral vascular accident) [facility]) [I63.9] <principal problem not specified>      Precautions: Fall (AMS)  Chart, occupational therapy assessment, plan of care, and goals were reviewed. ASSESSMENT  Patient continues with skilled OT services and is progressing towards goals. Patient received supine in bed agreeable to session, Aox1 with wife at bedside. Patient drowsy however more alert than yesterday. Pt able to transition to EOB with  mod A x2, required occasional assist to maintain seated balance.  Patient able to transition to standing with simple, one step verbal cues. Pt able to complete brief stand pivot transfer with mod A / min A x2 at RW level, maximal one step multimodal cueing provided. Pt tolerated standing to engage in reaching across midline briefly prior to returning to sitting in recliner chair. Patient is well below baseline LOF, improving participation and command following on this date. Recommend IPR at DC to maximize independence. Patient Vitals for the past 4 hrs:    Position BP   07/27/22 1200 sitting 116/70   07/27/22 1157 standing 92/81   07/27/22 1155 sitting 116/71       Current Level of Function Impacting Discharge (ADLs): mod - total A    Other factors to consider for discharge: CVA         PLAN :  Patient continues to benefit from skilled intervention to address the above impairments. Continue treatment per established plan of care to address goals    Recommendation for discharge: (in order for the patient to meet his/her long term goals)  Therapy 3 hours per day 5-7 days per week    This discharge recommendation:  Has been made in collaboration with the attending provider and/or case management    IF patient discharges home will need the following DME: defer to facility        SUBJECTIVE:   Patient stated 82 84. \" Pt unable to recall wifes name. OBJECTIVE DATA SUMMARY:   Cognitive/Behavioral Status:  Neurologic State: Alert  Orientation Level: Oriented to person  Cognition: Follows commands  Perception:  (appears to have visual deficits with cues needed to look L and R)          Functional Mobility and Transfers for ADLs:  Bed Mobility:  Rolling:  Moderate assistance  Supine to Sit: Moderate assistance;Assist x2  Scooting: Maximum assistance    Transfers:  Sit to Stand: Minimum assistance;Assist x2     Bed to Chair: Moderate assistance    Balance:  Sitting: Impaired  Sitting - Static: Fair (occasional)  Sitting - Dynamic: Fair (occasional)  Standing: Impaired  Standing - Static: Fair;Constant support  Standing - Dynamic : Fair;Constant support    ADL Intervention:       Grooming  Position Performed: Seated in chair  Washing Face: Set-up; Supervision            Pain:  Pt did not report pain during session. Activity Tolerance:   Fair and requires rest breaks    After treatment patient left in no apparent distress:   Sitting in chair, Call bell within reach, Bed / chair alarm activated, and Caregiver / family present    COMMUNICATION/COLLABORATION:   The patients plan of care was discussed with: Physical therapist, Occupational therapist, and Registered nurse.      Gerardo Davis OT  Time Calculation: 18 mins

## 2022-07-28 PROBLEM — U07.1 ENCEPHALOPATHY DUE TO COVID-19 VIRUS: Status: ACTIVE | Noted: 2022-01-01

## 2022-07-28 PROBLEM — G93.49 ENCEPHALOPATHY DUE TO COVID-19 VIRUS: Status: ACTIVE | Noted: 2022-01-01

## 2022-07-28 NOTE — PROGRESS NOTES
Speech Therapy    Chart reviewed. Note silent aspiration of both puree vs mildly thick and thin on MBS, with recommendations for PO meds in applesauce when alert. Patient is currently sleeping. Will hold off on swallow treatment. Blaire Isaacs M.S., CCC-SLP

## 2022-07-28 NOTE — PROGRESS NOTES
Transition of Care Plan:    RUR: 12% - \"low risk\"  Disposition: IPR placement (pending acceptance/insurance auth)  Follow up appointments: PCP & specialist as indicated   DME needed: Defer to rehab for DME needs  Transportation at Discharge: BLS transport needed for d/c  Keys or means to access home: N/A - pt likely transitioning to rehab at d/c     IM Medicare Letter: 2nd IM needed prior to d/c  Is patient a BCPI-A Bundle: N/A         Is patient a [place] and connected with the [address]? Yes - clinical updates faxed to the [facility] per protocol 7/28/22  Caregiver Contact: Pt's wife Yobany Del Castilloger: [phone])  Discharge Caregiver contacted prior to discharge? To be contacted prior to d/c  Care Conference needed?: N/A  COVID-19 test: PCR COVID-19 test completed 7/27/22; results positive    Update - 12:50 PM: Clinical updates faxed to the [facility] for reference (f: [phone]). Updates on status of acceptance/denial to [facility] remains pending at this time. Initial note: Chart reviewed, IDR completed. MD reported PATTI for early next week. PCR COVID-19 test completed 7/27/22 in anticipation for IPR; results positive. Previous CMJaycee sent referral for IPR to [facility] 7/26/22; referral pending updates at this time. CM requested for [facility] to report updates on the status of acceptance/denial to avoid delay in d/c. Pt will require insurance auth for IPR; updated PT/OT notes needed until [employer] is obtained. Pt is a ; CM will fax clinical updates to the [facility] per protocol. CM will continue to follow & report updates as they become available.     Yousif Lazaro MSW  Care Manager, [facility]

## 2022-07-28 NOTE — PROGRESS NOTES
Problem: Self Care Deficits Care Plan (Adult)  Goal: *Acute Goals and Plan of Care (Insert Text)  Description: FUNCTIONAL STATUS PRIOR TO ADMISSION: Patient was modified independent using a rolling walker for functional mobility. Hx falls and AMS. Wife provides transportation 2/2 decreased cognition at baseline. HOME SUPPORT: The patient lived with spouse who assisted with IADLs and driving. Family lives close by for support. Occupational Therapy Goals  Initiated 7/26/2022   1. Patient will perform upper body dressing with moderate assistance  within 7 day(s). 2.  Patient will perform lower body dressing with moderate assistance  within 7 day(s). 3.  Patient will perform bathing with moderate assistance  within 7 day(s). 4.  Patient will perform toilet transfers with moderate assistance  within 7 day(s). 5.  Patient will perform all aspects of toileting with moderate assistance  within 7 day(s). 6.  Patient will participate in upper extremity therapeutic exercise/activities with moderate assistance  within 7 day(s). 7.  Patient will utilize energy conservation techniques during functional activities with verbal cues within 7 day(s). 8.  Patient will improve their Fugl Jamil score by 5 points in prep for ADLs within 7 days. Outcome: Progressing Towards Goal   OCCUPATIONAL THERAPY TREATMENT  Patient: Verna Mays (8 y.o. male)  Date: 7/28/2022  Diagnosis: [facility] [I63.9] <principal problem not specified>  Procedure(s) (LRB):  ESOPHAGOGASTRODUODENOSCOPY (EGD) (N/A)  PERCUTANEOUS ENDOSCOPIC GASTROSTOMY TUBE CHANGE (N/A)    Precautions: Fall (AMS)  Chart, occupational therapy assessment, plan of care, and goals were reviewed. ASSESSMENT  Patient continues with skilled OT services and is progressing towards goals. Pt was supine in bed and was min of 1 for supine to sit and max to mod for scooting to EOB. Pt was able to pull up socks sitting on EOB.   Pt was mod of 2 to stand and with RW, therapy worked on weight shifting and tried to get pt to lean to the left and reach for items and he was not able to do so and not able to visually focus on item. He was able to reach for his toothbrush in sitting and completed task sitting down. Pt was min of 1 to walk to sink and wash his face with min to CGA, and min to mod assist of 2 for walking back to chair feel this may be more due to being tired and very difficult for pt to process information given and decreased motor planning also. Current Level of Function Impacting Discharge (ADLs): max for LB ADLs and mod to min for UB ADLs              PLAN :  Patient continues to benefit from skilled intervention to address the above impairments. Continue treatment per established plan of care to address goals. Recommend with staff: Fallon Hatch for meals    Recommend next OT session: work on standing at sink for UB ADLs    Recommendation for discharge: (in order for the patient to meet his/her long term goals)  Therapy 3 hours per day 5-7 days per week    This discharge recommendation:  Has been made in collaboration with the attending provider and/or case management    IF patient discharges home will need the following DME: tbd       SUBJECTIVE:   Patient stated I can try .     OBJECTIVE DATA SUMMARY:   Cognitive/Behavioral Status:  Neurologic State: Alert;Confused  Orientation Level: Oriented to person;Oriented to place  Cognition: Impaired decision making; Follows commands  Perception: Cues to attend left visual field;Cues to attend right visual field;Cues to attend to left side of body;Cues to attend to right side of body  Perseveration: No perseveration noted  Safety/Judgement: Fall prevention    Functional Mobility and Transfers for ADLs:  Bed Mobility:  Rolling: Minimum assistance;Assist x2  Supine to Sit: Minimum assistance;Assist x2  Scooting: Moderate assistance;Assist x1    Transfers:  Sit to Stand:  Moderate assistance;Minimum assistance;Assist x2  Functional Transfers  Toilet Transfer : Moderate assistance;Assist x2  Bed to Chair: Maximum assistance; Moderate assistance;Assist x2    Balance:  Sitting: Intact; Without support  Sitting - Static: Good (unsupported)  Sitting - Dynamic: Good (unsupported)  Standing: Impaired; Without support  Standing - Static: Fair;Constant support  Standing - Dynamic : Fair;Constant support    ADL Intervention:  Feeding  Feeding Assistance:  (NPO)    Grooming  Position Performed: Standing  Washing Face: Contact guard assistance;Stand-by assistance  Washing Hands: Contact guard assistance;Stand-by assistance  Brushing Teeth: Stand-by assistance;Supervision (sitting)    Upper Body Bathing  Bathing Assistance: Moderate assistance;Minimum assistance  Position Performed: Seated in chair         Lower Body Bathing  Bathing Assistance: Maximum assistance  Perineal  : Maximum assistance  Position Performed: Standing       Toileting  Bladder Hygiene: Total assistance (dependent) (catheter)  Bowel Hygiene: Total assistance (dependent)    Cognitive Retraining  Safety/Judgement: Fall prevention        Pain:  none    Activity Tolerance:   Fair    After treatment patient left in no apparent distress:   Sitting in chair, Call bell within reach, Bed / chair alarm activated, and Caregiver / family present    COMMUNICATION/COLLABORATION:   The patients plan of care was discussed with: Physical therapist and Registered nurse.      Nela Alexandre OT  Time Calculation: 32 mins

## 2022-07-28 NOTE — PROGRESS NOTES
Bedside and Verbal shift change report given to [address] [place] [address] Street (oncoming nurse) by Karen Riojas RN (offgoing nurse). Report included the following information SBAR, Kardex, and Intake/Output.
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
## 2022-07-28 NOTE — PROGRESS NOTES
Problem: Mobility Impaired (Adult and Pediatric)  Goal: *Acute Goals and Plan of Care (Insert Text)  Description:   FUNCTIONAL STATUS PRIOR TO ADMISSION: Patient was modified independent using a rolling walker for functional mobility. Subjective hx provided by spouse at bedside as pt is disoriented to self, time, and situation    HOME SUPPORT PRIOR TO ADMISSION: The patient lived with wife and required assistance for bathing. Physical Therapy Goals  Initiated 7/26/2022  1. Patient will move from supine to sit and sit to supine  in bed with moderate assistance  within 7 day(s). 2.  Patient will transfer from bed to chair and chair to bed with moderate assistance  using the least restrictive device within 7 day(s). 3.  Patient will perform sit to stand with moderate assistance  within 7 day(s). 4.  Patient will ambulate with moderate assistance  for 15 feet with the least restrictive device within 7 day(s). 5.  Patient will improve Jimenez Balance score by 7 points within 7 days. Outcome: Progressing Towards Goal   PHYSICAL THERAPY TREATMENT  Patient: Jessica Bowling (32 y.o. male)  Date: 7/28/2022  Diagnosis: CVA (cerebral vascular accident) ([facility] [I63.9] <principal problem not specified>  Procedure(s) (LRB):  ESOPHAGOGASTRODUODENOSCOPY (EGD) (N/A)  PERCUTANEOUS ENDOSCOPIC GASTROSTOMY TUBE CHANGE (N/A)    Precautions: Fall ([facility])  Chart, physical therapy assessment, plan of care and goals were reviewed. ASSESSMENT  Patient continues with skilled PT services and is progressing towards goals. Came to sitting with light 2 person assistance. Sitting balance needed light intermittent assistance at first but improved with reaching exercises to needing no assistance. Stood with mod/min a x 2 from bed - needed 2 tries. Worked in standing balance activities: reaching, weight shifting side to side - stood over 5 minutes during this time. Once sitting worked with OT on brushing teeth.  Then ambulated with RW to sink across room with min a from PT for mostly facilitated weight shifting to improve R step lengths which worked well. Once at sink, worked with OT in washing face with PT assisting patient to maintain good standing posture as he tends to bend forward too much. On the walk back with RW patient needed increased assistance for balance and advancing walker. Left patient up in bedside recliner chair with spouse in room when the session ended. Current Level of Function Impacting Discharge (mobility/balance): min/mod a x 2    Other factors to consider for discharge: high risk for falls; previously independent with RW at home; supportive spouse. PLAN :  Patient continues to benefit from skilled intervention to address the above impairments. Continue treatment per established plan of care. to address goals. Recommendation for discharge: (in order for the patient to meet his/her long term goals)  Therapy 3 hours per day 5-7 days per week    This discharge recommendation:  Has been made in collaboration with the attending provider and/or case management    IF patient discharges home will need the following DME: bedside commode and wheelchair       SUBJECTIVE:   Patient stated - patient minimally spoke    OBJECTIVE DATA SUMMARY:   Critical Behavior:  Neurologic State: Alert, Confused  Orientation Level: Oriented to person, Oriented to place  Cognition: Impaired decision making, Follows commands  Safety/Judgement: Fall prevention  Functional Mobility Training:  Bed Mobility:  Rolling: Minimum assistance;Assist x2  Supine to Sit: Minimum assistance;Assist x2     Scooting: Moderate assistance;Assist x1        Transfers:  Sit to Stand: Moderate assistance;Minimum assistance;Assist x2  Stand to Sit: Minimum assistance;Assist x2        Bed to Chair: Maximum assistance; Moderate assistance;Assist x2                    Balance:  Sitting: Intact; Without support  Sitting - Static: Good (unsupported)  Sitting - Dynamic: Good (unsupported)  Standing: Impaired; Without support  Standing - Static: Fair;Constant support  Standing - Dynamic : Fair;Constant support  Ambulation/Gait Training:  Distance (ft): 35 Feet (ft)  Assistive Device: Gait belt;Walker, rolling  Ambulation - Level of Assistance: Moderate assistance        Gait Abnormalities: Decreased step clearance        Base of Support: Narrowed  Stance: Right increased  Speed/Paula: Pace decreased (<100 feet/min)  Step Length: Right shortened        Interventions: Safety awareness training; Tactile cues;Manual cues (facilitated weight shift L to improve RLE step lengths)  Pain Rating:  No complaints during the PT session. Activity Tolerance:   Good and requires rest breaks    After treatment patient left in no apparent distress:   Sitting in chair, Call bell within reach, Bed / chair alarm activated, and Caregiver / family present    COMMUNICATION/COLLABORATION:   The patients plan of care was discussed with: Occupational therapist and Registered nurse.      Reinaldo Goodwin, PT   Time Calculation: 33 mins
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
## 2022-07-28 NOTE — PROGRESS NOTES
Hospitalist Progress Note    NAME: Lamine Hunt   :  1945   MRN:  [id number]       Assessment / Plan:  AMS  POA  Rule out CVA, ? Vascular dementia  Right leg weakness  History of HTN/PAF     -Recent admission in 2022, for left facial droop, suspected TIA, had MRI done which was negative for CVA. EEG was done which was negative.  -Presented to ED for 1 week of sudden cognitive decline, confusion. Dragging of right leg. No focal deficits on exam     -CT head and neck showed multifocal moderate to severe stenosis with occlusion of distal left vertebral artery at V3-V4 junction  -CT head negative for bleed  -Telemetry  -Carotid duplex   There is moderate stenosis in the right ICA (50-69%). There is mild stenosis in the left ICA (<50%). The right vertebral is antegrade. The left vertebral is antegrade.    -aspirin increased to 162 mg daily, Eliquis 5 mg twice daily. -c/w statin (not currently high intensity)  -MRI brain showed 1. Acute left basal ganglia ischemic infarct. 2. Atrophy and white matter disease, remote infarcts stable otherwise. -MRI Lumbar spine showed 1. Postsurgical changes L4-5 L5-S1 stable. 2. Spinal canal stenosis L3-L4.  - Neuro following  PT/ST/OT. - SNF. On NPO currently. If cannot pass SLP, will need to consider PEG  -Vit B12, folate, homocysteine, sed rate, TSH 0.47, vit D, ALAN, antineuronal cell AB all ordered. Blood cultures NGTD  -Had echocardiogram last admission which was negative for PFO  -No obvious infectious etiology     Agitation  Melatonin QHS  Neuro added buspar and lexapro    Cough  Aspiration? Check CXR    COVID +ve  Patient tested +ve for covid on  as well  Reason for CVA? History of CAD  S/p pacemaker  History of grade 3 diastolic dysfunction  C/w coreg     Recurrent fall  Chronic back pain  -check orthostats  -cont flexeril  -MRI lumbar spine 1. Postsurgical changes L4-5 L5-S1 stable. 2. Spinal canal stenosis L3-L4.      BPH  C/w flomax Code Status: full code  Surrogate Decision Maker:wife     DVT Prophylaxis: eliquis  GI Prophylaxis: not indicated     Baseline: Walks      18.5 - 24.9 Normal weight / Body mass index is 23.88 kg/m². Estimated discharge date: July 28  Barriers: Neuro eval final recs, dispo      Recommended Disposition: SNF/LTC     Subjective:     Chief Complaint / Reason for Physician Visit  Patient seen and examined at the bedside. Wife at bedside. Patient with some agitation overnight. Wife also says he appears to be coughing more. Patient is not in any distress and has no complaints. Discussed with RN events overnight. Review of Systems:  Symptom Y/N Comments  Symptom Y/N Comments   Fever/Chills    Chest Pain     Poor Appetite    Edema     Cough    Abdominal Pain     Sputum    Joint Pain     SOB/HANNA    Pruritis/Rash     Nausea/vomit    Tolerating PT/OT     Diarrhea    Tolerating Diet     Constipation    Other       Could NOT obtain due to:      Objective:     VITALS:   Last 24hrs VS reviewed since prior progress note. Most recent are:  Patient Vitals for the past 24 hrs:   Temp Pulse Resp BP SpO2   07/28/22 0835 98 °F (36.7 °C) 70 14 (!) 143/71 98 %   07/28/22 0337 97.7 °F (36.5 °C) 70 16 (!) 161/78 100 %   07/28/22 0029 98.1 °F (36.7 °C) 70 16 (!) 141/73 97 %   07/27/22 1905 98 °F (36.7 °C) 70 18 (!) 144/67 100 %   07/27/22 1705 98.4 °F (36.9 °C) 75 18 120/64 97 %   07/27/22 1200 -- -- -- 116/70 --   07/27/22 1157 -- -- -- 92/81 --   07/27/22 1156 -- 75 -- -- --   07/27/22 1155 -- -- -- 116/71 --         Intake/Output Summary (Last 24 hours) at 7/28/2022 0925  Last data filed at 7/28/2022 3971  Gross per 24 hour   Intake --   Output 250 ml   Net -250 ml          I had a face to face encounter and independently examined this patient on 7/28/2022, as outlined below:  PHYSICAL EXAM:  General: WD, WN. Awake, , no acute distress    EENT:  EOMI. PERRLA. Anicteric sclerae.  MMM  Resp:  CTA bilaterally, no wheezing or rales. No accessory muscle use  CV:  Regular  rhythm,  No edema  GI:  Soft, Non distended, Non tender. +Bowel sounds  Neurologic:  Alert, aphasia. Does follow simple commands  Psych:   Poor  insight. Not anxious nor agitated  Skin:  No rashes. No jaundice    Reviewed most current lab test results and cultures  YES  Reviewed most current radiology test results   YES  Review and summation of old records today    NO  Reviewed patient's current orders and MAR    YES  PMH/SH reviewed - no change compared to H&P  ________________________________________________________________________  Care Plan discussed with:    Comments   Patient     Family      RN     Care Manager     Consultant                        Multidiciplinary team rounds were held today with , nursing, pharmacist and clinical coordinator. Patient's plan of care was discussed; medications were reviewed and discharge planning was addressed. ________________________________________________________________________  Total NON critical care TIME:  35   Minutes    Total CRITICAL CARE TIME Spent:   Minutes non procedure based      Comments   >50% of visit spent in counseling and coordination of care     ________________________________________________________________________  Susan Soler MD     Procedures: see electronic medical records for all procedures/Xrays and details which were not copied into this note but were reviewed prior to creation of Plan. LABS:  I reviewed today's most current labs and imaging studies.   Pertinent labs include:  Recent Labs     07/27/22  0422 07/26/22  0249 07/25/22  1259   WBC 6.1 5.3 6.0   HGB 15.4 13.3 14.5   HCT 44.0 38.0 42.7    128* 145*       Recent Labs     07/27/22  0422 07/26/22  0249 07/25/22  1259    144 140   K 3.9 3.9 4.5   * 112* 107   CO2 23 25 29    99 107*   BUN 13 10 12   CREA 0.83 0.67* 0.97   CA 9.6 8.9 9.6   MG 2.3  --   --    PHOS 2.5*  --   --    ALB  -- --  3.8   TBILI  --   --  1.2*   ALT  --   --  79*   INR  --   --  1.0         Signed: Oli Carlisle MD

## 2022-07-28 NOTE — PROGRESS NOTES
Problem: Falls - Risk of  Goal: *Absence of Falls  Description: Document Edward Marie Fall Risk and appropriate interventions in the flowsheet.   Outcome: Progressing Towards Goal  Note: Fall Risk Interventions:  Mobility Interventions: Bed/chair exit alarm, Patient to call before getting OOB, PT Consult for mobility concerns    Mentation Interventions: Bed/chair exit alarm, Door open when patient unattended, Reorient patient, Self-releasing belt    Medication Interventions: Bed/chair exit alarm, Teach patient to arise slowly, Patient to call before getting OOB    Elimination Interventions: Call light in reach, Bed/chair exit alarm, Toileting schedule/hourly rounds    History of Falls Interventions: Room close to nurse's station, Door open when patient unattended, Bed/chair exit alarm

## 2022-07-29 NOTE — PROGRESS NOTES
Problem: Self Care Deficits Care Plan (Adult)  Goal: *Acute Goals and Plan of Care (Insert Text)  Description: FUNCTIONAL STATUS PRIOR TO ADMISSION: Patient was modified independent using a rolling walker for functional mobility. Hx falls and AMS. Wife provides transportation 2/2 decreased cognition at baseline. HOME SUPPORT: The patient lived with spouse who assisted with IADLs and driving. Family lives close by for support. Occupational Therapy Goals  Initiated 7/26/2022   1. Patient will perform upper body dressing with moderate assistance  within 7 day(s). 2.  Patient will perform lower body dressing with moderate assistance  within 7 day(s). 3.  Patient will perform bathing with moderate assistance  within 7 day(s). 4.  Patient will perform toilet transfers with moderate assistance  within 7 day(s). 5.  Patient will perform all aspects of toileting with moderate assistance  within 7 day(s). 6.  Patient will participate in upper extremity therapeutic exercise/activities with moderate assistance  within 7 day(s). 7.  Patient will utilize energy conservation techniques during functional activities with verbal cues within 7 day(s). 8.  Patient will improve their Fugl Jamli score by 5 points in prep for ADLs within 7 days. Outcome: Progressing Towards Goal   OCCUPATIONAL THERAPY TREATMENT  Patient: Best Scott (89 y.o. male)  Date: 7/29/2022  Diagnosis: CVA (cerebral vascular accident) ([facility] [I63.9] <principal problem not specified>  Procedure(s) (LRB):  ESOPHAGOGASTRODUODENOSCOPY (EGD) (N/A)  PERCUTANEOUS ENDOSCOPIC GASTROSTOMY TUBE CHANGE (N/A)    Precautions: Fall (AMS)  Chart, occupational therapy assessment, plan of care, and goals were reviewed. ASSESSMENT  Patient continues with skilled OT services and is progressing towards goals. Pt was supine in bed and was more tired today and confused. Pt was mod of 2 for bed mobility, and able to stand with mod of 2.   He was mod of 2 for wt shifting in order for pt to transfers to chair with RW, and he was dragging his right foot behind him more this session. Pt was able to stand with CGA and was able to clean suman area with VC and setup. Pt was tired and had to rest and stood once more with mod of 2 in order to jodie depends. Worked on wt shifting and walking distance in order for pt to get to toilet in bathroom. Pt at this time is not able to walk to bathroom, and will use BSC. He was left sitting up in recliner with family in room. Current Level of Function Impacting Discharge (ADLs): max to mod for ADLs              PLAN :  Patient continues to benefit from skilled intervention to address the above impairments. Continue treatment per established plan of care to address goals. Recommend with staff: OOB for meals     Recommend next OT session: work on standing grooming at chair or at sink     Recommendation for discharge: (in order for the patient to meet his/her long term goals)  Therapy 3 hours per day 5-7 days per week    This discharge recommendation:  Has been made in collaboration with the attending provider and/or case management    IF patient discharges home will need the following DME: tbd       SUBJECTIVE:   Patient stated I am a little tired.     OBJECTIVE DATA SUMMARY:   Cognitive/Behavioral Status:  Neurologic State: Alert;Confused  Orientation Level: Oriented to person  Cognition: Impaired decision making;Memory loss  Perception: Cues to attend left visual field;Cues to attend right visual field;Cues to attend to left side of body;Cues to attend to right side of body  Perseveration: No perseveration noted  Safety/Judgement: Fall prevention    Functional Mobility and Transfers for ADLs:  Bed Mobility:  Rolling: Minimum assistance;Assist x2; Additional time  Supine to Sit: Moderate assistance;Assist x1;Additional time;Bed Modified  Scooting: Moderate assistance;Assist x1    Transfers:  Sit to Stand:  Moderate assistance;Assist x2     Bed to Chair: Moderate assistance;Assist x2    Balance:  Sitting: Impaired  Sitting - Static: Fair (occasional); Good (unsupported)  Standing: Impaired  Standing - Static: Fair;Constant support  Standing - Dynamic : Fair;Constant support    ADL Intervention:       Grooming  Position Performed: Seated in chair  Washing Face: Contact guard assistance;Set-up  Washing Hands: Contact guard assistance;Set-up  Brushing Teeth: Contact guard assistance;Set-up    Upper Body Bathing  Bathing Assistance: Moderate assistance  Position Performed: Seated in chair         Lower Body Bathing  Bathing Assistance: Maximum assistance  Perineal  : Maximum assistance  Position Performed: Standing              Toileting  Toileting Assistance: Maximum assistance; Total assistance(dependent)  Bladder Hygiene: Total assistance (dependent)  Bowel Hygiene: Maximum assistance    Cognitive Retraining  Safety/Judgement: Fall prevention        Pain:  None     Activity Tolerance:   Fair    After treatment patient left in no apparent distress:   Sitting in chair, Call bell within reach, Bed / chair alarm activated, and Caregiver / family present    COMMUNICATION/COLLABORATION:   The patients plan of care was discussed with: Physical therapist and Registered nurse.      Nela Ortega OT  Time Calculation: 26 mins

## 2022-07-29 NOTE — PROGRESS NOTES
End of Shift Note    Bedside shift change report given to Jada Vega (oncoming nurse) by Kyree Tyler RN (offgoing nurse).   Report included the following information SBAR, Kardex, MAR, and Recent Results    Shift worked:  7a-7 p   Shift summary and any significant changes:     Up in chair with two person assist       Concerns for physician to address:  none   Zone phone for oncoming shift:   [phone]     Patient [address] [place]  68 y.o.  7/25/2022 12:04 PM by Perez Joshi MD. Maci العلي was admitted from Home    Problem List  Patient Active Problem List    Diagnosis Date Noted    Encephalopathy due to COVID-19 virus 07/28/2022    Thrombotic stroke involving left middle cerebral artery ([address] 07/27/2022    Acute alteration in mental status 07/26/2022    Convulsive syncope 07/26/2022    Bilateral carotid artery stenosis 07/26/2022    History of stroke 07/26/2022    CVA (cerebral vascular accident) ([address] 07/25/2022    Stroke ([address] 07/08/2022    Weakness of both legs 06/01/2021    Bilateral lower extremity pain 06/01/2021    Leg pain 05/30/2021    Dilated cardiomyopathy ([address] 12/18/2020    Permanent atrial fibrillation ([address] 12/18/2020    Tachycardia 12/18/2020    A-fib ([address] 12/18/2020    PAF (paroxysmal atrial fibrillation) ([address] 05/16/2018    Age-related cataract 08/01/2017    Allergic rhinitis 06/28/2017    Diverticulosis 06/28/2017    Urinary retention 06/28/2017    PVD (peripheral vascular disease) ([address] 06/28/2017    On statin therapy 06/28/2017    Low back pain 06/28/2017    Insomnia 06/28/2017    HTN (hypertension) 06/28/2017    Hyperlipidemia 06/28/2017    Glucose intolerance (impaired glucose tolerance) 06/28/2017    GERD (gastroesophageal reflux disease) 06/28/2017    ED (erectile dysfunction) 06/28/2017    DJD (degenerative joint disease) 06/28/2017    ASVD (arteriosclerotic vascular disease) 06/28/2017     Past Medical History:   Diagnosis Date    Allergic rhinitis 6/28/2017 Arrhythmia     Paroxysmal Afib- Dr. Juan Corbin    ASVD (arteriosclerotic vascular disease) 06/28/2017    Story: carotid stenosis followed by Vasc Surg    Atrial fibrillation ([facility]     Congestive heart failure ([facility]     Diverticulosis 6/28/2017    Comments: on colonoscopy 12/01    DJD (degenerative joint disease) 6/28/2017    ED (erectile dysfunction) 6/28/2017    GERD (gastroesophageal reflux disease) 6/28/2017    Glucose intolerance (impaired glucose tolerance) 06/28/2017    HTN (hypertension) 6/28/2017    Hyperlipidemia 6/28/2017    Insomnia 6/28/2017    Low back pain 6/28/2017    On statin therapy 6/28/2017    Pacemaker 2020    PVD (peripheral vascular disease) ([facility] 6/28/2017    Rheumatoid arthritis ([facility]     Urinary retention 6/28/2017       Core Measures:  CVA: Yes Yes  CHF:No Not applicable  PNA:No Not applicable    Activity:  Activity Level: Up with Assistance  Number times ambulated in hallways past shift: 0  Number of times OOB to chair past shift: 1    Cardiac:   Cardiac Monitoring: No          Access:   Current line(s): PIV       Genitourinary:   Urinary status: voiding inc and inc briefs  Urinary Catheter? No     Respiratory:   O2 Device: None (Room air)  Chronic home O2 use?: NO  Incentive spirometer at bedside: N/A       GI:  Last Bowel Movement Date: 07/27/22  Current diet:  DIET NPO  Passing flatus: YES  Tolerating current diet: YES       Pain Management:   Patient states pain is manageable on current regimen: YES    Skin:  Ghulam Score: 16  Interventions: increase time out of bed, limit briefs, and internal/external urinary devices    Patient Safety:  Fall Score:  Total Score: 5  Interventions: bed/chair alarm and gripper socks  High Fall Risk: Yes  @Rollbelt  @dexterity to release roll belt  Yes/No ( must document dexterity  here by stating Yes or No here, otherwise this is a restraint and must follow restraint documentation policy.)    DVT prophylaxis:  DVT prophylaxis Med- Yes  DVT prophylaxis SCD or ISAI- No     Wounds: (If Applicable)  Wounds- No  Location none    Active Consults:  IP CONSULT TO HOSPITALIST  IP CONSULT TO NEUROLOGY  IP CONSULT TO GASTROENTEROLOGY    Length of Stay:  Expected LOS: 4d 9h  Actual LOS: 4  Discharge Plan: Yes CM following.   Referral for IPR to [facility] mi Menezes RN

## 2022-07-29 NOTE — PROGRESS NOTES
End of Shift Note    Bedside shift change report given to [facility] (oncoming nurse) by Renetta Eason (offgoing nurse).   Report included the following information SBAR, Kardex, MAR, and Recent Results    Shift worked:  3-7 p   Shift summary and any significant changes:     none       Concerns for physician to address:  none   Zone phone for oncoming shift:   [phone]     Patient Information  Alex Perry  68 y.o.  7/25/2022 12:04 PM by Maine Cantrell MD. Alex Perry was admitted from Home    Problem List  Patient Active Problem List    Diagnosis Date Noted    Encephalopathy due to COVID-19 virus 07/28/2022    Thrombotic stroke involving left middle cerebral artery ([address] 07/27/2022    Acute alteration in mental status 07/26/2022    Convulsive syncope 07/26/2022    Bilateral carotid artery stenosis 07/26/2022    History of stroke 07/26/2022    CVA (cerebral vascular accident) ([address] 07/25/2022    Stroke ([address] 07/08/2022    Weakness of both legs 06/01/2021    Bilateral lower extremity pain 06/01/2021    Leg pain 05/30/2021    Dilated cardiomyopathy ([address] 12/18/2020    Permanent atrial fibrillation ([address] 12/18/2020    Tachycardia 12/18/2020    A-fib ([address] 12/18/2020    PAF (paroxysmal atrial fibrillation) ([address] 05/16/2018    Age-related cataract 08/01/2017    Allergic rhinitis 06/28/2017    Diverticulosis 06/28/2017    Urinary retention 06/28/2017    PVD (peripheral vascular disease) ([address] 06/28/2017    On statin therapy 06/28/2017    Low back pain 06/28/2017    Insomnia 06/28/2017    HTN (hypertension) 06/28/2017    Hyperlipidemia 06/28/2017    Glucose intolerance (impaired glucose tolerance) 06/28/2017    GERD (gastroesophageal reflux disease) 06/28/2017    ED (erectile dysfunction) 06/28/2017    DJD (degenerative joint disease) 06/28/2017    ASVD (arteriosclerotic vascular disease) 06/28/2017     Past Medical History:   Diagnosis Date    Allergic rhinitis 6/28/2017    Arrhythmia     Paroxysmal Afib- Dr. Leyla Thibodeaux ASVD (arteriosclerotic vascular disease) 06/28/2017    Story: carotid stenosis followed by Vasc Surg    Atrial fibrillation ([facility]     Congestive heart failure ([facility]     Diverticulosis 6/28/2017    Comments: on colonoscopy 12/01    DJD (degenerative joint disease) 6/28/2017    ED (erectile dysfunction) 6/28/2017    GERD (gastroesophageal reflux disease) 6/28/2017    Glucose intolerance (impaired glucose tolerance) 06/28/2017    HTN (hypertension) 6/28/2017    Hyperlipidemia 6/28/2017    Insomnia 6/28/2017    Low back pain 6/28/2017    On statin therapy 6/28/2017    Pacemaker 2020    PVD (peripheral vascular disease) ([facility] 6/28/2017    Rheumatoid arthritis ([facility]     Urinary retention 6/28/2017       Core Measures:  CVA: Yes Yes  CHF:No Not applicable  PNA:No Not applicable    Activity:  Activity Level: Up with Assistance  Number times ambulated in hallways past shift: 0  Number of times OOB to chair past shift: 0    Cardiac:   Cardiac Monitoring: No      Cardiac Rhythm: Ventricular Paced    Access:   Current line(s): PIV       Genitourinary:   Urinary status: voiding, incontinent, and external catheter  Urinary Catheter? No     Respiratory:   O2 Device: None (Room air)  Chronic home O2 use?: NO  Incentive spirometer at bedside: N/A       GI:  Last Bowel Movement Date: 07/27/22  Current diet:  DIET NPO  Passing flatus: YES  Tolerating current diet: YES       Pain Management:   Patient states pain is manageable on current regimen: YES    Skin:  Ghulam Score: 18  Interventions: increase time out of bed, limit briefs, and internal/external urinary devices    Patient Safety:  Fall Score:  Total Score: 5  Interventions: bed/chair alarm and gripper socks  High Fall Risk: Yes  @Rollbelt  @dexterity to release roll belt  Yes/No ( must document dexterity  here by stating Yes or No here, otherwise this is a restraint and must follow restraint documentation policy.)    DVT prophylaxis:  DVT prophylaxis Med- Yes  DVT prophylaxis SCD or ISAI- No     Wounds: (If Applicable)  Wounds- No  Location none    Active Consults:  IP CONSULT TO HOSPITALIST  IP CONSULT TO NEUROLOGY  IP CONSULT TO GASTROENTEROLOGY    Length of Stay:  Expected LOS: 4d 9h  Actual LOS: 3  Discharge Plan: Yes CM following.   Referral for IPR to [facility]

## 2022-07-29 NOTE — PROGRESS NOTES
Hospitalist Progress Note    NAME: Ginette Pineda   :  1945   MRN:  [id number]       Assessment / Plan:  AMS  POA  Rule out CVA, ? Vascular dementia  Right leg weakness  History of HTN/PAF     -Recent admission in 2022, for left facial droop, suspected TIA, had MRI done which was negative for CVA. EEG was done which was negative.  -Presented to ED for 1 week of sudden cognitive decline, confusion. Dragging of right leg. No focal deficits on exam     -CT head and neck showed multifocal moderate to severe stenosis with occlusion of distal left vertebral artery at V3-V4 junction  -CT head negative for bleed  -Telemetry  -Carotid duplex   There is moderate stenosis in the right ICA (50-69%). There is mild stenosis in the left ICA (<50%). The right vertebral is antegrade. The left vertebral is antegrade.    -aspirin increased to 162 mg daily, Eliquis 5 mg twice daily. -c/w statin (not currently high intensity)  -MRI brain showed 1. Acute left basal ganglia ischemic infarct. 2. Atrophy and white matter disease, remote infarcts stable otherwise. -MRI Lumbar spine showed 1. Postsurgical changes L4-5 L5-S1 stable. 2. Spinal canal stenosis L3-L4.  - Neuro following  PT/ST/OT. - SNF. On NPO currently. Patient's wife agreeable to PEG. Will hold AC on Saturday. -Vit B12, folate, homocysteine, sed rate, TSH 0.47, vit D, ALAN, antineuronal cell AB all ordered. Blood cultures NGTD  -Had echocardiogram last admission which was negative for PFO  -IVF while NPO. Will check labs in AM     Agitation  Melatonin QHS  Neuro added buspar and lexapro    Cough  Aspiration? CXR no acute changes. Remains on RA    COVID +ve  Patient tested +ve for covid on  as well  Reason for CVA? History of CAD  S/p pacemaker  History of grade 3 diastolic dysfunction  C/w coreg     Recurrent fall  Chronic back pain  -check orthostats  -cont flexeril  -MRI lumbar spine 1. Postsurgical changes L4-5 L5-S1 stable.   2. Spinal canal stenosis L3-L4. BPH  C/w flomax        Code Status: full code  Surrogate Decision Maker:wife     DVT Prophylaxis: eliquis  GI Prophylaxis: not indicated     Baseline: Walks      18.5 - 24.9 Normal weight / Body mass index is 23.88 kg/m². Estimated discharge date: July 28  Barriers: Neuro eval final recs, dispo      Recommended Disposition: SNF/LTC     Subjective:     Chief Complaint / Reason for Physician Visit  Patient seen and examined at the bedside. Wife at bedside. Patient appears more awake and alert this morning. He currently has no complaints. Discussed with RN events overnight. Review of Systems:  Symptom Y/N Comments  Symptom Y/N Comments   Fever/Chills    Chest Pain     Poor Appetite    Edema     Cough    Abdominal Pain     Sputum    Joint Pain     SOB/HANNA    Pruritis/Rash     Nausea/vomit    Tolerating PT/OT     Diarrhea    Tolerating Diet     Constipation    Other       Could NOT obtain due to:      Objective:     VITALS:   Last 24hrs VS reviewed since prior progress note. Most recent are:  Patient Vitals for the past 24 hrs:   Temp Pulse Resp BP SpO2   07/29/22 0800 99 °F (37.2 °C) 73 16 (!) 175/68 --   07/29/22 0416 -- 74 17 (!) 170/72 --   07/28/22 2039 98.9 °F (37.2 °C) 77 18 (!) 148/66 100 %   07/28/22 1523 98.1 °F (36.7 °C) 71 -- (!) 144/69 98 %       No intake or output data in the 24 hours ending 07/29/22 1433       I had a face to face encounter and independently examined this patient on 7/29/2022, as outlined below:  PHYSICAL EXAM:  General: WD, WN. Awake, , no acute distress    EENT:  EOMI. PERRLA. Anicteric sclerae. MMM  Resp:  CTA bilaterally, no wheezing or rales. No accessory muscle use  CV:  Regular  rhythm,  No edema  GI:  Soft, Non distended, Non tender. +Bowel sounds  Neurologic:  Alert, aphasia. Does follow simple commands  Psych:   Poor  insight. Not anxious nor agitated  Skin:  No rashes.   No jaundice    Reviewed most current lab test results and cultures YES  Reviewed most current radiology test results   YES  Review and summation of old records today    NO  Reviewed patient's current orders and MAR    YES  PMH/SH reviewed - no change compared to H&P  ________________________________________________________________________  Care Plan discussed with:    Comments   Patient     Family      RN     Care Manager     Consultant                        Multidiciplinary team rounds were held today with , nursing, pharmacist and clinical coordinator. Patient's plan of care was discussed; medications were reviewed and discharge planning was addressed. ________________________________________________________________________  Total NON critical care TIME:  35   Minutes    Total CRITICAL CARE TIME Spent:   Minutes non procedure based      Comments   >50% of visit spent in counseling and coordination of care     ________________________________________________________________________  Venecia Lubin MD     Procedures: see electronic medical records for all procedures/Xrays and details which were not copied into this note but were reviewed prior to creation of Plan. LABS:  I reviewed today's most current labs and imaging studies.   Pertinent labs include:  Recent Labs     07/27/22  0422   WBC 6.1   HGB 15.4   HCT 44.0          Recent Labs     07/27/22  0422      K 3.9   *   CO2 23      BUN 13   CREA 0.83   CA 9.6   MG 2.3   PHOS 2.5*         Signed: Venecia Lubin MD

## 2022-07-29 NOTE — PROGRESS NOTES
Problem: Mobility Impaired (Adult and Pediatric)  Goal: *Acute Goals and Plan of Care (Insert Text)  Description:   FUNCTIONAL STATUS PRIOR TO ADMISSION: Patient was modified independent using a rolling walker for functional mobility. Subjective hx provided by spouse at bedside as pt is disoriented to self, time, and situation    HOME SUPPORT PRIOR TO ADMISSION: The patient lived with wife and required assistance for bathing. Physical Therapy Goals  Initiated 7/26/2022  1. Patient will move from supine to sit and sit to supine  in bed with moderate assistance  within 7 day(s). 2.  Patient will transfer from bed to chair and chair to bed with moderate assistance  using the least restrictive device within 7 day(s). 3.  Patient will perform sit to stand with moderate assistance  within 7 day(s). 4.  Patient will ambulate with moderate assistance  for 15 feet with the least restrictive device within 7 day(s). 5.  Patient will improve Jimenez Balance score by 7 points within 7 days. Outcome: Progressing Towards Goal   PHYSICAL THERAPY TREATMENT  Patient: Ginette Pineda (61 y.o. male)  Date: 7/29/2022  Diagnosis: CVA (cerebral vascular accident) ([facility] [I63.9] <principal problem not specified>  Procedure(s) (LRB):  ESOPHAGOGASTRODUODENOSCOPY (EGD) (N/A)  PERCUTANEOUS ENDOSCOPIC GASTROSTOMY TUBE CHANGE (N/A)    Precautions: Fall (AMS)  Chart, physical therapy assessment, plan of care and goals were reviewed. ASSESSMENT  Patient continues with skilled PT services and is progressing towards goals. Pt received with OT present and agreeable to participate with therapy. Pt able to follow commands however requires simple one step commands. Pt requiring additional time with bed mobility and cues for proper sequencing to transition from supine to sitting. Pt initially with posterior lean while seated and needing cueing and minAx2 assistance to weight shift anteriorly to scoot hips out bilaterally.  Pt intermittently needing assistance to maintain seated balance with OT to don/doff socks however able to maintain sitting balance with time. Pt able to stand for 2-3 minutes for suman care with OT and donning of new brief, and tolerating well. Pt requiring increased assistance this session overall modAx2 with ambulation, for manual RW management to advance RW and lateral weight shifting to advance RLE during gait. Pt ambulated 15ftx2 with a seated rest break, and very fatigued at end of second ambulation trial. Pt left seated in chair, alarm activated, and family present with all needs met. Pt remains appropriate for IPR upon discharge. Current Level of Function Impacting Discharge (mobility/balance): min-modA w/bed mobility, modAx2 w/transfers, modA w/RW for gait    Other factors to consider for discharge: well below baseline, fall risk, hx dementia         PLAN :  Patient continues to benefit from skilled intervention to address the above impairments. Continue treatment per established plan of care. to address goals. Recommendation for discharge: (in order for the patient to meet his/her long term goals)  Therapy 3 hours per day 5-7 days per week    This discharge recommendation:  Has been made in collaboration with the attending provider and/or case management    IF patient discharges home will need the following DME: to be determined (TBD)       SUBJECTIVE:   Patient stated whew Curvin Sharps i'm tired.  when questioned from therapy if he's fatigued    OBJECTIVE DATA SUMMARY:   Critical Behavior:  Neurologic State: Alert, Drowsy  Orientation Level: Other (Comment), Unable to verbalize  Cognition: Follows commands  Safety/Judgement: Fall prevention  Functional Mobility Training:  Bed Mobility:  Rolling: Minimum assistance;Assist x2; Additional time  Supine to Sit: Moderate assistance;Assist x1;Additional time;Bed Modified     Scooting: Moderate assistance;Assist x1     Transfers:  Sit to Stand:  Moderate assistance;Assist x2  Stand to Sit: Moderate assistance;Assist x2        Bed to Chair: Moderate assistance;Assist x2     Balance:  Sitting: Impaired  Sitting - Static: Fair (occasional); Good (unsupported)  Standing: Impaired  Standing - Static: Fair;Constant support  Standing - Dynamic : Fair;Constant support  Ambulation/Gait Training:  Distance (ft): 35 Feet (ft) (5ft then 15ftx2 w/seated rest break b/t ambulation trial)  Assistive Device: Gait belt;Walker, rolling  Ambulation - Level of Assistance: Moderate assistance    Gait Abnormalities: Decreased step clearance    Base of Support: Narrowed     Speed/Paula: Pace decreased (<100 feet/min); Slow  Step Length: Left shortened;Right shortened (R > L)    Interventions: Safety awareness training;Manual cues; Tactile cues; Verbal cues (facilitated lateral weight shift to advance RLE)    Pain Rating:  No pain rating received from pt    Activity Tolerance:   Fair    After treatment patient left in no apparent distress:   Sitting in chair, Call bell within reach, Bed / chair alarm activated, and Caregiver / family present    COMMUNICATION/COLLABORATION:   The patients plan of care was discussed with: Occupational therapist and Registered nurse.      Lorena Lawrence PTA   Time Calculation: 29 mins

## 2022-07-29 NOTE — PROGRESS NOTES
Transition of Care Plan:     RUR: 12% - \"low risk\"  Disposition: IPR placement - [facility] (pending insurance auth - auth to be initiated 8/1/22)  Follow up appointments: PCP & specialist as indicated   DME needed: Defer to IPR for DME needs  Transportation at Discharge: BLS transport needed for d/c  Keys or means to access home: N/A - pt likely transitioning to IPR at d/c     IM Medicare Letter: 2nd IM needed prior to d/c  Is patient a BCPI-A Bundle: N/A         Is patient a  and connected with the [facility]? Yes - clinical updates faxed to the [facility] per protocol 7/28/22  Caregiver Contact: Pt's wife Diego Martinez Mariely: [phone])  Discharge Caregiver contacted prior to discharge? To be contacted prior to d/c  Care Conference needed?: N/A  COVID-19 test: PCR COVID-19 test completed 7/27/22; results positive    Initial note: Chart reviewed, IDR completed. MD reported [facility] for early next week pending PEG placement/toleration of TF. CM received call from [facility] admission liaison Timo Peerz: [phone]) reporting pt has been accepted to facility for IPR pending insurance auth. Karina Meeks reported facility will initiate insurance auth 8/1/22 to avoid it expiring prior to pt being deemed medically stable for d/c. CM will continue to follow & remain accessible for d/c planning.     Latanya Huggins MSW  Care Manager, [facility]

## 2022-07-29 NOTE — PROGRESS NOTES
Problem: Dysphagia (Adult)  Goal: *Acute Goals and Plan of Care (Insert Text)  Description: 7/26/2022  Speech path goals  1. Patient will participate with reeval of swallowing. Goal met 7/27.  2. Patient will participate with MBS to assess swallow physiology. Completed 7/27  3. Patient will tolerate ice chips after oral care to prevent disuse atrophy. 4. Patient will participate with repeat imaging prior to po. Outcome: Not Progressing Towards Goal     Problem: Communication Impaired (Adult)  Goal: *Acute Goals and Plan of Care (Insert Text)  Description: 7/26/2022  Speech path goals  1. Patient will follow 2 step commands with 80% acc. 2. Patient will answer simple to mod level y/n questions with 80% acc  3. Patient will name basic ADL objects with 70% acc with mod cues. Outcome: Not Progressing Towards Goal   SPEECH LANGUAGE PATHOLOGY DYSPHAGIA AND SPEECH TREATMENT  Patient: Pito Gee (73 y.o. male)  Date: 7/29/2022  Diagnosis: CVA (cerebral vascular accident) ([facility] [I63.9] <principal problem not specified>  Procedure(s) (LRB):  ESOPHAGOGASTRODUODENOSCOPY (EGD) (N/A)  PERCUTANEOUS ENDOSCOPIC GASTROSTOMY TUBE CHANGE (N/A)    Precautions:  Fall (AMS)    ASSESSMENT:  The patient continues with mod to severe rec/exp aphasia. Other than stating his name or his wife's name, he is not expressing himself in a meaningful way. He is nonfluent and cannot express his needs. Naming is 0% acc. Occasionally a neologism is produced spontaneously. He is following one step commands but his responses to y/n questions is poor. Swallowing of ice chips was characterized by slow mastication and propulsion posteriorly. After multiple ice chips, he coughed. He did aspirate thins on the MBS of Wednesday. While he was awake, he is very slow in all tasks. He has hearing aides but does not wear them. PLAN:  Recommendations and Planned Interventions:  When he is more alert, will consider reimaging for swallowing.  He is otherwise considering a PEG. Patient continues to benefit from skilled intervention to address the above impairments. Continue treatment per established plan of care. Discharge Recommendations: To Be Determined     SUBJECTIVE:   Patient stated White\"Abdulkadir Goyal. OBJECTIVE:   Cognitive and Communication Status:  Neurologic State: Alert, Drowsy  Orientation Level: Other (Comment), Unable to verbalize  Cognition: Follows commands  Perception: Cues to attend left visual field, Cues to attend right visual field, Cues to attend to left side of body, Cues to attend to right side of body  Perseveration: No perseveration noted  Safety/Judgement: Fall prevention    Dysphagia Treatment and Interventions:  Oral Assessment:     P.O. Trials:   Took ice chips with slow mastication and delayed swallow. Coughed after he had consumed multiple ice chips   While he is awake he is very slow             Speech Treatment and Interventions:     Language Comprehension and Expression:     Verbal Expression   No naming of body parts or objects. He counted to 7 with min cues but perseverated on this in other tasks. Auditory comp  Followed most 1 step commands   Poor responses to y/n questions. Response & Tolerance to Activities:   Fair   His aphasia has shown little improvement      Pain:  Pain Scale 1: Numeric (0 - 10)  Pain Intensity 1: 0       After treatment:   Patient left in no apparent distress in bed    COMMUNICATION/EDUCATION:   Patient was educated regarding his deficit(s) of aphasia and dysphagia. He is aphasic and not likely to comprehend this. His wife was educated. She is still in agreement with PEG. The patient's plan of care including recommendations, planned interventions, and recommended diet changes were discussed with: Registered nurse.        Scott Syed SLP  Time Calculation: 20 mins

## 2022-07-29 NOTE — PROGRESS NOTES
Wife in room and aware of patient being on roll belt and having dexterity to remove roll belt as it was reported he removed it several times during night

## 2022-07-30 NOTE — PROGRESS NOTES
HYPOGLYCEMIC EPISODE DOCUMENTATION    Patient with hypoglycemic episode at 0935 (time) on 7/30/22 (date). BG value(s) pre-treatment 79    Was patient symptomatic? [] yes, [x] no  Patient was treated with the following rescue medications/treatments: 125 ml D10 bolus  BG value post-treatment: 111  Once BG treated and value greater than 80mg/dl, pt was provided with the following: IV fluid change to D5 1/2NS per Dr. Smitha Blanco  Name of MD notified:Dr. Smitha Blanco  The following orders were received: Give IV bolus D10 per protocol.   MD placed orders for D5 1/2NS

## 2022-07-30 NOTE — PROGRESS NOTES
Hospitalist Progress Note    NAME: Ginette Pineda   :  1945   MRN:  [id number]       Assessment / Plan:  AMS  POA  Rule out CVA, ? Vascular dementia  Right leg weakness  History of HTN/PAF     -Recent admission in 2022, for left facial droop, suspected TIA, had MRI done which was negative for CVA. EEG was done which was negative.  -Presented to ED for 1 week of sudden cognitive decline, confusion. Dragging of right leg. No focal deficits on exam     -CT head and neck showed multifocal moderate to severe stenosis with occlusion of distal left vertebral artery at V3-V4 junction  -CT head negative for bleed  -Telemetry  -Carotid duplex   There is moderate stenosis in the right ICA (50-69%). There is mild stenosis in the left ICA (<50%). The right vertebral is antegrade. The left vertebral is antegrade.    -aspirin increased to 162 mg daily, Eliquis 5 mg twice daily. -c/w statin (not currently high intensity)  -MRI brain showed 1. Acute left basal ganglia ischemic infarct. 2. Atrophy and white matter disease, remote infarcts stable otherwise. -MRI Lumbar spine showed 1. Postsurgical changes L4-5 L5-S1 stable. 2. Spinal canal stenosis L3-L4.  - Neuro following  PT/ST/OT. - SNF. On NPO currently. Patient's wife agreeable to PEG. Hold eliquis  -Vit B12, folate, homocysteine, sed rate, TSH 0.47, vit D, ALAN, antineuronal cell AB all ordered. Blood cultures NGTD  -Had echocardiogram last admission which was negative for PFO  -IVF change to d5 1/2 ns while NPO. Agitation  Melatonin QHS  Neuro added buspar and lexapro    Cough  Aspiration? CXR no acute changes. Remains on RA    COVID + ve  Patient tested +ve for covid on  as well  Reason for CVA? History of CAD  S/p pacemaker  History of grade 3 diastolic dysfunction  C/w coreg     Recurrent fall  Chronic back pain  -check orthostats  -cont flexeril  -MRI lumbar spine 1. Postsurgical changes L4-5 L5-S1 stable.   2. Spinal canal stenosis L3-L4. BPH  C/w flomax     Hypernatremia  Hypophosphatemia  D5 1/2 NS  Monitor and replete phos     Code Status: full code  Surrogate Decision Maker:wife     DVT Prophylaxis: eliquis  GI Prophylaxis: not indicated     Baseline: Walks      18.5 - 24.9 Normal weight / Body mass index is 23.66 kg/m². Estimated discharge date: Aug 1  Barriers: PEG, dispo      Recommended Disposition: SNF/LTC     Subjective:     Chief Complaint / Reason for Physician Visit  Patient seen and examined at the bedside. Wife and son at bedside. Patient was agitated last night and currently sleeping when I entered the room. He currently has no complaints. Discussed with RN events overnight. Review of Systems:  Symptom Y/N Comments  Symptom Y/N Comments   Fever/Chills    Chest Pain     Poor Appetite    Edema     Cough    Abdominal Pain     Sputum    Joint Pain     SOB/HANNA    Pruritis/Rash     Nausea/vomit    Tolerating PT/OT     Diarrhea    Tolerating Diet     Constipation    Other       Could NOT obtain due to:      Objective:     VITALS:   Last 24hrs VS reviewed since prior progress note. Most recent are:  Patient Vitals for the past 24 hrs:   Temp Pulse Resp BP SpO2   07/30/22 0928 97.5 °F (36.4 °C) 70 18 (!) 172/83 99 %   07/30/22 0455 97.9 °F (36.6 °C) 71 18 (!) 152/72 99 %   07/30/22 0015 98.2 °F (36.8 °C) 73 18 138/60 99 %   07/29/22 2125 98.4 °F (36.9 °C) 71 18 137/64 100 %       No intake or output data in the 24 hours ending 07/30/22 1010       I had a face to face encounter and independently examined this patient on 7/30/2022, as outlined below:  PHYSICAL EXAM:  General: WD, WN. Awake, , no acute distress    EENT:  EOMI. PERRLA. Anicteric sclerae. MMM  Resp:  CTA bilaterally, no wheezing or rales. No accessory muscle use  CV:  Regular  rhythm,  No edema  GI:  Soft, Non distended, Non tender. +Bowel sounds  Neurologic:  Alert, aphasia. Does follow simple commands  Psych:   Poor  insight.  Not anxious nor agitated  Skin:  No rashes. No jaundice    Reviewed most current lab test results and cultures  YES  Reviewed most current radiology test results   YES  Review and summation of old records today    NO  Reviewed patient's current orders and MAR    YES  PMH/SH reviewed - no change compared to H&P  ________________________________________________________________________  Care Plan discussed with:    Comments   Patient     Family      RN     Care Manager     Consultant                        Multidiciplinary team rounds were held today with , nursing, pharmacist and clinical coordinator. Patient's plan of care was discussed; medications were reviewed and discharge planning was addressed. ________________________________________________________________________  Total NON critical care TIME:  35   Minutes    Total CRITICAL CARE TIME Spent:   Minutes non procedure based      Comments   >50% of visit spent in counseling and coordination of care     ________________________________________________________________________  Vaishnavi Agudelo MD     Procedures: see electronic medical records for all procedures/Xrays and details which were not copied into this note but were reviewed prior to creation of Plan. LABS:  I reviewed today's most current labs and imaging studies.   Pertinent labs include:  Recent Labs     07/30/22 0627   WBC 7.0   HGB 13.2   HCT 39.2          Recent Labs     07/30/22 0627   *   K 3.5   *   CO2 22   GLU 62*   BUN 9   CREA 0.61*   CA 7.4*   MG 1.9   PHOS 1.7*         Signed: Vaishnavi Agudelo MD

## 2022-07-30 NOTE — PROGRESS NOTES
Spiritual Care Assessment/Progress Note  [facility]      NAME: Best Scott      MRN: [id number]  AGE: 68 y.o. SEX: male  Hinduism Affiliation: Taoism   Language: English     7/30/2022     Total Time (in minutes): 18     Spiritual Assessment begun in [facility]UROSCIENCE TELEMETRY through conversation with:         []Patient        [x] Family    [] Friend(s)        Reason for Consult: Initial/Spiritual assessment, patient floor     Spiritual beliefs: (Please include comment if needed)     [] Identifies with a bari tradition:         [] Supported by a bari community:            [] Claims no spiritual orientation:           [] Seeking spiritual identity:                [] Adheres to an individual form of spirituality:           [] Not able to assess:                           Identified resources for coping:      [x] Prayer                               [] Music                  [] Guided Imagery     [x] Family/friends                 [] Pet visits     [] Devotional reading                         [] Unknown     [] Other:                                                Interventions offered during this visit: (See comments for more details)          Family/Friend(s):  Affirmation of emotions/emotional suffering, Coping skills reviewed/reinforced, Prayer (assurance of), Normalization of emotional/spiritual concerns, Integration of medical assessment with existing values and beliefs, Catharsis/review of pertinent events in supportive environment, Life review/legacy, Hinduism beliefs/image of God discussed     Plan of Care:     [] Support spiritual and/or cultural needs    [] Support AMD and/or advance care planning process      [] Support grieving process   [] Coordinate Rites and/or Rituals    [] Coordination with community clergy   [] No spiritual needs identified at this time   [] Detailed Plan of Care below (See Comments)  [] Make referral to Music Therapy  [] Make referral to Pet Therapy [] Make referral to Addiction services  [] Make referral to [facility]  [] Make referral to Spiritual Care Partner  [x] No future visits requested        [] Contact Spiritual Care for further referrals     Comments:   called patient, Mr. Ron Cooper room for initial spiritual assessment. Patient was on contact restrictions at this time. His wife Mrs. Howell picked the phone and engaged in conversation. She indicated that patient was doing quite well today, however he is unable to hold a phone conversation. They have been  for 51 years and have a good support system. She indicated they are people of bari and was appreciative of being kept in prayer. Supportive listening presence offered, spouse assured of 's prayer. She expressed appreciation for the  support. Visited by: Augustine Napoles.    Paging Service: [phone])

## 2022-07-30 NOTE — ANESTHESIA PREPROCEDURE EVALUATION
Anesthetic History   No history of anesthetic complications            Review of Systems / Medical History  Patient summary reviewed, nursing notes reviewed and pertinent labs reviewed    Pulmonary                Comments: Former smoker - Quit 1975   Neuro/Psych       CVA (Right leg weakness)  TIA    Comments: Encephalopathy due to COVID-19 virus Cardiovascular    Hypertension      CHF (Dilated cardiomyopathy )  Dysrhythmias (PAF (paroxysmal atrial fibrillation)) : atrial fibrillation  PAD (carotid stenosis) and hyperlipidemia  Pertinent negatives: Cardiac stents: Paroxysmal.  Exercise tolerance: >4 METS  Comments: Bilateral carotid artery stenosis    ECG (7/25/22): Ventricular-paced rhythm   Probable underlying afib   No obvious pacer malfxn   When compared with ECG of 08-JUL-2022 10:01,   No significant change was found    TTE (7/11/22):   Left Ventricle: Normal left ventricular systolic function with a visually estimated EF of 45 - 50%. Left ventricle is smaller than normal. Normal wall thickness. Normal wall motion.    GI/Hepatic/Renal     GERD: well controlled          Comments: Dysphagia    Hx Diverticulosis Endo/Other        Arthritis     Other Findings              Physical Exam    Airway  Mallampati: I  TM Distance: < 4 cm  Neck ROM: normal range of motion   Mouth opening: Normal     Cardiovascular    Rhythm: irregular  Rate: abnormal      Pertinent negatives: No murmur  Comments: bradycardia Dental    Dentition: Bridges  Comments: Upper right   Pulmonary  Breath sounds clear to auscultation               Abdominal  GI exam deferred       Other Findings            Anesthetic Plan    ASA: 3  Anesthesia type: MAC          Induction: Intravenous  Anesthetic plan and risks discussed with: Patient

## 2022-07-30 NOTE — PROGRESS NOTES
Physician Progress Note      Eric Whitley  CSN #:                  [id number]  :                       1945  ADMIT DATE:       2022 12:04 PM  DISCH DATE:  RESPONDING  PROVIDER #:        Torin Rodriguez MD          QUERY TEXT:    Pt admitted with Confusion. Pt noted to have AMS and Ammonia of 10. Patient noted to have Acute left basal ganglia ischemic infarct per MRI Brain on . If possible, please document in the progress notes and discharge summary if you are evaluating and / or treating any of the following: The medical record reflects the following:  Risk Factors: Hx: PAF; CHF; GERD; HTN; PPM....... C/o Confusion  Clinical Indicators: wbc: 6.0; Na+: 140; K+: 4.5; gluc: 107; Bun/Cr: 12/0.97 ^ 0.67; Bili: 1.2; Tri; Choles: 111; LDL: 52.4; trop. hs: 9. Pamalee Bucktigre .. Hgb A1c: 6.3... Chirag Jacome Ammonia: 10. .. Pamalee Bucker Pamnia Jacome SARS-CoV-2: Detected. ....cta head/neck: Multifocal moderate to severe stenoses with occlusion of the distal left vertebral artery at the V3-V4 junction. ..... Chirag Jacome mri brain: Acute left basal ganglia ischemic infarct. Chirag Jacome Carotid Duplex: There is moderate stenosis in the right ICA (50-69%); There is mild stenosis in the left ICA (<50%). Treatment: CT Head; CTA Head/Neck; MRI Brain, ASA; Eliquis; Neuro consult; PT/ST/OT; Carotid Duplex    Thank you,    Nikky Holcomb  CDI  Options provided:  -- Encephalopathy due to CVA  -- Encephalopathy due to COVID  -- Hypertensive encephalopathy  -- Metabolic encephalopathy  -- Toxic encephalopathy  -- Toxic metabolic encephalopathy  -- Dementia with behavioral disturbances, Only  -- Delirium  -- Other - I will add my own diagnosis  -- Disagree - Not applicable / Not valid  -- Disagree - Clinically unable to determine / Unknown  -- Refer to Clinical Documentation Reviewer    PROVIDER RESPONSE TEXT:    This patient has encephalopathy due to CVA.     Query created by: Vanessa Rudd on 2022 6:32 PM      Electronically signed by:  Torin Rodriguez MD 7/30/2022 8:55 AM

## 2022-07-30 NOTE — PROGRESS NOTES
Speech Pathology Note    Chart reviewed and spoke with RN. Per RN, patient agitated overnight and received Haldol. Patient lethargic on this date. Will defer SLP treatment and follow up when patient is more alert/awake and appropriate for PO intake. Thank you.     Emy Rodriguez M.S., CCC-SLP

## 2022-07-30 NOTE — PROGRESS NOTES
Patient on schedule for EGD w PEG placement on Monday at 1 pm.  Eliquis on hold until then and ok to use Lovenox SQ bridge. Will have repeat COVID test done tomorrow. Plan d/w Dr. Noemy Hernández.

## 2022-07-31 NOTE — PROGRESS NOTES
Problem: Falls - Risk of  Goal: *Absence of Falls  Description: Document Leafy Bustamante Fall Risk and appropriate interventions in the flowsheet. Outcome: Progressing Towards Goal  Note: Fall Risk Interventions:  Mobility Interventions: Bed/chair exit alarm    Mentation Interventions: Adequate sleep, hydration, pain control    Medication Interventions: Bed/chair exit alarm    Elimination Interventions: Bed/chair exit alarm    History of Falls Interventions: Bed/chair exit alarm         Problem: Patient Education: Go to Patient Education Activity  Goal: Patient/Family Education  Outcome: Progressing Towards Goal     Problem: Pressure Injury - Risk of  Goal: *Prevention of pressure injury  Description: Document Ghulam Scale and appropriate interventions in the flowsheet.   Outcome: Progressing Towards Goal  Note: Pressure Injury Interventions:  Sensory Interventions: Assess changes in LOC    Moisture Interventions: Absorbent underpads    Activity Interventions: Pressure redistribution bed/mattress(bed type)    Mobility Interventions: HOB 30 degrees or less, Float heels    Nutrition Interventions: Document food/fluid/supplement intake    Friction and Shear Interventions: Apply protective barrier, creams and emollients, Feet elevated on foot rest                Problem: Patient Education: Go to Patient Education Activity  Goal: Patient/Family Education  Outcome: Progressing Towards Goal     Problem: Patient Education: Go to Patient Education Activity  Goal: Patient/Family Education  Outcome: Progressing Towards Goal     Problem: TIA/CVA Stroke: 0-24 hours  Goal: Off Pathway (Use only if patient is Off Pathway)  Outcome: Progressing Towards Goal  Goal: Activity/Safety  Outcome: Progressing Towards Goal  Goal: Consults, if ordered  Outcome: Progressing Towards Goal  Goal: Diagnostic Test/Procedures  Outcome: Progressing Towards Goal  Goal: Nutrition/Diet  Outcome: Progressing Towards Goal  Goal: Discharge Planning  Outcome: Progressing Towards Goal  Goal: Medications  Outcome: Progressing Towards Goal  Goal: Respiratory  Outcome: Progressing Towards Goal  Goal: Treatments/Interventions/Procedures  Outcome: Progressing Towards Goal  Goal: Minimize risk of bleeding post-thrombolytic infusion  Outcome: Progressing Towards Goal  Goal: Monitor for complications post-thrombolytic infusion  Outcome: Progressing Towards Goal  Goal: Psychosocial  Outcome: Progressing Towards Goal  Goal: *Hemodynamically stable  Outcome: Progressing Towards Goal  Goal: *Neurologically stable  Description: Absence of additional neurological deficits    Outcome: Progressing Towards Goal  Goal: *Verbalizes anxiety and depression are reduced or absent  Outcome: Progressing Towards Goal  Goal: *Absence of Signs of Aspiration on Current Diet  Outcome: Progressing Towards Goal  Goal: *Absence of deep venous thrombosis signs and symptoms(Stroke Metric)  Outcome: Progressing Towards Goal  Goal: *Ability to perform ADLs and demonstrates progressive mobility and function  Outcome: Progressing Towards Goal  Goal: *Stroke education started(Stroke Metric)  Outcome: Progressing Towards Goal  Goal: *Dysphagia screen performed(Stroke Metric)  Outcome: Progressing Towards Goal  Goal: *Rehab consulted(Stroke Metric)  Outcome: Progressing Towards Goal     Problem: TIA/CVA Stroke: Day 2 Until Discharge  Goal: Off Pathway (Use only if patient is Off Pathway)  Outcome: Progressing Towards Goal  Goal: Activity/Safety  Outcome: Progressing Towards Goal  Goal: Diagnostic Test/Procedures  Outcome: Progressing Towards Goal  Goal: Nutrition/Diet  Outcome: Progressing Towards Goal  Goal: Discharge Planning  Outcome: Progressing Towards Goal  Goal: Medications  Outcome: Progressing Towards Goal  Goal: Respiratory  Outcome: Progressing Towards Goal  Goal: Treatments/Interventions/Procedures  Outcome: Progressing Towards Goal  Goal: Psychosocial  Outcome: Progressing Towards Goal  Goal: *Verbalizes anxiety and depression are reduced or absent  Outcome: Progressing Towards Goal  Goal: *Absence of aspiration  Outcome: Progressing Towards Goal  Goal: *Absence of deep venous thrombosis signs and symptoms(Stroke Metric)  Outcome: Progressing Towards Goal  Goal: *Optimal pain control at patient's stated goal  Outcome: Progressing Towards Goal  Goal: *Tolerating diet  Outcome: Progressing Towards Goal  Goal: *Ability to perform ADLs and demonstrates progressive mobility and function  Outcome: Progressing Towards Goal  Goal: *Stroke education continued(Stroke Metric)  Outcome: Progressing Towards Goal     Problem: Ischemic Stroke: Discharge Outcomes  Goal: *Verbalizes anxiety and depression are reduced or absent  Outcome: Progressing Towards Goal  Goal: *Verbalize understanding of risk factor modification(Stroke Metric)  Outcome: Progressing Towards Goal  Goal: *Hemodynamically stable  Outcome: Progressing Towards Goal  Goal: *Absence of aspiration pneumonia  Outcome: Progressing Towards Goal  Goal: *Aware of needed dietary changes  Outcome: Progressing Towards Goal  Goal: *Verbalize understanding of prescribed medications including anti-coagulants, anti-lipid, and/or anti-platelets(Stroke Metric)  Outcome: Progressing Towards Goal  Goal: *Tolerating diet  Outcome: Progressing Towards Goal  Goal: *Aware of follow-up diagnostics related to anticoagulants  Outcome: Progressing Towards Goal  Goal: *Ability to perform ADLs and demonstrates progressive mobility and function  Outcome: Progressing Towards Goal  Goal: *Absence of DVT(Stroke Metric)  Outcome: Progressing Towards Goal  Goal: *Absence of aspiration  Outcome: Progressing Towards Goal  Goal: *Optimal pain control at patient's stated goal  Outcome: Progressing Towards Goal  Goal: *Home safety concerns addressed  Outcome: Progressing Towards Goal  Goal: *Describes available resources and support systems  Outcome: Progressing Towards Goal  Goal: *Verbalizes understanding of activation of [id number]) for stroke symptoms(Stroke Metric)  Outcome: Progressing Towards Goal  Goal: *Understands and describes signs and symptoms to report to providers(Stroke Metric)  Outcome: Progressing Towards Goal  Goal: *Neurolgocially stable (absence of additional neurological deficits)  Outcome: Progressing Towards Goal  Goal: *Verbalizes importance of follow-up with primary care physician(Stroke Metric)  Outcome: Progressing Towards Goal  Goal: *Smoking cessation discussed,if applicable(Stroke Metric)  Outcome: Progressing Towards Goal  Goal: *Depression screening completed(Stroke Metric)  Outcome: Progressing Towards Goal     Problem: Patient Education: Go to Patient Education Activity  Goal: Patient/Family Education  Outcome: Progressing Towards Goal     Problem: Patient Education: Go to Patient Education Activity  Goal: Patient/Family Education  Outcome: Progressing Towards Goal     Problem: Patient Education: Go to Patient Education Activity  Goal: Patient/Family Education  Outcome: Progressing Towards Goal

## 2022-07-31 NOTE — PROGRESS NOTES
Hospitalist Progress Note    NAME: Sim Paige   :  1945   MRN:  [id number]       Assessment / Plan:  AMS  POA  Rule out CVA, ? Vascular dementia  Right leg weakness  History of HTN/PAF     -Recent admission in 2022, for left facial droop, suspected TIA, had MRI done which was negative for CVA. EEG was done which was negative.  -Presented to ED for 1 week of sudden cognitive decline, confusion. Dragging of right leg. No focal deficits on exam     -CT head and neck showed multifocal moderate to severe stenosis with occlusion of distal left vertebral artery at V3-V4 junction  -CT head negative for bleed  -Telemetry  -Carotid duplex   There is moderate stenosis in the right ICA (50-69%). There is mild stenosis in the left ICA (<50%). The right vertebral is antegrade. The left vertebral is antegrade.    -aspirin increased to 162 mg daily, Eliquis 5 mg twice daily. -c/w statin (not currently high intensity)  -MRI brain showed 1. Acute left basal ganglia ischemic infarct. 2. Atrophy and white matter disease, remote infarcts stable otherwise. -MRI Lumbar spine showed 1. Postsurgical changes L4-5 L5-S1 stable. 2. Spinal canal stenosis L3-L4.  - Neuro following  PT/ST/OT. - SNF. On NPO currently. Patient's wife agreeable to PEG. Hold eliquis. Wife requesting he be seen by SLP again in the morning before PEG. I did explain that he would need to be NPO so they may not be able to work with him but we will discuss it in the morning with SLP  -Vit B12, folate, homocysteine, sed rate, TSH 0.47, vit D, ALAN, antineuronal cell AB all ordered. Blood cultures NGTD  -Had echocardiogram last admission which was negative for PFO  -c/w IVF d5 1/2 ns while NPO. Agitation  Melatonin QHS  Neuro added buspar and lexapro    Cough  Aspiration? CXR no acute changes. Remains on RA    COVID + ve  Patient tested +ve for covid on  as well  Reason for CVA?   Re-screen today    History of CAD  S/p pacemaker  History of grade 3 diastolic dysfunction  C/w coreg     Recurrent fall  Chronic back pain  -check orthostats  -cont flexeril  -MRI lumbar spine 1. Postsurgical changes L4-5 L5-S1 stable. 2. Spinal canal stenosis L3-L4. BPH  C/w flomax     Hypernatremia (improving)  Hypophosphatemia  D5 1/2 NS  Monitor and replete phos     Code Status: full code  Surrogate Decision Maker:wife     DVT Prophylaxis: eliquis  GI Prophylaxis: not indicated     Baseline: Walks      18.5 - 24.9 Normal weight / Body mass index is 23.66 kg/m². Estimated discharge date: Aug 1  Barriers: PEG, dispo      Recommended Disposition: SNF/LTC     Subjective:     Chief Complaint / Reason for Physician Visit  Patient seen and examined at the bedside. Wife at bedside. inocente requesting he be seen by SLP again in the morning before PEG. I did explain that he would need to be NPO so they may not be able to work with him but we will discuss it in the morning with SLP. Patient is awake and follows simple commands. He is not in any distress. Discussed with RN events overnight. Review of Systems:  Symptom Y/N Comments  Symptom Y/N Comments   Fever/Chills    Chest Pain     Poor Appetite    Edema     Cough    Abdominal Pain     Sputum    Joint Pain     SOB/HANNA    Pruritis/Rash     Nausea/vomit    Tolerating PT/OT     Diarrhea    Tolerating Diet     Constipation    Other       Could NOT obtain due to:      Objective:     VITALS:   Last 24hrs VS reviewed since prior progress note. Most recent are:  Patient Vitals for the past 24 hrs:   Temp Pulse Resp BP SpO2   07/31/22 0900 98 °F (36.7 °C) 69 18 (!) 173/90 100 %   07/30/22 2032 98 °F (36.7 °C) 71 19 (!) 155/80 100 %   07/30/22 1120 97.4 °F (36.3 °C) 70 16 (!) 159/82 100 %       No intake or output data in the 24 hours ending 07/31/22 0944       I had a face to face encounter and independently examined this patient on 7/31/2022, as outlined below:  PHYSICAL EXAM:  General: WD, WN.  Awake, , no acute distress    EENT:  EOMI. PERRLA. Anicteric sclerae. MMM  Resp:  CTA bilaterally, no wheezing or rales. No accessory muscle use  CV:  Regular  rhythm,  No edema  GI:  Soft, Non distended, Non tender. +Bowel sounds  Neurologic:  Alert, aphasia. Does follow simple commands  Psych:   Poor  insight. Not anxious nor agitated  Skin:  No rashes. No jaundice    Reviewed most current lab test results and cultures  YES  Reviewed most current radiology test results   YES  Review and summation of old records today    NO  Reviewed patient's current orders and MAR    YES  PMH/SH reviewed - no change compared to H&P  ________________________________________________________________________  Care Plan discussed with:    Comments   Patient     Family      RN     Care Manager     Consultant                        Multidiciplinary team rounds were held today with , nursing, pharmacist and clinical coordinator. Patient's plan of care was discussed; medications were reviewed and discharge planning was addressed. ________________________________________________________________________  Total NON critical care TIME:  35   Minutes    Total CRITICAL CARE TIME Spent:   Minutes non procedure based      Comments   >50% of visit spent in counseling and coordination of care     ________________________________________________________________________  Tello Chadwick MD     Procedures: see electronic medical records for all procedures/Xrays and details which were not copied into this note but were reviewed prior to creation of Plan. LABS:  I reviewed today's most current labs and imaging studies.   Pertinent labs include:  Recent Labs     07/31/22  0001 07/30/22  0627   WBC 5.4 7.0   HGB 14.8 13.2   HCT 42.0 39.2    163       Recent Labs     07/31/22  0001 07/30/22  0627    146*   K 4.6 3.5   * 115*   CO2 21 22   GLU 76 62*   BUN 6 9   CREA 0.71 0.61*   CA 8.6 7.4*   MG 2.1 1.9   PHOS 2.5* 1.7* Signed: Mireya Cali MD

## 2022-08-01 NOTE — PROGRESS NOTES
Comprehensive Nutrition Assessment    Type and Reason for Visit: Initial, Consult    Nutrition Recommendations/Plan:   Jevity 1.5 @ 25 mL/hr and advance as tolerated by 10 mL q 12 hours to goal rate of 55 mL/hr + 150 mL water flushes q 4 hours   Replete phos, monitor lytes daily and continue to replete as needed     Nutrition Assessment:    Patient medically noted for CVA and COVID-19. PMH PVD, HTN, and GERD. Chart reviewed for new consult and length of stay. NPO per SLP recommendations; PEG placed this afternoon. TF recommendations provided above. Goal rate adequate to meet 100% of estimated kcal/protein needs. Monitor lytes daily and replete as needed. Nutrition Related Findings:    Phos 2.0, K+ 3.7, --67  BM 7/27  Atorvastatin, buspar, Coreg, Lexapro, D5% IVF    Current Nutrition Intake & Therapies:        DIET NPO    Anthropometric Measures:  Height: 5' 10\" (177.8 cm)  Ideal Body Weight (IBW): 166 lbs (75 kg)     Current Body Wt:  73.3 kg (161 lb 9.6 oz), 97.3 % IBW. Current BMI (kg/m2): 23.2                          BMI Category: Normal weight (BMI 18.5-24. 9)    Estimated Daily Nutrient Needs:  Energy Requirements Based On: Formula  Weight Used for Energy Requirements: Current  Energy (kcal/day): 1906 kcal (BMR 1466 x 1. 3AF)  Weight Used for Protein Requirements: Current  Protein (g/day): 73g (1.0 g/kg bw)  Method Used for Fluid Requirements: 1 ml/kcal  Fluid (ml/day): 1900 mL    Nutrition Diagnosis:   Inadequate protein-energy intake related to swallowing difficulty as evidenced by NPO or clear liquid status due to medical condition    Nutrition Interventions:   Food and/or Nutrient Delivery: Start tube feeding  Nutrition Education/Counseling: No recommendations at this time  Coordination of Nutrition Care: Continue to monitor while inpatient       Goals:     Goals:  Tolerate nutrition support at goal rate, by next RD assessment       Nutrition Monitoring and Evaluation: Behavioral-Environmental Outcomes: None identified  Food/Nutrient Intake Outcomes: Enteral nutrition intake/tolerance  Physical Signs/Symptoms Outcomes: Biochemical data, Weight, GI status    Discharge Planning:    Enteral nutrition    Shavonne Carlisle RD  Contact: ext [phone]

## 2022-08-01 NOTE — PROGRESS NOTES
Speech Pathology Note    RN indicated that patient scheduled for PEG placement today at 1pm, however patient's wife refusing to sign consent form asking for patient's swallow to be re-evaluated prior to PEG placement. Reviewed chart and patient with MBS on 7/27 indicating silent aspiration with puree + mildly thick liquids. MBS indicated recommendation of PEG for primary nutrition/hydration. Patient has tolerated one SLP treatment session since 7/27 and otherwise not been alert/awake to participate with dysphagia treatment. Patient with significantly decreased alertness level on this date, per RN,  which would further limit patient's ability to participate in another swallowing instrumental assessment. Also, instrumental imaging remains not indicated at this time as patient with no ability to participate in dysphagia treatment since MBS 5 days ago. It is highly unlikely that functional change has occurred with swallow to indicate different diet recommendations. Patient requiring nutrition/hydration for healing, but not able to receive p.o. at this time. Wife in agreement with information and noted she will work to sign consent for PEG placement.      Adam Casillas, SLP

## 2022-08-01 NOTE — PROGRESS NOTES
.. TRANSFER - IN REPORT:    Verbal report received from Kyung(name) on Ashwin Ott  being received from 3109(unit) for ordered procedure      Report consisted of patients Situation, Background, Assessment and   Recommendations(SBAR). Information from the following report(s) Intake/Output, MAR, Accordion, and Recent Results was reviewed with the receiving nurse. Opportunity for questions and clarification was provided. Assessment completed upon patients arrival to unit and care assumed.

## 2022-08-01 NOTE — PROCEDURES
Esophagogastroduodenoscopy Procedure Note      Yo Eliana  1945  [id number]    Indication:  Feeding Difficulties      Endoscopist: Rosales Mayer MD    Referring Provider:  Triston Garcia MD    Sedation:  MAC anesthesia Propofol    Procedure Details:  After infomed consent was obtained for the procedure, with all risks and benefits of procedure explained the patient was taken to the endoscopy suite and placed in the left lateral decubitus position. Following sequential administration of sedation as per above, the endoscope was inserted into the mouth and advanced under direct vision to second portion of the duodenum. A careful inspection was made as the gastroscope was withdrawn, including a retroflexed view of the proximal stomach; findings and interventions are described below. Findings:     Esophagus:   - Normal mucosa throughout. Z line normal at 39 cm from incisors. Stomach:   - Mild nonspecific erythema in the stomach c/w nonerosive gastritis. Identified location on anterior abd wall with light reflux and tract anesthetized with 1% lidocaine then incision made w scalpel under sterile technique. Trocar introduced and passed a guide wire that was grasped w snare and then 20 FR G tube advanced with pull technique over wire. Position on abd wall was at 2. Duodenum:   The bulb and post bulbar mucosa is normal in appearance to the second portion. The duodenal folds appeared normal.     Therapies:  see above    Specimen: none            Complications:   None were encountered during the procedure. EBL:  None. Recommendations:     -The PEG tube may be used for feedings as soon as bowel sounds are confirmed post-procedure. The head of the bed should be kept elevated to 30 degrees during tube feeds, and the tube should be flushed with 30 mL of water every 8 hours and after giving m, edications through the tube. Monitor the PEG site for bleeding and infection.       Thank you for entrusting me with this patient's care. Please do not hesitate to contact me with any questions or if I can be of assistance with any of your other patients' GI needs.       Luke Wright MD  8/1/2022  1:51 PM

## 2022-08-01 NOTE — PERIOP NOTES
PEG tube inserted by M[facility] Scientific catalog #   [id number]  Product # [id number]  Lot # [id number]   Expiration date 03/15/2024

## 2022-08-01 NOTE — PROGRESS NOTES
Hospitalist Progress Note    NAME: Moisesirene Das   :  1945   MRN:  [id number]       Assessment / Plan:  AMS  POA  Rule out CVA, ? Vascular dementia  Right leg weakness  History of HTN/PAF     -Recent admission in 2022, for left facial droop, suspected TIA, had MRI done which was negative for CVA. EEG was done which was negative.  -Presented to ED for 1 week of sudden cognitive decline, confusion. Dragging of right leg. No focal deficits on exam     -CT head and neck showed multifocal moderate to severe stenosis with occlusion of distal left vertebral artery at V3-V4 junction  -CT head negative for bleed  -Telemetry  -Carotid duplex   There is moderate stenosis in the right ICA (50-69%). There is mild stenosis in the left ICA (<50%). The right vertebral is antegrade. The left vertebral is antegrade.    -aspirin increased to 162 mg daily, Eliquis 5 mg twice daily. -c/w statin (not currently high intensity)  -MRI brain showed 1. Acute left basal ganglia ischemic infarct. 2. Atrophy and white matter disease, remote infarcts stable otherwise. -MRI Lumbar spine showed 1. Postsurgical changes L4-5 L5-S1 stable. 2. Spinal canal stenosis L3-L4.  - Neuro following  PT/ST/OT. - SNF. On NPO currently. Patient's wife agreeable to PEG. Hold eliquis, resume when ok by GI s/p PEG. -Vit B12, folate, homocysteine, sed rate, TSH 0.47, vit D, ALAN, antineuronal cell AB all ordered. Blood cultures NGTD  -Had echocardiogram last admission which was negative for PFO  -c/w IVF d5 1/2 ns while NPO. -Nutrition consulted for tube feed recs     Agitation  Melatonin QHS  Neuro added buspar and lexapro    Cough  Aspiration? CXR no acute changes. Remains on RA    COVID + ve  Patient tested +ve for covid on  as well  Reason for CVA? Re-screen today    History of CAD  S/p pacemaker  History of grade 3 diastolic dysfunction  C/w coreg     Recurrent fall  Chronic back pain  -cont flexeril  -MRI lumbar spine 1. Postsurgical changes L4-5 L5-S1 stable. 2. Spinal canal stenosis L3-L4. BPH  C/w flomax     Hypernatremia (improving)  Hypophosphatemia  D5 1/2 NS  Monitor and replete phos     Code Status: full code  Surrogate Decision Maker:wife     DVT Prophylaxis: eliquis  GI Prophylaxis: not indicated     Baseline: Walks      18.5 - 24.9 Normal weight / Body mass index is 23.2 kg/m². Estimated discharge date: Aug 1  Barriers: PEG, dispo      Recommended Disposition: SNF/LTC     Subjective:     Chief Complaint / Reason for Physician Visit  Patient seen and examined at the bedside. Wife at bedside. Discussed case with SLP who has concerns for aspiration. Wife ok to proceed with PEG tube today. Patient himself has no complaints and is not in any distress. Discussed with RN events overnight. Review of Systems:  Symptom Y/N Comments  Symptom Y/N Comments   Fever/Chills    Chest Pain     Poor Appetite    Edema     Cough    Abdominal Pain     Sputum    Joint Pain     SOB/HANNA    Pruritis/Rash     Nausea/vomit    Tolerating PT/OT     Diarrhea    Tolerating Diet     Constipation    Other       Could NOT obtain due to:      Objective:     VITALS:   Last 24hrs VS reviewed since prior progress note. Most recent are:  Patient Vitals for the past 24 hrs:   Temp Pulse Resp BP SpO2   08/01/22 0859 99.1 °F (37.3 °C) 70 15 (!) 160/85 97 %   08/01/22 0324 97.8 °F (36.6 °C) 71 16 (!) 148/78 98 %   08/01/22 0030 98 °F (36.7 °C) 70 18 (!) 150/88 99 %   07/31/22 1849 97.7 °F (36.5 °C) 71 18 (!) 159/82 99 %   07/31/22 1622 98.1 °F (36.7 °C) 69 18 (!) 151/92 99 %   07/31/22 1305 97.6 °F (36.4 °C) 76 18 (!) 147/87 100 %         Intake/Output Summary (Last 24 hours) at 8/1/2022 1029  Last data filed at 8/1/2022 0600  Gross per 24 hour   Intake --   Output 0 ml   Net 0 ml          I had a face to face encounter and independently examined this patient on 8/1/2022, as outlined below:  PHYSICAL EXAM:  General: WD, WN.  Awake, , no acute distress    EENT:  EOMI. PERRLA. Anicteric sclerae. MMM  Resp:  CTA bilaterally, no wheezing or rales. No accessory muscle use  CV:  Regular  rhythm,  No edema  GI:  Soft, Non distended, Non tender. +Bowel sounds  Neurologic:  Alert, aphasia. Does follow simple commands  Psych:   Poor  insight. Not anxious nor agitated  Skin:  No rashes. No jaundice    Reviewed most current lab test results and cultures  YES  Reviewed most current radiology test results   YES  Review and summation of old records today    NO  Reviewed patient's current orders and MAR    YES  PMH/SH reviewed - no change compared to H&P  ________________________________________________________________________  Care Plan discussed with:    Comments   Patient     Family      RN     Care Manager     Consultant                        Multidiciplinary team rounds were held today with , nursing, pharmacist and clinical coordinator. Patient's plan of care was discussed; medications were reviewed and discharge planning was addressed. ________________________________________________________________________  Total NON critical care TIME:  35   Minutes    Total CRITICAL CARE TIME Spent:   Minutes non procedure based      Comments   >50% of visit spent in counseling and coordination of care     ________________________________________________________________________  Alexa Riojas MD     Procedures: see electronic medical records for all procedures/Xrays and details which were not copied into this note but were reviewed prior to creation of Plan. LABS:  I reviewed today's most current labs and imaging studies.   Pertinent labs include:  Recent Labs     08/01/22  0002 07/31/22  0001 07/30/22  0627   WBC 4.2 5.4 7.0   HGB 14.3 14.8 13.2   HCT 41.0 42.0 39.2    167 163       Recent Labs     08/01/22  0002 07/31/22  0001 07/30/22  0627    143 146*   K 3.7 4.6 3.5   * 113* 115*   CO2 25 21 22   * 76 62*   BUN 4* 6 9 CREA 0.88 0.71 0.61*   CA 8.6 8.6 7.4*   MG 1.9 2.1 1.9   PHOS 2.0* 2.5* 1.7*         Signed: Shad Seymour MD

## 2022-08-01 NOTE — PROGRESS NOTES
Patient arrived to endoscopy room 3, he is oriented to person. Placed on monitors vital signs stable. All belongings remain in inpatint room. poor dentition no dentures/ partials.

## 2022-08-01 NOTE — ANESTHESIA POSTPROCEDURE EVALUATION
Procedure(s):  ESOPHAGOGASTRODUODENOSCOPY (EGD)  PERCUTANEOUS ENDOSCOPIC GASTROSTOMY TUBE INSERTION. total IV anesthesia    Anesthesia Post Evaluation        Patient location during evaluation: PACU  Note status: Adequate. Level of consciousness: responsive to verbal stimuli and sleepy but conscious  Pain management: satisfactory to patient  Airway patency: patent  Anesthetic complications: no  Cardiovascular status: acceptable  Respiratory status: acceptable  Hydration status: acceptable  Comments: +Post-Anesthesia Evaluation and Assessment    Patient: Addie Quijano MRN: [id number]  SSN: [id number]   YOB: 1945  Age: 68 y.o. Sex: male      Cardiovascular Function/Vital Signs    /72   Pulse 70   Temp 37.3 °C (99.1 °F)   Resp 18   Ht 5' 10\" (1.778 m)   Wt 73.3 kg (161 lb 11.2 oz)   SpO2 99%   BMI 23.20 kg/m²     Patient is status post Procedure(s):  ESOPHAGOGASTRODUODENOSCOPY (EGD)  PERCUTANEOUS ENDOSCOPIC GASTROSTOMY TUBE INSERTION. Nausea/Vomiting: Controlled. Postoperative hydration reviewed and adequate. Pain:  Pain Scale 1: Visual (08/01/22 1431)  Pain Intensity 1: 0 (08/01/22 1431)   Managed. Neurological Status: At baseline. Mental Status and Level of Consciousness: Arousable. Pulmonary Status:   O2 Device: None (Room air) (08/01/22 1431)   Adequate oxygenation and airway patent. Complications related to anesthesia: None    Post-anesthesia assessment completed. No concerns.     Signed By: Nicolas Shields DO    8/1/2022  Post anesthesia nausea and vomiting:  controlled      INITIAL Post-op Vital signs:   Vitals Value Taken Time   /72 08/01/22 1431   Temp     Pulse 70 08/01/22 1431   Resp 18 08/01/22 1431   SpO2 99 % 08/01/22 1431

## 2022-08-01 NOTE — PROGRESS NOTES
Problem: Falls - Risk of  Goal: *Absence of Falls  Description: Document Ivon Alessandro Fall Risk and appropriate interventions in the flowsheet.   Outcome: Progressing Towards Goal  Note: Fall Risk Interventions:  Mobility Interventions: Assess mobility with egress test, Bed/chair exit alarm    Mentation Interventions: Adequate sleep, hydration, pain control    Medication Interventions: Bed/chair exit alarm    Elimination Interventions: Bed/chair exit alarm, Call light in reach    History of Falls Interventions: Bed/chair exit alarm

## 2022-08-01 NOTE — PROGRESS NOTES
Occupational Therapy  Medical record reviewed. Pt is off the floor for PEG surgery at this time. Will defer and continue to follow as appropriate.

## 2022-08-01 NOTE — PERIOP NOTES
TRANSFER - OUT REPORT:    Verbal report given to [facility]) on Maegan Alva  being transferred to  [facility](unit) for routine progression of care       Report consisted of patients Situation, Background, Assessment and   Recommendations(SBAR). Information from the following report(s) SBAR was reviewed with the receiving nurse. Lines:   Peripheral IV 07/31/22 Right Antecubital (Active)   Site Assessment Clean, dry, & intact 08/01/22 1400   Phlebitis Assessment 0 08/01/22 1400   Infiltration Assessment 0 08/01/22 1400   Dressing Status Clean, dry, & intact 08/01/22 1400   Dressing Type Tape;Transparent 08/01/22 1400   Hub Color/Line Status Pink 08/01/22 0745   Action Taken Open ports on tubing capped 08/01/22 0745   Alcohol Cap Used Yes 08/01/22 0745        Opportunity for questions and clarification was provided.

## 2022-08-01 NOTE — PROGRESS NOTES
End of Shift Note     Bedside shift change report given to Denise Mulligan (oncoming nurse) by Darlyn Zhang RN (offgoing nurse).   Report included the following information SBAR, Kardex, MAR, and Recent Results     Shift worked:  Days    Shift summary and any significant changes:      PEG placed today         Concerns for physician to address:  None    Zone phone for oncoming shift:  [phone]      Patient Information  Haley Sheffield  68 y.o.  7/25/2022 12:04 PM by Raquel Bowden MD. Haley Sheffield was admitted from Home     Problem List       Patient Active Problem List     Diagnosis Date Noted    Encephalopathy due to COVID-19 virus 07/28/2022    Thrombotic stroke involving left middle cerebral artery ([address] 07/27/2022    Acute alteration in mental status 07/26/2022    Convulsive syncope 07/26/2022    Bilateral carotid artery stenosis 07/26/2022    History of stroke 07/26/2022    CVA (cerebral vascular accident) ([address] 07/25/2022    Stroke ([address] 07/08/2022    Weakness of both legs 06/01/2021    Bilateral lower extremity pain 06/01/2021    Leg pain 05/30/2021    Dilated cardiomyopathy ([address] 12/18/2020    Permanent atrial fibrillation ([address] 12/18/2020    Tachycardia 12/18/2020    A-fib ([address] 12/18/2020    PAF (paroxysmal atrial fibrillation) ([address] 05/16/2018    Age-related cataract 08/01/2017    Allergic rhinitis 06/28/2017    Diverticulosis 06/28/2017    Urinary retention 06/28/2017    PVD (peripheral vascular disease) ([address] 06/28/2017    On statin therapy 06/28/2017    Low back pain 06/28/2017    Insomnia 06/28/2017    HTN (hypertension) 06/28/2017    Hyperlipidemia 06/28/2017    Glucose intolerance (impaired glucose tolerance) 06/28/2017    GERD (gastroesophageal reflux disease) 06/28/2017    ED (erectile dysfunction) 06/28/2017    DJD (degenerative joint disease) 06/28/2017    ASVD (arteriosclerotic vascular disease) 06/28/2017           Past Medical History:   Diagnosis Date    Allergic rhinitis 6/28/2017    Arrhythmia Paroxysmal Afib- Dr. Leyla Thibodeaux    ASVD (arteriosclerotic vascular disease) 06/28/2017     Story: carotid stenosis followed by Vasc Surg    Atrial fibrillation ([facility]      Congestive heart failure ([facility]      Diverticulosis 6/28/2017     Comments: on colonoscopy 12/01    DJD (degenerative joint disease) 6/28/2017    ED (erectile dysfunction) 6/28/2017    GERD (gastroesophageal reflux disease) 6/28/2017    Glucose intolerance (impaired glucose tolerance) 06/28/2017    HTN (hypertension) 6/28/2017    Hyperlipidemia 6/28/2017    Insomnia 6/28/2017    Low back pain 6/28/2017    On statin therapy 6/28/2017    Pacemaker 2020    PVD (peripheral vascular disease) ([facility] 6/28/2017    Rheumatoid arthritis ([facility]      Urinary retention 6/28/2017         Core Measures:  CVA: Yes Yes  CHF:No Not applicable  PNA:No Not applicable     Activity:  Activity Level: Up with Assistance  Number times ambulated in hallways past shift: 0  Number of times OOB to chair past shift: 1     Cardiac:  Cardiac Monitoring: No            Access:   Current line(s): PIV         Genitourinary:   Urinary status: voiding inc and inc briefs  Urinary Catheter? No     Respiratory:   O2 Device: None (Room air)  Chronic home O2 use?: NO  Incentive spirometer at bedside: N/A     GI:  Last Bowel Movement Date: 07/27/22  Current diet:  DIET NPO  Passing flatus: YES  Tolerating current diet: YES     Pain Management:  Patient states pain is manageable on current regimen: YES     Skin:  Ghulam Score: 16  Interventions: increase time out of bed, limit briefs, and internal/external urinary devices    Patient Safety:  Fall Score:  Total Score: 5  Interventions: bed/chair alarm and gripper socks  High Fall Risk: Yes  @Rollbelt  @dexterity to release roll belt  Yes/No ( must document dexterity  here by stating Yes or No here, otherwise this is a restraint and must follow restraint documentation policy.)     DVT prophylaxis:  DVT prophylaxis Med- Yes  DVT prophylaxis SCD or ISAI- No     Wounds: (If Applicable)  Wounds- No  Location none     Active Consults:  IP CONSULT TO HOSPITALIST  IP CONSULT TO NEUROLOGY  IP CONSULT TO GASTROENTEROLOGY     Length of Stay:  Expected LOS: 4d 9h  Actual LOS: 7  Discharge Plan: Yes CM following.   [facility]        Emerald Howard RN

## 2022-08-02 NOTE — PROGRESS NOTES
Problem: Falls - Risk of  Goal: *Absence of Falls  Description: Document Tawanda Tika Fall Risk and appropriate interventions in the flowsheet. Outcome: Progressing Towards Goal  Note: Fall Risk Interventions:  Mobility Interventions: Bed/chair exit alarm    Mentation Interventions: Bed/chair exit alarm, Adequate sleep, hydration, pain control    Medication Interventions: Bed/chair exit alarm    Elimination Interventions: Bed/chair exit alarm, Call light in reach    History of Falls Interventions: Bed/chair exit alarm         Problem: Patient Education: Go to Patient Education Activity  Goal: Patient/Family Education  Outcome: Progressing Towards Goal     Problem: Pressure Injury - Risk of  Goal: *Prevention of pressure injury  Description: Document Ghulam Scale and appropriate interventions in the flowsheet.   Outcome: Progressing Towards Goal  Note: Pressure Injury Interventions:  Sensory Interventions: Assess changes in LOC, Assess need for specialty bed    Moisture Interventions: Absorbent underpads, Apply protective barrier, creams and emollients    Activity Interventions: PT/OT evaluation, Increase time out of bed    Mobility Interventions: Float heels, HOB 30 degrees or less    Nutrition Interventions: Document food/fluid/supplement intake    Friction and Shear Interventions: HOB 30 degrees or less                Problem: Patient Education: Go to Patient Education Activity  Goal: Patient/Family Education  Outcome: Progressing Towards Goal     Problem: Patient Education: Go to Patient Education Activity  Goal: Patient/Family Education  Outcome: Progressing Towards Goal     Problem: TIA/CVA Stroke: 0-24 hours  Goal: Off Pathway (Use only if patient is Off Pathway)  Outcome: Progressing Towards Goal  Goal: Activity/Safety  Outcome: Progressing Towards Goal  Goal: Consults, if ordered  Outcome: Progressing Towards Goal  Goal: Diagnostic Test/Procedures  Outcome: Progressing Towards Goal  Goal: Nutrition/Diet  Outcome: Progressing Towards Goal  Goal: Discharge Planning  Outcome: Progressing Towards Goal  Goal: Medications  Outcome: Progressing Towards Goal  Goal: Respiratory  Outcome: Progressing Towards Goal  Goal: Treatments/Interventions/Procedures  Outcome: Progressing Towards Goal  Goal: Minimize risk of bleeding post-thrombolytic infusion  Outcome: Progressing Towards Goal  Goal: Monitor for complications post-thrombolytic infusion  Outcome: Progressing Towards Goal  Goal: Psychosocial  Outcome: Progressing Towards Goal  Goal: *Hemodynamically stable  Outcome: Progressing Towards Goal  Goal: *Neurologically stable  Description: Absence of additional neurological deficits    Outcome: Progressing Towards Goal  Goal: *Verbalizes anxiety and depression are reduced or absent  Outcome: Progressing Towards Goal  Goal: *Absence of Signs of Aspiration on Current Diet  Outcome: Progressing Towards Goal  Goal: *Absence of deep venous thrombosis signs and symptoms(Stroke Metric)  Outcome: Progressing Towards Goal  Goal: *Ability to perform ADLs and demonstrates progressive mobility and function  Outcome: Progressing Towards Goal  Goal: *Stroke education started(Stroke Metric)  Outcome: Progressing Towards Goal  Goal: *Dysphagia screen performed(Stroke Metric)  Outcome: Progressing Towards Goal  Goal: *Rehab consulted(Stroke Metric)  Outcome: Progressing Towards Goal     Problem: TIA/CVA Stroke: Day 2 Until Discharge  Goal: Off Pathway (Use only if patient is Off Pathway)  Outcome: Progressing Towards Goal  Goal: Activity/Safety  Outcome: Progressing Towards Goal  Goal: Diagnostic Test/Procedures  Outcome: Progressing Towards Goal  Goal: Nutrition/Diet  Outcome: Progressing Towards Goal  Goal: Discharge Planning  Outcome: Progressing Towards Goal  Goal: Medications  Outcome: Progressing Towards Goal  Goal: Respiratory  Outcome: Progressing Towards Goal  Goal: Treatments/Interventions/Procedures  Outcome: Progressing Towards Goal  Goal: Psychosocial  Outcome: Progressing Towards Goal  Goal: *Verbalizes anxiety and depression are reduced or absent  Outcome: Progressing Towards Goal  Goal: *Absence of aspiration  Outcome: Progressing Towards Goal  Goal: *Absence of deep venous thrombosis signs and symptoms(Stroke Metric)  Outcome: Progressing Towards Goal  Goal: *Optimal pain control at patient's stated goal  Outcome: Progressing Towards Goal  Goal: *Tolerating diet  Outcome: Progressing Towards Goal  Goal: *Ability to perform ADLs and demonstrates progressive mobility and function  Outcome: Progressing Towards Goal  Goal: *Stroke education continued(Stroke Metric)  Outcome: Progressing Towards Goal     Problem: Ischemic Stroke: Discharge Outcomes  Goal: *Verbalizes anxiety and depression are reduced or absent  Outcome: Progressing Towards Goal  Goal: *Verbalize understanding of risk factor modification(Stroke Metric)  Outcome: Progressing Towards Goal  Goal: *Hemodynamically stable  Outcome: Progressing Towards Goal  Goal: *Absence of aspiration pneumonia  Outcome: Progressing Towards Goal  Goal: *Aware of needed dietary changes  Outcome: Progressing Towards Goal  Goal: *Verbalize understanding of prescribed medications including anti-coagulants, anti-lipid, and/or anti-platelets(Stroke Metric)  Outcome: Progressing Towards Goal  Goal: *Tolerating diet  Outcome: Progressing Towards Goal  Goal: *Aware of follow-up diagnostics related to anticoagulants  Outcome: Progressing Towards Goal  Goal: *Ability to perform ADLs and demonstrates progressive mobility and function  Outcome: Progressing Towards Goal  Goal: *Absence of DVT(Stroke Metric)  Outcome: Progressing Towards Goal  Goal: *Absence of aspiration  Outcome: Progressing Towards Goal  Goal: *Optimal pain control at patient's stated goal  Outcome: Progressing Towards Goal  Goal: *Home safety concerns addressed  Outcome: Progressing Towards Goal  Goal: *Describes available resources and support systems  Outcome: Progressing Towards Goal  Goal: *Verbalizes understanding of activation of EMS(911) for stroke symptoms(Stroke Metric)  Outcome: Progressing Towards Goal  Goal: *Understands and describes signs and symptoms to report to providers(Stroke Metric)  Outcome: Progressing Towards Goal  Goal: *Neurolgocially stable (absence of additional neurological deficits)  Outcome: Progressing Towards Goal  Goal: *Verbalizes importance of follow-up with primary care physician(Stroke Metric)  Outcome: Progressing Towards Goal  Goal: *Smoking cessation discussed,if applicable(Stroke Metric)  Outcome: Progressing Towards Goal  Goal: *Depression screening completed(Stroke Metric)  Outcome: Progressing Towards Goal

## 2022-08-02 NOTE — PROGRESS NOTES
Problem: Dysphagia (Adult)  Goal: *Acute Goals and Plan of Care (Insert Text)  Description: 2022  Speech path goals  Re-Eval 2022    1. Patient will participate with reeval of swallowing. Goal met .  2. Patient will participate with MBS to assess swallow physiology. Completed   3. Patient will tolerate ice chips after oral care to prevent disuse atrophy. CONTINUED 2022  4. Patient will participate with repeat imaging prior to po. Outcome: Progressing Towards Goal     Problem: Communication Impaired (Adult)  Goal: *Acute Goals and Plan of Care (Insert Text)  Description: 2022  Speech path goals  Re-Eval 2022    1. Patient will follow 2 step commands with 80% acc. CONTINUED 2022  2. Patient will answer simple to mod level y/n questions with 80% acc. CONTINUED 2022  3. Patient will name basic ADL objects with 70% acc with mod cues. CONTINUED 2022  Outcome: Progressing Towards Goal     SPEECH LANGUAGE PATHOLOGY SPEECH/LANGUAGE AND DYSPHAGIA TREATMENT: WEEKLY REASSESSMENT  Patient: Lamine Hunt (85 y.o. male)  Date: 2022  Diagnosis: CVA (cerebral vascular accident) ([facility] [I63.9] <principal problem not specified>  Procedure(s) (LRB):  ESOPHAGOGASTRODUODENOSCOPY (EGD) (N/A)  PERCUTANEOUS ENDOSCOPIC GASTROSTOMY TUBE INSERTION (N/A) 1 Day Post-Op  Precautions: Fall, Aspiration, Contact, Skin (droplet plus - covid +; PEG tube)    ASSESSMENT:  Patient continues to be followed by SLP. Patient found sitting upright in chair upon SLP arrival. Patient s/p PEG placement on Monday. Patient Ox1 on this date (to self, not to ). Oral care completed prior to PO intake. Patient tolerated 8 single ice chips with effortful swallow on this date without overt signs of aspiration, although per MBS on  patient with silent aspiration of thin liquids, mildly thick, and puree consistencies. Further ice chip trials deferred given patient's increasing lethargy.     Patient continues to demonstrate difficulty with sustaining attention to task, responding to simple yes/no questions, confrontation naming, and following 1-step commands. Patient benefited from frequent redirection to task on this date (i.e. calling patient by first name consistently before directions, \"Armin\"). Patient benefited from SLP model from 1-step commands as well as phonemic and semantic cueing during confrontational naming task. Patient will continue to benefit from dysphagia and language treatment acutely. Patient's progression toward goals since last assessment: Since initial evaluation on 7/26, patient has not progressed towards goals. Last SLP visit on 7/29 with patient's participation limited by lethargy. Patient with PEG placed 8/1. Goals continued on this date. PLAN:  Goals have been updated based on progression since last assessment. Patient continues to benefit from skilled intervention to address the above impairments. Continue to follow the patient 3 times a week to address goals. Recommendations and Planned Interventions:  -- NPO with PEG for primary/nutrition/hydration  -- Non-oral medication  -- Ice chips following oral care for patient comfort and to prevent further laryngeal atrophy  -- SLP to continue to follow from dysphagia and language tx    Patient continues to benefit from skilled intervention to address the above impairments. Continue treatment per established plan of care. Discharge Recommendations: To Be Determined     SUBJECTIVE:   Patient stated oh please when asked if SLP could call him Kevin Freed.     OBJECTIVE:   Cognitive and Communication Status:  Neurologic State: Drowsy  Orientation Level: Oriented to person, Disoriented to place, Disoriented to situation, Disoriented to time  Cognition: Decreased command following, Decreased attention/concentration  Perception: Appears intact  Perseveration: No perseveration noted  Safety/Judgement: Decreased awareness of environment, Decreased awareness of need for assistance, Decreased awareness of need for safety, Decreased insight into deficits, Fall prevention    Dysphagia Treatment and Interventions:    P.O. Trials:  Patient Position: Upright in chair  Vocal quality prior to P.O.: No impairment  Consistency Presented: Ice chips  How Presented: SLP-fed/presented;Spoon     Bolus Acceptance: No impairment  Bolus Formation/Control: No impairment     Propulsion: No impairment  Oral Residue: None  Initiation of Swallow: Delayed (# of seconds) (Per [facility] 7/27)     Aspiration Signs/Symptoms: None                        Speech Treatment and Interventions:    Language Comprehension and Expression:  Auditory Comprehension   Auditory Impairment: Yes  Response to Basic Yes/No Questions (%): 60 %  One-Step Basic Commands (%): 100 %  Two-Step Basic Commands (%): 0 %  Interfering Components: Attention - sustained;Processing speed  Verbal Expression  Verbal Expression  Naming: Impaired  Confrontation (%): 50 %  Cueing type: Phonemic; Other (Semantic)  Cueing amount: Min-mod    Voice:          Response & Tolerance to Activities:         Pain:  Pain Scale 1: Numeric (0 - 10)  Pain Intensity 1: 4  Pain Location 1: Abdomen    After treatment:   Patient left in no apparent distress sitting up in chair, Call bell within reach, Nursing notified, and Caregiver / family present    COMMUNICATION/EDUCATION:   Patient was educated regarding role of SLP and SLP recommendations. He demonstrated guarded understanding due to expressive/receptive language deficits. The patient's plan of care including recommendations, planned interventions, and recommended diet changes were discussed with: Registered nurse.        Naya Noland, SLP  Time Calculation: 30 mins

## 2022-08-02 NOTE — PROGRESS NOTES
End of Shift Note     Bedside shift change report given to Zay Torrez RN (oncoming nurse) by Aba Almonte RN (offgoing nurse). Report included the following information SBAR, Kardex, MAR, and Recent Results     Shift worked: nights   Shift summary and any significant changes:     Patient had unwitnessed fall. Patient continues to be disoriented and noncompliant Bed alarm went off and Nurse DON ppe as patient is Covid positive,patient was already on the floor before nurse entered room. Patient vital signs and b/s was stable. NP notified and assessed patient and did not order any test.    Telesitter and rollbelt was placed. Patient continues to try to get up out the bed throughout the night. Haldol ordered         Concerns for physician to address:  None    Zone phone for oncoming shift:  [phone]      Patient Information  Nisreen Payton  68 y.o.  7/25/2022 12:04 PM by Teodora Lewis MD. Nisreen Payton was admitted from Home     Problem List       Patient Active Problem List     Diagnosis Date Noted    Encephalopathy due to COVID-19 virus 07/28/2022    Thrombotic stroke involving left middle cerebral artery ([address] 07/27/2022    Acute alteration in mental status 07/26/2022    Convulsive syncope 07/26/2022    Bilateral carotid artery stenosis 07/26/2022    History of stroke 07/26/2022    CVA (cerebral vascular accident) ([address] 07/25/2022    Stroke ([address] 07/08/2022    Weakness of both legs 06/01/2021    Bilateral lower extremity pain 06/01/2021    Leg pain 05/30/2021    Dilated cardiomyopathy ([address] 12/18/2020    Permanent atrial fibrillation ([address] 12/18/2020    Tachycardia 12/18/2020    A-fib ([address] 12/18/2020    PAF (paroxysmal atrial fibrillation) ([address] 05/16/2018    Age-related cataract 08/01/2017    Allergic rhinitis 06/28/2017    Diverticulosis 06/28/2017    Urinary retention 06/28/2017    PVD (peripheral vascular disease) ([address] 06/28/2017    On statin therapy 06/28/2017    Low back pain 06/28/2017    Insomnia 06/28/2017    HTN (hypertension) 06/28/2017    Hyperlipidemia 06/28/2017    Glucose intolerance (impaired glucose tolerance) 06/28/2017    GERD (gastroesophageal reflux disease) 06/28/2017    ED (erectile dysfunction) 06/28/2017    DJD (degenerative joint disease) 06/28/2017    ASVD (arteriosclerotic vascular disease) 06/28/2017           Past Medical History:   Diagnosis Date    Allergic rhinitis 6/28/2017    Arrhythmia       Paroxysmal Afib- Dr. Lee Bourne    ASVD (arteriosclerotic vascular disease) 06/28/2017     Story: carotid stenosis followed by Vasc Surg    Atrial fibrillation ([address]      Congestive heart failure ([address]      Diverticulosis 6/28/2017     Comments: on colonoscopy 12/01    DJD (degenerative joint disease) 6/28/2017    ED (erectile dysfunction) 6/28/2017    GERD (gastroesophageal reflux disease) 6/28/2017    Glucose intolerance (impaired glucose tolerance) 06/28/2017    HTN (hypertension) 6/28/2017    Hyperlipidemia 6/28/2017    Insomnia 6/28/2017    Low back pain 6/28/2017    On statin therapy 6/28/2017    Pacemaker 2020    PVD (peripheral vascular disease) ([address] 6/28/2017    Rheumatoid arthritis ([address]      Urinary retention 6/28/2017         Core Measures:  CVA: Yes Yes  CHF:No Not applicable  PNA:No Not applicable     Activity:  Activity Level: Up with Assistance  Number times ambulated in hallways past shift: 0  Number of times OOB to chair past shift: 1     Cardiac:  Cardiac Monitoring: No            Access:   Current line(s): PIV         Genitourinary:   Urinary status: voiding inc and inc briefs  Urinary Catheter?  No     Respiratory:   O2 Device: None (Room air)  Chronic home O2 use?: NO  Incentive spirometer at bedside: N/A     GI:  Last Bowel Movement Date: 07/27/22  Current diet:  DIET NPO  Passing flatus: YES  Tolerating current diet: YES     Pain Management:  Patient states pain is manageable on current regimen: YES     Skin:  Ghulam Score: 16  Interventions: increase time out of bed, limit briefs, and internal/external urinary devices    Patient Safety:  Fall Score: Total Score: 5  Interventions: bed/chair alarm and gripper socks  High Fall Risk: Yes  @Rollbelt  @dexterity to release roll belt  Yes/No ( must document dexterity  here by stating Yes or No here, otherwise this is a restraint and must follow restraint documentation policy.)     DVT prophylaxis:  DVT prophylaxis Med- Yes  DVT prophylaxis SCD or ISAI- No     Wounds: (If Applicable)  Wounds- No  Location none     Active Consults:  IP CONSULT TO HOSPITALIST  IP CONSULT TO NEUROLOGY  IP CONSULT TO GASTROENTEROLOGY     Length of Stay:  Expected LOS: 4d 9h  Actual LOS: 7  Discharge Plan: Yes CM following.   Referral for IPR to [facility]        Lita Roach RN

## 2022-08-02 NOTE — PROGRESS NOTES
Hospitalist Progress Note    NAME: Haley Sheffield   :  1945   MRN:  [id number]       Assessment / Plan:  AMS  POA  Rule out CVA, ? Vascular dementia  Right leg weakness  History of HTN/PAF     -Recent admission in 2022, for left facial droop, suspected TIA, had MRI done which was negative for CVA. EEG was done which was negative.  -Presented to ED for 1 week of sudden cognitive decline, confusion. Dragging of right leg. No focal deficits on exam     -CT head and neck showed multifocal moderate to severe stenosis with occlusion of distal left vertebral artery at V3-V4 junction  -CT head negative for bleed  -Telemetry  -Carotid duplex   There is moderate stenosis in the right ICA (50-69%). There is mild stenosis in the left ICA (<50%). The right vertebral is antegrade. The left vertebral is antegrade.    -aspirin increased to 162 mg daily, Eliquis 5 mg twice daily. -c/w statin (not currently high intensity)  -MRI brain showed 1. Acute left basal ganglia ischemic infarct. 2. Atrophy and white matter disease, remote infarcts stable otherwise. -MRI Lumbar spine showed 1. Postsurgical changes L4-5 L5-S1 stable. 2. Spinal canal stenosis L3-L4.  - Neuro following  PT/ST/OT. - SNF. On NPO currently. - s/p PEG  - Eliquis resumed  -Vit B12, folate, homocysteine, sed rate, TSH 0.47, vit D, ALAN, antineuronal cell AB all ordered. Blood cultures NGTD  -Had echocardiogram last admission which was negative for PFO  -DC IVF  -Nutrition consulted for tube feed recs, appreciate recs    PEG  Patient having some pain, GI adjusted bumper today  Will check Axr (low suspicion for finding pathology)     Agitation (has not been receiving due to NPO, but will resume today)  Melatonin QHS  Neuro added buspar and lexapro    Cough  Aspiration? CXR no acute changes. Remains on RA    COVID + ve  Patient tested +ve for covid on  as well  Reason for CVA?   Re-screen still +ve  Not exhibiting any respiratory symptoms    History of CAD  S/p pacemaker  History of grade 3 diastolic dysfunction  C/w coreg     Recurrent fall  Chronic back pain  -cont flexeril  -MRI lumbar spine 1. Postsurgical changes L4-5 L5-S1 stable. 2. Spinal canal stenosis L3-L4. BPH  C/w flomax     Hypernatremia (resolved)  Hypophosphatemia  DC IVF  Monitor and replete phos     Code Status: full code  Surrogate Decision Maker:wife     DVT Prophylaxis: eliquis  GI Prophylaxis: not indicated     Baseline: Walks      18.5 - 24.9 Normal weight / Body mass index is 22.28 kg/m². Estimated discharge date: Aug 1  Barriers: PEG, dispo      Recommended Disposition: SNF/LTC     Subjective:     Chief Complaint / Reason for Physician Visit  Patient seen and examined at the bedside. Wife at bedside. Patient was agitated overnight and had a fall. He was given haldol. He currently complains of some abdominal pain around his PEG tube. He is unable to describe it due to aphagia. Discussed with RN events overnight. Review of Systems:  Symptom Y/N Comments  Symptom Y/N Comments   Fever/Chills    Chest Pain     Poor Appetite    Edema     Cough    Abdominal Pain     Sputum    Joint Pain     SOB/HANNA    Pruritis/Rash     Nausea/vomit    Tolerating PT/OT     Diarrhea    Tolerating Diet     Constipation    Other       Could NOT obtain due to:      Objective:     VITALS:   Last 24hrs VS reviewed since prior progress note.  Most recent are:  Patient Vitals for the past 24 hrs:   Temp Pulse Resp BP SpO2   08/02/22 1224 98.1 °F (36.7 °C) 70 18 103/61 97 %   08/02/22 0834 97.9 °F (36.6 °C) 75 18 (!) 148/62 99 %   08/02/22 0312 98.9 °F (37.2 °C) 96 16 136/77 96 %   08/01/22 2211 99.4 °F (37.4 °C) 89 17 (!) 148/68 98 %   08/01/22 2034 99.2 °F (37.3 °C) 79 16 (!) 173/83 100 %   08/01/22 1553 97.5 °F (36.4 °C) 69 18 (!) 163/93 100 %         Intake/Output Summary (Last 24 hours) at 8/2/2022 1505  Last data filed at 8/2/2022 1103  Gross per 24 hour   Intake 150 ml Output --   Net 150 ml          I had a face to face encounter and independently examined this patient on 8/2/2022, as outlined below:  PHYSICAL EXAM:  General: WD, WN. Awake, , no acute distress    EENT:  EOMI. PERRLA. Anicteric sclerae. MMM  Resp:  CTA bilaterally, no wheezing or rales. No accessory muscle use  CV:  Regular  rhythm,  No edema  GI:  Soft, Non distended, Non tender. +Bowel sounds  Neurologic:  Alert, aphasia. Does follow simple commands  Psych:   Poor  insight. Not anxious nor agitated  Skin:  No rashes. No jaundice    Reviewed most current lab test results and cultures  YES  Reviewed most current radiology test results   YES  Review and summation of old records today    NO  Reviewed patient's current orders and MAR    YES  PMH/SH reviewed - no change compared to H&P  ________________________________________________________________________  Care Plan discussed with:    Comments   Patient     Family      RN     Care Manager     Consultant                        Multidiciplinary team rounds were held today with , nursing, pharmacist and clinical coordinator. Patient's plan of care was discussed; medications were reviewed and discharge planning was addressed. ________________________________________________________________________  Total NON critical care TIME:  35   Minutes    Total CRITICAL CARE TIME Spent:   Minutes non procedure based      Comments   >50% of visit spent in counseling and coordination of care     ________________________________________________________________________  Alexa Wick MD     Procedures: see electronic medical records for all procedures/Xrays and details which were not copied into this note but were reviewed prior to creation of Plan. LABS:  I reviewed today's most current labs and imaging studies.   Pertinent labs include:  Recent Labs     08/02/22  0140 08/01/22  0002 07/31/22  0001   WBC 12.6* 4.2 5.4   HGB 14.1 14.3 14.8   HCT 40.2 41.0 42.0    162 167       Recent Labs     08/02/22  0140 08/01/22  0002 07/31/22  0001    144 143   K 3.6 3.7 4.6   * 111* 113*   CO2 23 25 21   * 112* 76   BUN 6 4* 6   CREA 0.88 0.88 0.71   CA 8.2* 8.6 8.6   MG 1.8 1.9 2.1   PHOS 3.0 2.0* 2.5*         Signed: Narendra Field MD

## 2022-08-02 NOTE — PROGRESS NOTES
Received notification from bedside RN about patient with regards to: recent unwitnessed fall, assessed at bedside denies any and negative for any ROM changes  VS: /68, HR 89, RR 17, o2 sat 98% on RA    Intervention given: No imaging needed, continue to monitor for now    0340: Notified of patient's persistent agitation and frequent attempt to get out of bed     - Haldol 2 mg IM x 1 dose ordered

## 2022-08-02 NOTE — PROGRESS NOTES
Transition of Care Plan:     RUR: 14% - \"low risk\"  Disposition: IPR placement - [facility] (pending insurance auth - auth initiated 8/1/22, pending as of 8/2/22)  Follow up appointments: PCP & specialist as indicated   DME needed: Defer to IPR for DME needs  Transportation at Discharge: BLS transport needed for d/c  Keys or means to access home: N/A - pt likely transitioning to IPR at d/c     IM Medicare Letter: 2nd IM needed prior to d/c  Is patient a BCPI-A Bundle: N/A         Is patient a  and connected with the [place]? Yes - clinical updates faxed to the [facility] per protocol 7/28/22  Caregiver Contact: Pt's wife Giovani Zurita Wellington: [phone])  Discharge Caregiver contacted prior to discharge? To be contacted prior to d/c  Care Conference needed?: N/A  COVID-19 test: PCR COVID-19 test completed 7/27/22; results positive     Initial note: Chart reviewed. CM contacted [facility] admission liaison Heide Francoiser: [phone]) to request for facility to initiate insurance auth for IPR today. Jacob Alcantar reported facility initiated [employer] Hence 8/1/22, auth pending at this time. Arbutus Katharine to contact CM with updates on the status of auth once available. CM will continue to follow & remain accessible for d/c planning.     Martha Lopez MSW  Care Manager, [facility]

## 2022-08-02 NOTE — PROGRESS NOTES
End of Shift Note     Bedside shift change report given to Keri Rawls RN (oncoming nurse) by Chadd Berman RN (offgoing nurse).   Report included the following information SBAR, Kardex, MAR, and Recent Results     Shift worked:  Days   Shift summary and any significant changes:     Tube feeding started today, tolerating well    Pt worked with therapy today          Concerns for physician to address:  None   Zone phone for oncoming shift:  [phone]      Patient [address] [place]  68 y.o.  7/25/2022 12:04 PM by Bharat Farrar MD. Yo Monroy was admitted from Home     Problem List          Patient Active Problem List     Diagnosis Date Noted    Encephalopathy due to COVID-19 virus 07/28/2022    Thrombotic stroke involving left middle cerebral artery ([address] 07/27/2022    Acute alteration in mental status 07/26/2022    Convulsive syncope 07/26/2022    Bilateral carotid artery stenosis 07/26/2022    History of stroke 07/26/2022    CVA (cerebral vascular accident) ([address] 07/25/2022    Stroke ([address] 07/08/2022    Weakness of both legs 06/01/2021    Bilateral lower extremity pain 06/01/2021    Leg pain 05/30/2021    Dilated cardiomyopathy ([address] 12/18/2020    Permanent atrial fibrillation ([address] 12/18/2020    Tachycardia 12/18/2020    A-fib ([address] 12/18/2020    PAF (paroxysmal atrial fibrillation) ([address] 05/16/2018    Age-related cataract 08/01/2017    Allergic rhinitis 06/28/2017    Diverticulosis 06/28/2017    Urinary retention 06/28/2017    PVD (peripheral vascular disease) ([address] 06/28/2017    On statin therapy 06/28/2017    Low back pain 06/28/2017    Insomnia 06/28/2017    HTN (hypertension) 06/28/2017    Hyperlipidemia 06/28/2017    Glucose intolerance (impaired glucose tolerance) 06/28/2017    GERD (gastroesophageal reflux disease) 06/28/2017    ED (erectile dysfunction) 06/28/2017    DJD (degenerative joint disease) 06/28/2017    ASVD (arteriosclerotic vascular disease) 06/28/2017              Past Medical History: Diagnosis Date    Allergic rhinitis 6/28/2017    Arrhythmia       Paroxysmal Afib- Dr. Kris Zavala    ASVD (arteriosclerotic vascular disease) 06/28/2017     Story: carotid stenosis followed by [facility] Surg    Atrial fibrillation ([facility]      Congestive heart failure ([facility]      Diverticulosis 6/28/2017     Comments: on colonoscopy 12/01    DJD (degenerative joint disease) 6/28/2017    ED (erectile dysfunction) 6/28/2017    GERD (gastroesophageal reflux disease) 6/28/2017    Glucose intolerance (impaired glucose tolerance) 06/28/2017    HTN (hypertension) 6/28/2017    Hyperlipidemia 6/28/2017    Insomnia 6/28/2017    Low back pain 6/28/2017    On statin therapy 6/28/2017    Pacemaker 2020    PVD (peripheral vascular disease) ([facility] 6/28/2017    Rheumatoid arthritis ([facility]      Urinary retention 6/28/2017         Core Measures:  CVA: Yes Yes  CHF:No Not applicable  PNA:No Not applicable     Activity:  Activity Level: Up with Assistance  Number times ambulated in hallways past shift: 0  Number of times OOB to chair past shift: 1     Cardiac:  Cardiac Monitoring: No            Access:   Current line(s): PIV         Genitourinary:   Urinary status: voiding inc and inc briefs  Urinary Catheter? No     Respiratory:   O2 Device: None (Room air)  Chronic home O2 use?: NO  Incentive spirometer at bedside: N/A     GI:  Last Bowel Movement Date: 07/27/22  Current diet:  DIET NPO  Passing flatus: YES  Tolerating current diet: YES     Pain Management:  Patient states pain is manageable on current regimen: YES     Skin:  Ghulam Score: 16  Interventions: increase time out of bed, limit briefs, and internal/external urinary devices    Patient Safety:  Fall Score:  Total Score: 5  Interventions: bed/chair alarm and gripper socks  High Fall Risk: Yes  @Rollbelt  @dexterity to release roll belt  Yes/No ( must document dexterity  here by stating Yes or No here, otherwise this is a restraint and must follow restraint documentation policy.) DVT prophylaxis:  DVT prophylaxis Med- Yes  DVT prophylaxis SCD or ISAI- No     Wounds: (If Applicable)  Wounds- No  Location none     Active Consults:  IP CONSULT TO HOSPITALIST  IP CONSULT TO NEUROLOGY  IP CONSULT TO GASTROENTEROLOGY     Length of Stay:  Expected LOS: 4d 9h  Actual LOS: 8  Discharge Plan: Yes CM following.   Referral for IPR to [facility] arms        Marquita Brink RN

## 2022-08-02 NOTE — PROGRESS NOTES
Gastroenterology Daily Progress Note   GUERO Thornton for Dr. Kylah Yoder)   [facility]    Admit Date: 7/25/2022     Follow up of feeding difficulty    Subjective:       PEG tube placed yesterday, 8/1/22  Wife at bedside  TF's have not been started yet  Nurse states she is waiting on them to come up from dietary, order written    Current Facility-Administered Medications   Medication Dose Route Frequency    apixaban (ELIQUIS) tablet 5 mg  5 mg Per G Tube BID    sodium chloride (NS) flush 5-40 mL  5-40 mL IntraVENous Q8H    sodium chloride (NS) flush 5-40 mL  5-40 mL IntraVENous PRN    simethicone (MYLICON) 08AE/8.0YF oral drops 80 mg  1.2 mL Oral Multiple    traMADoL (ULTRAM) tablet 50 mg  50 mg Oral Q6H PRN    morphine injection 1 mg  1 mg IntraVENous Q6H PRN    dextrose 5 % - 0.45% NaCl infusion  75 mL/hr IntraVENous CONTINUOUS    glucagon (GLUCAGEN) injection 1 mg  1 mg IntraMUSCular PRN    dextrose 10% infusion 0-250 mL  0-250 mL IntraVENous PRN    aspirin chewable tablet 162 mg  162 mg Oral DAILY    escitalopram oxalate (LEXAPRO) tablet 10 mg  10 mg Oral DAILY WITH DINNER    busPIRone (BUSPAR) tablet 15 mg  15 mg Oral TID    acetaminophen (TYLENOL) tablet 650 mg  650 mg Oral Q4H PRN    Or    acetaminophen (TYLENOL) solution 650 mg  650 mg Per NG tube Q4H PRN    Or    acetaminophen (TYLENOL) suppository 650 mg  650 mg Rectal Q4H PRN    carvediloL (COREG) tablet 3.125 mg  3.125 mg Oral DAILY    tamsulosin (FLOMAX) capsule 0.4 mg  0.4 mg Oral DAILY    atorvastatin (LIPITOR) tablet 20 mg  20 mg Oral QHS    cyclobenzaprine (FLEXERIL) tablet 5 mg  5 mg Oral TID PRN    ondansetron (ZOFRAN) injection 4 mg  4 mg IntraVENous Q8H PRN        Objective:     Visit Vitals  BP (!) 148/62 (BP 1 Location: Left upper arm, BP Patient Position: At rest)   Pulse 75   Temp 97.9 °F (36.6 °C)   Resp 18   Ht 5' 10\" (1.778 m)   Wt 70.4 kg (155 lb 4 oz)   SpO2 99%   BMI 22.28 kg/m²   Blood pressure (!) 148/62, pulse 75, temperature 97.9 °F (36.6 °C), resp. rate 18, height 5' 10\" (1.778 m), weight 70.4 kg (155 lb 4 oz), SpO2 99 %. 08/02 0701 - 08/02 1900  In: 150   Out: -     07/31 1901 - 08/02 0700  In: 1000 [I.V.:1000]  Out: 0       Intake/Output Summary (Last 24 hours) at 8/2/2022 1143  Last data filed at 8/2/2022 1103  Gross per 24 hour   Intake 1150 ml   Output --   Net 1150 ml         Physical Exam:       General: pleasantly confused elderly white male in nad  Abd: PEG tube in upper abd, no bleeding or drainage.   Bumper loosened from 2cm to 2.5cm, non tender, abd flat, nd, soft, with normoactive bowel sounds      Labs:       Recent Results (from the past 24 hour(s))   GLUCOSE, POC    Collection Time: 08/01/22 10:21 PM   Result Value Ref Range    Glucose (POC) 103 65 - 117 mg/dL    Performed by [facility]    METABOLIC PANEL, BASIC    Collection Time: 08/02/22  1:40 AM   Result Value Ref Range    Sodium 142 136 - 145 mmol/L    Potassium 3.6 3.5 - 5.1 mmol/L    Chloride 109 (H) 97 - 108 mmol/L    CO2 23 21 - 32 mmol/L    Anion gap 10 5 - 15 mmol/L    Glucose 146 (H) 65 - 100 mg/dL    BUN 6 6 - 20 MG/DL    Creatinine 0.88 0.70 - 1.30 MG/DL    BUN/Creatinine ratio 7 (L) 12 - 20      GFR est AA >60 >60 ml/min/1.73m2    GFR est non-AA >60 >60 ml/min/1.73m2    Calcium 8.2 (L) 8.5 - 10.1 MG/DL   MAGNESIUM    Collection Time: 08/02/22  1:40 AM   Result Value Ref Range    Magnesium 1.8 1.6 - 2.4 mg/dL   PHOSPHORUS    Collection Time: 08/02/22  1:40 AM   Result Value Ref Range    Phosphorus 3.0 2.6 - 4.7 MG/DL   CBC WITH AUTOMATED DIFF    Collection Time: 08/02/22  1:40 AM   Result Value Ref Range    WBC 12.6 (H) 4.1 - 11.1 K/uL    RBC 4.38 4.10 - 5.70 M/uL    HGB 14.1 12.1 - 17.0 g/dL    HCT 40.2 36.6 - 50.3 %    MCV 91.8 80.0 - 99.0 FL    MCH 32.2 26.0 - 34.0 PG    MCHC 35.1 30.0 - 36.5 g/dL    RDW 15.4 (H) 11.5 - 14.5 %    PLATELET 417 784 - 020 K/uL    MPV 10.3 8.9 - 12.9 FL    NRBC 0.0 0  WBC    ABSOLUTE NRBC 0.00 0.00 - 0.01 K/uL    NEUTROPHILS 83 (H) 32 - 75 %    LYMPHOCYTES 4 (L) 12 - 49 %    MONOCYTES 12 5 - 13 %    EOSINOPHILS 0 0 - 7 %    BASOPHILS 0 0 - 1 %    IMMATURE GRANULOCYTES 1 (H) 0.0 - 0.5 %    ABS. NEUTROPHILS 10.5 (H) 1.8 - 8.0 K/UL    ABS. LYMPHOCYTES 0.5 (L) 0.8 - 3.5 K/UL    ABS. MONOCYTES 1.5 (H) 0.0 - 1.0 K/UL    ABS. EOSINOPHILS 0.0 0.0 - 0.4 K/UL    ABS. BASOPHILS 0.0 0.0 - 0.1 K/UL    ABS. IMM. GRANS. 0.1 (H) 0.00 - 0.04 K/UL    DF SMEAR SCANNED      RBC COMMENTS NORMOCYTIC, NORMOCHROMIC     LABRCNT(wbc:2,hgb:2,hct:2,plt:2,)  Recent Labs     08/02/22  0140 08/01/22  0002 07/31/22  0001    144 143   K 3.6 3.7 4.6   * 111* 113*   CO2 23 25 21   BUN 6 4* 6   CREA 0.88 0.88 0.71   * 112* 76   CA 8.2* 8.6 8.6   MG 1.8 1.9 2.1   PHOS 3.0 2.0* 2.5*   LABRCNT(sgot:3,gpt:3,ap:3,tbiL:3,TP:3,ALB:3,GLOB:3,ggt:3,aml:3,amyp:3,lpse:3,hlpse:3)No results for input(s): INR, PTP, APTT, INREXT in the last 72 hours. No results for input(s): AP, TBIL, TP, ALB, GLOB, GGT, AML, LPSE in the last 72 hours. No lab exists for component: SGOT, GPT, AMYP, HLPSEBRIEFLAB(B12,FOL,FOLAT,RBCF)  Lab Results   Component Value Date/Time    Folate 9.3 07/27/2022 04:22 AM   LABRCNT(CPK:3,CpKMB:3,ckndx:3,troiq:3)No components found for: GLPOCBRIEFLAB(CHOL,CHOLX,CHOLP,CHLST,CHOLV,HDL,HDLC,HDLP,LDL,DLDL,LDLC,DLDLP,TGL,TGLX,TRIGL,TRIGP,CHHD,CHHDX)No results for input(s): PH, PCO2, PO2 in the last 72 hours. LABRCNT(CPK:3,CpKMB:3,ckndx:3,troiq:3)GUERO Lira  No results for input(s): CPK, CKNDX, TROIQ in the last 72 hours.     No lab exists for component: CPKMBMEShGUERO Rob      Problem List:     Active Problems:    CVA (cerebral vascular accident) ([facility] (7/25/2022)      Acute alteration in mental status (7/26/2022)      Convulsive syncope (7/26/2022)      Bilateral carotid artery stenosis (7/26/2022)      History of stroke (7/26/2022)      Thrombotic stroke involving left middle cerebral artery ([facility] (7/27/2022)      Encephalopathy due to COVID-19 virus (7/28/2022)        Impression:    Feeding difficulty  S/p CVA  Anticoagulation  Covid         Plan:  Okay to start TF's and resume anticoagulation.   Discussed with hospitalist.  Will sign off and see again on request.        GUERO Amaya    8/2/2022  [address]  [id number] [place] [address] [place]: [phone]
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
## 2022-08-02 NOTE — PROGRESS NOTES
Problem: Self Care Deficits Care Plan (Adult)  Goal: *Acute Goals and Plan of Care (Insert Text)  Description: FUNCTIONAL STATUS PRIOR TO ADMISSION: Patient was modified independent using a rolling walker for functional mobility. Hx falls and AMS. Wife provides transportation 2/2 decreased cognition at baseline. HOME SUPPORT: The patient lived with spouse who assisted with IADLs and driving. Family lives close by for support. Occupational Therapy Goals  Weekly reassess, goals remain same   Initiated 7/26/2022   1. Patient will perform upper body dressing with moderate assistance  within 7 day(s). 2.  Patient will perform lower body dressing with moderate assistance  within 7 day(s). 3.  Patient will perform bathing with moderate assistance  within 7 day(s). 4.  Patient will perform toilet transfers with moderate assistance  within 7 day(s). 5.  Patient will perform all aspects of toileting with moderate assistance  within 7 day(s). 6.  Patient will participate in upper extremity therapeutic exercise/activities with moderate assistance  within 7 day(s). 7.  Patient will utilize energy conservation techniques during functional activities with verbal cues within 7 day(s). 8.  Patient will improve their Fugl Jamil score by 5 points in prep for ADLs within 7 days. Outcome: Not Progressing Towards Goal   OCCUPATIONAL THERAPY TREATMENT/WEEKLY RE-EVALUATION  Patient: Pito Gee (31 y.o. male)  Date: 8/2/2022  Diagnosis: CV[facility]cerebral vascular accident[facility] [I63.9] <principal problem not specified>  Procedure(s) (LRB):  ESOPHAGOGASTRODUODENOSCOPY (EGD) (N/A)  PERCUTANEOUS ENDOSCOPIC GASTROSTOMY TUBE INSERTION (N/A) 1 Day Post-Op  Precautions: Fall, Aspiration, Contact, Skin (droplet plus - covid +; PEG tube)  Chart, occupational therapy assessment, plan of care, and goals were reviewed.     ASSESSMENT  Based on the objective data described below, decreased endurance, strenth, funcitonal mobility, ADLs and confusion, and covid. Pt was supine in bed and was more lethargic today due to pt was given morphine for pain. Pt was min to mod for bed mobility, and needed VS.  Worked on sitting balance and following commands with reaching to right and left. Pt was having difficulty with reaching right and left today . Attempted to have pt jodie socks but he was having increased pain in abdomen and was not able, and total for donning socks. Pt was able to pull up socks once he sat in chair. He was min of 2 to stand and mod assist of 2 to transfer to chair, and needed assist with wt shifting, and moving the RW. Pt was left sitting up in chair , wife in room, roll belt on and chair alarm activated. Current Level of Function Impacting Discharge (ADLs): max for ADL s             PLAN :  Goals have been updated based on progression since last assessment. Patient continues to benefit from skilled intervention to address the above impairments. Continue to follow patient 4 times a week to address goals. Recommend with staff: Krystina Marni for meals     Recommend next OT session: work on simple grooming tasks     Recommendation for discharge: (in order for the patient to meet his/her long term goals)  Therapy up to 5 days/week in SNF setting    This discharge recommendation:  Has been made in collaboration with the attending provider and/or case management    Equipment recommendations for successful discharge (if) home: tbd       SUBJECTIVE:   Patient stated I am a little tired .     OBJECTIVE DATA SUMMARY:   Cognitive/Behavioral Status:  Neurologic State: Drowsy  Orientation Level: Oriented to person;Disoriented to situation;Disoriented to time;Disoriented to place  Cognition: Decreased command following;Decreased attention/concentration;Memory loss; Impaired decision making  Perception: Appears intact  Perseveration: No perseveration noted  Safety/Judgement: Decreased awareness of environment;Decreased awareness of need for assistance;Decreased awareness of need for safety;Decreased insight into deficits; Fall prevention    Functional Mobility and Transfers for ADLs:  Bed Mobility:  Rolling: Minimum assistance (log roll)  Supine to Sit: Moderate assistance (log roll)  Scooting: Minimum assistance    Transfers:  Sit to Stand: Minimum assistance;Assist x2     Bed to Chair: Minimum assistance; Moderate assistance;Assist x2    Balance:  Sitting: Impaired  Sitting - Static: Fair (occasional)  Sitting - Dynamic: Poor (constant support)  Standing: Impaired  Standing - Static: Fair;Constant support  Standing - Dynamic : Fair;Constant support    ADL Intervention:  Feeding  Feeding Assistance: Maximum assistance;Contact guard assistance    Grooming  Position Performed: Seated in chair  Washing Face: Minimum assistance  Washing Hands: Minimum assistance  Brushing Teeth: Minimum assistance    Upper Body Bathing  Bathing Assistance: Maximum assistance    Lower Body Bathing  Bathing Assistance: Maximum assistance  Perineal  : Maximum assistance              Toileting  Toileting Assistance: Total assistance(dependent); Maximum assistance  Bladder Hygiene: Total assistance (dependent); Maximum assistance  Bowel Hygiene: Total assistance (dependent); Maximum assistance    Cognitive Retraining  Orientation Retraining: Situation;Place  Safety/Judgement: Decreased awareness of environment;Decreased awareness of need for assistance;Decreased awareness of need for safety;Decreased insight into deficits; Fall prevention    Pain:  none    Activity Tolerance:   Fair    After treatment patient left in no apparent distress:   Sitting in chair, Call bell within reach, Bed / chair alarm activated, and Caregiver / family present    COMMUNICATION/COLLABORATION:   The patients plan of care was discussed with: Physical Therapist and Registered Nurse    Ashlyn Woodall OT  Time Calculation: 20 mins

## 2022-08-03 NOTE — PROGRESS NOTES
End of Shift Note     Bedside shift change report given to Analia Benson (oncoming nurse) by Sandy Howard RN (offgoing nurse).   Report included the following information SBAR, Kardex, MAR, and Recent Results     Shift worked:  Days   Shift summary and any significant changes:     Tube feeding increased to 45 ml tolerating well              Concerns for physician to address:  None   Zone phone for oncoming shift:  1149      Patient Information  Sarah Singh  68 y.o.  7/25/2022 12:04 PM by Venkat Jimenez MD. Sarah Singh was admitted from Home     Problem List          Patient Active Problem List     Diagnosis Date Noted    Encephalopathy due to COVID-19 virus 07/28/2022    Thrombotic stroke involving left middle cerebral artery ([address] 07/27/2022    Acute alteration in mental status 07/26/2022    Convulsive syncope 07/26/2022    Bilateral carotid artery stenosis 07/26/2022    History of stroke 07/26/2022    CVA (cerebral vascular accident) ([address] 07/25/2022    Stroke ([address] 07/08/2022    Weakness of both legs 06/01/2021    Bilateral lower extremity pain 06/01/2021    Leg pain 05/30/2021    Dilated cardiomyopathy ([address] 12/18/2020    Permanent atrial fibrillation ([address] 12/18/2020    Tachycardia 12/18/2020    A-fib ([address] 12/18/2020    PAF (paroxysmal atrial fibrillation) ([address] 05/16/2018    Age-related cataract 08/01/2017    Allergic rhinitis 06/28/2017    Diverticulosis 06/28/2017    Urinary retention 06/28/2017    PVD (peripheral vascular disease) ([address] 06/28/2017    On statin therapy 06/28/2017    Low back pain 06/28/2017    Insomnia 06/28/2017    HTN (hypertension) 06/28/2017    Hyperlipidemia 06/28/2017    Glucose intolerance (impaired glucose tolerance) 06/28/2017    GERD (gastroesophageal reflux disease) 06/28/2017    ED (erectile dysfunction) 06/28/2017    DJD (degenerative joint disease) 06/28/2017    ASVD (arteriosclerotic vascular disease) 06/28/2017              Past Medical History:   Diagnosis Date Allergic rhinitis 6/28/2017    Arrhythmia       Paroxysmal Afib- Dr. Clara Murphy    ASVD (arteriosclerotic vascular disease) 06/28/2017     Story: carotid stenosis followed by [facility]    [facility]      Congestive heart failure ([facility]      Diverticulosis 6/28/2017     Comments: on colonoscopy 12/01    DJD (degenerative joint disease) 6/28/2017    ED (erectile dysfunction) 6/28/2017    GERD (gastroesophageal reflux disease) 6/28/2017    Glucose intolerance (impaired glucose tolerance) 06/28/2017    HTN (hypertension) 6/28/2017    Hyperlipidemia 6/28/2017    Insomnia 6/28/2017    Low back pain 6/28/2017    On statin therapy 6/28/2017    Pacemaker 2020    PVD (peripheral vascular disease) ([facility] 6/28/2017    Rheumatoid arthritis ([facility]      Urinary retention 6/28/2017         Core Measures:  CVA: Yes Yes  CHF:No Not applicable  PNA:No Not applicable     Activity:  Activity Level: Up with Assistance  Number times ambulated in hallways past shift: 0  Number of times OOB to chair past shift: 1     Cardiac:  Cardiac Monitoring: No            Access:   Current line(s): PIV         Genitourinary:   Urinary status: voiding inc and inc briefs  Urinary Catheter? No     Respiratory:   O2 Device: None (Room air)  Chronic home O2 use?: NO  Incentive spirometer at bedside: N/A     GI:  Last Bowel Movement Date: 07/27/22  Current diet:  DIET NPO  Passing flatus: YES  Tolerating current diet: YES     Pain Management:  Patient states pain is manageable on current regimen: YES     Skin:  Ghulam Score: 16  Interventions: increase time out of bed, limit briefs, and internal/external urinary devices    Patient Safety:  Fall Score:  Total Score: 5  Interventions: bed/chair alarm and gripper socks  High Fall Risk: Yes  @Rollbelt  @dexterity to release roll belt  Yes/No ( must document dexterity  here by stating Yes or No here, otherwise this is a restraint and must follow restraint documentation policy.)     DVT prophylaxis:  DVT prophylaxis Med- Yes  DVT prophylaxis SCD or ISAI- No     Wounds: (If Applicable)  Wounds- No  Location none     Active Consults:  IP CONSULT TO HOSPITALIST  IP CONSULT TO NEUROLOGY  IP CONSULT TO GASTROENTEROLOGY     Length of Stay:  Expected LOS: 4d 9h  Actual LOS: 8  Discharge Plan: Yes CM following.   Referral for IPR to jaswant Cheung RN

## 2022-08-03 NOTE — PROGRESS NOTES
Problem: Falls - Risk of  Goal: *Absence of Falls  Description: Document Tawanda Tika Fall Risk and appropriate interventions in the flowsheet.   Note: Fall Risk Interventions:  Mobility Interventions: Bed/chair exit alarm, Patient to call before getting OOB    Mentation Interventions: Bed/chair exit alarm, Adequate sleep, hydration, pain control, More frequent rounding    Medication Interventions: Bed/chair exit alarm, Patient to call before getting OOB    Elimination Interventions: Call light in reach, Bed/chair exit alarm, Patient to call for help with toileting needs    History of Falls Interventions: Bed/chair exit alarm, Room close to nurse's station

## 2022-08-03 NOTE — PROGRESS NOTES
Problem: Dysphagia (Adult)  Goal: *Acute Goals and Plan of Care (Insert Text)  Description: 7/26/2022  Speech path goals  Re-Eval 8/2/2022    1. Patient will participate with reeval of swallowing. Goal met 7/27.  2. Patient will participate with MBS to assess swallow physiology. Completed 7/27  3. Patient will tolerate ice chips after oral care to prevent disuse atrophy. CONTINUED 8/2/2022  4. Patient will participate with repeat imaging prior to po. Outcome: Progressing Towards Goal   SPEECH LANGUAGE PATHOLOGY DYSPHAGIA TREATMENT  Patient: Maegan Alva (71 y.o. male)  Date: 8/3/2022  Diagnosis: CVA (cerebral vascular accident) ([facility] [I63.9] <principal problem not specified>  Procedure(s) (LRB):  ESOPHAGOGASTRODUODENOSCOPY (EGD) (N/A)  PERCUTANEOUS ENDOSCOPIC GASTROSTOMY TUBE INSERTION (N/A) 2 Days Post-Op  Precautions:   Fall, Aspiration, Contact, Skin (droplet plus - covid +; PEG tube)    ASSESSMENT:  Patient lethargic this date, requiring verbal and tactile cues to achieve alertness, washing of face, HOB elevated, opening blinds. He accepted numerous ice chips, demonstrating functional oral skills. No overt s/s of aspiration. This may not be a good indicator of absence of aspiration given silent aspiration on MBS. He will need repeat imaging prior to diet initiation. Spoke with his wife regarding timing of this. Note he has had significant lethargy during the day for several days. Would like to see him able to maintain alertness for at least long enough to consume a meal three times daily. Wife nodded, but then asked again when we would schedule repeat imaging. Reviewed the plan to wait for more stable/predictable alertness. She verbalized understanding. PLAN:  Recommendations and Planned Interventions:  Continue NPO with ice chips for oral integrity and prevention of disuse atrophy  Oral care  Patient continues to benefit from skilled intervention to address the above impairments.   Continue treatment per established plan of care. Discharge Recommendations: To Be Determined     SUBJECTIVE:   Patient stated Nice to be seen when told that it was nice to see him today. OBJECTIVE:   Cognitive and Communication Status:  Neurologic State: Drowsy  Orientation Level: Unable to verbalize  Cognition: No command following  Perception: Appears intact  Perseveration: No perseveration noted  Safety/Judgement: Decreased awareness of environment, Decreased awareness of need for assistance, Decreased awareness of need for safety, Decreased insight into deficits, Fall prevention  Dysphagia Treatment:       P.O. Trials:  Patient Position: up in bed  Vocal quality prior to P.O.: No impairment  Consistency Presented: Ice chips  How Presented: SLP-fed/presented     Bolus Acceptance: No impairment  Bolus Formation/Control: No impairment        Oral Residue: None        Aspiration Signs/Symptoms: None                                                                                                                                                       Pain:  Pain Scale 1: Numeric (0 - 10)  Pain Intensity 1: 0       After treatment:   Patient left in no apparent distress in bed, Call bell within reach, and Caregiver / family present    COMMUNICATION/EDUCATION:   Patient's wife was educated regarding plan with respect to repeat swallow imaging. She verbalized understanding, but may need reinforcement. The patient's plan of care including recommendations, planned interventions, and recommended diet changes were discussed with: Registered nurse.      PRAITMA Parmar  Time Calculation: 18 mins

## 2022-08-03 NOTE — PROGRESS NOTES
Problem: Mobility Impaired (Adult and Pediatric)  Goal: *Acute Goals and Plan of Care (Insert Text)  Description:   FUNCTIONAL STATUS PRIOR TO ADMISSION: Patient was modified independent using a rolling walker for functional mobility. Subjective hx provided by spouse at bedside as pt is disoriented to self, time, and situation    HOME SUPPORT PRIOR TO ADMISSION: The patient lived with wife and required assistance for bathing. Physical Therapy Goals  Initiated 7/26/2022  1. Patient will move from supine to sit and sit to supine  in bed with moderate assistance  within 7 day(s). Met 8/2/22. 2.  Patient will transfer from bed to chair and chair to bed with moderate assistance using the least restrictive device within 7 day(s). Met 8/2/22. 3.  Patient will perform sit to stand with moderate assistance  within 7 day(s). Met 8/2/22. 4.  Patient will ambulate with moderate assistance  for 15 feet with the least restrictive device within 7 day(s). Not met 8/2/22. 5.  Patient will improve Jimenez Balance score by 7 points within 7 days. Not met 8/2/22.    8/2/2022  1. Patient will move from supine to sit and sit to supine , scoot up and down, and roll side to side in bed with minimal assistance within 7 day(s). 2.  Patient will transfer from bed to chair and chair to bed with minimal assistance using the least restrictive device within 7 day(s). 3.  Patient will perform sit to stand with minimal assistance/contact guard assist within 7 day(s). 4.  Patient will ambulate with minimal assistance for 15 feet with the least restrictive device within 7 day(s). 5.  Patient will improve Jimenez Balance score by 7 points within 7 days.      Outcome: Not Progressing Towards Goal   PHYSICAL THERAPY TREATMENT  Patient: Teja Mccall (52 y.o. male)  Date: 8/3/2022  Diagnosis: CVA [facility]cerebral vascular accident) [facility] [I63.9] <principal problem not specified>  Procedure(s) (LRB):  ESOPHAGOGASTRODUODENOSCOPY (EGD) (N/A)  PERCUTANEOUS ENDOSCOPIC GASTROSTOMY TUBE INSERTION (N/A) 2 Days Post-Op  Precautions: Fall, Aspiration, Contact, Skin (droplet plus - covid +; PEG tube)  Chart, physical therapy assessment, plan of care and goals were reviewed. ASSESSMENT  Patient continues with skilled PT services and is not progressing towards goals. Patient lying in bed when PT arrived and agreed to PT services. Provided LE exercises prior to mobilization - patient needed aarom to perform all exercises adequately through therapeutic ROM. Came to sitting with moderate assistance using log roll technique. Sitting balance was good statically. He stood with mod a and RW for additional support. Took steps to bedside recliner with moderate assistance using RW with consistent R foot drag even with manual weight shifting assistance and verbal/tactile cues. Left patient up in recliner with daughter in law and spouse in the room with all needs met. Current Level of Function Impacting Discharge (mobility/balance): mod assistance overall. Other factors to consider for discharge: high risk for falls and skin breakdown; vascular dementia with recent new L basal ganglia infarct; supportive spouse; modified independent PLOF         PLAN :  Patient continues to benefit from skilled intervention to address the above impairments. Continue treatment per established plan of care. to address goals. Recommendation for discharge: (in order for the patient to meet his/her long term goals)  Therapy 3 hours per day 5-7 days per week    This discharge recommendation:  Has been made in collaboration with the attending provider and/or case management    IF patient discharges home will need the following DME: bedside commode and wheelchair       SUBJECTIVE:   Patient stated [employer] you.     OBJECTIVE DATA SUMMARY:   Critical Behavior:  Neurologic State: Alert  Orientation Level: Oriented to person, Disoriented to situation, Disoriented to place, Disoriented to time  Cognition: Decreased command following, Decreased attention/concentration  Safety/Judgement: Decreased insight into deficits, Decreased awareness of need for safety, Decreased awareness of need for assistance, Decreased awareness of environment, Fall prevention  Functional Mobility Training:  Bed Mobility:  Rolling: Minimum assistance  Supine to Sit: Moderate assistance     Scooting: Minimum assistance        Transfers:  Sit to Stand: Moderate assistance  Stand to Sit: Minimum assistance        Bed to Chair: Moderate assistance                    Balance:  Sitting: Impaired  Sitting - Static: Good (unsupported)  Sitting - Dynamic: Fair (occasional)  Standing: Impaired  Standing - Static: Fair;Constant support  Standing - Dynamic : Poor;Constant support  Ambulation/Gait Training:  Distance (ft): 6 Feet (ft)  Assistive Device: Gait belt;Walker, rolling  Ambulation - Level of Assistance: Moderate assistance        Gait Abnormalities: Decreased step clearance; Path deviations; Step to gait; Shuffling gait        Base of Support: Narrowed; Center of gravity altered;Shift to right  Stance: Right increased  Speed/Paula: Slow;Shuffled  Step Length: Right shortened  Swing Pattern: Right asymmetrical (poor foot clearance)     Interventions: Manual cues; Safety awareness training;Verbal cues     Therapeutic Exercises:   Aarom: ankle pumps, heel slides, hip abd. 10 reps each side. Pain Rating:  None reported    Activity Tolerance:   Fair, SpO2 stable on RA, and requires rest breaks    After treatment patient left in no apparent distress:   Sitting in chair, Call bell within reach, Bed / chair alarm activated, and Caregiver / family present    COMMUNICATION/COLLABORATION:   The patients plan of care was discussed with: Registered nurse.      Dulce Maria Stroud, PT   Time Calculation: 24 mins

## 2022-08-03 NOTE — PROGRESS NOTES
Hospitalist Progress Note    NAME: Abram Henson   :  1945   MRN:  [id number]       Assessment / Plan:  AMS  POA  Rule out CVA, ? Vascular dementia  Right leg weakness  History of HTN/PAF     -Recent admission in 2022, for left facial droop, suspected TIA, had MRI done which was negative for CVA. EEG was done which was negative.  -Presented to ED for 1 week of sudden cognitive decline, confusion. Dragging of right leg. No focal deficits on exam     -CT head and neck showed multifocal moderate to severe stenosis with occlusion of distal left vertebral artery at V3-V4 junction  -CT head negative for bleed  -Telemetry  -Carotid duplex   There is moderate stenosis in the right ICA (50-69%). There is mild stenosis in the left ICA (<50%). The right vertebral is antegrade. The left vertebral is antegrade.    -aspirin increased to 162 mg daily, Eliquis 5 mg twice daily. -c/w statin (not currently high intensity)  -MRI brain showed 1. Acute left basal ganglia ischemic infarct. 2. Atrophy and white matter disease, remote infarcts stable otherwise. -MRI Lumbar spine showed 1. Postsurgical changes L4-5 L5-S1 stable. 2. Spinal canal stenosis L3-L4.  - Neuro following  PT/ST/OT. - SNF. On NPO currently. - s/p PEG  - Eliquis resumed  -Vit B12, folate, homocysteine, sed rate, TSH 0.47, vit D, ALAN, antineuronal cell AB all ordered. Blood cultures NGTD  -Had echocardiogram last admission which was negative for PFO  -DC IVF  -Nutrition consulted for tube feed recs, appreciate recs    PEG  GI adjusted bumper today  Axr with no pathology    Hypophosphatemia  Monitor and replete PRN    Agitation   Melatonin QHS  Neuro added buspar and lexapro    Cough  Aspiration? CXR no acute changes. Remains on RA    COVID + ve  Patient tested +ve for covid on  as well  Reason for CVA?   Re-screen still +ve  Not exhibiting any respiratory symptoms    History of CAD  S/p pacemaker  History of grade 3 diastolic dysfunction  C/w coreg     Recurrent fall  Chronic back pain  -cont flexeril  -MRI lumbar spine 1. Postsurgical changes L4-5 L5-S1 stable. 2. Spinal canal stenosis L3-L4. BPH  C/w flomax     Hypernatremia (resolved)  Hypophosphatemia  DC IVF  Monitor and replete phos     Code Status: full code  Surrogate Decision Maker:wife     DVT Prophylaxis: eliquis  GI Prophylaxis: not indicated     Baseline: Walks      18.5 - 24.9 Normal weight / Body mass index is 23.83 kg/m². Estimated discharge date: Aug 4  Barriers: PEG to get tube feeds to goal, dispo      Recommended Disposition: SNF/LTC     Subjective:     Chief Complaint / Reason for Physician Visit  Patient seen and examined at the bedside. Wife at bedside Patient sleeping at the time of arrival.  I did let him sleep due to overnight agitation. Wife says he is no longer complaining of abdominal pain. Discussed with RN events overnight. Review of Systems:  Symptom Y/N Comments  Symptom Y/N Comments   Fever/Chills    Chest Pain     Poor Appetite    Edema     Cough    Abdominal Pain     Sputum    Joint Pain     SOB/HANNA    Pruritis/Rash     Nausea/vomit    Tolerating PT/OT     Diarrhea    Tolerating Diet     Constipation    Other       Could NOT obtain due to:      Objective:     VITALS:   Last 24hrs VS reviewed since prior progress note.  Most recent are:  Patient Vitals for the past 24 hrs:   Temp Pulse Resp BP SpO2   08/03/22 0734 98.2 °F (36.8 °C) 70 16 (!) 140/71 99 %   08/03/22 0320 97.9 °F (36.6 °C) 70 18 (!) 167/81 99 %   08/02/22 2018 97.8 °F (36.6 °C) 70 19 (!) 174/80 99 %   08/02/22 1505 97.6 °F (36.4 °C) 70 18 (!) 124/58 100 %   08/02/22 1224 98.1 °F (36.7 °C) 70 18 103/61 97 %         Intake/Output Summary (Last 24 hours) at 8/3/2022 0915  Last data filed at 8/3/2022 0002  Gross per 24 hour   Intake 500 ml   Output --   Net 500 ml          I had a face to face encounter and independently examined this patient on 8/3/2022, as outlined below:  PHYSICAL EXAM:  General: WD, WN. Awake, , no acute distress    EENT:  EOMI. PERRLA. Anicteric sclerae. MMM  Resp:  CTA bilaterally, no wheezing or rales. No accessory muscle use  CV:  Regular  rhythm,  No edema  GI:  Soft, Non distended, Non tender. +Bowel sounds  Neurologic:  Alert, aphasia. Does follow simple commands  Psych:   Poor  insight. Not anxious nor agitated  Skin:  No rashes. No jaundice    Reviewed most current lab test results and cultures  YES  Reviewed most current radiology test results   YES  Review and summation of old records today    NO  Reviewed patient's current orders and MAR    YES  PMH/ reviewed - no change compared to H&P  ________________________________________________________________________  Care Plan discussed with:    Comments   Patient     Family      RN     Care Manager     Consultant                        Multidiciplinary team rounds were held today with , nursing, pharmacist and clinical coordinator. Patient's plan of care was discussed; medications were reviewed and discharge planning was addressed. ________________________________________________________________________  Total NON critical care TIME:  35   Minutes    Total CRITICAL CARE TIME Spent:   Minutes non procedure based      Comments   >50% of visit spent in counseling and coordination of care     ________________________________________________________________________  Theresa Dennis MD     Procedures: see electronic medical records for all procedures/Xrays and details which were not copied into this note but were reviewed prior to creation of Plan. LABS:  I reviewed today's most current labs and imaging studies.   Pertinent labs include:  Recent Labs     08/03/22  0337 08/02/22  0140 08/01/22  0002   WBC 9.9 12.6* 4.2   HGB 13.9 14.1 14.3   HCT 39.7 40.2 41.0    159 162       Recent Labs     08/03/22  0337 08/02/22  0140 08/01/22  0002    142 144   K 3.8 3.6 3.7   CL 108 109* 111*   CO2 29 23 25   * 146* 112*   BUN 7 6 4*   CREA 1.01 0.88 0.88   CA 8.9 8.2* 8.6   MG 2.0 1.8 1.9   PHOS 2.1* 3.0 2.0*         Signed: Lavell Molina MD

## 2022-08-03 NOTE — PROGRESS NOTES
Received notification from bedside RN about patient with regards to: persistent agitation despite recent Melatonin and Buspar.  Needs medication to keep patient from getting out of bed and pulling on lines  VS: /80, HR 70, RR 19, O2 sat 99% on RA    Intervention given: Clonazepam 0.5 mg PO x 1 dose ordered

## 2022-08-03 NOTE — PROGRESS NOTES
Transition of Care Plan:     RUR: 14% - \"low risk\"  Disposition: Plan A) SNF placement (pending acceptance/insurance auth) vs Plan B) Home with HH (RN/PT/OT/SLP), home infusion for TF, & follow up apts  Follow up appointments: PCP & specialist as indicated   DME needed: Defer to IPR for DME needs  Transportation at Discharge: BLS transport needed for d/c  Keys or means to access home: N/A - pt likely transitioning to IPR at d/c     IM Medicare Letter: 2nd IM needed prior to d/c  Is patient a BCPI-A Bundle: N/A         Is patient a [place] and connected with the [place]? Yes - clinical updates faxed to the [facility] per protocol 7/28/22  Caregiver Contact: Pt's wife Lizeth Eidutter: [phone])  Discharge Caregiver contacted prior to discharge? To be contacted prior to d/c  Care Conference needed?: N/A  COVID-19 test: PCR COVID-19 test completed 7/27/22; results positive    Update - 4:03 PM: MD declined to complete P2P reporting pt is more appropriate for SNF level rehab at this time. CM will follow up with pt and wife Matilde Espinal) tomorrow (8/4/22) to report update regarding IPR & discuss alternative plan for d/c re: SNF vs home with [facility] & home infusion for TF. Initial note: Chart reviewed. CM received call from [facility] admission liaison Miriam Mis: [phone]) reporting pt's insurance Pola Valdez was denied for IPR; P2P offered. MD can initiated P2P by calling [phone], option #5, policy #: [id number], Augustine Syed #: [id number]. P2P deadline reported for 8/4/22 at 10:30 AM. Perfect Serve message sent to MD reporting update/P2P information. CM will continue to follow & remain accessible for d/c planning.     Maribeth Singer, MSW  Care Manager, [facility]

## 2022-08-03 NOTE — PROGRESS NOTES
End of Shift Note     Bedside shift change report given to PASTOR Hall (oncoming nurse) by Jonathan Conteh RN (offgoing nurse). Report included the following information SBAR, Kardex, MAR, and Recent Results     Shift worked:  1900-7   Shift summary and any significant changes:     Tube feeding running, increase by 10mL Q12 hours. PRN Clonazepam 0.5mg given   D/C [facility]?     Concerns for physician to address:  None   Zone phone for oncoming shift:  2448      Patient Information  Maci العلي  68 y.o.  7/25/2022 12:04 PM by Perez Joshi MD. Maci العلي was admitted from Home     Problem List          Patient Active Problem List     Diagnosis Date Noted    Encephalopathy due to COVID-19 virus 07/28/2022    Thrombotic stroke involving left middle cerebral artery ([address] 07/27/2022    Acute alteration in mental status 07/26/2022    Convulsive syncope 07/26/2022    Bilateral carotid artery stenosis 07/26/2022    History of stroke 07/26/2022    CVA (cerebral vascular accident) ([address] 07/25/2022    Stroke ([address] 07/08/2022    Weakness of both legs 06/01/2021    Bilateral lower extremity pain 06/01/2021    Leg pain 05/30/2021    Dilated cardiomyopathy ([address] 12/18/2020    Permanent atrial fibrillation ([address] 12/18/2020    Tachycardia 12/18/2020    A-fib ([address] 12/18/2020    PAF (paroxysmal atrial fibrillation) ([address] 05/16/2018    Age-related cataract 08/01/2017    Allergic rhinitis 06/28/2017    Diverticulosis 06/28/2017    Urinary retention 06/28/2017    PVD (peripheral vascular disease) ([address] 06/28/2017    On statin therapy 06/28/2017    Low back pain 06/28/2017    Insomnia 06/28/2017    HTN (hypertension) 06/28/2017    Hyperlipidemia 06/28/2017    Glucose intolerance (impaired glucose tolerance) 06/28/2017    GERD (gastroesophageal reflux disease) 06/28/2017    ED (erectile dysfunction) 06/28/2017    DJD (degenerative joint disease) 06/28/2017    ASVD (arteriosclerotic vascular disease) 06/28/2017              Past Medical History:   Diagnosis Date    Allergic rhinitis 6/28/2017    Arrhythmia       Paroxysmal Afib- Dr. Randy Bianchi    ASVD (arteriosclerotic vascular disease) 06/28/2017     Story: carotid stenosis followed by Vasc Surg    Atrial fibrillation [facility]      Congestive heart failure ([facility]      Diverticulosis 6/28/2017     Comments: on colonoscopy 12/01    DJD (degenerative joint disease) 6/28/2017    ED (erectile dysfunction) 6/28/2017    GERD (gastroesophageal reflux disease) 6/28/2017    Glucose intolerance (impaired glucose tolerance) 06/28/2017    HTN (hypertension) 6/28/2017    Hyperlipidemia 6/28/2017    Insomnia 6/28/2017    Low back pain 6/28/2017    On statin therapy 6/28/2017    Pacemaker 2020    PVD (peripheral vascular disease) ([facility] 6/28/2017    Rheumatoid arthritis ([facility]      Urinary retention 6/28/2017         Core Measures:  CVA: Yes Yes  CHF:No Not applicable  PNA:No Not applicable     Activity:  Activity Level: Up with Assistance  Number times ambulated in hallways past shift: 0  Number of times OOB to chair past shift: 1     Cardiac:  Cardiac Monitoring: No            Access:   Current line(s): PIV         Genitourinary:   Urinary status: voiding inc and inc briefs  Urinary Catheter? No     Respiratory:   O2 Device: None (Room air)  Chronic home O2 use?: NO  Incentive spirometer at bedside: N/A     GI:  Last Bowel Movement Date: 07/27/22  Current diet:  DIET NPO  Passing flatus: YES  Tolerating current diet: YES     Pain Management:  Patient states pain is manageable on current regimen: YES     Skin:  Ghulam Score: 16  Interventions: increase time out of bed, limit briefs, and internal/external urinary devices    Patient Safety:  Fall Score:  Total Score: 5  Interventions: bed/chair alarm and gripper socks  High Fall Risk: Yes  @Rollbelt  @dexterity to release roll belt  Yes/No ( must document dexterity  here by stating Yes or No here, otherwise this is a restraint and must follow restraint documentation policy.)     DVT prophylaxis:  DVT prophylaxis Med- Yes  DVT prophylaxis SCD or ISAI- No     Wounds: (If Applicable)  Wounds- No  Location none     Active Consults:  IP CONSULT TO HOSPITALIST  IP CONSULT TO NEUROLOGY  IP CONSULT TO GASTROENTEROLOGY     Length of Stay:  Expected LOS: 4d 9h  Actual LOS: 9  Discharge Plan: Yes CM following.   [facility]

## 2022-08-04 NOTE — PROGRESS NOTES
Problem: Falls - Risk of  Goal: *Absence of Falls  Description: Document Tata Ross Fall Risk and appropriate interventions in the flowsheet. Outcome: Progressing Towards Goal  Note: Fall Risk Interventions:  Mobility Interventions: Bed/chair exit alarm    Mentation Interventions: Bed/chair exit alarm    Medication Interventions: Bed/chair exit alarm    Elimination Interventions: Call light in reach, Bed/chair exit alarm    History of Falls Interventions: Bed/chair exit alarm, Room close to nurse's station         Problem: Patient Education: Go to Patient Education Activity  Goal: Patient/Family Education  Outcome: Progressing Towards Goal     Problem: Patient Education: Go to Patient Education Activity  Goal: Patient/Family Education  Outcome: Progressing Towards Goal     Problem: Patient Education: Go to Patient Education Activity  Goal: Patient/Family Education  Outcome: Progressing Towards Goal     Problem: Patient Education: Go to Patient Education Activity  Goal: Patient/Family Education  Outcome: Progressing Towards Goal     Problem: Airway Clearance - Ineffective  Goal: Achieve or maintain patent airway  Outcome: Progressing Towards Goal     Problem: Gas Exchange - Impaired  Goal: Absence of hypoxia  Outcome: Progressing Towards Goal  Goal: Promote optimal lung function  Outcome: Progressing Towards Goal     Problem: Breathing Pattern - Ineffective  Goal: Ability to achieve and maintain a regular respiratory rate  Outcome: Progressing Towards Goal     Problem:  Body Temperature -  Risk of, Imbalanced  Goal: Ability to maintain a body temperature within defined limits  Outcome: Progressing Towards Goal  Goal: Will regain or maintain usual level of consciousness  Outcome: Progressing Towards Goal  Goal: Complications related to the disease process, condition or treatment will be avoided or minimized  Outcome: Progressing Towards Goal     Problem: Nutrition Deficits  Goal: Optimize nutrtional status  Outcome: Progressing Towards Goal     Problem: Risk for Fluid Volume Deficit  Goal: Maintain normal heart rhythm  Outcome: Progressing Towards Goal  Goal: Maintain absence of muscle cramping  Outcome: Progressing Towards Goal  Goal: Maintain normal serum potassium, sodium, calcium, phosphorus, and pH  Outcome: Progressing Towards Goal     Problem: Isolation Precautions - Risk of Spread of Infection  Goal: Prevent transmission of infectious organism to others  Outcome: Progressing Towards Goal     Problem: Loneliness or Risk for Loneliness  Goal: Demonstrate positive use of time alone when socialization is not possible  Outcome: Progressing Towards Goal     Problem: Fatigue  Goal: Verbalize increase energy and improved vitality  Outcome: Progressing Towards Goal     Problem: Patient Education: Go to Patient Education Activity  Goal: Patient/Family Education  Outcome: Progressing Towards Goal     Problem: Delirium Treatment  Goal: *Level of consciousness restored to baseline  Outcome: Progressing Towards Goal  Goal: *Level of environmental perceptions restored to baseline  Outcome: Progressing Towards Goal  Goal: *Sensory perception restored to baseline  Outcome: Progressing Towards Goal  Goal: *Emotional stability restored to baseline  Outcome: Progressing Towards Goal  Goal: *Functional assessment restored to baseline  Outcome: Progressing Towards Goal  Goal: *Absence of falls  Outcome: Progressing Towards Goal  Goal: *Will remain free of delirium, CAM Score negative  Outcome: Progressing Towards Goal  Goal: *Cognitive status will be restored to baseline  Outcome: Progressing Towards Goal  Goal: Interventions  Outcome: Progressing Towards Goal

## 2022-08-04 NOTE — PROGRESS NOTES
Problem: Mobility Impaired (Adult and Pediatric)  Goal: *Acute Goals and Plan of Care (Insert Text)  Description:   FUNCTIONAL STATUS PRIOR TO ADMISSION: Patient was modified independent using a rolling walker for functional mobility. Subjective hx provided by spouse at bedside as pt is disoriented to self, time, and situation    HOME SUPPORT PRIOR TO ADMISSION: The patient lived with wife and required assistance for bathing. Physical Therapy Goals  Initiated 7/26/2022  1. Patient will move from supine to sit and sit to supine  in bed with moderate assistance  within 7 day(s). Met 8/2/22. 2.  Patient will transfer from bed to chair and chair to bed with moderate assistance using the least restrictive device within 7 day(s). Met 8/2/22. 3.  Patient will perform sit to stand with moderate assistance  within 7 day(s). Met 8/2/22. 4.  Patient will ambulate with moderate assistance  for 15 feet with the least restrictive device within 7 day(s). Not met 8/2/22. 5.  Patient will improve Jimenez Balance score by 7 points within 7 days. Not met 8/2/22.    8/2/2022  1. Patient will move from supine to sit and sit to supine , scoot up and down, and roll side to side in bed with minimal assistance within 7 day(s). 2.  Patient will transfer from bed to chair and chair to bed with minimal assistance using the least restrictive device within 7 day(s). 3.  Patient will perform sit to stand with minimal assistance/contact guard assist within 7 day(s). 4.  Patient will ambulate with minimal assistance for 15 feet with the least restrictive device within 7 day(s). 5.  Patient will improve Jimenez Balance score by 7 points within 7 days.      Outcome: Progressing Towards Goal   PHYSICAL THERAPY TREATMENT  Patient: Teja Mccall (89 y.o. male)  Date: 8/4/2022  Diagnosis: CVA (cerebral vascular accident) ([facility] [I63.9] <principal problem not specified>  Procedure(s) (LRB):  ESOPHAGOGASTRODUODENOSCOPY (EGD) (N/A)  PERCUTANEOUS ENDOSCOPIC GASTROSTOMY TUBE INSERTION (N/A) 3 Days Post-Op  Precautions: Fall, Aspiration, Contact, Skin (droplet plus - covid +; PEG tube)  Chart, physical therapy assessment, plan of care and goals were reviewed. ASSESSMENT  Patient continues with skilled PT services and is progressing towards goals. Lying in bed and confused but cooperative. Spouse and other family members in the room. Came to sitting with moderate assistance due to poor motor planning by patient with hesitation from PEG site pain during the transfer. Sitting balance was stable with slight R lean preference - worked on sitting weight shifting using reaching to L, WB onto L elbow and reaching across to left side. Once in standing PT worked on side to side weight shifting with emphasis on shifting more left and then standing statically while OT had patient perform ADL's. Afterwards patient ambulated 5 feet to bedside recliner using RW and 2 person assistance. He continues to not lift R foot for stepping and needed manual assistance by PT. Left patient up in chair with all needs met with family members in the room. Still feel best setting for patient is IPR for 15 hours per week. Current Level of Function Impacting Discharge (mobility/balance): mod a supine to sit; min a x 2 sit to stand; mod/min a x 2 bed to chair with RW; gait with RW limited to about 5 feet with R foot sliding and minimal stepping. Other factors to consider for discharge: confusion, high risk for falls and skin breakdown, modified independent with RW PLOF at home; supportive spouse and family. PLAN :  Patient continues to benefit from skilled intervention to address the above impairments. Continue treatment per established plan of care. to address goals.     Recommendation for discharge: (in order for the patient to meet his/her long term goals)  Therapy 3 hours per day 5-7 days per week vs SNF    This discharge recommendation:  Has been made in collaboration with the attending provider and/or case management    IF patient discharges home will need the following DME: hospital bed, mechanical lift, and wheelchair       SUBJECTIVE:   Patient stated  yes.  - patient speaks with 1-2 word responses    OBJECTIVE DATA SUMMARY:   Critical Behavior:  Neurologic State: Alert, Confused  Orientation Level: Oriented to person, Disoriented to place, Disoriented to situation, Disoriented to time  Cognition: Follows commands, Impaired decision making  Safety/Judgement: Fall prevention  Functional Mobility Training:  Bed Mobility:  Rolling: Minimum assistance;Assist x1  Supine to Sit: Moderate assistance;Assist x1              Transfers:  Sit to Stand: Minimum assistance;Assist x2  Stand to Sit: Minimum assistance;Assist x2        Bed to Chair: Moderate assistance;Minimum assistance;Assist x2                    Balance:  Sitting: Impaired; Without support  Sitting - Static: Good (unsupported)  Sitting - Dynamic: Fair (occasional)  Standing: Impaired; Without support  Standing - Static: Fair;Constant support  Standing - Dynamic : Fair;Constant support  Ambulation/Gait Training:  Distance (ft): 5 Feet (ft)  Assistive Device: Gait belt;Walker, rolling  Ambulation - Level of Assistance: Minimal assistance; Moderate assistance;Assist x2        Gait Abnormalities: Decreased step clearance (R foot not clearing floor)        Base of Support: Shift to right;Center of gravity altered  Stance: Right increased  Speed/Paula: Slow;Shuffled  Step Length: Right shortened  Swing Pattern: Right asymmetrical     Interventions: Manual cues; Safety awareness training; Tactile cues; Verbal cues; Visual/Demos     Pain Rating:  Intermittent abdominal pain from PEG site during supine to sit transfers.     Activity Tolerance:   Good and SpO2 stable on RA    After treatment patient left in no apparent distress:   Sitting in chair, Call bell within reach, Bed / chair alarm activated, and Caregiver / family present    COMMUNICATION/COLLABORATION:   The patients plan of care was discussed with: Occupational therapist and Registered nurse.      Regina Hdez, PT   Time Calculation: 44 mins

## 2022-08-04 NOTE — PROGRESS NOTES
Comprehensive Nutrition Assessment    Type and Reason for Visit: Reassess    Nutrition Recommendations/Plan:   Continue current TF regimen     Nutrition Assessment:    Chart reviewed; patient s/p PEG placement. TF advanced to goal rate. Tolerating well per RN. TF adequate to meet 100% of estimated kcal and protein need. Nutrition Related Findings:    K+ 3.6, Phos 2.9, Na 142, -[phone]  BM 7/31  Atorvastatin, Buspar, Coreg, Lexapro, Melatonin    Current Nutrition Intake & Therapies:        Current Tube Feeding (TF) Orders:   Feeding Route:  PEG  Formula:  Jevity 1.5  Schedule:   Continuous  Regimen:  55 mL/hr  Additives/Modulars:  None  Water Flushes:  150 mL water flushes q 4  Current TF & Flush Orders Provides:  1980 kcals, 84g protein, 1903 mL water    Anthropometric Measures:  Height: 5' 10\" (177.8 cm)  Ideal Body Weight (IBW): 166 lbs (75 kg)     Current Body Wt:  75.3 kg (166 lb 0.1 oz), 100 % IBW. Current BMI (kg/m2): 23.8                          BMI Category: Normal weight (BMI 18.5-24. 9)    Estimated Daily Nutrient Needs:  Energy Requirements Based On: Formula  Weight Used for Energy Requirements: Current  Energy (kcal/day): 1906 kcal (BMR 1466 x 1. 3AF)  Weight Used for Protein Requirements: Current  Protein (g/day): 75g (1.0 g/kg bw)  Method Used for Fluid Requirements: 1 ml/kcal  Fluid (ml/day): 1900 mL    Nutrition Diagnosis:   Swallowing difficulty related to cognitive or neurological impairment as evidenced by NPO or clear liquid status due to medical condition, nutrition support-enteral nutrition    Nutrition Interventions:   Food and/or Nutrient Delivery: Continue tube feeding  Nutrition Education/Counseling: No recommendations at this time  Coordination of Nutrition Care: Continue to monitor while inpatient       Goals:  Previous Goal Met: Goal(s) achieved  Goals:  Tolerate nutrition support at goal rate, by next RD assessment       Nutrition Monitoring and Evaluation: Behavioral-Environmental Outcomes: None identified  Food/Nutrient Intake Outcomes: Enteral nutrition intake/tolerance  Physical Signs/Symptoms Outcomes: Biochemical data, Weight    Discharge Planning:    Enteral nutrition    Emelina Rosario RD  Contact: ext [phone]

## 2022-08-04 NOTE — PROGRESS NOTES
End of Shift Note     Bedside shift change report given Dayana Silverman (oncoming nurse) by Sandy Howard RN (offgoing nurse).   Report included the following information SBAR, Kardex, MAR, and Recent Results     Shift worked:  Days   Shift summary and any significant changes:     Tube feeding increased to 45 ml tolerating well              Concerns for physician to address:  None   Zone phone for oncoming shift:  [phone]      Patient Information  Sarah Singh  68 y.o.  7/25/2022 12:04 PM by Venkat Jimenez MD. Sarah Singh was admitted from Home     Problem List          Patient Active Problem List     Diagnosis Date Noted    Encephalopathy due to COVID-19 virus 07/28/2022    Thrombotic stroke involving left middle cerebral artery ([address] 07/27/2022    Acute alteration in mental status 07/26/2022    Convulsive syncope 07/26/2022    Bilateral carotid artery stenosis 07/26/2022    History of stroke 07/26/2022    CVA (cerebral vascular accident) ([address] 07/25/2022    Stroke ([address] 07/08/2022    Weakness of both legs 06/01/2021    Bilateral lower extremity pain 06/01/2021    Leg pain 05/30/2021    Dilated cardiomyopathy ([address] 12/18/2020    Permanent atrial fibrillation ([address] 12/18/2020    Tachycardia 12/18/2020    A-fib ([address] 12/18/2020    PAF (paroxysmal atrial fibrillation) ([address] 05/16/2018    Age-related cataract 08/01/2017    Allergic rhinitis 06/28/2017    Diverticulosis 06/28/2017    Urinary retention 06/28/2017    PVD (peripheral vascular disease) ([address] 06/28/2017    On statin therapy 06/28/2017    Low back pain 06/28/2017    Insomnia 06/28/2017    HTN (hypertension) 06/28/2017    Hyperlipidemia 06/28/2017    Glucose intolerance (impaired glucose tolerance) 06/28/2017    GERD (gastroesophageal reflux disease) 06/28/2017    ED (erectile dysfunction) 06/28/2017    DJD (degenerative joint disease) 06/28/2017    ASVD (arteriosclerotic vascular disease) 06/28/2017              Past Medical History:   Diagnosis Date Allergic rhinitis 6/28/2017    Arrhythmia       Paroxysmal Afib- Dr. Colin Dennis    ASVD (arteriosclerotic vascular disease) 06/28/2017     Story: carotid stenosis followed by Vasc Surg    Atrial fibrillation ([facility]      Congestive heart failure ([facility]      Diverticulosis 6/28/2017     Comments: on colonoscopy 12/01    DJD (degenerative joint disease) 6/28/2017    ED (erectile dysfunction) 6/28/2017    GERD (gastroesophageal reflux disease) 6/28/2017    Glucose intolerance (impaired glucose tolerance) 06/28/2017    HTN (hypertension) 6/28/2017    Hyperlipidemia 6/28/2017    Insomnia 6/28/2017    Low back pain 6/28/2017    On statin therapy 6/28/2017    Pacemaker 2020    PVD (peripheral vascular disease) ([facility] 6/28/2017    Rheumatoid arthritis ([facility]      Urinary retention 6/28/2017         Core Measures:  CVA: Yes Yes  CHF:No Not applicable  PNA:No Not applicable     Activity:  Activity Level: Up with Assistance  Number times ambulated in hallways past shift: 0  Number of times OOB to chair past shift: 1     Cardiac:  Cardiac Monitoring: No            Access:   Current line(s): PIV         Genitourinary:   Urinary status: voiding inc and inc briefs  Urinary Catheter? No     Respiratory:   O2 Device: None (Room air)  Chronic home O2 use?: NO  Incentive spirometer at bedside: N/A     GI:  Last Bowel Movement Date: 07/27/22  Current diet:  DIET NPO  Passing flatus: YES  Tolerating current diet: YES     Pain Management:  Patient states pain is manageable on current regimen: YES     Skin:  Ghulam Score: 16  Interventions: increase time out of bed, limit briefs, and internal/external urinary devices    Patient Safety:  Fall Score:  Total Score: 5  Interventions: bed/chair alarm and gripper socks  High Fall Risk: Yes  @Rollbelt  @dexterity to release roll belt  Yes/No ( must document dexterity  here by stating Yes or No here, otherwise this is a restraint and must follow restraint documentation policy.)     DVT prophylaxis:  DVT prophylaxis Med- Yes  DVT prophylaxis SCD or ISAI- No     Wounds: (If Applicable)  Wounds- No  Location none     Active Consults:  IP CONSULT TO HOSPITALIST  IP CONSULT TO NEUROLOGY  IP CONSULT TO GASTROENTEROLOGY     Length of Stay:  Expected LOS: 4d 9h  Actual LOS: 8  Discharge Plan: Yes CM following.   Referral for IPR to [facility] mi Farooq RN

## 2022-08-04 NOTE — PROGRESS NOTES
End of Shift Note     Bedside shift change report given Cy Snow, RN (oncoming nurse) by Salvatore Wright RN (offgoing nurse). Report included the following information SBAR, Kardex, MAR, and Recent Results     Shift worked: nights   Shift summary and any significant changes:     Tube feeding increased to 55ml  maximum rate pt tolerating well   Pt continues to be confused and trying to get out bed.   Rollbelt and telesitter remains in placee           Concerns for physician to address:  None   Zone phone for oncoming shift:  [phone]      Patient Information  Rhonda Thomas  68 y.o.  7/25/2022 12:04 PM by Stevo Lucas MD. Rhonda Thomas was admitted from Home     Problem List          Patient Active Problem List     Diagnosis Date Noted    Encephalopathy due to COVID-19 virus 07/28/2022    Thrombotic stroke involving left middle cerebral artery ([address] 07/27/2022    Acute alteration in mental status 07/26/2022    Convulsive syncope 07/26/2022    Bilateral carotid artery stenosis 07/26/2022    History of stroke 07/26/2022    CVA (cerebral vascular accident) ([address] 07/25/2022    Stroke ([address] 07/08/2022    Weakness of both legs 06/01/2021    Bilateral lower extremity pain 06/01/2021    Leg pain 05/30/2021    Dilated cardiomyopathy ([address] 12/18/2020    Permanent atrial fibrillation ([address] 12/18/2020    Tachycardia 12/18/2020    A-fib ([address] 12/18/2020    PAF (paroxysmal atrial fibrillation) ([address] 05/16/2018    Age-related cataract 08/01/2017    Allergic rhinitis 06/28/2017    Diverticulosis 06/28/2017    Urinary retention 06/28/2017    PVD (peripheral vascular disease) ([address] 06/28/2017    On statin therapy 06/28/2017    Low back pain 06/28/2017    Insomnia 06/28/2017    HTN (hypertension) 06/28/2017    Hyperlipidemia 06/28/2017    Glucose intolerance (impaired glucose tolerance) 06/28/2017    GERD (gastroesophageal reflux disease) 06/28/2017    ED (erectile dysfunction) 06/28/2017    DJD (degenerative joint disease) 06/28/2017 ASVD (arteriosclerotic vascular disease) 06/28/2017              Past Medical History:   Diagnosis Date    Allergic rhinitis 6/28/2017    Arrhythmia       Paroxysmal Afib- Dr. Patsy Jolly    ASVD (arteriosclerotic vascular disease) 06/28/2017     Story: carotid stenosis followed by Vasc Surg    Atrial fibrillation ([address]      Congestive heart failure ([address]      Diverticulosis 6/28/2017     Comments: on colonoscopy 12/01    DJD (degenerative joint disease) 6/28/2017    ED (erectile dysfunction) 6/28/2017    GERD (gastroesophageal reflux disease) 6/28/2017    Glucose intolerance (impaired glucose tolerance) 06/28/2017    HTN (hypertension) 6/28/2017    Hyperlipidemia 6/28/2017    Insomnia 6/28/2017    Low back pain 6/28/2017    On statin therapy 6/28/2017    Pacemaker 2020    PVD (peripheral vascular disease) ([address] 6/28/2017    Rheumatoid arthritis ([facility]      Urinary retention 6/28/2017         Core Measures:  CVA: Yes Yes  CHF:No Not applicable  PNA:No Not applicable     Activity:  Activity Level: Up with Assistance  Number times ambulated in hallways past shift: 0  Number of times OOB to chair past shift: 1     Cardiac:  Cardiac Monitoring: No            Access:   Current line(s): PIV         Genitourinary:   Urinary status: voiding inc and inc briefs  Urinary Catheter? No     Respiratory:   O2 Device: None (Room air)  Chronic home O2 use?: NO  Incentive spirometer at bedside: N/A     GI:  Last Bowel Movement Date: 07/27/22  Current diet:  DIET NPO  Passing flatus: YES  Tolerating current diet: YES     Pain Management:  Patient states pain is manageable on current regimen: YES     Skin:  Ghulam Score: 16  Interventions: increase time out of bed, limit briefs, and internal/external urinary devices    Patient Safety:  Fall Score:  Total Score: 5  Interventions: bed/chair alarm and gripper socks  High Fall Risk: Yes  @Rollbelt  @dexterity to release roll belt  Yes/No ( must document dexterity  here by stating Yes or No here, otherwise this is a restraint and must follow restraint documentation policy.)     DVT prophylaxis:  DVT prophylaxis Med- Yes  DVT prophylaxis SCD or ISAI- No     Wounds: (If Applicable)  Wounds- No  Location none     Active Consults:  IP CONSULT TO HOSPITALIST  IP CONSULT TO NEUROLOGY  IP CONSULT TO GASTROENTEROLOGY     Length of Stay:  Expected LOS: 4d 9h  Actual LOS: 8  Discharge Plan: Yes CM following.   Referral for IPR to [facility]        Alexey Ludwig RN

## 2022-08-04 NOTE — PROGRESS NOTES
Transition of Care Plan:     RUR: 14% - \"low risk\"  Disposition: Plan A) COVID positive SNF placement (pending acceptance/insurance auth) vs Plan B) Home with HH (RN/PT/OT/SLP), home infusion for TF, & follow up apts  Follow up appointments: PCP & specialist as indicated   DME needed: Defer to SNF for DME needs  Transportation at Discharge: BLS transport needed for d/c  Keys or means to access home: N/A - pt likely transitioning to SNF at d/c     IM Medicare Letter: 2nd IM needed prior to d/c  Is patient a BCPI-A Bundle: N/A         Is patient a [facility] and connected with the [facility]? Yes - clinical updates faxed to the [address] [place] [address] 7/28/22 & 8/4/22  Caregiver Contact: Pt's wife Lawrence Leyva Orris: [phone])  Discharge Caregiver contacted prior to discharge? To be contacted prior to d/c  Care Conference needed?: N/A  COVID-19 test: PCR COVID-19 test completed 7/27/22; results positive    Update - 2:36 PM: CM met with pt's wife outside of room to provide updates on disposition. CM provided update on IPR placement/insurance auth & addressed follow up questions as needed. CM discussed alternative plan for d/c re: SNF vs HH & home infusion for TF. Wife requested to pursue SNF placement, reporting concerns associated with managing pt's needs in the home immediately following d/c. Wife informed of SNF's that have COVID positive bed availability; wife agreeable to referrals being sent to all the SNF's accepting COVID positive pt's. FOC offered, copy to be placed on chart. Wife provided with CM's contact information in the event questions/concerns arise throughout the remainder of pt's admission. CM to meet with wife 8/5/22 at 2:30 PM to provide updates. Update - 2:00 PM: CM re-attempted to contact pt's wife to report updates. Wife expressed frustration regarding the lack of updates received at bedside from CM. Wife informed CM left voicemail on her phone earlier today requesting call back to discuss updates.  Wife informed CM will not be conducted bedside visits due to pt's COVID-19 status. Wife & CM to meet in hallway this afternoon at 2:45 PM to discuss updates. Initial note: Chart reviewed, IDR completed. Pt currently has tele-sitter present at bedside; medical team informed tele-sitter will need to be removed 24 hrs prior to d/c to SNF. IPR placement/insurance auth unsuccessful; attending MD declined to complete P2P 8/3/22. CM attempted to contact pt's wife Georgia Number: [phone]) to provide updates regarding IPR & discuss/confirm alternative plan for d/c re: COVID positive SNF placement vs home with [facility] & home infusion for TF. CM reached wife's voicemail & requested call back. CM will fax clinical updates to the [facility] for reference (f: [phone]). CM will continue to follow & remain accessible for d/c planning.     Nic Milan, MSW  Care Manager, [facility]

## 2022-08-04 NOTE — PROGRESS NOTES
Problem: Self Care Deficits Care Plan (Adult)  Goal: *Acute Goals and Plan of Care (Insert Text)  Description: FUNCTIONAL STATUS PRIOR TO ADMISSION: Patient was modified independent using a rolling walker for functional mobility. Hx falls and AMS. Wife provides transportation 2/2 decreased cognition at baseline. HOME SUPPORT: The patient lived with spouse who assisted with IADLs and driving. Family lives close by for support. Occupational Therapy Goals  Weekly reassess, goals remain same   Initiated 7/26/2022   1. Patient will perform upper body dressing with moderate assistance  within 7 day(s). 2.  Patient will perform lower body dressing with moderate assistance  within 7 day(s). 3.  Patient will perform bathing with moderate assistance  within 7 day(s). 4.  Patient will perform toilet transfers with moderate assistance  within 7 day(s). 5.  Patient will perform all aspects of toileting with moderate assistance  within 7 day(s). 6.  Patient will participate in upper extremity therapeutic exercise/activities with moderate assistance  within 7 day(s). 7.  Patient will utilize energy conservation techniques during functional activities with verbal cues within 7 day(s). 8.  Patient will improve their Fugl Jamil score by 5 points in prep for ADLs within 7 days. Outcome: Progressing Towards Goal   OCCUPATIONAL THERAPY TREATMENT  Patient: Pito Gee (59 y.o. male)  Date: 8/4/2022  Diagnosis: [facility]cerebral vascular accident) ([facility] [I63.9] <principal problem not specified>  Procedure(s) (LRB):  ESOPHAGOGASTRODUODENOSCOPY (EGD) (N/A)  PERCUTANEOUS ENDOSCOPIC GASTROSTOMY TUBE INSERTION (N/A) 3 Days Post-Op  Precautions: Fall, Aspiration, Contact, Skin (droplet plus - covid +; PEG tube)  Chart, occupational therapy assessment, plan of care, and goals were reviewed. ASSESSMENT  Patient continues with skilled OT services and is progressing towards goals. Pt was supine in bed and family in room. Pt was min of 1 for bed mobility, and was able to sit on EOb with SBA and worked on wt shifting to the left sitting, and stood with min to  mod of 2 and worked on wt shifting to left. Pt was min to CGA for standing while OT assisted pt with brushing his teeth, and he needed VC and tactile cues to brush right side of mouth and CGA to support right UE to keep his toothbrush in his mouth. He did much better with his transfer to chair after wt shifting ex, and pt was mod to max to move left leg and needed assist with wt shifting, mod to min assist.  Pt was left sitting up in chair with family in room. Current Level of Function Impacting Discharge (ADLs): max for ADLs              PLAN :  Patient continues to benefit from skilled intervention to address the above impairments. Continue treatment per established plan of care to address goals. Recommend with staff: Candice Hebert for meals     Recommend next OT session: work on UB ADLs in chair     Recommendation for discharge: (in order for the patient to meet his/her long term goals)  Therapy up to 5 days/week in SNF setting    This discharge recommendation:  Has been made in collaboration with the attending provider and/or case management    IF patient discharges home will need the following DME: tbd       SUBJECTIVE:   Patient stated Maybe a little.   (when asked if he was tired)    OBJECTIVE DATA SUMMARY:   Cognitive/Behavioral Status:  Neurologic State: Alert;Confused  Orientation Level: Oriented to person  Cognition: Follows commands; Impaired decision making  Perception: Appears intact  Perseveration: No perseveration noted  Safety/Judgement: Fall prevention    Functional Mobility and Transfers for ADLs:  Bed Mobility:  Rolling: Minimum assistance;Assist x1  Supine to Sit: Moderate assistance;Assist x1    Transfers:  Sit to Stand: Minimum assistance;Assist x2  Functional Transfers  Toilet Transfer :  Moderate assistance  Bed to Chair: Moderate assistance;Minimum assistance;Assist x2    Balance:  Sitting: Impaired; Without support  Sitting - Static: Good (unsupported)  Sitting - Dynamic: Fair (occasional)  Standing: Impaired; Without support  Standing - Static: Fair;Constant support  Standing - Dynamic : Fair;Constant support    ADL Intervention:  Feeding  Feeding Assistance:  (NPO)    Grooming  Position Performed: Standing  Brushing Teeth: Contact guard assistance    Upper Body Bathing  Bathing Assistance: Maximum assistance         Lower Body Bathing  Bathing Assistance: Total assistance(dependent); Maximum assistance          Toileting  Toileting Assistance: Total assistance(dependent)  Bladder Hygiene: Total assistance (dependent)  Bowel Hygiene: Total assistance (dependent)    Cognitive Retraining  Safety/Judgement: Fall prevention      Pain:  none    Activity Tolerance:   Fair    After treatment patient left in no apparent distress:   Sitting in chair, Call bell within reach, Bed / chair alarm activated, and Caregiver / family present    COMMUNICATION/COLLABORATION:   The patients plan of care was discussed with: Physical therapist and Registered nurse.      Nela Rivas OT  Time Calculation: 51 mins

## 2022-08-04 NOTE — ADT AUTH CERT NOTES
Stroke: Ischemic - Care Day 9 (8/2/2022) by Rajiv Childers       Review Status Review Entered   Completed 8/2/2022 14:55      Criteria Review      Care Day: 9 Care Date: 8/2/2022 Level of Care: Telemetry    Guideline Day 2    Level Of Care    (X) Stroke unit, ICU, telemetry, or floor    Clinical Status    (X) * Hemodynamic stability    8/2/2022 14:55:00 EDT by Mirta Alston      98.9 °F (37.2 °C)94090/795428 %    ( ) * Mental status at baseline or stable    8/2/2022 14:55:00 EDT by aly Triplett. Does follow simple commands    ( ) * Neurologic deficits absent or stable    ( ) * Unimpaired swallowing    8/2/2022 14:55:00 EDT by Rajiv Childers      starting TF today    Activity    (X) * Up to chair    (X) Possible assisted ambulation    8/2/2022 14:55:00 EDT by Rajiv Childers      up with 2 assist to stand and transfer    Routes    (X) * Oral hydration    (X) * Oral medications    8/2/2022 14:55:00 EDT by Rajiv Childers      per g tube - apixaban 5 mg bid, aspirin 162 mg daily, buspar 15 mg TID, coreg 3.125 mg daily,    ( ) * Advance diet as tolerated    Interventions    (X) Neurologic checks    (X) Possible physical therapy    (X) Possible occupational and speech therapy    Medications    (X) Antithrombotic therapy    8/2/2022 14:55:00 EDT by Rjaiv Childers      aspirin 162 mg daily    (X) Blood pressure control    * Milestone   Additional Notes   08/02   98.9 °F (37.2 °C) 96 136/77 16 96 %      Chloride 109 High    CO2 23    Anion gap 10    Glucose 146 High    BUN 6    Creatinine 0.88    BUN/Creatinine ratio 7 Low    GFR est AA >60    GFR est non-AA >60    Calcium 8.2 Low       WBC 12.6 High       per g tube - apixaban 5 mg bid, aspirin 162 mg daily, buspar 15 mg TID, coreg 3.125 mg daily, morphine 1 mg iv prn x1, Haldol 2 mg im x1      gi note -   Patient seen post PEG tube placement yesterday.    A/P:   PEG tube placed yesterday, 8/1/22   Feeding difficulty   S/p CVA   Anticoagulation   Covid 19 +       A/P: -starting TF today and advance toward goal per nutrition recs   -keep on binder for a few more days      Im note -    Assessment / Plan:   AMS  POA   Rule out CVA, ? Vascular dementia   Right leg weakness   History of HTN/PAF       -Recent admission in July 2022, for left facial droop, suspected TIA, had MRI done which was negative for CVA. EEG was done which was negative.   -Presented to ED for 1 week of sudden cognitive decline, confusion. Dragging of right leg. No focal deficits on exam       -CT head and neck showed multifocal moderate to severe stenosis with occlusion of distal left vertebral artery at V3-V4 junction   -CT head negative for bleed   -Telemetry   -Carotid duplex   There is moderate stenosis in the right ICA (50-69%). · There is mild stenosis in the left ICA (<50%). · The right vertebral is antegrade. · The left vertebral is antegrade.       -aspirin increased to 162 mg daily, Eliquis 5 mg twice daily. -c/w statin (not currently high intensity)   -MRI brain showed 1. Acute left basal ganglia ischemic infarct. 2. Atrophy and white matter disease, remote infarcts stable otherwise. -MRI Lumbar spine showed 1. Postsurgical changes L4-5 L5-S1 stable. 2. Spinal canal stenosis L3-L4.   - Neuro following   PT/ST/OT. - SNF. On NPO currently. - s/p PEG   - Eliquis resumed   -Vit B12, folate, homocysteine, sed rate, TSH 0.47, vit D, ALAN, antineuronal cell AB all ordered. Blood cultures NGTD   -Had echocardiogram last admission which was negative for PFO   -DC IVF   -Nutrition consulted for tube feed recs, appreciate recs       PEG   Patient having some pain, GI adjusted bumper today   Will check Axr (low suspicion for finding pathology)        Agitation (has not been receiving due to NPO, but will resume today)   Melatonin QHS   Neuro added buspar and lexapro       Cough   Aspiration? CXR no acute changes.   Remains on RA       COVID + ve   Patient tested +ve for covid on 7/8 as well   Reason for CVA? Re-screen still +ve   Not exhibiting any respiratory symptoms      History of CAD   S/p pacemaker   History of grade 3 diastolic dysfunction   C/w coreg       Recurrent fall   Chronic back pain   -cont flexeril   -MRI lumbar spine 1. Postsurgical changes L4-5 L5-S1 stable. 2. Spinal canal stenosis L3-L4.        BPH   C/w flomax       Hypernatremia (resolved)   Hypophosphatemia   DC IVF   Monitor and replete phos                     Stroke: Ischemic - Care Day 6 (7/30/2022) by Shreya Branham       Review Status Review Entered   Completed 8/2/2022 14:36      Criteria Review      Care Day: 6 Care Date: 7/30/2022 Level of Care: Telemetry    Guideline Day 2    Level Of Care    (X) Stroke unit, ICU, telemetry, or floor    8/2/2022 14:36:12 EDT by Shreya Branham      telemetry    Clinical Status    (X) * Hemodynamic stability    8/2/2022 14:36:12 EDT by Shreya Branham      97.5 °F (36.4 °C)7018(!) 172/8399 %    ( ) * Mental status at baseline or stable    8/2/2022 14:36:12 EDT by Shreya Branham      poor insight, alert,  follows simple commands    ( ) * Neurologic deficits absent or stable    ( ) * Unimpaired swallowing    8/2/2022 14:36:12 EDT by Shreya Branham      thickened liquids    Activity    (X) * Up to chair    (X) Possible assisted ambulation    8/2/2022 14:36:12 EDT by Aurelia Dk of 2 for bed mobility, and able to stand with mod of 2    Routes    (X) * Oral hydration    (X) * Oral medications    ( ) * Advance diet as tolerated    Interventions    (X) Neurologic checks    (X) Possible physical therapy    (X) Possible occupational and speech therapy    Medications    (X) Antithrombotic therapy    8/2/2022 14:36:12 EDT by Shreya Branham      aspirin 162 mg po daily    (X) Blood pressure control    8/2/2022 14:36:12 EDT by Shreya Branham      coreg 3.125 mg po daily    * Milestone   Additional Notes   07/30   97.5 °F (36.4 °C) 70 18 (!) 172/83 99 %      *   K 3.5   *   CO2 22   GLU 62* BUN 9   CREA 0.61*   CA 7.4*   MG 1.9   PHOS 1.7*      aspirin 162 mg po daily, Lipitor 20 mg po hs, buspar 15 mg pot id, coreg 3.125 mg po daily, flexeril 5 mg po TID, d10% infusion 125 ml, d5.45 nacl @75/hr, Lexapro 10 mg po daily, Flomax 0.4 mg po daily, lovenox 40 mg sc q24h   PHYSICAL EXAM:   General:          WD, WN. Awake, , no acute distress     EENT:              EOMI. PERRLA. Anicteric sclerae. MMM   Resp:               CTA bilaterally, no wheezing or rales. No accessory muscle use   CV:                  Regular  rhythm,  No edema   GI:                   Soft, Non distended, Non tender. +Bowel sounds   Neurologic:       Alert, aphasia. Does follow simple commands   Psych:   Poor  insight. Not anxious nor agitated   Skin:                No rashes. No jaundice       Assessment / Plan:   AMS  POA   Rule out CVA, ? Vascular dementia   Right leg weakness   History of HTN/PAF       -Recent admission in July 2022, for left facial droop, suspected TIA, had MRI done which was negative for CVA. EEG was done which was negative.   -Presented to ED for 1 week of sudden cognitive decline, confusion. Dragging of right leg. No focal deficits on exam       -CT head and neck showed multifocal moderate to severe stenosis with occlusion of distal left vertebral artery at V3-V4 junction   -CT head negative for bleed   -Telemetry   -Carotid duplex   There is moderate stenosis in the right ICA (50-69%). \" There is mild stenosis in the left ICA (<50%). \" The right vertebral is antegrade. \" The left vertebral is antegrade.       -aspirin increased to 162 mg daily, Eliquis 5 mg twice daily. -c/w statin (not currently high intensity)   -MRI brain showed 1. Acute left basal ganglia ischemic infarct. 2. Atrophy and white matter disease, remote infarcts stable otherwise. -MRI Lumbar spine showed 1. Postsurgical changes L4-5 L5-S1 stable. 2. Spinal canal stenosis L3-L4.   - Neuro following   PT/ST/OT. - SNF.   On NPO currently. Patient's wife agreeable to PEG. Hold eliquis   -Vit B12, folate, homocysteine, sed rate, TSH 0.47, vit D, ALAN, antineuronal cell AB all ordered. Blood cultures NGTD   -Had echocardiogram last admission which was negative for PFO   -IVF change to d5 1/2 ns while NPO. Agitation   Melatonin QHS   Neuro added buspar and lexapro       Cough   Aspiration? CXR no acute changes. Remains on RA       COVID + ve   Patient tested +ve for covid on 7/8 as well   Reason for CVA? History of CAD   S/p pacemaker   History of grade 3 diastolic dysfunction   C/w coreg       Recurrent fall   Chronic back pain   -check orthostats   -cont flexeril   -MRI lumbar spine 1. Postsurgical changes L4-5 L5-S1 stable. 2. Spinal canal stenosis L3-L4. BPH   C/w flomax       Hypernatremia   Hypophosphatemia   D5 1/2 NS   Monitor and replete phos      gi note - Patient on schedule for EGD w PEG placement on Monday at 1 pm.   Eliquis on hold until then and ok to use Lovenox SQ bridge. Will have repeat COVID test done tomorrow.

## 2022-08-05 NOTE — PROGRESS NOTES
Bedside and Verbal shift change report given to Makayla Vidal RN (oncoming nurse) by Rosey Jackson RN (offgoing nurse). Report included the following information SBAR, Kardex, Intake/Output, MAR and Recent Results.

## 2022-08-05 NOTE — PROGRESS NOTES
Removing COVID infection flag and isolation precautions. Patient originally tested positive on July 8th and is past the transmission based precautions guidelines for mild to moderate illness. Symptoms are stable or have improved, Afebrile and on room air since admission.  08/05/2022 AH

## 2022-08-05 NOTE — PROGRESS NOTES
Problem: Patient Education: Go to Patient Education Activity  Goal: Patient/Family Education  Outcome: Progressing Towards Goal     Problem: Patient Education: Go to Patient Education Activity  Goal: Patient/Family Education  Outcome: Progressing Towards Goal     Problem: Patient Education: Go to Patient Education Activity  Goal: Patient/Family Education  Outcome: Progressing Towards Goal     Problem: Airway Clearance - Ineffective  Goal: Achieve or maintain patent airway  Outcome: Progressing Towards Goal     Problem: Gas Exchange - Impaired  Goal: Absence of hypoxia  Outcome: Progressing Towards Goal  Goal: Promote optimal lung function  Outcome: Progressing Towards Goal     Problem: Breathing Pattern - Ineffective  Goal: Ability to achieve and maintain a regular respiratory rate  Outcome: Progressing Towards Goal     Problem:  Body Temperature -  Risk of, Imbalanced  Goal: Ability to maintain a body temperature within defined limits  Outcome: Progressing Towards Goal  Goal: Will regain or maintain usual level of consciousness  Outcome: Progressing Towards Goal  Goal: Complications related to the disease process, condition or treatment will be avoided or minimized  Outcome: Progressing Towards Goal     Problem: Isolation Precautions - Risk of Spread of Infection  Goal: Prevent transmission of infectious organism to others  Outcome: Progressing Towards Goal     Problem: Nutrition Deficits  Goal: Optimize nutrtional status  Outcome: Progressing Towards Goal     Problem: Risk for Fluid Volume Deficit  Goal: Maintain normal heart rhythm  Outcome: Progressing Towards Goal  Goal: Maintain absence of muscle cramping  Outcome: Progressing Towards Goal  Goal: Maintain normal serum potassium, sodium, calcium, phosphorus, and pH  Outcome: Progressing Towards Goal     Problem: Loneliness or Risk for Loneliness  Goal: Demonstrate positive use of time alone when socialization is not possible  Outcome: Progressing Towards Goal     Problem: Fatigue  Goal: Verbalize increase energy and improved vitality  Outcome: Progressing Towards Goal     Problem: Patient Education: Go to Patient Education Activity  Goal: Patient/Family Education  Outcome: Progressing Towards Goal     Problem: Delirium Treatment  Goal: *Level of consciousness restored to baseline  Outcome: Progressing Towards Goal  Goal: *Level of environmental perceptions restored to baseline  Outcome: Progressing Towards Goal  Goal: *Sensory perception restored to baseline  Outcome: Progressing Towards Goal  Goal: *Emotional stability restored to baseline  Outcome: Progressing Towards Goal  Goal: *Functional assessment restored to baseline  Outcome: Progressing Towards Goal  Goal: *Absence of falls  Outcome: Progressing Towards Goal  Goal: *Will remain free of delirium, CAM Score negative  Outcome: Progressing Towards Goal  Goal: *Cognitive status will be restored to baseline  Outcome: Progressing Towards Goal  Goal: Interventions  Outcome: Progressing Towards Goal     Problem: Patient Education: Go to Patient Education Activity  Goal: Patient/Family Education  Outcome: Progressing Towards Goal     Problem: Falls - Risk of  Goal: *Absence of Falls  Description: Document Donavanney Mess Fall Risk and appropriate interventions in the flowsheet. Outcome: Progressing Towards Goal  Note: Fall Risk Interventions:  Mobility Interventions: Bed/chair exit alarm    Mentation Interventions: Adequate sleep, hydration, pain control, Bed/chair exit alarm    Medication Interventions: Bed/chair exit alarm    Elimination Interventions: Toileting schedule/hourly rounds, Call light in reach, Bed/chair exit alarm    History of Falls Interventions:  Investigate reason for fall, Bed/chair exit alarm         Problem: Patient Education: Go to Patient Education Activity  Goal: Patient/Family Education  Outcome: Progressing Towards Goal     Problem: Non-Violent Restraints  Goal: Removal from restraints as soon as assessed to be safe  Outcome: Progressing Towards Goal  Goal: No harm/injury to patient while restraints in use  Outcome: Progressing Towards Goal  Goal: Patient's dignity will be maintained  Outcome: Progressing Towards Goal  Goal: Patient Interventions  Outcome: Progressing Towards Goal

## 2022-08-05 NOTE — PROGRESS NOTES
End of Shift Note     Bedside shift change report given Noreen Kahn, RN (oncoming nurse) by Tuyet England RN (offgoing nurse). Report included the following information SBAR, Kardex, MAR, and Recent Results     Shift worked: nights   Shift summary and any significant changes:     Tube feeding at 55ml goal  Pt in rollbelt, telesitter placed, soft mitt restraints placed due to pt continuously pulling at PEG tube         Concerns for physician to address: Repeat CT, MRI?    Zone phone for oncoming shift:  [phone]      Patient Information  Yovana Arroyo  68 y.o.  7/25/2022 12:04 PM by Lis Bryan MD. Yovana Arroyo was admitted from Home     Problem List          Patient Active Problem List     Diagnosis Date Noted    Encephalopathy due to COVID-19 virus 07/28/2022    Thrombotic stroke involving left middle cerebral artery ([address] 07/27/2022    Acute alteration in mental status 07/26/2022    Convulsive syncope 07/26/2022    Bilateral carotid artery stenosis 07/26/2022    History of stroke 07/26/2022    CVA (cerebral vascular accident) ([address] 07/25/2022    Stroke ([address] 07/08/2022    Weakness of both legs 06/01/2021    Bilateral lower extremity pain 06/01/2021    Leg pain 05/30/2021    Dilated cardiomyopathy ([address] 12/18/2020    Permanent atrial fibrillation ([address] 12/18/2020    Tachycardia 12/18/2020    A-fib ([address] 12/18/2020    PAF (paroxysmal atrial fibrillation) ([address] 05/16/2018    Age-related cataract 08/01/2017    Allergic rhinitis 06/28/2017    Diverticulosis 06/28/2017    Urinary retention 06/28/2017    PVD (peripheral vascular disease) ([address] 06/28/2017    On statin therapy 06/28/2017    Low back pain 06/28/2017    Insomnia 06/28/2017    HTN (hypertension) 06/28/2017    Hyperlipidemia 06/28/2017    Glucose intolerance (impaired glucose tolerance) 06/28/2017    GERD (gastroesophageal reflux disease) 06/28/2017    ED (erectile dysfunction) 06/28/2017    DJD (degenerative joint disease) 06/28/2017    ASVD (arteriosclerotic vascular disease) 06/28/2017              Past Medical History:   Diagnosis Date    Allergic rhinitis 6/28/2017    Arrhythmia       Paroxysmal Afib- Dr. Eli Moncada    ASVD (arteriosclerotic vascular disease) 06/28/2017     Story: carotid stenosis followed by Vasc Surg    Atrial fibrillation [address]      Congestive heart failure ([facility]      Diverticulosis 6/28/2017     Comments: on colonoscopy 12/01    DJD (degenerative joint disease) 6/28/2017    ED (erectile dysfunction) 6/28/2017    GERD (gastroesophageal reflux disease) 6/28/2017    Glucose intolerance (impaired glucose tolerance) 06/28/2017    HTN (hypertension) 6/28/2017    Hyperlipidemia 6/28/2017    Insomnia 6/28/2017    Low back pain 6/28/2017    On statin therapy 6/28/2017    Pacemaker 2020    PVD (peripheral vascular disease) ([facility] 6/28/2017    Rheumatoid arthritis ([facility]      Urinary retention 6/28/2017         Core Measures:  CVA: Yes Yes  CHF:No Not applicable  PNA:No Not applicable     Activity:  Activity Level: Up with Assistance  Number times ambulated in hallways past shift: 0  Number of times OOB to chair past shift: 1     Cardiac:  Cardiac Monitoring: No            Access:   Current line(s): PIV         Genitourinary:   Urinary status: voiding inc and inc briefs  Urinary Catheter? No     Respiratory:   O2 Device: None (Room air)  Chronic home O2 use?: NO  Incentive spirometer at bedside: N/A     GI:  Last Bowel Movement Date: 07/27/22  Current diet:  DIET NPO  Passing flatus: YES  Tolerating current diet: YES     Pain Management:  Patient states pain is manageable on current regimen: YES     Skin:  Ghulam Score: 16  Interventions: increase time out of bed, limit briefs, and internal/external urinary devices    Patient Safety:  Fall Score:  Total Score: 5  Interventions: bed/chair alarm and gripper socks  High Fall Risk: Yes  @Rollbelt  @dexterity to release roll belt  Yes/No ( must document dexterity  here by stating Yes or No here, otherwise this is a restraint and must follow restraint documentation policy.)     DVT prophylaxis:  DVT prophylaxis Med- Yes  DVT prophylaxis SCD or ISAI- No     Wounds: (If Applicable)  Wounds- No  Location none     Active Consults:  IP CONSULT TO HOSPITALIST  IP CONSULT TO NEUROLOGY  IP CONSULT TO GASTROENTEROLOGY     Length of Stay:  Expected LOS: 4d 9h  Actual LOS: 8  Discharge Plan: Yes CM following.   Referral for IPR to [facility] mi Aguilera RN

## 2022-08-05 NOTE — PROGRESS NOTES
Received notification from bedside RN about patient with regards to: pulling on his tube, difficult to redirect  VS: /79, HR 70, RR 17, O2 sat 100% on RA    Intervention given: bilateral mitts ordered

## 2022-08-06 NOTE — PROGRESS NOTES
Hospitalist Progress Note    NAME: Susan Moyer   :  1945   MRN:  [id number]     Estimated discharge date: 2022    Barriers: awaiting SNF placement    Assessment / Plan:    Acute left basal ganglia ischemic infarct POA   ? Vascular dementia  Essential HTN POA  PAF POA  - Recent admission in 2022, for left facial droop, suspected TIA   MRI done which was negative for CVA. EEG was done which was negative. - Presented to ED for 1 week of sudden cognitive decline, confusion. Dragging of right leg.  - CTA head and neck IMPRESSION  No evidence of acute intracranial vessel occlusion. Multifocal moderate to severe stenoses with occlusion of the distal left              vertebral artery at the V3-V4 junction. There is no intracranial mass, hemorrhage or evidence of acute infarction. No acute intracranial process is identified. s-CT head negative for bleed  - MRI brain showed   1. Acute left basal ganglia ischemic infarct. 2. Atrophy and white matter disease, remote infarcts stable otherwise. - MRI Lumbar spine showed   1. Postsurgical changes L4-5 L5-S1 stable. 2. Spinal canal stenosis L3-L4.  - Telemetry  - Carotid duplex  Moderate stenosis in the right ICA (50-69%). Mild stenosis in the left ICA (<50%). Right vertebral is antegrade. Left vertebral is antegrade. - Aspirin 162 mg daily, Eliquis 5 mg twice daily.    - C/w statin (not currently high intensity)  - Neuro followed  - PT/ST/OT.   - SNF.    - Dysphagia s/p PEG  - Vit B12 ~ 1152, folate 9.3, homocysteine 21.9, TSH 0.47  - ALAN negative  - Antineuronal cell AB pending    - Blood cultures NGTD  - Echocardiogram  LVEF 45-50%, normal wall motion   No AS or MS  -DC IVF  -Continue tube feeds     PEG  GI adjusted bumper  TF resumed     Hypophosphatemia  Monitor and replete PRN     Agitation  Melatonin QHS  Neuro added buspar and lexapro     Cough  Aspiration? CXR no acute changes.   Remains on RA     COVID + ve  Patient tested +ve for covid on 7/8 as well  Reason for CVA? Re-screen still +ve  Not exhibiting any respiratory symptoms    History of CAD  S/p pacemaker  History of grade 3 diastolic dysfunction  C/w coreg     Recurrent fall  Chronic back pain  -cont flexeril  -MRI lumbar spine 1. Postsurgical changes L4-5 L5-S1 stable. 2. Spinal canal stenosis L3-L4. BPH  C/w flomax     Hypernatremia (resolved)  Hypophosphatemia  DC IVF  Monitor and replete phos     Code Status: full code  Surrogate Decision Maker:wife     DVT Prophylaxis: eliquis  GI Prophylaxis: not indicated     18.5 - 24.9 Normal weight / Body mass index is 23.83 kg/m². Recommended Disposition: SNF/LTC    Subjective:     Chief Complaint / Reason for Physician Visit  Patient seen and examined at the bedside. Still not sleeping well, overnight agitation. Discussed with RN events overnight. Review of Systems:  Symptom Y/N Comment  Symptom Y/N Comments   Fever/Chills n   Chest Pain n    Poor Appetite    Edema     Cough n   Abdominal Pain n    Sputum    Joint Pain     SOB/HANNA n   Pruritis/Rash     Nausea/vomit n   Tolerating PT/OT     Diarrhea n   Tolerating Diet y    Constipation    Other       Could NOT obtain due to:      Objective:     VITALS:   Last 24hrs VS reviewed since prior progress note. Most recent are:  Patient Vitals for the past 24 hrs:   Temp Pulse Resp BP SpO2   08/05/22 1943 97.8 °F (36.6 °C) 72 17 (!) 170/74 100 %   08/05/22 1517 97.6 °F (36.4 °C) 70 16 (!) 141/65 98 %   08/05/22 1100 98.2 °F (36.8 °C) 70 18 (!) 165/85 --   08/05/22 0850 98.2 °F (36.8 °C) 70 18 (!) 154/71 99 %   08/04/22 2345 97.3 °F (36.3 °C) 70 17 (!) 156/79 100 %         Intake/Output Summary (Last 24 hours) at 8/5/2022 2122  Last data filed at 8/5/2022 1831  Gross per 24 hour   Intake 1370 ml   Output --   Net 1370 ml          I had a face to face encounter and independently examined this patient on 8/5/2022, as outlined below:  PHYSICAL EXAM:  General: WD, WN.  Awake, , no acute distress    EENT:  PERRLA. Anicteric sclerae. MMM  Resp:  CTA bilaterally, no wheezing or rales. No accessory muscle use  CV:  Regular  rhythm,  No edema  GI:  Soft, Non distended, Non tender. +Bowel sounds  Neurologic:  Alert, aphasia. Does follow simple commands  Psych:   Poor  insight. Not anxious nor agitated  Skin:  No rashes. No jaundice    Reviewed most current lab test results and cultures  YES  Reviewed most current radiology test results   YES  Review and summation of old records today    NO  Reviewed patient's current orders and MAR    YES  PMH/SH reviewed - no change compared to H&P  ________________________________________________________________________  Care Plan discussed with:    Comments   Patient y    Family      RN y    Care Manager     Consultant                        Multidiciplinary team rounds were held today with , nursing, pharmacist and clinical coordinator. Patient's plan of care was discussed; medications were reviewed and discharge planning was addressed. ________________________________________________________________________      Comments   >50% of visit spent in counseling and coordination of care     ________________________________________________________________________  Laura Weiner MD     Procedures: see electronic medical records for all procedures/Xrays and details which were not copied into this note but were reviewed prior to creation of Plan. LABS:  I reviewed today's most current labs and imaging studies.   Pertinent labs include:  Recent Labs     08/03/22  0337   WBC 9.9   HGB 13.9   HCT 39.7          Recent Labs     08/04/22 0107 08/03/22  0337    142   K 3.6 3.8   * 108   CO2 29 29   * 164*   BUN 9 7   CREA 0.75 1.01   CA 8.4* 8.9   MG 2.0 2.0   PHOS 2.9 2.1*         Signed: Laura Weiner MD

## 2022-08-06 NOTE — PROGRESS NOTES
Problem: Patient Education: Go to Patient Education Activity  Goal: Patient/Family Education  Outcome: Progressing Towards Goal     Problem: Patient Education: Go to Patient Education Activity  Goal: Patient/Family Education  Outcome: Progressing Towards Goal     Problem: Patient Education: Go to Patient Education Activity  Goal: Patient/Family Education  Outcome: Progressing Towards Goal     Problem: Airway Clearance - Ineffective  Goal: Achieve or maintain patent airway  Outcome: Progressing Towards Goal     Problem: Gas Exchange - Impaired  Goal: Absence of hypoxia  Outcome: Progressing Towards Goal  Goal: Promote optimal lung function  Outcome: Progressing Towards Goal     Problem: Breathing Pattern - Ineffective  Goal: Ability to achieve and maintain a regular respiratory rate  Outcome: Progressing Towards Goal     Problem:  Body Temperature -  Risk of, Imbalanced  Goal: Ability to maintain a body temperature within defined limits  Outcome: Progressing Towards Goal  Goal: Will regain or maintain usual level of consciousness  Outcome: Progressing Towards Goal  Goal: Complications related to the disease process, condition or treatment will be avoided or minimized  Outcome: Progressing Towards Goal     Problem: Isolation Precautions - Risk of Spread of Infection  Goal: Prevent transmission of infectious organism to others  Outcome: Progressing Towards Goal     Problem: Nutrition Deficits  Goal: Optimize nutrtional status  Outcome: Progressing Towards Goal     Problem: Risk for Fluid Volume Deficit  Goal: Maintain normal heart rhythm  Outcome: Progressing Towards Goal  Goal: Maintain absence of muscle cramping  Outcome: Progressing Towards Goal  Goal: Maintain normal serum potassium, sodium, calcium, phosphorus, and pH  Outcome: Progressing Towards Goal     Problem: Loneliness or Risk for Loneliness  Goal: Demonstrate positive use of time alone when socialization is not possible  Outcome: Progressing Towards Goal     Problem: Fatigue  Goal: Verbalize increase energy and improved vitality  Outcome: Progressing Towards Goal     Problem: Patient Education: Go to Patient Education Activity  Goal: Patient/Family Education  Outcome: Progressing Towards Goal     Problem: Delirium Treatment  Goal: *Level of consciousness restored to baseline  Outcome: Progressing Towards Goal  Goal: *Level of environmental perceptions restored to baseline  Outcome: Progressing Towards Goal  Goal: *Sensory perception restored to baseline  Outcome: Progressing Towards Goal  Goal: *Emotional stability restored to baseline  Outcome: Progressing Towards Goal  Goal: *Functional assessment restored to baseline  Outcome: Progressing Towards Goal  Goal: *Absence of falls  Outcome: Progressing Towards Goal  Goal: *Will remain free of delirium, CAM Score negative  Outcome: Progressing Towards Goal  Goal: *Cognitive status will be restored to baseline  Outcome: Progressing Towards Goal  Goal: Interventions  Outcome: Progressing Towards Goal     Problem: Patient Education: Go to Patient Education Activity  Goal: Patient/Family Education  Outcome: Progressing Towards Goal     Problem: Falls - Risk of  Goal: *Absence of Falls  Description: Document Kevonroxanna Kilpatrick Fall Risk and appropriate interventions in the flowsheet.   Outcome: Progressing Towards Goal  Note: Fall Risk Interventions:  Mobility Interventions: Bed/chair exit alarm    Mentation Interventions: Bed/chair exit alarm, Adequate sleep, hydration, pain control    Medication Interventions: Bed/chair exit alarm, Patient to call before getting OOB    Elimination Interventions: Bed/chair exit alarm    History of Falls Interventions: Bed/chair exit alarm, Investigate reason for fall         Problem: Patient Education: Go to Patient Education Activity  Goal: Patient/Family Education  Outcome: Progressing Towards Goal     Problem: Non-Violent Restraints  Goal: Removal from restraints as soon as assessed to be safe  Outcome: Progressing Towards Goal  Goal: No harm/injury to patient while restraints in use  Outcome: Progressing Towards Goal  Goal: Patient's dignity will be maintained  Outcome: Progressing Towards Goal  Goal: Patient Interventions  Outcome: Progressing Towards Goal

## 2022-08-06 NOTE — PROGRESS NOTES
Hospitalist Progress Note    NAME: Clara Jameson   :  1945   MRN:  [id number]       Assessment / Plan:    Acute left basal ganglia ischemic infarct POA   ? Vascular dementia  Essential HTN POA  PAF POA  - Recent admission in 2022, for left facial droop, suspected TIA   MRI done which was negative for CVA. EEG was done which was negative. - Presented to ED for 1 week of sudden cognitive decline, confusion. Dragging of right leg.  - CTA head and neck IMPRESSION  No evidence of acute intracranial vessel occlusion. Multifocal moderate to severe stenoses with occlusion of the distal left              vertebral artery at the V3-V4 junction. There is no intracranial mass, hemorrhage or evidence of acute infarction. No acute intracranial process is identified. s-CT head negative for bleed  - MRI brain showed   1. Acute left basal ganglia ischemic infarct. 2. Atrophy and white matter disease, remote infarcts stable otherwise. - MRI Lumbar spine showed   1. Postsurgical changes L4-5 L5-S1 stable. 2. Spinal canal stenosis L3-L4.  - Telemetry  - Carotid duplex  Moderate stenosis in the right ICA (50-69%). Mild stenosis in the left ICA (<50%). Right vertebral is antegrade. Left vertebral is antegrade. - Aspirin 162 mg daily, Eliquis 5 mg twice daily.    - C/w statin (not currently high intensity)  - Neuro followed  - PT/ST/OT.   - SNF.    - Dysphagia s/p PEG  - Vit B12 ~ 1152, folate 9.3, homocysteine 21.9, TSH 0.47  - ALAN negative  - Antineuronal cell AB pending    - Blood cultures NGTD  - Echocardiogram  LVEF 45-50%, normal wall motion   No AS or MS  -DC IVF  -Continue tube feeds     PEG  GI adjusted bumper  TF resumed     Hypophosphatemia  Monitor and replete PRN     Agitation  Melatonin QHS  Neuro added buspar and lexapro     Cough  Aspiration? CXR no acute changes. Remains on RA     COVID + ve  Patient tested +ve for covid on  as well  Reason for CVA?   Re-screen still +ve  Not exhibiting any respiratory symptoms    History of CAD  S/p pacemaker  History of grade 3 diastolic dysfunction  C/w coreg     Recurrent fall  Chronic back pain  -cont flexeril  -MRI lumbar spine 1. Postsurgical changes L4-5 L5-S1 stable. 2. Spinal canal stenosis L3-L4. BPH  C/w flomax     Hypernatremia (resolved)  Hypophosphatemia  DC IVF  Monitor and replete phos     Code Status: full code  Surrogate Decision Maker:wife     DVT Prophylaxis: eliquis  GI Prophylaxis: not indicated     18.5 - 24.9 Normal weight / Body mass index is 23.83 kg/m². Recommended Disposition: SNF/LTC    Subjective:     Chief Complaint / Reason for Physician Visit  Patient seen and examined at the bedside. Still not sleeping well, overnight agitation. Discussed with RN events overnight. Review of Systems:  Symptom Y/N Comment  Symptom Y/N Comments   Fever/Chills n   Chest Pain n    Poor Appetite    Edema     Cough n   Abdominal Pain n    Sputum    Joint Pain     SOB/HANNA n   Pruritis/Rash     Nausea/vomit n   Tolerating PT/OT     Diarrhea n   Tolerating Diet y    Constipation    Other       Could NOT obtain due to:      Objective:     VITALS:   Last 24hrs VS reviewed since prior progress note. Most recent are:  Patient Vitals for the past 24 hrs:   Temp Pulse Resp BP SpO2   08/05/22 1517 97.6 °F (36.4 °C) 70 16 (!) 141/65 98 %   08/05/22 1100 98.2 °F (36.8 °C) 70 18 (!) 165/85 --   08/05/22 0850 98.2 °F (36.8 °C) 70 18 (!) 154/71 99 %   08/04/22 2345 97.3 °F (36.3 °C) 70 17 (!) 156/79 100 %         Intake/Output Summary (Last 24 hours) at 8/5/2022 2119  Last data filed at 8/5/2022 1831  Gross per 24 hour   Intake 1370 ml   Output --   Net 1370 ml          I had a face to face encounter and independently examined this patient on 8/5/2022, as outlined below:  PHYSICAL EXAM:  General: WD, WN. Awake, , no acute distress    EENT:  PERRLA. Anicteric sclerae. MMM  Resp:  CTA bilaterally, no wheezing or rales.   No accessory muscle use  CV:  Regular  rhythm,  No edema  GI:  Soft, Non distended, Non tender. +Bowel sounds  Neurologic:  Alert, aphasia. Does follow simple commands  Psych:   Poor  insight. Not anxious nor agitated  Skin:  No rashes. No jaundice    Reviewed most current lab test results and cultures  YES  Reviewed most current radiology test results   YES  Review and summation of old records today    NO  Reviewed patient's current orders and MAR    YES  PMH/SH reviewed - no change compared to H&P  ________________________________________________________________________  Care Plan discussed with:    Comments   Patient y    Family      RN y    Care Manager     Consultant                        Multidiciplinary team rounds were held today with , nursing, pharmacist and clinical coordinator. Patient's plan of care was discussed; medications were reviewed and discharge planning was addressed. ________________________________________________________________________      Comments   >50% of visit spent in counseling and coordination of care     ________________________________________________________________________  Claudia Dotson MD     Procedures: see electronic medical records for all procedures/Xrays and details which were not copied into this note but were reviewed prior to creation of Plan. LABS:  I reviewed today's most current labs and imaging studies.   Pertinent labs include:  Recent Labs     08/03/22  0337   WBC 9.9   HGB 13.9   HCT 39.7          Recent Labs     08/04/22  0107 08/03/22  0337    142   K 3.6 3.8   * 108   CO2 29 29   * 164*   BUN 9 7   CREA 0.75 1.01   CA 8.4* 8.9   MG 2.0 2.0   PHOS 2.9 2.1*         Signed: Claudia Dotson MD

## 2022-08-06 NOTE — PROGRESS NOTES
Pt given full bed bath with gown, linen, under pads and incontinence brief changed. No further breakdown in skin noted on assessment. Roll belt in place with all bed alarms audible.

## 2022-08-06 NOTE — PROGRESS NOTES
Report given to [facility]. Pt's medication, care plan and concerns addressed at bedside. All questions answered at this time.

## 2022-08-06 NOTE — PROGRESS NOTES
Hospitalist Progress Note    NAME: Pito Gee   :  1945   MRN:  [id number]     Estimated discharge date: 2022    Barriers: awaiting SNF placement, medically ready for discharge    Assessment / Plan:    Acute left basal ganglia ischemic infarct POA   ? Vascular dementia  Essential HTN POA  PAF POA  - Recent admission in 2022, for left facial droop, suspected TIA   MRI done which was negative for CVA. EEG was done which was negative. - Presented to ED for 1 week of sudden cognitive decline, confusion. Dragging of right leg.  - CTA head and neck IMPRESSION  No evidence of acute intracranial vessel occlusion. Multifocal moderate to severe stenoses with occlusion of the distal left              vertebral artery at the V3-V4 junction. There is no intracranial mass, hemorrhage or evidence of acute infarction. No acute intracranial process is identified. s-CT head negative for bleed  - MRI brain showed   1. Acute left basal ganglia ischemic infarct. 2. Atrophy and white matter disease, remote infarcts stable otherwise. - MRI Lumbar spine showed   1. Postsurgical changes L4-5 L5-S1 stable. 2. Spinal canal stenosis L3-L4.  - Telemetry  - Carotid duplex  Moderate stenosis in the right ICA (50-69%). Mild stenosis in the left ICA (<50%). Right vertebral is antegrade. Left vertebral is antegrade.   - Aspirin 162 mg daily, Eliquis 5 mg twice daily.    - C/w statin (not currently high intensity)  - Neuro followed  - PT/ST/OT.   - SNF.    - Dysphagia s/p PEG  - Vit B12 ~ 1152, folate 9.3, homocysteine 21.9, TSH 0.47  - ALAN negative  - Antineuronal cell AB pending    - Blood cultures NGTD  - Echocardiogram  LVEF 45-50%, normal wall motion   No AS or MS  - DC IVF  -Continue tube feeds  - still not sleeping well, agitated at night despite increased melatonin  - D/W wife re risks and benefits of seroquel, including small cardiac event risk   Start seroquel  - Awaiting SNF placement     PEG  GI adjusted bumper  TF resumed. Tolerating per wife     Hypophosphatemia  Monitor and replete PRN     Agitation  Melatonin QHS  Neuro added buspar and lexapro     Cough  Aspiration? CXR no acute changes. Remains on RA     COVID + ve  Patient tested +ve for covid on 7/8 as well  Reason for CVA? Re-screen still +ve  Not exhibiting any respiratory symptoms, remains on room air    CAD  S/p pacemaker  History of grade 3 diastolic dysfunction  C/w coreg     Recurrent fall  Chronic back pain  -cont flexeril  -MRI lumbar spine   1. Postsurgical changes L4-5 L5-S1 stable. 2. Spinal canal stenosis L3-L4. BPH  C/w flomax     Hypernatremia (resolved)  Hypophosphatemia  DC IVF  Monitor and replete phos     Code Status: full code  Surrogate Decision Maker:wife     DVT Prophylaxis: eliquis  GI Prophylaxis: not indicated     18.5 - 24.9 Normal weight / Body mass index is 23.83 kg/m². Recommended Disposition: SNF/LTC    Subjective:     Chief Complaint / Reason for Physician Visit  Patient seen and examined at the bedside, spoke with wife  Still not sleeping well, overnight agitation  Despite increased melatonin  Awaiting SNF placement. Discussed with RN events overnight. Review of Systems:  Symptom Y/N Comment  Symptom Y/N Comments   Fever/Chills n   Chest Pain n    Poor Appetite    Edema     Cough n   Abdominal Pain n    Sputum    Joint Pain     SOB/HANNA n   Pruritis/Rash     Nausea/vomit n   Tolerating PT/OT     Diarrhea n   Tolerating Diet y    Constipation    Other       Could NOT obtain due to:      Objective:     VITALS:   Last 24hrs VS reviewed since prior progress note.  Most recent are:  Patient Vitals for the past 24 hrs:   Temp Pulse Resp BP SpO2   08/06/22 1055 98.9 °F (37.2 °C) 71 18 135/75 96 %   08/06/22 0700 98.9 °F (37.2 °C) 74 18 (!) 143/72 94 %   08/06/22 0332 97.7 °F (36.5 °C) 77 17 (!) 140/72 98 %   08/05/22 2320 97.9 °F (36.6 °C) 71 16 (!) 149/55 98 %   08/05/22 1943 97.8 °F (36.6 °C) 72 17 (!) 170/74 100 %   08/05/22 1517 97.6 °F (36.4 °C) 70 16 (!) 141/65 98 %         Intake/Output Summary (Last 24 hours) at 8/6/2022 1119  Last data filed at 8/5/2022 1943  Gross per 24 hour   Intake 1150 ml   Output --   Net 1150 ml          I had a face to face encounter and independently examined this patient on 8/6/2022, as outlined below:  PHYSICAL EXAM:  General: WD, WN. Awake, , no acute distress    EENT:  PERRLA. Anicteric sclerae. MMM  Resp:  CTA bilaterally, no wheezing or rales. No accessory muscle use  CV:  Regular  rhythm,  No edema  GI:  Soft, Non distended, Non tender. +Bowel sounds  Neurologic:  Alert, aphasia. Does follow simple commands  Psych:   Poor  insight. Not anxious nor agitated  Skin:  No rashes. No jaundice    Reviewed most current lab test results and cultures  YES  Reviewed most current radiology test results   YES  Review and summation of old records today    NO  Reviewed patient's current orders and MAR    YES  PMH/SH reviewed - no change compared to H&P  ________________________________________________________________________  Care Plan discussed with:    Comments   Patient y    Family  y Wife at bedside   RN y    Care Manager     Consultant                        Multidiciplinary team rounds were held today with , nursing, pharmacist and clinical coordinator. Patient's plan of care was discussed; medications were reviewed and discharge planning was addressed. ________________________________________________________________________      Comments   >50% of visit spent in counseling and coordination of care     ________________________________________________________________________  Eze Dwyer MD     Procedures: see electronic medical records for all procedures/Xrays and details which were not copied into this note but were reviewed prior to creation of Plan. LABS:  I reviewed today's most current labs and imaging studies.   Pertinent labs include:  Recent Labs     08/06/22 0247   WBC 9.0   HGB 12.4   HCT 35.6*          Recent Labs     08/06/22 0247 08/04/22  0107    142   K 4.1 3.6    110*   CO2 28 29   * 191*   BUN 9 9   CREA 0.73 0.75   CA 8.8 8.4*   MG  --  2.0   PHOS 2.7 2.9   ALB 2.6*  --    TBILI 0.8  --    ALT 28  --          Signed: Javier Duke MD

## 2022-08-07 NOTE — PROGRESS NOTES
Request made by provider to remove roll belt if nursing staff comfortable with pts condition. Pts wife updated and educated on use of roll belt and need to call staff if pt attempts to exit bed.

## 2022-08-08 NOTE — PROGRESS NOTES
Report given to [facility] ([facility]). Pt's medications, condition and updates all reviewed at bedside. All questions answered at this time. Mitts were not placed on the pt over night. Roll belt remains in place, pt continues to have intermittent restlessness. Pt had a temp of 101.1 which was alleviated by tylenol.  Will continue to monitor

## 2022-08-08 NOTE — PROGRESS NOTES
Comprehensive Nutrition Assessment    Type and Reason for Visit: Reassess    Nutrition Recommendations/Plan:   Continue Jevity 1.5 hira @ goal rate 55 ml/hr x 24 hr via PEG + 150 ml free water q4h which provides daily approx. 1980 kcals/84 g protein/284 g CHO/28g fiber/1903 ml free water. Bolus Recs (transition at least 24 hours prior to discharge to ensure tolerance):  360 ml Jevity 1.5 hira bolus feedings 4X/day via PEG (total 6-8oz containers)  Flush with 100 ml free water before and after each bolus. Suggested bolus schedule:          0800: Flush 100 ml free water, Admin 360 ml Jevity 1.5 hira, Flush 100 ml free water         1200: Flush 100 ml free water, Admin 360 ml Jevity 1.5 hira, Flush 100 ml free water         1600: Flush 100 ml free water, Admin 360 ml Jevity 1.5 hira, Flush 100 ml free water         2000: Flush 100 ml free water, Admin 360 ml Jevity 1.5 hira, Flush 100 ml free water  Will provide daily approx. 2160 kcals/92 g protein/310 g CHO/30 g fiber/1894 ml free water. Nutrition Assessment:    8/8: Chart reviewed; med noted for left basal ganglia infarct resulting in dysphagia and PEG placement. RD visited with pt and wife at bedside. Pt continues to tolerate Jevity 1.5 hira TFs at goal rate of 55 ml/hr x 24 hr continuous infusion. Plans are to discharge to SNF and then hopefully home per wife. May be beneficial to start transitioning pt's tube feedings to bolus in prep for discharge. RD explained continuous feedings vs bolus feedings to pt/wife and provided an opportunity to ask questions. Wife and pt agreeable to bolus feedings for the long-term. RD did not identify any contraindications to bolus.      Last Weight Metric  Weight Loss Metrics 8/7/2022 7/11/2022 3/22/2022 7/30/2021 7/27/2021 6/8/2021 5/17/2021   Today's Wt 168 lb 8 oz 171 lb 15.3 oz 172 lb 174 lb 174 lb 174 lb 9.6 oz 185 lb   BMI 24.18 kg/m2 24.67 kg/m2 24.68 kg/m2 24.97 kg/m2 24.97 kg/m2 25.05 kg/m2 26.54 kg/m2      Nutrition Related Findings:    BM: 7/27; Labs: reviewed; Meds: Lipitor, Lexapro Wound Type: None    Current Nutrition Intake & Therapies:        DIET NPO  ADULT TUBE FEEDING PEG; Standard with Fiber; Delivery Method: Continuous; Continuous Initial Rate (mL/hr): 25; Continuous Advance Tube Feeding: Yes; Advancement Volume (mL/hr): 10; Advancement Frequency: Q 12 hours; Continuous Goal Rate (mL/hr): 5... Anthropometric Measures:  Height: 5' 10\" (177.8 cm)  Ideal Body Weight (IBW): 166 lbs (75 kg)     Current Body Wt:  75.3 kg (166 lb 0.1 oz), 100 % IBW. Current BMI (kg/m2): 23.8                          BMI Category: Normal weight (BMI 22.0-24.9) age over 72    Estimated Daily Nutrient Needs:  Energy Requirements Based On: Formula  Weight Used for Energy Requirements: Current  Energy (kcal/day): 1906 kcal (BMR 1466 x 1. 3AF)  Weight Used for Protein Requirements: Current  Protein (g/day): 75g (1.0 g/kg bw)  Method Used for Fluid Requirements: 1 ml/kcal  Fluid (ml/day): 1900 mL    Nutrition Diagnosis:   Swallowing difficulty related to cognitive or neurological impairment as evidenced by NPO or clear liquid status due to medical condition, nutrition support-enteral nutrition    Nutrition Interventions:   Food and/or Nutrient Delivery: Continue tube feeding (Will place recs for bolus feedings in preparation for discharge)  Nutrition Education/Counseling: No recommendations at this time  Coordination of Nutrition Care: Continue to monitor while inpatient       Goals:  Previous Goal Met: Goal(s) achieved  Goals:  Tolerate nutrition support at goal rate, by next RD assessment       Nutrition Monitoring and Evaluation:   Behavioral-Environmental Outcomes: None identified  Food/Nutrient Intake Outcomes: Enteral nutrition intake/tolerance  Physical Signs/Symptoms Outcomes: Biochemical data, Weight    Discharge Planning:    Enteral nutrition    Kayleen Buckley RD  Contact:

## 2022-08-08 NOTE — PROGRESS NOTES
Report given to Barbara. Pt's medications, condition and updates all reviewed at bedside. All questions answered at this time. Mitts and roll belt were not placed on the pt over night.

## 2022-08-08 NOTE — PROGRESS NOTES
Hospitalist Progress Note    NAME: Alex Perry   :  1945   MRN:  [id number]     Estimated discharge date: 2022    Barriers: awaiting SNF placement, medically ready for discharge    Assessment / Plan:    Fever 101.1 overnight   No leukocytosis or tachycardia  No clear source  COVID-19 + since admit  Watch for recurrence, check blood cultures with next temp  Check pCXR and UA  Procalcitonin and CBC in AM  Hold the antibiotics    Acute left basal ganglia ischemic infarct POA   ? Vascular dementia  Essential HTN POA  PAF POA  - Recent admission in 2022, for left facial droop, suspected TIA   MRI done which was negative for CVA. EEG was done which was negative. - Presented to ED for 1 week of sudden cognitive decline, confusion. Dragging of right leg.  - CTA head and neck IMPRESSION  No evidence of acute intracranial vessel occlusion. Multifocal moderate to severe stenoses with occlusion of the distal left              vertebral artery at the V3-V4 junction. There is no intracranial mass, hemorrhage or evidence of acute infarction. No acute intracranial process is identified. s-CT head negative for bleed  - MRI brain showed   1. Acute left basal ganglia ischemic infarct. 2. Atrophy and white matter disease, remote infarcts stable otherwise. - MRI Lumbar spine showed   1. Postsurgical changes L4-5 L5-S1 stable. 2. Spinal canal stenosis L3-L4.  - Telemetry  - Carotid duplex  Moderate stenosis in the right ICA (50-69%). Mild stenosis in the left ICA (<50%). Right vertebral is antegrade. Left vertebral is antegrade.   - Aspirin 162 mg daily, Eliquis 5 mg twice daily.    - C/w statin (not currently high intensity)  - Neuro followed  - PT/ST/OT.   - SNF.    - Dysphagia s/p PEG  - Vit B12 ~ 1152, folate 9.3, homocysteine 21.9, TSH 0.47  - ALAN negative  - Antineuronal cell AB pending    - Blood cultures NGTD  - Echocardiogram  LVEF 45-50%, normal wall motion   No AS or MS  - DC IVF  -Continue tube feeds  - still not sleeping well, agitated at night despite increased melatonin  - D/W wife re risks and benefits of seroquel, including small cardiac event risk   Start seroquel  - Awaiting SNF placement     PEG  GI adjusted bumper  TF resumed. Tolerating per wife     Hypophosphatemia  Monitor and replete PRN     Agitation  Melatonin QHS  Neuro added buspar and lexapro     Cough  Aspiration? CXR no acute changes. Remains on RA     COVID + ve  Patient tested +ve for covid on 7/8 as well  Reason for CVA? Re-screen still +ve  Not exhibiting any respiratory symptoms, remains on room air    CAD  S/p pacemaker  History of grade 3 diastolic dysfunction  C/w coreg     Recurrent fall  Chronic back pain  -cont flexeril  -MRI lumbar spine   1. Postsurgical changes L4-5 L5-S1 stable. 2. Spinal canal stenosis L3-L4. BPH  C/w flomax     Hypernatremia (resolved)  Hypophosphatemia  DC IVF  Monitor and replete phos     Code Status: full code  Surrogate Decision Maker:wife     DVT Prophylaxis: eliquis  GI Prophylaxis: not indicated     18.5 - 24.9 Normal weight / Body mass index is 23.83 kg/m². Recommended Disposition: SNF/LTC    Subjective:     Chief Complaint / Reason for Physician Visit  Patient seen and examined at the bedside, spoke with Son  \"Okay\" denies complaints  Still not sleeping well, overnight agitation  Awaiting SNF placement. Fever overnight POA  Discussed with RN events overnight. Review of Systems:  Symptom Y/N Comment  Symptom Y/N Comments   Fever/Chills n   Chest Pain n    Poor Appetite    Edema     Cough n   Abdominal Pain n    Sputum    Joint Pain     SOB/HANNA n   Pruritis/Rash     Nausea/vomit n   Tolerating PT/OT     Diarrhea n   Tolerating Diet y    Constipation    Other       Could NOT obtain due to:      Objective:     VITALS:   Last 24hrs VS reviewed since prior progress note.  Most recent are:  Patient Vitals for the past 24 hrs:   Temp Pulse Resp BP SpO2   08/07/22 2017 (P) 98.7 °F (37.1 °C) (P) 72 (P) 17 (P) 138/75 (P) 99 %   08/07/22 1612 98.4 °F (36.9 °C) 64 18 (!) 146/70 98 %   08/07/22 1156 98.5 °F (36.9 °C) 65 18 (!) 156/74 99 %   08/07/22 0700 98.2 °F (36.8 °C) 68 16 (!) 155/77 100 %   08/07/22 0322 97.4 °F (36.3 °C) 71 16 130/62 99 %   08/07/22 0147 97.6 °F (36.4 °C) -- -- -- --   08/06/22 2345 (!) 101.1 °F (38.4 °C) 73 17 (!) 146/88 100 %       No intake or output data in the 24 hours ending 08/07/22 2031       I had a face to face encounter and independently examined this patient on 8/7/2022, as outlined below:  PHYSICAL EXAM:  General: WD, WN. Awake, , no acute distress    EENT:  PERRLA. Anicteric sclerae. MMM  Resp:  CTA bilaterally, no wheezing or rales. No accessory muscle use  CV:  Regular  rhythm,  No edema  GI:  Soft, Non distended, Non tender. +Bowel sounds  Neurologic:  Alert, aphasia. Does follow simple commands  Psych:   Poor  insight. Not anxious nor agitated  Skin:  No rashes. No jaundice    Reviewed most current lab test results and cultures  YES  Reviewed most current radiology test results   YES  Review and summation of old records today    NO  Reviewed patient's current orders and MAR    YES  PMH/SH reviewed - no change compared to H&P  ________________________________________________________________________  Care Plan discussed with:    Comments   Patient y    Family  y Wife at bedside   RN y    Care Manager     Consultant                        Multidiciplinary team rounds were held today with , nursing, pharmacist and clinical coordinator. Patient's plan of care was discussed; medications were reviewed and discharge planning was addressed.      ________________________________________________________________________      Comments   >50% of visit spent in counseling and coordination of care     ________________________________________________________________________  Arty Baptise, MD     Procedures: see electronic medical records for all procedures/Xrays and details which were not copied into this note but were reviewed prior to creation of Plan. LABS:  I reviewed today's most current labs and imaging studies.   Pertinent labs include:  Recent Labs     08/06/22 0247   WBC 9.0   HGB 12.4   HCT 35.6*          Recent Labs     08/06/22 0247      K 4.1      CO2 28   *   BUN 9   CREA 0.73   CA 8.8   PHOS 2.7   ALB 2.6*   TBILI 0.8   ALT 28         Signed: Isha Hernandez MD

## 2022-08-08 NOTE — PROGRESS NOTES
Problem: Self Care Deficits Care Plan (Adult)  Goal: *Acute Goals and Plan of Care (Insert Text)  Description: FUNCTIONAL STATUS PRIOR TO ADMISSION: Patient was modified independent using a rolling walker for functional mobility. Hx falls and AMS. Wife provides transportation 2/2 decreased cognition at baseline. HOME SUPPORT: The patient lived with spouse who assisted with IADLs and driving. Family lives close by for support. Occupational Therapy Goals  Weekly Re-assessment all goals remain appropriate  Weekly reassess, goals remain same   Initiated 7/26/2022   1. Patient will perform upper body dressing with moderate assistance  within 7 day(s). 2.  Patient will perform lower body dressing with moderate assistance  within 7 day(s). 3.  Patient will perform bathing with moderate assistance  within 7 day(s). 4.  Patient will perform toilet transfers with moderate assistance  within 7 day(s). 5.  Patient will perform all aspects of toileting with moderate assistance  within 7 day(s). 6.  Patient will participate in upper extremity therapeutic exercise/activities with moderate assistance  within 7 day(s). 7.  Patient will utilize energy conservation techniques during functional activities with verbal cues within 7 day(s). 8.  Patient will improve their Fugl Jamil score by 5 points in prep for ADLs within 7 days. Outcome: Progressing Towards Goal   OCCUPATIONAL THERAPY RE-EVALUATION  Patient: Haley Sheffield (5 y.o. male)  Date: 8/8/2022  Diagnosis: CVA (cerebral vascular accident) ([facility] [I63.9] <principal problem not specified>  Procedure(s) (LRB):  ESOPHAGOGASTRODUODENOSCOPY (EGD) (N/A)  PERCUTANEOUS ENDOSCOPIC GASTROSTOMY TUBE INSERTION (N/A) 7 Days Post-Op  Precautions: Fall, Aspiration, Contact, Skin (droplet plus - covid +; PEG tube)  Chart, occupational therapy assessment, plan of care, and goals were reviewed.     ASSESSMENT  Based on the objective data described below, pt was supine upon arrival with supportive wife at bedside. Pt was drowsy and inattentive to task. Pt did understand what therapist was staying and was smiling and smerking at times when therapist was joking with pt. He was slid to bottom of bed upon arrival with PEG tube running and PEG tube was stopped for mobility. He was resistive at times with attempt of ROM of BUE, but was able to hold UE against gravity bilaterally when cued to do so. Max assist x2 needed for supine to sit edge of bed and initially pt was pushing posteriorly and left. Over time pt was able to remain seated with posterior lean and moderate assist.  Bolsters placed behind pt for support and pt was able to remain seated with CGA. Focused on balance and scanning this session. Max/total assist needed to change gown and total assist for socks. Max assist x2 needed for stand pivot to bedside chair. PEG tube was restarted once pt was seated bedside recliner chair. No goals met from previous re-eval but pt is making slow gains. Pt will need rehab at discharge. Current Level of Function Impacting Discharge (ADLs): max assist x2 bed mobility, moderate/max assist x2 sit to stand and stand pivot    Feeding: Setup    Oral Facial Hygiene/Grooming: Setup    Bathing: Moderate assistance    Upper Body Dressing: Minimum assistance    Lower Body Dressing: Total assistance;Maximum assistance    Toileting: Maximum assistance; Total assistance    Other factors to consider for discharge: now off COVID precautions, last test was positive, high fall risk, two assist for mobility attempts         PLAN :  Recommendations and Planned Interventions: self care training, functional mobility training, therapeutic exercise, balance training, therapeutic activities, endurance activities, neuromuscular re-education, patient education, home safety training, and family training/education    Frequency/Duration: Patient will be followed by occupational therapy 4 times a week to address goals. Recommend with staff: lift team back to bed, frequent rounding on pt to ensure pt is in proper position with PEG tube feedings    Recommend next OT session: balance, UB ADLS, attention to task    Recommendation for discharge: (in order for the patient to meet his/her long term goals)  Therapy up to 5 days/week in SNF setting    This discharge recommendation:  Has been made in collaboration with the attending provider and/or case management    Equipment recommendations for successful discharge (if) home: gael lift, hospital bed, bedpan, gait belt, PEG tube supplies, wheelchair, drop arm bedside commode       SUBJECTIVE:   Patient stated [facility].    OBJECTIVE DATA SUMMARY:   Hospital course since last seen and reason for reevaluation: now off COVID precautions    Cognitive/Behavioral Status:  Neurologic State: Eyes open to voice  Orientation Level: Disoriented to time;Disoriented to situation;Disoriented to place;Oriented to person  Cognition: Decreased command following;Decreased attention/concentration  Perception: Cues to maintain midline in sitting;Cues to maintain midline in standing; Tactile;Verbal;Visual (manual)       Hearing: Auditory  Auditory Impairment: Hard of hearing, bilateral    Vision/Perceptual:                           Acuity: Within Defined Limits         Range of Motion:    AROM: Grossly decreased, non-functional                         Strength:    Strength: Generally decreased, functional                Coordination:  Coordination: Generally decreased, functional  Fine Motor Skills-Upper: Left Impaired;Right Impaired    Gross Motor Skills-Upper: Left Impaired;Right Impaired    Tone & Sensation:    Tone: Normal                           Functional Mobility and Transfers for ADLs:  Bed Mobility:  Supine to Sit: Maximum assistance;Assist x2  Scooting: Maximum assistance    Transfers:  Sit to Stand:  Moderate assistance;Maximum assistance;Assist x2  Functional Transfers  Bathroom Mobility:  (unable)  Toilet Transfer : Maximum assistance;Assist x2; Additional time (stand pivot)  Bed to Chair: Maximum assistance;Assist x2; Additional time (stand pivot)    Balance:  Sitting: Impaired  Sitting - Static: Fair (occasional); Poor (constant support)  Sitting - Dynamic: Poor (constant support)  Standing: Impaired; With support  Standing - Static: Constant support;Poor  Standing - Dynamic : Constant support;Poor    ADL Assessment:  Feeding: Setup    Oral Facial Hygiene/Grooming: Setup    Bathing: Moderate assistance    Upper Body Dressing: Minimum assistance    Lower Body Dressing: Total assistance;Maximum assistance    Toileting: Maximum assistance; Total assistance                ADL Intervention and task modifications: Total assist socks, total assist donning of gown        Therapeutic Exercises:   AAROM to PROM BUE all functional planes, focus on trunk rotation, holding head in midline, scanning to the right left (manual assist with head turns), seated static balance (max/to moderate assist pushing with LUE at times)    Functional Measure:  Unable to perform fugl jacobo this session, due to pts decreased command following    Pain:  Facial grimacing at times, ?  PEG tube site (abdominal binder on)    Activity Tolerance:   Fair    After treatment patient left in no apparent distress:   Sitting in chair, Call bell within reach, Bed / chair alarm activated, and Caregiver / family present    COMMUNICATION/COLLABORATION:   The patients plan of care was discussed with: Physical therapist, Registered nurse, Case management    SOLEDAD Flynn/L  Time Calculation: 30 mins

## 2022-08-08 NOTE — PROGRESS NOTES
Shift change report given at bedside to oncoming nurse Michelle Ross. Pt has no roll belt or restraints at shift change report. RN removed telemetry sitter from patient room during bedside report and has discontinued his remote monitoring.

## 2022-08-08 NOTE — PROGRESS NOTES
Transition of Care Plan:     RUR: 15% - \"moderate risk\"  Disposition: Plan A) COVID positive SNF placement (pending acceptance/insurance auth) vs Plan B) Home with HH (RN/PT/OT/SLP), home infusion for TF, & follow up apts  Follow up appointments: PCP & specialist as indicated   DME needed: Defer to SNF for DME needs  Transportation at Discharge: BLS transport needed for d/c  Keys or means to access home: N/A - pt likely transitioning to SNF at d/c     IM Medicare Letter: 2nd IM needed prior to d/c  Is patient a BCPI-A Bundle: N/A         Is patient a [place] and connected with the [place]? Yes - clinical updates faxed to the [facility] 7/28/22 & 8/4/22  Caregiver Contact: Pt's wife Greer Dianeler: [phone])  Discharge Caregiver contacted prior to discharge? To be contacted prior to d/c  Care Conference needed?: N/A  COVID-19 test: PCR COVID-19 test completed 7/27/22; results positive    Initial note: Chart reviewed, IDR completed. CM confirmed pt no longer has tele-sitter present in room. CM received call from pt's wife Torito Mcnamara) requesting for referral to be sent to [facility] for review. Wife specified [facility] as top preference for placement, as it's close to their home address. Wife informed CM would have to confirm if facility is accepting COVID positive pt's. FOC offered, copy to be placed on chart. Wife specified [address] and [address] [facility] [address] as back up options if [facility] is unable to accept. Wife made it very clear she is not interested in pursing placement at [facility] [place]. CM contacted [facility] admission liaison Abimael Clay: [phone]) to follow up on referral. Natalia Young reported facility recieves referrals via manual fax vs [employer]; CM to fax referral directly to facility for review (f: [phone]). CM inquired if facility was able to accept COVID positive pt's; Natalia Young confirmed.  Referral to be sent to facility for review; updates on status of referral to be reported out once available.     ERIKA Pérez  Care Manager, [facility]

## 2022-08-08 NOTE — PROGRESS NOTES
Received notification from bedside RN about patient with regards to: yeast in scrotum and groin area  VS: /69, HR 73, RR 16, O2 sat 100%    Intervention given: Nystatin powder BID, 1st dose now
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
## 2022-08-08 NOTE — PROGRESS NOTES
Report given to [facility]. Pt's medications, condition and updates all reviewed at bedside. All questions answered at this time.

## 2022-08-08 NOTE — PROGRESS NOTES
Report given to [facility] ([facility]). Pt's medications, condition and updates all reviewed at bedside. All questions answered at this time. Mitts were not placed on the pt over night. Roll belt remains in place, pt continues to have intermittent restlessness.

## 2022-08-09 NOTE — PROGRESS NOTES
Speech Pathology Note    Chart reviewed and spoke with RN. Patient drowsy on this date. Patient's wife reports patient with fever overnight and did not sleep much. Patient unable to be aroused despite maximal cues (i.e. sitting HOB upright, verbal and tactile cues, ice chip to lip). Will defer and follow up when patient is awake and actively accepting PO intake. Thank you.     Shanti Stevens M.S., CCC-SLP

## 2022-08-09 NOTE — PROGRESS NOTES
Hospitalist Progress Note    NAME: Pito Gee   :  1945   MRN:  [id number]     Estimated discharge date: 2022    Barriers: awaiting SNF placement, medically ready for discharge    Assessment / Plan:    Fever 102.6 unclear etiology  Acute metabolic encephalopathy lethargic today  Mild WBC increase 12.3   Fever   101.6 on 2022   102.6 on 2022  No clear source  COVID-19 + since admit  pCXR clear on 2022  UA on 2022 Nitrite neg, 0 to 4 WBC, negative bacteria  Procalcitonin 0.36  Mild cough since admit  No N/V or diarrhea  PEG site with minimal erythema  Check repeat CXR, check blood cultures  Check repeat procalcitonin, serial labs    Acute left basal ganglia ischemic infarct POA   ? Vascular dementia  Essential HTN POA  PAF POA  - Recent admission in 2022, for left facial droop, suspected TIA   MRI done which was negative for CVA. EEG was done which was negative. - Presented to ED for 1 week of sudden cognitive decline, confusion. Dragging of right leg.  - CTA head and neck IMPRESSION  No evidence of acute intracranial vessel occlusion. Multifocal moderate to severe stenoses with occlusion of the distal left              vertebral artery at the V3-V4 junction. There is no intracranial mass, hemorrhage or evidence of acute infarction. No acute intracranial process is identified. s-CT head negative for bleed  - MRI brain showed   1. Acute left basal ganglia ischemic infarct. 2. Atrophy and white matter disease, remote infarcts stable otherwise. - MRI Lumbar spine showed   1. Postsurgical changes L4-5 L5-S1 stable. 2. Spinal canal stenosis L3-L4.  - Telemetry  - Carotid duplex  Moderate stenosis in the right ICA (50-69%). Mild stenosis in the left ICA (<50%). Right vertebral is antegrade. Left vertebral is antegrade.   - Aspirin 162 mg daily, Eliquis 5 mg twice daily.    - C/w statin (not currently high intensity)  - Neuro followed  - PT/ST/OT.   - SNF.    - Dysphagia s/p PEG  - Vit B12 ~ 1152, folate 9.3, homocysteine 21.9, TSH 0.47  - ALAN negative  - Antineuronal cell AB pending    - Blood cultures NGTD  - Echocardiogram 7/11 LVEF 45-50%, normal wall motion   No AS or MS  - DC IVF  - Continue tube feeds  - Still not sleeping well, agitated at night despite increased melatonin  - D/W wife re risks and benefits of seroquel, including small cardiac event risk   Start seroquel once fever resolved  - Slept better overnight   - awaiting SNF     PEG  GI adjusted bumper  Examined the PEG site today, minimal redness  TF resumed. Tolerating per wife     Hypophosphatemia  Monitor and replete PRN     Agitation  Melatonin QHS  Neuro added buspar and lexapro     Cough  Aspiration? CXR no acute changes. Remains on RA     COVID + ve  Patient tested +ve for covid on 7/8 as well  Reason for CVA? Re-screen still +ve  Not exhibiting any respiratory symptoms, remains on room air    CAD  S/p pacemaker  History of grade 3 diastolic dysfunction  C/w coreg     Recurrent fall  Chronic back pain  -cont flexeril  -MRI lumbar spine   1. Postsurgical changes L4-5 L5-S1 stable. 2. Spinal canal stenosis L3-L4. BPH  C/w flomax     Hypernatremia (resolved)  Hypophosphatemia  DC IVF  Monitor and replete phos     Code Status: full code  Surrogate Decision Maker:wife     DVT Prophylaxis: eliquis  GI Prophylaxis: not indicated     18.5 - 24.9 Normal weight / Body mass index is 23.83 kg/m². Recommended Disposition: SNF/LTC    Subjective:     Chief Complaint / Reason for Physician Visit  Patient seen and examined at the bedside  Spoke with wife at bedside  Fever to 102.6 this AM  More lethargic  No complaints of pain  No diarrhea, n/v  Discussed with RN events overnight.      Review of Systems:  Symptom Y/N Comment  Symptom Y/N Comments   Fever/Chills n   Chest Pain n    Poor Appetite    Edema     Cough n   Abdominal Pain n    Sputum    Joint Pain     SOB/HANNA n   Pruritis/Rash     Nausea/vomit n Tolerating PT/OT     Diarrhea n   Tolerating Diet y    Constipation    Other       Could NOT obtain due to:      Objective:     VITALS:   Last 24hrs VS reviewed since prior progress note. Most recent are:  Patient Vitals for the past 24 hrs:   Temp Pulse Resp BP SpO2   08/09/22 1259 99.9 °F (37.7 °C) 73 24 (!) 150/54 99 %   08/09/22 1011 (!) 102.6 °F (39.2 °C) 92 20 97/73 99 %   08/09/22 0500 98.9 °F (37.2 °C) 74 20 (!) 159/88 98 %   08/09/22 0000 98.2 °F (36.8 °C) 76 18 (!) 159/62 98 %   08/08/22 2100 98.4 °F (36.9 °C) 75 21 (!) 159/70 99 %   08/08/22 1631 98.6 °F (37 °C) 70 22 118/62 98 %       No intake or output data in the 24 hours ending 08/09/22 1535       I had a face to face encounter and independently examined this patient on 8/9/2022, as outlined below:  PHYSICAL EXAM:  General: WD, WN. lethargic, no acute distress    EENT:  PERRLA. Anicteric sclerae. MMM  Resp:  CTA bilaterally, no wheezing or rales. No accessory muscle use  CV:  Regular  rhythm,  No edema  GI:  Soft, Non distended, Non tender. +Bowel sounds  Neurologic:  Lethargic, no change in motor exam  Psych:   Poor  insight. Not anxious nor agitated  Skin:  No rashes. No jaundice    Reviewed most current lab test results and cultures  YES  Reviewed most current radiology test results   YES  Review and summation of old records today    NO  Reviewed patient's current orders and MAR    YES  PMH/ reviewed - no change compared to H&P  ________________________________________________________________________  Care Plan discussed with:    Comments   Patient y    Family  y Wife at bedside   RN y    Care Manager y    Consultant                        Multidiciplinary team rounds were held today with , nursing, pharmacist and clinical coordinator. Patient's plan of care was discussed; medications were reviewed and discharge planning was addressed.      ________________________________________________________________________      Comments >50% of visit spent in counseling and coordination of care     ________________________________________________________________________  Lisa Watson MD     Procedures: see electronic medical records for all procedures/Xrays and details which were not copied into this note but were reviewed prior to creation of Plan. LABS:  I reviewed today's most current labs and imaging studies.   Pertinent labs include:  Recent Labs     08/08/22  0403   WBC 12.3*   HGB 12.9   HCT 37.5          Recent Labs     08/08/22  0403      K 4.5      CO2 25   *   BUN 14   CREA 0.87   CA 9.0   ALB 2.4*   TBILI 1.2*   ALT 32         Signed: Lisa Watson MD

## 2022-08-09 NOTE — PROGRESS NOTES
Hospitalist Progress Note    NAME: Lamine Hunt   :  1945   MRN:  [id number]     Estimated discharge date: 2022    Barriers: awaiting SNF placement, medically ready for discharge    Assessment / Plan:    Fever 101.1 on    Mild WBC increase 12.3   No clear source  COVID-19 + since admit  No further fevers  pCXR clear  UA Nitrite neg, 0 to 4 WBC, negative bacteria  Procalcitonin 0.36  Hold the antibiotics and follow    Acute left basal ganglia ischemic infarct POA   ? Vascular dementia  Essential HTN POA  PAF POA  - Recent admission in 2022, for left facial droop, suspected TIA   MRI done which was negative for CVA. EEG was done which was negative. - Presented to ED for 1 week of sudden cognitive decline, confusion. Dragging of right leg.  - CTA head and neck IMPRESSION  No evidence of acute intracranial vessel occlusion. Multifocal moderate to severe stenoses with occlusion of the distal left              vertebral artery at the V3-V4 junction. There is no intracranial mass, hemorrhage or evidence of acute infarction. No acute intracranial process is identified. s-CT head negative for bleed  - MRI brain showed   1. Acute left basal ganglia ischemic infarct. 2. Atrophy and white matter disease, remote infarcts stable otherwise. - MRI Lumbar spine showed   1. Postsurgical changes L4-5 L5-S1 stable. 2. Spinal canal stenosis L3-L4.  - Telemetry  - Carotid duplex  Moderate stenosis in the right ICA (50-69%). Mild stenosis in the left ICA (<50%). Right vertebral is antegrade. Left vertebral is antegrade.   - Aspirin 162 mg daily, Eliquis 5 mg twice daily.    - C/w statin (not currently high intensity)  - Neuro followed  - PT/ST/OT.   - SNF.    - Dysphagia s/p PEG  - Vit B12 ~ 1152, folate 9.3, homocysteine 21.9, TSH 0.47  - ALAN negative  - Antineuronal cell AB pending    - Blood cultures NGTD  - Echocardiogram  LVEF 45-50%, normal wall motion   No AS or MS  - DC IVF  -Continue tube feeds  - still not sleeping well, agitated at night despite increased melatonin  - D/W wife re risks and benefits of seroquel, including small cardiac event risk   Start seroquel  - Slept better overnight   - awaiting SNF     PEG  GI adjusted bumper  TF resumed. Tolerating per wife     Hypophosphatemia  Monitor and replete PRN     Agitation  Melatonin QHS  Neuro added buspar and lexapro     Cough  Aspiration? CXR no acute changes. Remains on RA     COVID + ve  Patient tested +ve for covid on 7/8 as well  Reason for CVA? Re-screen still +ve  Not exhibiting any respiratory symptoms, remains on room air    CAD  S/p pacemaker  History of grade 3 diastolic dysfunction  C/w coreg     Recurrent fall  Chronic back pain  -cont flexeril  -MRI lumbar spine   1. Postsurgical changes L4-5 L5-S1 stable. 2. Spinal canal stenosis L3-L4. BPH  C/w flomax     Hypernatremia (resolved)  Hypophosphatemia  DC IVF  Monitor and replete phos     Code Status: full code  Surrogate Decision Maker:wife     DVT Prophylaxis: eliquis  GI Prophylaxis: not indicated     18.5 - 24.9 Normal weight / Body mass index is 23.83 kg/m². Recommended Disposition: SNF/LTC    Subjective:     Chief Complaint / Reason for Physician Visit  Patient seen and examined at the bedside, spoke with Son  \"Okay\" denies complaints  Slept better, not agitated as much per wife  Awaiting SNF placement. No further fevers  Discussed with RN events overnight. Review of Systems:  Symptom Y/N Comment  Symptom Y/N Comments   Fever/Chills n   Chest Pain n    Poor Appetite    Edema     Cough n   Abdominal Pain n    Sputum    Joint Pain     SOB/HANNA n   Pruritis/Rash     Nausea/vomit n   Tolerating PT/OT     Diarrhea n   Tolerating Diet y    Constipation    Other       Could NOT obtain due to:      Objective:     VITALS:   Last 24hrs VS reviewed since prior progress note.  Most recent are:  Patient Vitals for the past 24 hrs:   Temp Pulse Resp BP SpO2   08/08/22 1631 98.6 °F (37 °C) 70 22 118/62 98 %   08/08/22 1234 99.8 °F (37.7 °C) 72 22 117/62 94 %   08/08/22 0834 99.4 °F (37.4 °C) 74 20 (!) 145/67 97 %   08/08/22 0329 99.7 °F (37.6 °C) 78 16 (!) 147/72 100 %   08/07/22 2314 99.1 °F (37.3 °C) 73 16 139/69 100 %   08/07/22 2017 98.7 °F (37.1 °C) 72 17 138/75 99 %         Intake/Output Summary (Last 24 hours) at 8/8/2022 2006  Last data filed at 8/7/2022 2017  Gross per 24 hour   Intake 150 ml   Output --   Net 150 ml          I had a face to face encounter and independently examined this patient on 8/8/2022, as outlined below:  PHYSICAL EXAM:  General: WD, WN. Awake, , no acute distress    EENT:  PERRLA. Anicteric sclerae. MMM  Resp:  CTA bilaterally, no wheezing or rales. No accessory muscle use  CV:  Regular  rhythm,  No edema  GI:  Soft, Non distended, Non tender. +Bowel sounds  Neurologic:  Alert, aphasia. Does follow simple commands  Psych:   Poor  insight. Not anxious nor agitated  Skin:  No rashes. No jaundice    Reviewed most current lab test results and cultures  YES  Reviewed most current radiology test results   YES  Review and summation of old records today    NO  Reviewed patient's current orders and MAR    YES  PMH/ reviewed - no change compared to H&P  ________________________________________________________________________  Care Plan discussed with:    Comments   Patient y    Family  y Wife at bedside   RN y    Care Manager y    Consultant                        Multidiciplinary team rounds were held today with , nursing, pharmacist and clinical coordinator. Patient's plan of care was discussed; medications were reviewed and discharge planning was addressed.      ________________________________________________________________________      Comments   >50% of visit spent in counseling and coordination of care     ________________________________________________________________________  Page Kevon, MD     Procedures: see electronic medical records for all procedures/Xrays and details which were not copied into this note but were reviewed prior to creation of Plan. LABS:  I reviewed today's most current labs and imaging studies.   Pertinent labs include:  Recent Labs     08/08/22 0403 08/06/22 0247   WBC 12.3* 9.0   HGB 12.9 12.4   HCT 37.5 35.6*    162       Recent Labs     08/08/22 0403 08/06/22 0247    138   K 4.5 4.1    105   CO2 25 28   * 182*   BUN 14 9   CREA 0.87 0.73   CA 9.0 8.8   PHOS  --  2.7   ALB 2.4* 2.6*   TBILI 1.2* 0.8   ALT 32 28         Signed: Ellie Ballard MD

## 2022-08-09 NOTE — PROGRESS NOTES
Transition of Care Plan:     RUR: 15% - \"moderate risk\"  Disposition: COVID positive SNF placement - [facility] (pending medical stability)  Follow up appointments: PCP & specialist as indicated   DME needed: Defer to SNF for DME needs  Transportation at Discharge: Pt on will call with [facility] for potential d/c within the next 24/48 hrs; PCS to be completed & placed on chart  Keys or means to access home: N/A - pt transitioning to SNF at d/c     IM Medicare Letter: 2nd IM needed prior to d/c  Is patient a BCPI-A Bundle: N/A         Is patient a  and connected with the [place]? Yes - clinical updates faxed to the [facility] throughout course of admission  Caregiver Contact: Pt's wife Clifford Coello: [phone])  Discharge Caregiver contacted prior to discharge? To be contacted prior to d/c  Care Conference needed?: N/A  COVID-19 test: PCR COVID-19 test completed 7/27/22; results positive     Update - 3:50 PM: CM received call from [facility] admission liaison reporting facility received insurance auth & can accept pt 8/10/22 for placement. Room assignment/number for report reflected in [facility] plan detailed above. Update reported to MD. MD reported pt had fever of 102; MD completing work-up to determine cause of fever. CM to follow up with MD 8/10/22 to determine if pt is medically stable for d/c. Pt on will call with [facility] for potential d/c within the next 24-48 hrs. CM will fax clinical updates to the [facility] for reference (f: [phone]). Update - 11:19 AM: CM received call back from [facility] admission liaison reporting facility accepted pt for placement & initiated insurance auth 8/8/22; Denita Ott pending at this time. Italo Benjamin to contact CM with updates on the status of auth once available. Update reported to pt's wife Clifford Coello) at bedside. Wife reported no immediate questions/concerns. Initial note: Chart reviewed.  CM contacted [facility] admission anastasiya Jett: [phone]) to receive an update on the status of the referral faxed 8/8/22; CM reached voicemail. CM left voicemail & requested call back. CM will remain on stand-by to receive call back from facility. Pt will require insurance auth for SNF; updated PT/OT notes needed until auth is obtained. CM will continue to follow & report updates as they become available.     Lenita Cogan, MSW  Care Manager, [address] [place] [address]

## 2022-08-10 NOTE — PROGRESS NOTES
Problem: Patient Education: Go to Patient Education Activity  Goal: Patient/Family Education  Outcome: Progressing Towards Goal     Problem: Patient Education: Go to Patient Education Activity  Goal: Patient/Family Education  Outcome: Progressing Towards Goal     Problem: Patient Education: Go to Patient Education Activity  Goal: Patient/Family Education  Outcome: Progressing Towards Goal     Problem: Airway Clearance - Ineffective  Goal: Achieve or maintain patent airway  Outcome: Progressing Towards Goal     Problem: Gas Exchange - Impaired  Goal: Absence of hypoxia  Outcome: Progressing Towards Goal  Goal: Promote optimal lung function  Outcome: Progressing Towards Goal     Problem: Breathing Pattern - Ineffective  Goal: Ability to achieve and maintain a regular respiratory rate  Outcome: Progressing Towards Goal     Problem:  Body Temperature -  Risk of, Imbalanced  Goal: Ability to maintain a body temperature within defined limits  Outcome: Progressing Towards Goal  Goal: Will regain or maintain usual level of consciousness  Outcome: Progressing Towards Goal  Goal: Complications related to the disease process, condition or treatment will be avoided or minimized  Outcome: Progressing Towards Goal     Problem: Isolation Precautions - Risk of Spread of Infection  Goal: Prevent transmission of infectious organism to others  Outcome: Progressing Towards Goal     Problem: Nutrition Deficits  Goal: Optimize nutrtional status  Outcome: Progressing Towards Goal     Problem: Risk for Fluid Volume Deficit  Goal: Maintain normal heart rhythm  Outcome: Progressing Towards Goal  Goal: Maintain absence of muscle cramping  Outcome: Progressing Towards Goal  Goal: Maintain normal serum potassium, sodium, calcium, phosphorus, and pH  Outcome: Progressing Towards Goal     Problem: Loneliness or Risk for Loneliness  Goal: Demonstrate positive use of time alone when socialization is not possible  Outcome: Progressing Towards Goal     Problem: Fatigue  Goal: Verbalize increase energy and improved vitality  Outcome: Progressing Towards Goal     Problem: Patient Education: Go to Patient Education Activity  Goal: Patient/Family Education  Outcome: Progressing Towards Goal     Problem: Delirium Treatment  Goal: *Level of consciousness restored to baseline  Outcome: Progressing Towards Goal  Goal: *Level of environmental perceptions restored to baseline  Outcome: Progressing Towards Goal  Goal: *Sensory perception restored to baseline  Outcome: Progressing Towards Goal  Goal: *Emotional stability restored to baseline  Outcome: Progressing Towards Goal  Goal: *Functional assessment restored to baseline  Outcome: Progressing Towards Goal  Goal: *Absence of falls  Outcome: Progressing Towards Goal  Goal: *Will remain free of delirium, CAM Score negative  Outcome: Progressing Towards Goal  Goal: *Cognitive status will be restored to baseline  Outcome: Progressing Towards Goal  Goal: Interventions  Outcome: Progressing Towards Goal     Problem: Patient Education: Go to Patient Education Activity  Goal: Patient/Family Education  Outcome: Progressing Towards Goal     Problem: Falls - Risk of  Goal: *Absence of Falls  Description: Document Matthew Hazel Fall Risk and appropriate interventions in the flowsheet.   Outcome: Progressing Towards Goal  Note: Fall Risk Interventions:  Mobility Interventions: Bed/chair exit alarm    Mentation Interventions: Bed/chair exit alarm    Medication Interventions: Bed/chair exit alarm, Patient to call before getting OOB    Elimination Interventions: Bed/chair exit alarm, Call light in reach    History of Falls Interventions: Bed/chair exit alarm         Problem: Patient Education: Go to Patient Education Activity  Goal: Patient/Family Education  Outcome: Progressing Towards Goal     Problem: Non-Violent Restraints  Goal: Removal from restraints as soon as assessed to be safe  Outcome: Progressing Towards Goal  Goal: No harm/injury to patient while restraints in use  Outcome: Progressing Towards Goal  Goal: Patient's dignity will be maintained  Outcome: Progressing Towards Goal  Goal: Patient Interventions  Outcome: Progressing Towards Goal

## 2022-08-10 NOTE — PROGRESS NOTES
Occupational Therapy Note:    Chart reviewed, spoke with pt's nurse and wife. Pt with fever of 102 earlier today, and nurse and wife report pt is more confused and disoriented. Will defer at this time and continue to follow. Thank you.     Minerva Lopez,, OTR/L

## 2022-08-10 NOTE — PROGRESS NOTES
Positive Mycoplasma IgM. Spoke with Dr. Jessica Lee. Agrees to add azithromycin.        Jodee Monzon, OSVALDOD

## 2022-08-10 NOTE — PROGRESS NOTES
Report given to [facility] LPN. Pt's medications, plan of care and condition all discussed with all questions answered at this time.

## 2022-08-10 NOTE — PROGRESS NOTES
Transition of Care Plan:     RUR: 14% - \"low risk\"  Disposition: COVID positive SNF placement - [facility] (pending medical stability)  Follow up appointments: PCP & specialist as indicated   DME needed: Defer to SNF for DME needs  Transportation at Discharge: Pt on will call with [facility] for potential d/c within the next 24/48 hrs; PCS to be completed & placed on chart  Keys or means to access home: N/A - pt transitioning to SNF at d/c     IM Medicare Letter: 2nd IM needed prior to d/c  Is patient a BCPI-A Bundle: N/A         Is patient a  and connected with the [address]? Yes - clinical updates faxed to the [facility] throughout course of admission  Caregiver Contact: Pt's wife Ingrid Spencer: [phone])  Discharge Caregiver contacted prior to discharge? To be contacted prior to d/c  Care Conference needed?: N/A  COVID-19 test: PCR COVID-19 test completed 7/27/22; results positive    Initial note: Chart reviewed, IDR completed. Attending MD reported pt is not medically stable for d/c; PATTI anticipated within the next 48-72 hrs pending clinical improvement. CM contacted [facility] admission liaison Eunice Mckeon: [phone]) to report update; Jessy Kumar reported she will continue to follow the referral for medical stability. CM received voicemail from the [facility] requesting for transfer documentation to be provided/signed/faxed back to them; request to be completed. CM met with wife Ingrid Spencer), provided updates on the status of the disposition, and inquired if she was interested in pt transferring to the [facility] for continued care; wife declined. Refusal to transfer form signed by wife secondary to pt's confusion. Wife reported no immediate questions or concerns related to the d/c plan. CM faxed clinical updates & refusal to transfer form to the [facility] for reference (f: [phone]). CM will continue to follow & remain accessible for d/c planning.     Yue Nieves MSW  Care Manager, [facility]  [phone]

## 2022-08-10 NOTE — PROGRESS NOTES
Pharmacy Antimicrobial Kinetic Dosing    Indication for Antimicrobials: Sepsis     Current Regimen of Each Antimicrobial:  Cefepime 1 gm IV q 8h  (Start Date 8/10,  Day # 1)  Vancomycin 2000 mg IV x1 then pharmacy to dose  (Start Date 8/10,  Day # 1)    Previous Antimicrobial Therapy:    Goal Level: AUC: 400-600 mg/hr/Liter/day    Date Dose & Interval Measured (mcg/mL) Predicted AUC/BRITNEY                       Date & time of next level:  or     Dosing calculator used: Notifo [id number]    Significant Positive Cultures:     Blood = pending    Conditions for Dosing Consideration: None    Labs:  Recent Labs     22  1816 22  0403   CREA 0.98 0.87   BUN 21* 14   PCT 0.45 0.36     Recent Labs     22  0403   WBC 10.7 12.3*     Temp (24hrs), Av.6 °F (37.6 °C), Min:97.9 °F (36.6 °C), Max:102.6 °F (39.2 °C)        Creatinine Clearance (mL/min):   CrCl (Ideal Body Weight): 66.2   If actual weight < IBW: CrCl (Actual Body Weight) 69.0    Impression/Plan:   Will continue with Vancomycin 750 mg IV q 12h for AUC estimated 515   Daily BMP per Vancomycin dosing protocol   Antimicrobial stop date: To be determined     Pharmacy will follow daily and adjust medications as appropriate for renal function and/or serum levels.     Thank you,  Marty Paulino, [facility]

## 2022-08-10 NOTE — PROGRESS NOTES
Speech pathology  Spoke with RN who reports patient with 102 temperature despite meds. Recommend holding off on PO trials at this time, offering ice chips only after oral care and if fully alert and accepting. Patient will require repeat imaging prior to any PO initiation. SLP will continue to follow.     Thanks,   Katie Grant M.S. CCC-SLP

## 2022-08-10 NOTE — PROGRESS NOTES
Chart reviewed. Spoke with patient's wife and the patient's nurse. He is more confused and disoriented this afternoon in addition to having a fever spike of 102 earlier today. Will defer and follow up  tomorrow.     Jg Tellez, PT

## 2022-08-10 NOTE — PROGRESS NOTES
Hospitalist Progress Note    NAME: Teja Mccall   :  1945   MRN:  [id number]     Estimated discharge date: 2022    Barriers: awaiting SNF placement, medically ready for discharge    Assessment / Plan:    Fever 101.1 on    No clear source  COVID-19 + since admit  Still febrile  CXR showed: Increased interstitial process could represent interstitial edema or an atypical  or viral pneumonia  F/u with sputum culture, mycoplasma and legionella   UA Nitrite neg, 0 to 4 WBC, negative bacteria  Started on cefepime     Acute left basal ganglia ischemic infarct POA   ? Vascular dementia  Essential HTN POA  PAF POA  - Recent admission in 2022, for left facial droop, suspected TIA   MRI done which was negative for CVA. EEG was done which was negative. - Presented to ED for 1 week of sudden cognitive decline, confusion. Dragging of right leg.  - CTA head and neck IMPRESSION  No evidence of acute intracranial vessel occlusion. Multifocal moderate to severe stenoses with occlusion of the distal left              vertebral artery at the V3-V4 junction. There is no intracranial mass, hemorrhage or evidence of acute infarction. No acute intracranial process is identified. s-CT head negative for bleed  - MRI brain showed   1. Acute left basal ganglia ischemic infarct. 2. Atrophy and white matter disease, remote infarcts stable otherwise. - MRI Lumbar spine showed   1. Postsurgical changes L4-5 L5-S1 stable. 2. Spinal canal stenosis L3-L4.  - Telemetry  - Carotid duplex  Moderate stenosis in the right ICA (50-69%). Mild stenosis in the left ICA (<50%). Right vertebral is antegrade. Left vertebral is antegrade.   - Aspirin 162 mg daily, Eliquis 5 mg twice daily.    - C/w statin (not currently high intensity)  - Neuro followed  - PT/ST/OT.   - SNF.    - Dysphagia s/p PEG  - Vit B12 ~ 1152, folate 9.3, homocysteine 21.9, TSH 0.47  - ALAN negative  - Antineuronal cell AB pending    - Blood cultures NGTD  - Echocardiogram 7/11 LVEF 45-50%, normal wall motion   No AS or MS  - DC IVF  -Continue tube feeds  - still not sleeping well, agitated at night despite increased melatonin  - D/W wife re risks and benefits of seroquel, including small cardiac event risk   Start seroquel  - Slept better overnight   - awaiting SNF     PEG  GI adjusted bumper  TF resumed. Tolerating per wife     Hypophosphatemia  Monitor and replete PRN     Agitation  Melatonin QHS  Neuro added buspar and lexapro     Cough  Aspiration? CXR no acute changes. Remains on RA     COVID + ve  Patient tested +ve for covid on 7/8 as well  Reason for CVA? Re-screen still +ve  Not exhibiting any respiratory symptoms, remains on room air    CAD  S/p pacemaker  History of grade 3 diastolic dysfunction  C/w coreg     Recurrent fall  Chronic back pain  -cont flexeril  -MRI lumbar spine   1. Postsurgical changes L4-5 L5-S1 stable. 2. Spinal canal stenosis L3-L4. BPH  C/w flomax     Hypernatremia (resolved)  Hypophosphatemia  DC IVF  Monitor and replete phos     Code Status: full code  Surrogate Decision Maker:wife     DVT Prophylaxis: eliquis  GI Prophylaxis: not indicated     18.5 - 24.9 Normal weight / Body mass index is 23.83 kg/m². Recommended Disposition: SNF/LTC    Subjective:     Chief Complaint / Reason for Physician Visit  Patient seen and examined at the bedside, spoke with Son  \"Okay\" denies complaints  Slept better, not agitated as much per wife  Awaiting SNF placement. No further fevers  Discussed with RN events overnight.      Review of Systems:  Symptom Y/N Comment  Symptom Y/N Comments   Fever/Chills n   Chest Pain n    Poor Appetite    Edema     Cough n   Abdominal Pain n    Sputum    Joint Pain     SOB/HANNA n   Pruritis/Rash     Nausea/vomit n   Tolerating PT/OT     Diarrhea n   Tolerating Diet y    Constipation    Other       Could NOT obtain due to:      Objective:     VITALS:   Last 24hrs VS reviewed since prior progress note. Most recent are:  Patient Vitals for the past 24 hrs:   Temp Pulse Resp BP SpO2   08/10/22 1554 97.4 °F (36.3 °C) 70 18 (!) 105/54 96 %   08/10/22 1242 99.9 °F (37.7 °C) -- -- -- --   08/10/22 0748 (!) 102.2 °F (39 °C) 70 22 (!) 119/59 94 %   08/10/22 0408 99.3 °F (37.4 °C) 69 22 (!) 130/49 93 %   08/09/22 2339 100.2 °F (37.9 °C) 70 24 130/65 100 %   08/09/22 2013 99.6 °F (37.6 °C) 70 20 94/65 100 %       Intake/Output Summary (Last 24 hours) at 8/10/2022 1615  Last data filed at 8/10/2022 8284  Gross per 24 hour   Intake 150 ml   Output --   Net 150 ml        I had a face to face encounter and independently examined this patient on 8/10/2022, as outlined below:  PHYSICAL EXAM:  General: WD, WN. Awake, , no acute distress    EENT:  PERRLA. Anicteric sclerae. MMM  Resp:  CTA bilaterally, no wheezing or rales. No accessory muscle use  CV:  Regular  rhythm,  No edema  GI:  Soft, Non distended, Non tender. +Bowel sounds  Neurologic:  Alert, aphasia. Does follow simple commands  Psych:   Poor  insight. Not anxious nor agitated  Skin:  No rashes. No jaundice    Reviewed most current lab test results and cultures  YES  Reviewed most current radiology test results   YES  Review and summation of old records today    NO  Reviewed patient's current orders and MAR    YES  PMH/SH reviewed - no change compared to H&P  ________________________________________________________________________  Care Plan discussed with:    Comments   Patient y    Family  y Wife at bedside   RN y    Care Manager y    Consultant                        Multidiciplinary team rounds were held today with , nursing, pharmacist and clinical coordinator. Patient's plan of care was discussed; medications were reviewed and discharge planning was addressed.      ________________________________________________________________________      Comments   >50% of visit spent in counseling and coordination of care ________________________________________________________________________  Ingrid Oseguera MD     Procedures: see electronic medical records for all procedures/Xrays and details which were not copied into this note but were reviewed prior to creation of Plan. LABS:  I reviewed today's most current labs and imaging studies. Pertinent labs include:  Recent Labs     08/09/22  1816 08/08/22  0403   WBC 10.7 12.3*   HGB 11.1* 12.9   HCT 31.8* 37.5    183     Recent Labs     08/10/22  0324 08/09/22  1816 08/08/22  0403   * 134* 136   K 4.9 4.2 4.5    102 103   CO2 30 28 25   * 252* 191*   BUN 20 21* 14   CREA 0.93 0.98 0.87   CA 9.1 8.9 9.0   ALB  --  2.1* 2.4*   TBILI  --  0.9 1.2*   ALT  --  35 32       Signed:  Ingrid Oseguera MD

## 2022-08-10 NOTE — PROGRESS NOTES
End of Shift Note     Bedside shift change report given Tucker Hernandez, RN (oncoming nurse) by Sussy Fuller RN (offgoing nurse). Report included the following information SBAR, Kardex, MAR, and Recent Results     Shift worked: nights   Shift summary and any significant changes:     Tube feeding feeding continues. PT rested quietly         Concerns for physician to address: none   Zone phone for oncoming shift:  [phone]      Patient Information  Maegan Alva  68 y.o.  7/25/2022 12:04 PM by Gabi Sheppard MD. Maegan Alva was admitted from Home     Problem List          Patient Active Problem List     Diagnosis Date Noted    Encephalopathy due to COVID-19 virus 07/28/2022    Thrombotic stroke involving left middle cerebral artery ([address] 07/27/2022    Acute alteration in mental status 07/26/2022    Convulsive syncope 07/26/2022    Bilateral carotid artery stenosis 07/26/2022    History of stroke 07/26/2022    CVA (cerebral vascular accident) ([address] 07/25/2022    Stroke ([address] 07/08/2022    Weakness of both legs 06/01/2021    Bilateral lower extremity pain 06/01/2021    Leg pain 05/30/2021    Dilated cardiomyopathy ([address] 12/18/2020    Permanent atrial fibrillation ([address] 12/18/2020    Tachycardia 12/18/2020    A-fib ([address] 12/18/2020    PAF (paroxysmal atrial fibrillation) ([address] 05/16/2018    Age-related cataract 08/01/2017    Allergic rhinitis 06/28/2017    Diverticulosis 06/28/2017    Urinary retention 06/28/2017    PVD (peripheral vascular disease) ([address] 06/28/2017    On statin therapy 06/28/2017    Low back pain 06/28/2017    Insomnia 06/28/2017    HTN (hypertension) 06/28/2017    Hyperlipidemia 06/28/2017    Glucose intolerance (impaired glucose tolerance) 06/28/2017    GERD (gastroesophageal reflux disease) 06/28/2017    ED (erectile dysfunction) 06/28/2017    DJD (degenerative joint disease) 06/28/2017    ASVD (arteriosclerotic vascular disease) 06/28/2017              Past Medical History:   Diagnosis Date    Allergic rhinitis 6/28/2017    Arrhythmia       Paroxysmal Afib- Dr. Avelino Scott    ASVD (arteriosclerotic vascular disease) 06/28/2017     Story: carotid stenosis followed by Vasc Surg    [facility]rial fibrillation [facility]      Congestive heart failure ([facility]      Diverticulosis 6/28/2017     Comments: on colonoscopy 12/01    DJD (degenerative joint disease) 6/28/2017    ED (erectile dysfunction) 6/28/2017    GERD (gastroesophageal reflux disease) 6/28/2017    Glucose intolerance (impaired glucose tolerance) 06/28/2017    HTN (hypertension) 6/28/2017    Hyperlipidemia 6/28/2017    Insomnia 6/28/2017    Low back pain 6/28/2017    On statin therapy 6/28/2017    Pacemaker 2020    PVD (peripheral vascular disease) ([facility] 6/28/2017    Rheumatoid arthritis ([facility]      Urinary retention 6/28/2017         Core Measures:  CVA: Yes Yes  CHF:No Not applicable  PNA:No Not applicable     Activity:  Activity Level: Up with Assistance  Number times ambulated in hallways past shift: 0  Number of times OOB to chair past shift: 1     Cardiac:  Cardiac Monitoring: No            Access:   Current line(s): PIV         Genitourinary:   Urinary status: voiding inc and inc briefs  Urinary Catheter? No     Respiratory:   O2 Device: None (Room air)  Chronic home O2 use?: NO  Incentive spirometer at bedside: N/A     GI:  Last Bowel Movement Date: 07/27/22  Current diet:  DIET NPO  Passing flatus: YES  Tolerating current diet: YES     Pain Management:  Patient states pain is manageable on current regimen: YES     Skin:  Ghulam Score: 16  Interventions: increase time out of bed, limit briefs, and internal/external urinary devices    Patient Safety:  Fall Score:  Total Score: 5  Interventions: bed/chair alarm and gripper socks  High Fall Risk: Yes  @Rollbelt  @dexterity to release roll belt  Yes/No ( must document dexterity  here by stating Yes or No here, otherwise this is a restraint and must follow restraint documentation policy.)     DVT prophylaxis:  DVT prophylaxis Med- Yes  DVT prophylaxis SCD or ISAI- No     Wounds: (If Applicable)  Wounds- No  Location none     Active Consults:  IP CONSULT TO HOSPITALIST  IP CONSULT TO NEUROLOGY  IP CONSULT TO GASTROENTEROLOGY     Length of Stay:  Expected LOS: 4d 9h  Actual LOS: 8  Discharge Plan: Yes CM following.   Referral for IPR to jaswant Verdugo RN

## 2022-08-10 NOTE — PROGRESS NOTES
End of Shift Note     Bedside shift change report given PASTOR Navarro (oncoming nurse) by Adry Flores RN (offgoing nurse).   Report included the following information SBAR, Kardex, MAR, and Recent Results     Shift worked: Days    Shift summary and any significant changes:     Pt spiked fever, tylenol suppository given    Fever continues ketorolac given x1    Tube feeding continues    Labs drawn    Urine collected   Concerns for physician to address: none   Zone phone for oncoming shift:  [phone]      Patient Information  Sim Paige  68 y.o.  7/25/2022 12:04 PM by Nishant Suarez MD. Sim Paige was admitted from Home     Problem List          Patient Active Problem List     Diagnosis Date Noted    Encephalopathy due to COVID-19 virus 07/28/2022    Thrombotic stroke involving left middle cerebral artery ([address] 07/27/2022    Acute alteration in mental status 07/26/2022    Convulsive syncope 07/26/2022    Bilateral carotid artery stenosis 07/26/2022    History of stroke 07/26/2022    CVA (cerebral vascular accident) ([address] 07/25/2022    Stroke ([address] 07/08/2022    Weakness of both legs 06/01/2021    Bilateral lower extremity pain 06/01/2021    Leg pain 05/30/2021    Dilated cardiomyopathy ([address] 12/18/2020    Permanent atrial fibrillation ([address] 12/18/2020    Tachycardia 12/18/2020    A-fib ([address] 12/18/2020    PAF (paroxysmal atrial fibrillation) ([address] 05/16/2018    Age-related cataract 08/01/2017    Allergic rhinitis 06/28/2017    Diverticulosis 06/28/2017    Urinary retention 06/28/2017    PVD (peripheral vascular disease) ([address] 06/28/2017    On statin therapy 06/28/2017    Low back pain 06/28/2017    Insomnia 06/28/2017    HTN (hypertension) 06/28/2017    Hyperlipidemia 06/28/2017    Glucose intolerance (impaired glucose tolerance) 06/28/2017    GERD (gastroesophageal reflux disease) 06/28/2017    ED (erectile dysfunction) 06/28/2017    DJD (degenerative joint disease) 06/28/2017    ASVD (arteriosclerotic vascular disease) 06/28/2017              Past Medical History:   Diagnosis Date    Allergic rhinitis 6/28/2017    Arrhythmia       Paroxysmal Afib- Dr. Patsy Jolly    ASVD (arteriosclerotic vascular disease) 06/28/2017     Story: carotid stenosis followed by Vasc Surg    Atrial fibrillation [facility]      Congestive heart failure ([facility]      Diverticulosis 6/28/2017     Comments: on colonoscopy 12/01    DJD (degenerative joint disease) 6/28/2017    ED (erectile dysfunction) 6/28/2017    GERD (gastroesophageal reflux disease) 6/28/2017    Glucose intolerance (impaired glucose tolerance) 06/28/2017    HTN (hypertension) 6/28/2017    Hyperlipidemia 6/28/2017    Insomnia 6/28/2017    Low back pain 6/28/2017    On statin therapy 6/28/2017    Pacemaker 2020    PVD (peripheral vascular disease) ([facility] 6/28/2017    Rheumatoid arthritis ([facility]      Urinary retention 6/28/2017         Core Measures:  CVA: Yes Yes  CHF:No Not applicable  PNA:No Not applicable     Activity:  Activity Level: Up with Assistance  Number times ambulated in hallways past shift: 0  Number of times OOB to chair past shift: 1     Cardiac:  Cardiac Monitoring: No            Access:   Current line(s): PIV         Genitourinary:   Urinary status: voiding inc and inc briefs  Urinary Catheter? No     Respiratory:   O2 Device: None (Room air)  Chronic home O2 use?: NO  Incentive spirometer at bedside: N/A     GI:  Last Bowel Movement Date: 07/27/22  Current diet:  DIET NPO  Passing flatus: YES  Tolerating current diet: YES     Pain Management:  Patient states pain is manageable on current regimen: YES     Skin:  Ghulam Score: 16  Interventions: increase time out of bed, limit briefs, and internal/external urinary devices    Patient Safety:  Fall Score:  Total Score: 5  Interventions: bed/chair alarm and gripper socks  High Fall Risk: Yes  @Rollbelt  @dexterity to release roll belt  Yes/No ( must document dexterity  here by stating Yes or No here, otherwise this is a restraint and must follow restraint documentation policy.)     DVT prophylaxis:  DVT prophylaxis Med- Yes  DVT prophylaxis SCD or ISAI- No     Wounds: (If Applicable)  Wounds- No  Location none     Active Consults:  IP CONSULT TO HOSPITALIST  IP CONSULT TO NEUROLOGY  IP CONSULT TO GASTROENTEROLOGY     Length of Stay:  Expected LOS: 4d 9h  Actual LOS: 16  Discharge Plan: Yes CM following.   Referral for IPR to Kiran Small Dr, RN

## 2022-08-11 NOTE — PROGRESS NOTES
End of Shift Note     Bedside shift change report given Zay Torrez RN (oncoming nurse) by Alonso Calero LPN(offgoing nurse).   Report included the following information SBAR, Kardex, MAR, and Recent Results     Shift worked: Nights   Shift summary and any significant changes:       Labs drawn       Concerns for physician to address: none   Zone phone for oncoming shift:  [phone]      Patient Information  Nisreen Payton  68 y.o.  7/25/2022 12:04 PM by Teodora Lewis MD. Nisreen Payton was admitted from Home     Problem List          Patient Active Problem List     Diagnosis Date Noted    Encephalopathy due to COVID-19 virus 07/28/2022    Thrombotic stroke involving left middle cerebral artery ([address] 07/27/2022    Acute alteration in mental status 07/26/2022    Convulsive syncope 07/26/2022    Bilateral carotid artery stenosis 07/26/2022    History of stroke 07/26/2022    CVA (cerebral vascular accident) ([address] 07/25/2022    Stroke ([address] 07/08/2022    Weakness of both legs 06/01/2021    Bilateral lower extremity pain 06/01/2021    Leg pain 05/30/2021    Dilated cardiomyopathy ([address] 12/18/2020    Permanent atrial fibrillation ([address] 12/18/2020    Tachycardia 12/18/2020    A-fib ([address] 12/18/2020    PAF (paroxysmal atrial fibrillation) ([address] 05/16/2018    Age-related cataract 08/01/2017    Allergic rhinitis 06/28/2017    Diverticulosis 06/28/2017    Urinary retention 06/28/2017    PVD (peripheral vascular disease) ([address] 06/28/2017    On statin therapy 06/28/2017    Low back pain 06/28/2017    Insomnia 06/28/2017    HTN (hypertension) 06/28/2017    Hyperlipidemia 06/28/2017    Glucose intolerance (impaired glucose tolerance) 06/28/2017    GERD (gastroesophageal reflux disease) 06/28/2017    ED (erectile dysfunction) 06/28/2017    DJD (degenerative joint disease) 06/28/2017    ASVD (arteriosclerotic vascular disease) 06/28/2017              Past Medical History:   Diagnosis Date    Allergic rhinitis 6/28/2017    Arrhythmia Paroxysmal Afib- Dr. Marlen Arguelles    ASVD (arteriosclerotic vascular disease) 06/28/2017     Story: carotid stenosis followed by Vasc Surg    [facility] fibrillation [facility]      Congestive heart failure ([facility]      Diverticulosis 6/28/2017     Comments: on colonoscopy 12/01    DJD (degenerative joint disease) 6/28/2017    ED (erectile dysfunction) 6/28/2017    GERD (gastroesophageal reflux disease) 6/28/2017    Glucose intolerance (impaired glucose tolerance) 06/28/2017    HTN (hypertension) 6/28/2017    Hyperlipidemia 6/28/2017    Insomnia 6/28/2017    Low back pain 6/28/2017    On statin therapy 6/28/2017    Pacemaker 2020    PVD (peripheral vascular disease) ([facility] 6/28/2017    Rheumatoid arthritis ([facility]      Urinary retention 6/28/2017         Core Measures:  CVA: Yes Yes  CHF:No Not applicable  PNA:No Not applicable     Activity:  Activity Level: Up with Assistance  Number times ambulated in hallways past shift: 0  Number of times OOB to chair past shift: 1     Cardiac:  Cardiac Monitoring: No            Access:   Current line(s): PIV         Genitourinary:   Urinary status: voiding inc and inc briefs  Urinary Catheter? No     Respiratory:   O2 Device: None (Room air)  Chronic home O2 use?: NO  Incentive spirometer at bedside: N/A     GI:  Last Bowel Movement Date: 07/27/22  Current diet:  DIET NPO  Passing flatus: YES  Tolerating current diet: YES     Pain Management:  Patient states pain is manageable on current regimen: YES     Skin:  Ghulam Score: 16  Interventions: increase time out of bed, limit briefs, and internal/external urinary devices    Patient Safety:  Fall Score:  Total Score: 5  Interventions: bed/chair alarm and gripper socks  High Fall Risk: Yes  @Rollbelt  @dexterity to release roll belt  Yes/No ( must document dexterity  here by stating Yes or No here, otherwise this is a restraint and must follow restraint documentation policy.)     DVT prophylaxis:  DVT prophylaxis Med- Yes  DVT prophylaxis SCD or ISAI- No     Wounds: (If Applicable)  Wounds- No  Location none     Active Consults:  IP CONSULT TO HOSPITALIST  IP CONSULT TO NEUROLOGY  IP CONSULT TO GASTROENTEROLOGY     Length of Stay:  Expected LOS: 4d 9h  Actual LOS: 16  Discharge Plan: Yes CM following.   Referral for IPR to [address], LPN

## 2022-08-11 NOTE — PROGRESS NOTES
Hospitalist Progress Note    NAME: Maribel Georges   :  1945   MRN:  [id number]     Estimated discharge date: 2022    Barriers: awaiting SNF placement, medically ready for discharge    Assessment / Plan:    Severe spesis  Fever 101.1 on    Likely due to pnemonia  COVID-19 + since admit  Still febrile  CXR showed: Increased interstitial process could represent interstitial edema or an atypical  or viral pneumonia  F/u with sputum culture  Legionella -ve  Mycoplasma positive, started on azithromycin for atypical coverage    Continue with cefepime and vanco    Acute left basal ganglia ischemic infarct POA   ? Vascular dementia  Essential HTN POA  PAF POA  - Recent admission in 2022, for left facial droop, suspected TIA   MRI done which was negative for CVA. EEG was done which was negative. - Presented to ED for 1 week of sudden cognitive decline, confusion. Dragging of right leg.  - CTA head and neck IMPRESSION  No evidence of acute intracranial vessel occlusion. Multifocal moderate to severe stenoses with occlusion of the distal left              vertebral artery at the V3-V4 junction. There is no intracranial mass, hemorrhage or evidence of acute infarction. No acute intracranial process is identified. s-CT head negative for bleed  - MRI brain showed   1. Acute left basal ganglia ischemic infarct. 2. Atrophy and white matter disease, remote infarcts stable otherwise. - MRI Lumbar spine showed   1. Postsurgical changes L4-5 L5-S1 stable. 2. Spinal canal stenosis L3-L4.  - Telemetry  - Carotid duplex  Moderate stenosis in the right ICA (50-69%). Mild stenosis in the left ICA (<50%). Right vertebral is antegrade. Left vertebral is antegrade.   - Aspirin 162 mg daily, Eliquis 5 mg twice daily.    - C/w statin (not currently high intensity)  - Neuro followed  - PT/ST/OT.   - SNF.    - Dysphagia s/p PEG  - Vit B12 ~ 1152, folate 9.3, homocysteine 21.9, TSH 0.47  - ALAN negative  - Antineuronal cell AB pending    - Blood cultures NGTD  - Echocardiogram 7/11 LVEF 45-50%, normal wall motion   No AS or MS  - DC IVF  -Continue tube feeds  - still not sleeping well, agitated at night despite increased melatonin  - D/W wife re risks and benefits of seroquel, including small cardiac event risk   Start seroquel  - Slept better overnight   - awaiting SNF     PEG  GI adjusted bumper  TF resumed. Tolerating per wife     Hypophosphatemia  Monitor and replete PRN     Agitation  Melatonin QHS  Neuro added buspar and lexapro     Cough  Aspiration? CXR no acute changes. Remains on RA     COVID + ve  Patient tested +ve for covid on 7/8 as well  Reason for CVA? Re-screen still +ve  Not exhibiting any respiratory symptoms, remains on room air    CAD  S/p pacemaker  History of grade 3 diastolic dysfunction  C/w coreg     Recurrent fall  Chronic back pain  -cont flexeril  -MRI lumbar spine   1. Postsurgical changes L4-5 L5-S1 stable. 2. Spinal canal stenosis L3-L4. BPH  C/w flomax     Hypernatremia (resolved)  Hypophosphatemia  DC IVF  Monitor and replete phos     Code Status: full code  Surrogate Decision Maker:wife     DVT Prophylaxis: eliquis  GI Prophylaxis: not indicated     18.5 - 24.9 Normal weight / Body mass index is 23.83 kg/m². Recommended Disposition: SNF/LTC    Subjective:     Chief Complaint / Reason for Physician Visit  Patient seen and examined at the bedside, spoke with Son  \"Okay\" denies complaints  Slept better, not agitated as much per wife  Awaiting SNF placement. No further fevers  Discussed with RN events overnight.      Review of Systems:  Symptom Y/N Comment  Symptom Y/N Comments   Fever/Chills n   Chest Pain n    Poor Appetite    Edema     Cough n   Abdominal Pain n    Sputum    Joint Pain     SOB/HANNA n   Pruritis/Rash     Nausea/vomit n   Tolerating PT/OT     Diarrhea n   Tolerating Diet y    Constipation    Other       Could NOT obtain due to:      Objective:     VITALS: Last 24hrs VS reviewed since prior progress note. Most recent are:  Patient Vitals for the past 24 hrs:   Temp Pulse Resp BP SpO2   08/11/22 0742 (!) 100.7 °F (38.2 °C) 67 16 132/88 97 %   08/11/22 0315 97.8 °F (36.6 °C) 68 18 (!) 139/99 96 %   08/10/22 2347 98 °F (36.7 °C) 69 18 125/82 98 %   08/10/22 2007 97.9 °F (36.6 °C) 69 16 (!) 123/58 99 %   08/10/22 1726 97.9 °F (36.6 °C) -- -- -- --   08/10/22 1554 97.4 °F (36.3 °C) 70 18 (!) 105/54 96 %   08/10/22 1242 99.9 °F (37.7 °C) -- -- -- --       Intake/Output Summary (Last 24 hours) at 8/11/2022 0813  Last data filed at 8/11/2022 0735  Gross per 24 hour   Intake 300 ml   Output --   Net 300 ml        I had a face to face encounter and independently examined this patient on 8/11/2022, as outlined below:  PHYSICAL EXAM:  General: WD, WN. Awake, , no acute distress    EENT:  PERRLA. Anicteric sclerae. MMM  Resp:  CTA bilaterally, no wheezing or rales. No accessory muscle use  CV:  Regular  rhythm,  No edema  GI:  Soft, Non distended, Non tender. +Bowel sounds  Neurologic:  Alert, aphasia. Does follow simple commands  Psych:   Poor  insight. Not anxious nor agitated  Skin:  No rashes. No jaundice    Reviewed most current lab test results and cultures  YES  Reviewed most current radiology test results   YES  Review and summation of old records today    NO  Reviewed patient's current orders and MAR    YES  PMH/SH reviewed - no change compared to H&P  ________________________________________________________________________  Care Plan discussed with:    Comments   Patient y    Family  y Wife at bedside   RN y    Care Manager y    Consultant                        Multidiciplinary team rounds were held today with , nursing, pharmacist and clinical coordinator. Patient's plan of care was discussed; medications were reviewed and discharge planning was addressed.      ________________________________________________________________________      Comments >50% of visit spent in counseling and coordination of care     ________________________________________________________________________  Amanda Fisher MD     Procedures: see electronic medical records for all procedures/Xrays and details which were not copied into this note but were reviewed prior to creation of Plan. LABS:  I reviewed today's most current labs and imaging studies. Pertinent labs include:  Recent Labs     08/11/22  0158 08/09/22  1816   WBC 15.3* 10.7   HGB 11.0* 11.1*   HCT 32.5* 31.8*    230     Recent Labs     08/10/22  0324 08/09/22  1816   * 134*   K 4.9 4.2    102   CO2 30 28   * 252*   BUN 20 21*   CREA 0.93 0.98   CA 9.1 8.9   ALB  --  2.1*   TBILI  --  0.9   ALT  --  35       Signed:  Amanda Fisher MD

## 2022-08-11 NOTE — PROGRESS NOTES
Physical Therapy    Chart reviewed and spoke with RN/SLP. Pt highly drowsy today and not arousable to sternal rubs, verbal cueing, or ice chips to lip (per SLP). Will defer this date and follow up tomorrow when pt able to participate in skilled mobility interventions.      Anca Lo PT, DPT, NCS

## 2022-08-11 NOTE — PROGRESS NOTES
Speech Pathology Note    Chart reviewed and spoke with RN. SLP visit attempted, however patient drowsy on this date and unable to arouse despite maximal cues (i.e. lights on, HOB elevated, sternal rub, ice chip to lip). Patient not appropriate for PO trials on this date. Will defer and continue to follow as patient is appropriate. Thank you.     Bharat Cardoso M.S., CCC-SLP

## 2022-08-11 NOTE — PROGRESS NOTES
Comprehensive Nutrition Assessment    Type and Reason for Visit: Reassess    Nutrition Recommendations/Plan:   Continue current TF regimen   Bowel regimen - no documented BM since 7/31? Nutrition Assessment:    Chart reviewed; patient continues with PEG feedings of Jevity 1.5 @ 55 mL/hr. Tolerating at goal rate. New fever and sepsis noted; likely pneumonia per notes. Continue current nutrition plan of care. Nutrition Related Findings:    BM 7/31  Atorvastatin, Buspar, Coreg, Lexapro, humalog, melatonin   [phone]    Current Nutrition Intake & Therapies:        Current Tube Feeding (TF) Orders:   Feeding Route:  PEG  Formula:  Standard with fiber (Jevity 1.5)  Schedule:   Continuous  Regimen:  55 mL/hr  Additives/Modulars:  None  Water Flushes:  150 mL Q 4  Current TF & Flush Orders Provides:  1980 kcals, 84g protein, 1903 mL water    Anthropometric Measures:  Height: 5' 10\" (177.8 cm)  Ideal Body Weight (IBW): 166 lbs (75 kg)     Current Body Wt:  75.2 kg (165 lb 12.6 oz), 100 % IBW. Current BMI (kg/m2): 23.8                          BMI Category: Normal weight (BMI 22.0-24.9) age over 72    Estimated Daily Nutrient Needs:  Energy Requirements Based On: Formula  Weight Used for Energy Requirements: Current  Energy (kcal/day): 1932 kcals (BMR 1486 x 1. 3AF)  Weight Used for Protein Requirements: Current  Protein (g/day): 75g (1.0  g/kg bw)  Method Used for Fluid Requirements: 1 ml/kcal  Fluid (ml/day): 1900 mL    Nutrition Diagnosis:   Swallowing difficulty related to cognitive or neurological impairment as evidenced by NPO or clear liquid status due to medical condition, nutrition support-enteral nutrition    Nutrition Interventions:   Food and/or Nutrient Delivery: Continue tube feeding  Nutrition Education/Counseling: No recommendations at this time  Coordination of Nutrition Care: Continue to monitor while inpatient       Goals:  Previous Goal Met: Goal(s) achieved  Goals:  Tolerate nutrition support at goal rate, by next RD assessment       Nutrition Monitoring and Evaluation:   Behavioral-Environmental Outcomes: None identified  Food/Nutrient Intake Outcomes: Enteral nutrition intake/tolerance  Physical Signs/Symptoms Outcomes: Biochemical data, Weight    Discharge Planning:    Enteral nutrition    Asher Del Rio RD  Contact: ext [phone]

## 2022-08-11 NOTE — PROGRESS NOTES
Occupational Therapy note:    Chart reviewed and spoke with RN/SLP. Pt highly drowsy today and not arousable to sternal rubs, verbal cueing, or ice chips to lip (per SLP). Will defer this date and follow up tomorrow when pt able to participate in skilled mobility interventions.      Elo Jesus, OTR/L

## 2022-08-11 NOTE — PROGRESS NOTES
Problem: Patient Education: Go to Patient Education Activity  Goal: Patient/Family Education  Outcome: Progressing Towards Goal     Problem: Patient Education: Go to Patient Education Activity  Goal: Patient/Family Education  Outcome: Progressing Towards Goal     Problem: Patient Education: Go to Patient Education Activity  Goal: Patient/Family Education  Outcome: Progressing Towards Goal     Problem: Airway Clearance - Ineffective  Goal: Achieve or maintain patent airway  Outcome: Progressing Towards Goal     Problem: Gas Exchange - Impaired  Goal: Absence of hypoxia  Outcome: Progressing Towards Goal  Goal: Promote optimal lung function  Outcome: Progressing Towards Goal     Problem: Breathing Pattern - Ineffective  Goal: Ability to achieve and maintain a regular respiratory rate  Outcome: Progressing Towards Goal     Problem:  Body Temperature -  Risk of, Imbalanced  Goal: Ability to maintain a body temperature within defined limits  Outcome: Progressing Towards Goal  Goal: Will regain or maintain usual level of consciousness  Outcome: Progressing Towards Goal  Goal: Complications related to the disease process, condition or treatment will be avoided or minimized  Outcome: Progressing Towards Goal     Problem: Isolation Precautions - Risk of Spread of Infection  Goal: Prevent transmission of infectious organism to others  Outcome: Progressing Towards Goal     Problem: Nutrition Deficits  Goal: Optimize nutrtional status  Outcome: Progressing Towards Goal     Problem: Risk for Fluid Volume Deficit  Goal: Maintain normal heart rhythm  Outcome: Progressing Towards Goal  Goal: Maintain absence of muscle cramping  Outcome: Progressing Towards Goal  Goal: Maintain normal serum potassium, sodium, calcium, phosphorus, and pH  Outcome: Progressing Towards Goal     Problem: Loneliness or Risk for Loneliness  Goal: Demonstrate positive use of time alone when socialization is not possible  Outcome: Progressing Towards Goal     Problem: Fatigue  Goal: Verbalize increase energy and improved vitality  Outcome: Progressing Towards Goal     Problem: Patient Education: Go to Patient Education Activity  Goal: Patient/Family Education  Outcome: Progressing Towards Goal     Problem: Delirium Treatment  Goal: *Level of consciousness restored to baseline  Outcome: Progressing Towards Goal  Goal: *Level of environmental perceptions restored to baseline  Outcome: Progressing Towards Goal  Goal: *Sensory perception restored to baseline  Outcome: Progressing Towards Goal  Goal: *Emotional stability restored to baseline  Outcome: Progressing Towards Goal  Goal: *Functional assessment restored to baseline  Outcome: Progressing Towards Goal  Goal: *Absence of falls  Outcome: Progressing Towards Goal  Goal: *Will remain free of delirium, CAM Score negative  Outcome: Progressing Towards Goal  Goal: *Cognitive status will be restored to baseline  Outcome: Progressing Towards Goal  Goal: Interventions  Outcome: Progressing Towards Goal     Problem: Patient Education: Go to Patient Education Activity  Goal: Patient/Family Education  Outcome: Progressing Towards Goal     Problem: Falls - Risk of  Goal: *Absence of Falls  Description: Document [place] Fall Risk and appropriate interventions in the flowsheet.   Outcome: Progressing Towards Goal  Note: Fall Risk Interventions:  Mobility Interventions: Bed/chair exit alarm    Mentation Interventions: Bed/chair exit alarm    Medication Interventions: Bed/chair exit alarm    Elimination Interventions: Bed/chair exit alarm    History of Falls Interventions: Bed/chair exit alarm         Problem: Patient Education: Go to Patient Education Activity  Goal: Patient/Family Education  Outcome: Progressing Towards Goal     Problem: Non-Violent Restraints  Goal: Removal from restraints as soon as assessed to be safe  Outcome: Progressing Towards Goal  Goal: No harm/injury to patient while restraints in use  Outcome: Progressing Towards Goal  Goal: Patient's dignity will be maintained  Outcome: Progressing Towards Goal  Goal: Patient Interventions  Outcome: Progressing Towards Goal

## 2022-08-11 NOTE — PROGRESS NOTES
End of Shift Note     Bedside shift change report given to Michelle Gonzáles RN (oncoming nurse) by Sergio Mustafa RN (offgoing nurse).   Report included the following information SBAR, Kardex, MAR, and Recent Results     Shift worked: Days    Shift summary and any significant changes:     Tube feeding continues, pt tolerating well    Concerns for physician to address: None    Zone phone for oncoming shift:  [phone]      Patient Information  Ashwin Ott  68 y.o.  7/25/2022 12:04 PM by Yvrose Vee MD. Ashwin Ott was admitted from Home     Problem List          Patient Active Problem List     Diagnosis Date Noted    Encephalopathy due to COVID-19 virus 07/28/2022    Thrombotic stroke involving left middle cerebral artery ([address] 07/27/2022    Acute alteration in mental status 07/26/2022    Convulsive syncope 07/26/2022    Bilateral carotid artery stenosis 07/26/2022    History of stroke 07/26/2022    CVA (cerebral vascular accident) ([address] 07/25/2022    Stroke ([address] 07/08/2022    Weakness of both legs 06/01/2021    Bilateral lower extremity pain 06/01/2021    Leg pain 05/30/2021    Dilated cardiomyopathy ([address] 12/18/2020    Permanent atrial fibrillation ([address] 12/18/2020    Tachycardia 12/18/2020    A-fib ([address] 12/18/2020    PAF (paroxysmal atrial fibrillation) ([address] 05/16/2018    Age-related cataract 08/01/2017    Allergic rhinitis 06/28/2017    Diverticulosis 06/28/2017    Urinary retention 06/28/2017    PVD (peripheral vascular disease) ([address] 06/28/2017    On statin therapy 06/28/2017    Low back pain 06/28/2017    Insomnia 06/28/2017    HTN (hypertension) 06/28/2017    Hyperlipidemia 06/28/2017    Glucose intolerance (impaired glucose tolerance) 06/28/2017    GERD (gastroesophageal reflux disease) 06/28/2017    ED (erectile dysfunction) 06/28/2017    DJD (degenerative joint disease) 06/28/2017    ASVD (arteriosclerotic vascular disease) 06/28/2017              Past Medical History:   Diagnosis Date    Allergic rhinitis 6/28/2017    Arrhythmia       Paroxysmal Afib- Dr. Leyla Thibodeaux    ASVD (arteriosclerotic vascular disease) 06/28/2017     Story: carotid stenosis followed by Vasc Surg    Atrial fibrillation ([facility]      Congestive heart failure ([facility]      Diverticulosis 6/28/2017     Comments: on colonoscopy 12/01    DJD (degenerative joint disease) 6/28/2017    ED (erectile dysfunction) 6/28/2017    GERD (gastroesophageal reflux disease) 6/28/2017    Glucose intolerance (impaired glucose tolerance) 06/28/2017    HTN (hypertension) 6/28/2017    Hyperlipidemia 6/28/2017    Insomnia 6/28/2017    Low back pain 6/28/2017    On statin therapy 6/28/2017    Pacemaker 2020    PVD (peripheral vascular disease) ([facility] 6/28/2017    Rheumatoid arthritis ([facility]      Urinary retention 6/28/2017         Core Measures:  CVA: Yes Yes  CHF:No Not applicable  PNA:No Not applicable     Activity:  Activity Level: Up with Assistance  Number times ambulated in hallways past shift: 0  Number of times OOB to chair past shift: 1     Cardiac:  Cardiac Monitoring: No            Access:   Current line(s): PIV         Genitourinary:   Urinary status: voiding inc and inc briefs  Urinary Catheter? No     Respiratory:   O2 Device: None (Room air)  Chronic home O2 use?: NO  Incentive spirometer at bedside: N/A     GI:  Last Bowel Movement Date: 07/27/22  Current diet:  DIET NPO  Passing flatus: YES  Tolerating current diet: YES     Pain Management:  Patient states pain is manageable on current regimen: YES     Skin:  Ghulam Score: 16  Interventions: increase time out of bed, limit briefs, and internal/external urinary devices    Patient Safety:  Fall Score:  Total Score: 5  Interventions: bed/chair alarm and gripper socks  High Fall Risk: Yes  @Rollbelt  @dexterity to release roll belt  Yes/No ( must document dexterity  here by stating Yes or No here, otherwise this is a restraint and must follow restraint documentation policy.)     DVT prophylaxis:  DVT prophylaxis Med- Yes  DVT prophylaxis SCD or ISAI- No     Wounds: (If Applicable)  Wounds- No  Location none     Active Consults:  IP CONSULT TO HOSPITALIST  IP CONSULT TO NEUROLOGY  IP CONSULT TO GASTROENTEROLOGY     Length of Stay:  Expected LOS: 4d 9h  Actual LOS: 16  Discharge Plan: Yes CM following.   Referral for IPR to Silvio Jack RN

## 2022-08-12 NOTE — PROGRESS NOTES
Gastroenterology Daily Progress Note   Nas Alethea, [address] for Dr. Sylvie Laird)   [facility]    Admit Date: 7/25/2022     Peg tube drainage    Subjective:        We were called back to see the patient due to some peg tube drainage noted on the binder  There was a small amount of black material on the binder that looked like dried blood  Patient's hgb 11.0  Patient is on eliquis  There was no bleeding or drainage from the stoma of the peg  Patient was tolerating tube feeds well without difficulty  No apparent abd tenderness around the peg/stoma    Current Facility-Administered Medications   Medication Dose Route Frequency    vancomycin (VANCOCIN) 1250 mg in  ml infusion  1,250 mg IntraVENous Q18H    glucose chewable tablet 16 g  4 Tablet Oral PRN    glucagon (GLUCAGEN) injection 1 mg  1 mg IntraMUSCular PRN    insulin lispro (HUMALOG) injection   SubCUTAneous Q6H    cefepime (MAXIPIME) 1 g in 0.9% sodium chloride (MBP/ADV) 50 mL MBP  1 g IntraVENous Q8H    0.9% sodium chloride infusion  75 mL/hr IntraVENous CONTINUOUS    azithromycin (ZITHROMAX) 500 mg in 0.9% sodium chloride 250 mL ([id number])  500 mg IntraVENous Q24H    [Held by provider] QUEtiapine (SEROquel) tablet 25 mg  25 mg Oral QHS    nystatin (MYCOSTATIN) 100,000 unit/gram powder   Topical BID    melatonin tablet 10.5 mg  10.5 mg Oral QHS    apixaban (ELIQUIS) tablet 5 mg  5 mg Per G Tube BID    sodium chloride (NS) flush 5-40 mL  5-40 mL IntraVENous Q8H    sodium chloride (NS) flush 5-40 mL  5-40 mL IntraVENous PRN    simethicone (MYLICON) 81LP/9.1TC oral drops 80 mg  1.2 mL Oral Multiple    traMADoL (ULTRAM) tablet 50 mg  50 mg Oral Q6H PRN    morphine injection 1 mg  1 mg IntraVENous Q6H PRN    glucagon (GLUCAGEN) injection 1 mg  1 mg IntraMUSCular PRN    dextrose 10% infusion 0-250 mL  0-250 mL IntraVENous PRN    aspirin chewable tablet 162 mg  162 mg Oral DAILY    escitalopram oxalate (LEXAPRO) tablet 10 mg  10 mg Oral DAILY WITH DINNER    busPIRone (BUSPAR) tablet 15 mg  15 mg Oral TID    acetaminophen (TYLENOL) tablet 650 mg  650 mg Oral Q4H PRN    Or    acetaminophen (TYLENOL) solution 650 mg  650 mg Per NG tube Q4H PRN    Or    acetaminophen (TYLENOL) suppository 650 mg  650 mg Rectal Q4H PRN    carvediloL (COREG) tablet 3.125 mg  3.125 mg Oral DAILY    tamsulosin (FLOMAX) capsule 0.4 mg  0.4 mg Oral DAILY    atorvastatin (LIPITOR) tablet 20 mg  20 mg Oral QHS    cyclobenzaprine (FLEXERIL) tablet 5 mg  5 mg Oral TID PRN    ondansetron (ZOFRAN) injection 4 mg  4 mg IntraVENous Q8H PRN        Objective:     Visit Vitals  /86   Pulse 75   Temp 98.4 °F (36.9 °C)   Resp 16   Ht 5' 10\" (1.778 m)   Wt 76.1 kg (167 lb 12.3 oz)   SpO2 98%   BMI 24.07 kg/m²   Blood pressure 137/86, pulse 75, temperature 98.4 °F (36.9 °C), resp. rate 16, height 5' 10\" (1.778 m), weight 76.1 kg (167 lb 12.3 oz), SpO2 98 %.    08/12 0701 - 08/12 1900  In: 150   Out: -     08/10 1901 - 08/12 0700  In: 450   Out: -       Intake/Output Summary (Last 24 hours) at 8/12/2022 1150  Last data filed at 8/12/2022 1004  Gross per 24 hour   Intake 300 ml   Output --   Net 300 ml         Physical Exam:       General: elderly white male with sequelae from cva  Chest:  CTA, No rhonchi, rales or rubs. Heart: S1, S2, RRR  GI: Soft, ND + bowel sounds, peg in good position. Bumper at 3cm. No drainage or bleeding from the stoma. No apparent tenderness.   No masses appreciated  Extremities: no edema   CNS: CN II-XII normal.      Labs:       Recent Results (from the past 24 hour(s))   GLUCOSE, POC    Collection Time: 08/11/22 12:11 PM   Result Value Ref Range    Glucose (POC) 329 (H) 65 - 117 mg/dL    Performed by [facility]    GLUCOSE, POC    Collection Time: 08/11/22  5:31 PM   Result Value Ref Range    Glucose (POC) 291 (H) 65 - 117 mg/dL    Performed by [facility]    GLUCOSE, POC    Collection Time: 08/11/22 11:20 PM   Result Value Ref Range    Glucose (POC) 300 (H) 65 - 117 mg/dL    Performed by Andra Castro (RODRIGO RN)    METABOLIC PANEL, BASIC    Collection Time: 08/12/22 12:56 AM   Result Value Ref Range    Sodium 135 (L) 136 - 145 mmol/L    Potassium 4.8 3.5 - 5.1 mmol/L    Chloride 104 97 - 108 mmol/L    CO2 24 21 - 32 mmol/L    Anion gap 7 5 - 15 mmol/L    Glucose 252 (H) 65 - 100 mg/dL    BUN 27 (H) 6 - 20 MG/DL    Creatinine 0.92 0.70 - 1.30 MG/DL    BUN/Creatinine ratio 29 (H) 12 - 20      GFR est AA >60 >60 ml/min/1.73m2    GFR est non-AA >60 >60 ml/min/1.73m2    Calcium 8.5 8.5 - 10.1 MG/DL   VANCOMYCIN, RANDOM    Collection Time: 08/12/22 12:56 AM   Result Value Ref Range    Vancomycin, random 13.0 UG/ML   GLUCOSE, POC    Collection Time: 08/12/22  5:19 AM   Result Value Ref Range    Glucose (POC) 285 (H) 65 - 117 mg/dL    Performed by Andra Castro (RODRIGO RN)    GLUCOSE, POC    Collection Time: 08/12/22  7:59 AM   Result Value Ref Range    Glucose (POC) 282 (H) 65 - 117 mg/dL    Performed by Martha Green RN    LABRCNT(wbc:2,hgb:2,hct:2,plt:2,)  Recent Labs     08/12/22  0056 08/10/22  0324 08/09/22  1816   * 134* 134*   K 4.8 4.9 4.2    100 102   CO2 24 30 28   BUN 27* 20 21*   CREA 0.92 0.93 0.98   * 195* 252*   CA 8.5 9.1 8.9   LABRCNT(sgot:3,gpt:3,ap:3,tbiL:3,TP:3,ALB:3,GLOB:3,ggt:3,aml:3,amyp:3,lpse:3,hlpse:3)No results for input(s): INR, PTP, APTT, INREXT in the last 72 hours. Recent Labs     08/09/22  1816   AP 97   TP 5.8*   ALB 2.1*   GLOB 3.7   BRIEFLAB(B12,FOL,FOLAT,RBCF)  Lab Results   Component Value Date/Time    Folate 9.3 07/27/2022 04:22 AM   LABRCNT(CPK:3,CpKMB:3,ckndx:3,troiq:3)No components found for: GLPOCBRIEFLAB(CHOL,CHOLX,CHOLP,CHLST,CHOLV,HDL,HDLC,HDLP,LDL,DLDL,LDLC,DLDLP,TGL,TGLX,TRIGL,TRIGP,CHHD,CHHDX)No results for input(s): PH, PCO2, PO2 in the last 72 hours. LABRCNT(CPK:3,CpKMB:3,ckndx:3,troiq:3)Eli Blackman [place]  Recent Labs     08/09/22 1816   CPK 86   MEShannon Gabrielle Monaema      Problem List: Active Problems:    CVA (cerebral vascular accident) ([address] (7/25/2022)      Acute alteration in mental status (7/26/2022)      Convulsive syncope (7/26/2022)      Bilateral carotid artery stenosis (7/26/2022)      History of stroke (7/26/2022)      Thrombotic stroke involving left middle cerebral artery ([facility] (7/27/2022)      Encephalopathy due to COVID-19 virus (7/28/2022)        Impression:  Attention to PEG Tube  CVA  Anticoagulation with Eliquis         Plan:  The dried material appears to be a small amount of old blood. However the patient's hgb has not dropped significantly and he does not have any active bleeding from the stoma. There is no sign of purulent drainage or infection and the peg is functioning well without issues. No apparent tenderness on exam.  Recommend getting a larger binder as the current one is pretty tight for patient's body habitus. Discussed with RN. Nothing further to do at this time. Continue using for TF's as you are and keep the site clean and dry. We will see again on request.         GUERO Layton  12:00 PM   8/12/2022  [address], [address] [place], [place] [id number]  Loc: [phone]        Leo Rogers had a PEG placed 8.1.22 (Dr Augustine Null) and we are asked to see again for some dark material around the PEG site winston: on the binder. This is not unusual while he is on a blood thinner. As his hgb is stable no further work up is needed. Please re-consult us prn.     Active Problems:    CVA (cerebral vascular accident) ([facility] (7/25/2022)      Acute alteration in mental status (7/26/2022)      Convulsive syncope (7/26/2022)      Bilateral carotid artery stenosis (7/26/2022)      History of stroke (7/26/2022)      Thrombotic stroke involving left middle cerebral artery ([facility] (7/27/2022)      Encephalopathy due to COVID-19 virus (7/28/2022)     Latest Reference Range & Units 8/6/22 02:47 8/8/22 04:03 8/9/22 18:16 8/11/22 01:58   HGB 12.1 - 17.0 g/dL 12.4 12.9 11.1 (L) 11.0 (L)   HCT 36.6 - 50.3 % 35.6 (L) 37.5 31.8 (L) 32.5 (L)   (L): Data is abnormally low      Pertinent labs, provider notes and radiological testing was reviewed by me  I discussed the case with the [facility] Provider. I have personally reviewed the history and independently examined the patient. I have reviewed the chart and agree with the documentation recorded by the Mid Level Provider, including the assessment, treatment plan, and disposition. Michael Gamino MD

## 2022-08-12 NOTE — PROGRESS NOTES
Transition of Care Plan:     RUR: 16% - \"moderate risk\"  LOS: 18 days  Disposition: COVID positive SNF placement - [address] (pending medical stability/insurance auth)  Insurance auth: SNF initiated insurance auth 22, auth approved 22, auth  8/10/22  Follow up appointments: PCP & specialist as indicated   DME needed: Defer to SNF for DME needs  Transportation at Discharge: BLS transport needed  Js Ro or means to access home: N/A - pt transitioning to SNF at d/c     IM Medicare Letter: 2nd IM needed prior to d/c  Is patient a BCPI-A Bundle: N/A         Is patient a [place] and connected with the [place]? Yes - clinical updates faxed to the [facility] throughout course of admission  Caregiver Contact: Pt's wife Yolanda Marx: [phone])  Discharge Caregiver contacted prior to discharge? To be contacted prior to d/c  Care Conference needed?: N/A  COVID-19 test: PCR COVID-19 test completed 22; results positive     Initial note: Chart reviewed, IDR completed. MD reported pt is not medically stable for d/c. CM contacted [facility] [address] admission liaison Deyvi Cadet: [phone]) to report update & inquire when insurance auth expires; Sarai Houser reported Jenna Mora  8/10/22. Facility will have to reinitiate insurance auth early next week pending medical stability. Clinical updates faxed to the [facility] for reference (f: [phone]). CM will continue to follow & remain accessible for d/c planning.     Carlene Lindo, MSW  Care Manager, [address] [facility]

## 2022-08-12 NOTE — PROGRESS NOTES
Hospitalist Progress Note    NAME: Ashwin Ott   :  1945   MRN:  [id number]     Estimated discharge date: 2022    Barriers: awaiting SNF placement, medically ready for discharge    Assessment / Plan:    Severe spesis  Developed Fever 101.1 on    Likely due to pnemonia  COVID-19 + since admit  Still febrile  CXR showed: Increased interstitial process could represent interstitial edema or an atypical  or viral pneumonia  F/u with sputum culture  Legionella -ve  Mycoplasma positive, started on azithromycin for atypical coverage (day 3)  Continue with cefepime and vanco   Has low grade fever overnight    Acute left basal ganglia ischemic infarct POA   ? Vascular dementia  Essential HTN POA  PAF POA  - Recent admission in 2022, for left facial droop, suspected TIA   MRI done which was negative for CVA. EEG was done which was negative. - Presented to ED for 1 week of sudden cognitive decline, confusion. Dragging of right leg.  - CTA head and neck IMPRESSION  No evidence of acute intracranial vessel occlusion. Multifocal moderate to severe stenoses with occlusion of the distal left              vertebral artery at the V3-V4 junction. There is no intracranial mass, hemorrhage or evidence of acute infarction. No acute intracranial process is identified. s-CT head negative for bleed  - MRI brain showed   1. Acute left basal ganglia ischemic infarct. 2. Atrophy and white matter disease, remote infarcts stable otherwise. - MRI Lumbar spine showed   1. Postsurgical changes L4-5 L5-S1 stable. 2. Spinal canal stenosis L3-L4.  - Telemetry  - Carotid duplex  Moderate stenosis in the right ICA (50-69%). Mild stenosis in the left ICA (<50%). Right vertebral is antegrade. Left vertebral is antegrade.   - Aspirin 162 mg daily, Eliquis 5 mg twice daily.    - C/w statin (not currently high intensity)  - Neuro followed  - PT/ST/OT.   - SNF.    - Dysphagia s/p PEG  - Vit B12 ~ 1152, folate 9.3, homocysteine 21.9, TSH 0.47  - ALAN negative  - Antineuronal cell AB pending    - Blood cultures NGTD  - Echocardiogram 7/11 LVEF 45-50%, normal wall motion   No AS or MS  - DC IVF  -Continue tube feeds  - still not sleeping well, agitated at night despite increased melatonin  - D/W wife re risks and benefits of seroquel, including small cardiac event risk   Start seroquel  - Slept better overnight   - awaiting SNF     PEG  GI adjusted bumper  TF resumed. Tolerating per wife     Hypophosphatemia  Monitor and replete PRN     Agitation  Melatonin QHS  Neuro added buspar and lexapro     Cough  Aspiration? CXR no acute changes. Remains on RA     COVID + ve  Patient tested +ve for covid on 7/8 as well  Reason for CVA? Re-screen still +ve  Not exhibiting any respiratory symptoms, remains on room air    CAD  S/p pacemaker  History of grade 3 diastolic dysfunction  C/w coreg     Recurrent fall  Chronic back pain  -cont flexeril  -MRI lumbar spine   1. Postsurgical changes L4-5 L5-S1 stable. 2. Spinal canal stenosis L3-L4. BPH  C/w flomax     Hypernatremia (resolved)  Hypophosphatemia  DC IVF  Monitor and replete phos     Code Status: full code  Surrogate Decision Maker:wife     DVT Prophylaxis: eliquis  GI Prophylaxis: not indicated     18.5 - 24.9 Normal weight / Body mass index is 23.83 kg/m². Recommended Disposition: SNF/LTC    Subjective:     Chief Complaint / Reason for Physician Visit  Patient seen and examined , with wife and other family members at the bedside Slept better, not agitated as much per wife    Discussed with RN events overnight.      Review of Systems:  Symptom Y/N Comment  Symptom Y/N Comments   Fever/Chills n   Chest Pain n    Poor Appetite    Edema     Cough n   Abdominal Pain n    Sputum    Joint Pain     SOB/HANNA n   Pruritis/Rash     Nausea/vomit n   Tolerating PT/OT     Diarrhea n   Tolerating Diet y    Constipation    Other       Could NOT obtain due to:      Objective:     VITALS: Last 24hrs VS reviewed since prior progress note. Most recent are:  Patient Vitals for the past 24 hrs:   Temp Pulse Resp BP SpO2   08/12/22 0750 98.6 °F (37 °C) 70 16 (!) 148/62 98 %   08/12/22 0301 100 °F (37.8 °C) 70 18 (!) 149/50 97 %   08/11/22 2322 (!) 100.5 °F (38.1 °C) 71 19 (!) 129/45 96 %   08/11/22 1943 99.5 °F (37.5 °C) 70 17 101/61 99 %   08/11/22 1620 98.8 °F (37.1 °C) 69 16 (!) 143/61 97 %         Intake/Output Summary (Last 24 hours) at 8/12/2022 0816  Last data filed at 8/11/2022 1943  Gross per 24 hour   Intake 150 ml   Output --   Net 150 ml          I had a face to face encounter and independently examined this patient on 8/12/2022, as outlined below:  PHYSICAL EXAM:  General: WD, WN. Awake, , no acute distress    EENT:  PERRLA. Anicteric sclerae. MMM  Resp:  CTA bilaterally, no wheezing or rales. No accessory muscle use  CV:  Regular  rhythm,  No edema  GI:  Soft, Non distended, Non tender. +Bowel sounds  Neurologic:  Alert, aphasia. Does follow simple commands  Psych:   Poor  insight. Not anxious nor agitated  Skin:  No rashes. No jaundice    Reviewed most current lab test results and cultures  YES  Reviewed most current radiology test results   YES  Review and summation of old records today    NO  Reviewed patient's current orders and MAR    YES  PMH/SH reviewed - no change compared to H&P  ________________________________________________________________________  Care Plan discussed with:    Comments   Patient y    Family  y Wife at bedside   RN y    Care Manager y    Consultant                        Multidiciplinary team rounds were held today with , nursing, pharmacist and clinical coordinator. Patient's plan of care was discussed; medications were reviewed and discharge planning was addressed.      ________________________________________________________________________      Comments   >50% of visit spent in counseling and coordination of care ________________________________________________________________________  Ceasar Rodney MD     Procedures: see electronic medical records for all procedures/Xrays and details which were not copied into this note but were reviewed prior to creation of Plan. LABS:  I reviewed today's most current labs and imaging studies. Pertinent labs include:  Recent Labs     08/11/22  0158 08/09/22  1816   WBC 15.3* 10.7   HGB 11.0* 11.1*   HCT 32.5* 31.8*    230       Recent Labs     08/12/22  0056 08/10/22  0324 08/09/22  1816   * 134* 134*   K 4.8 4.9 4.2    100 102   CO2 24 30 28   * 195* 252*   BUN 27* 20 21*   CREA 0.92 0.93 0.98   CA 8.5 9.1 8.9   ALB  --   --  2.1*   TBILI  --   --  0.9   ALT  --   --  35         Signed:  Ceasar Rodney MD

## 2022-08-12 NOTE — PROGRESS NOTES
End of Shift Note     Bedside shift change report given to Chiquis Marquez, PASTOR (oncoming nurse) by Janet Lazaro RN (offgoing nurse). Report included the following information SBAR, Kardex, MAR, and Recent Results     Shift worked: Nights   Shift summary and any significant changes:     Tube feeding continues, pt tolerating well. 20g inserted to R hand.    Concerns for physician to address: None    Zone phone for oncoming shift:  [phone]      Patient Information  Mac Walter  68 y.o.  7/25/2022 12:04 PM by Isha Hou MD. Mac Walter was admitted from Home     Problem List          Patient Active Problem List     Diagnosis Date Noted    Encephalopathy due to COVID-19 virus 07/28/2022    Thrombotic stroke involving left middle cerebral artery ([address] 07/27/2022    Acute alteration in mental status 07/26/2022    Convulsive syncope 07/26/2022    Bilateral carotid artery stenosis 07/26/2022    History of stroke 07/26/2022    CVA (cerebral vascular accident) ([address] 07/25/2022    Stroke ([address] 07/08/2022    Weakness of both legs 06/01/2021    Bilateral lower extremity pain 06/01/2021    Leg pain 05/30/2021    Dilated cardiomyopathy ([address] 12/18/2020    Permanent atrial fibrillation ([address] 12/18/2020    Tachycardia 12/18/2020    A-fib ([address] 12/18/2020    PAF (paroxysmal atrial fibrillation) ([address] 05/16/2018    Age-related cataract 08/01/2017    Allergic rhinitis 06/28/2017    Diverticulosis 06/28/2017    Urinary retention 06/28/2017    PVD (peripheral vascular disease) ([address] 06/28/2017    On statin therapy 06/28/2017    Low back pain 06/28/2017    Insomnia 06/28/2017    HTN (hypertension) 06/28/2017    Hyperlipidemia 06/28/2017    Glucose intolerance (impaired glucose tolerance) 06/28/2017    GERD (gastroesophageal reflux disease) 06/28/2017    ED (erectile dysfunction) 06/28/2017    DJD (degenerative joint disease) 06/28/2017    ASVD (arteriosclerotic vascular disease) 06/28/2017              Past Medical History:   Diagnosis Date Allergic rhinitis 6/28/2017    Arrhythmia       Paroxysmal Afib- Dr. Leyla Thibodeaux    ASVD (arteriosclerotic vascular disease) 06/28/2017     Story: carotid stenosis followed by Vasc Surg    Atrial fibrillation [facility]      Congestive heart failure ([facility]      Diverticulosis 6/28/2017     Comments: on colonoscopy 12/01    DJD (degenerative joint disease) 6/28/2017    ED (erectile dysfunction) 6/28/2017    GERD (gastroesophageal reflux disease) 6/28/2017    Glucose intolerance (impaired glucose tolerance) 06/28/2017    HTN (hypertension) 6/28/2017    Hyperlipidemia 6/28/2017    Insomnia 6/28/2017    Low back pain 6/28/2017    On statin therapy 6/28/2017    Pacemaker 2020    PVD (peripheral vascular disease) ([facility] 6/28/2017    Rheumatoid arthritis ([facility]      Urinary retention 6/28/2017         Core Measures:  CVA: Yes Yes  CHF:No Not applicable  PNA:No Not applicable     Activity:  Activity Level: Up with Assistance  Number times ambulated in hallways past shift: 0  Number of times OOB to chair past shift: 1     Cardiac:  Cardiac Monitoring: No            Access:   Current line(s): PIV         Genitourinary:   Urinary status: voiding inc and inc briefs  Urinary Catheter? No     Respiratory:   O2 Device: None (Room air)  Chronic home O2 use?: NO  Incentive spirometer at bedside: N/A     GI:  Last Bowel Movement Date: 07/27/22  Current diet:  DIET NPO  Passing flatus: YES  Tolerating current diet: YES     Pain Management:  Patient states pain is manageable on current regimen: YES     Skin:  Ghulam Score: 16  Interventions: increase time out of bed, limit briefs, and internal/external urinary devices    Patient Safety:  Fall Score:  Total Score: 5  Interventions: bed/chair alarm and gripper socks  High Fall Risk: Yes  @Rollbelt  @dexterity to release roll belt  Yes/No ( must document dexterity  here by stating Yes or No here, otherwise this is a restraint and must follow restraint documentation policy.)     DVT prophylaxis:  DVT prophylaxis Med- Yes  DVT prophylaxis SCD or ISAI- No     Wounds: (If Applicable)  Wounds- No  Location none     Active Consults:  IP CONSULT TO HOSPITALIST  IP CONSULT TO NEUROLOGY  IP CONSULT TO GASTROENTEROLOGY     Length of Stay:  Expected LOS: 4d 9h  Actual LOS: 16  Discharge Plan: Yes CM following.   Referral for IPR to Silvio Jack RN

## 2022-08-12 NOTE — PROGRESS NOTES
Problem: Patient Education: Go to Patient Education Activity  Goal: Patient/Family Education  Outcome: Progressing Towards Goal     Problem: Patient Education: Go to Patient Education Activity  Goal: Patient/Family Education  Outcome: Progressing Towards Goal     Problem: Patient Education: Go to Patient Education Activity  Goal: Patient/Family Education  Outcome: Progressing Towards Goal     Problem: Airway Clearance - Ineffective  Goal: Achieve or maintain patent airway  Outcome: Progressing Towards Goal     Problem: Gas Exchange - Impaired  Goal: Absence of hypoxia  Outcome: Progressing Towards Goal  Goal: Promote optimal lung function  Outcome: Progressing Towards Goal     Problem: Breathing Pattern - Ineffective  Goal: Ability to achieve and maintain a regular respiratory rate  Outcome: Progressing Towards Goal     Problem:  Body Temperature -  Risk of, Imbalanced  Goal: Ability to maintain a body temperature within defined limits  Outcome: Progressing Towards Goal  Goal: Will regain or maintain usual level of consciousness  Outcome: Progressing Towards Goal  Goal: Complications related to the disease process, condition or treatment will be avoided or minimized  Outcome: Progressing Towards Goal     Problem: Isolation Precautions - Risk of Spread of Infection  Goal: Prevent transmission of infectious organism to others  Outcome: Progressing Towards Goal     Problem: Nutrition Deficits  Goal: Optimize nutrtional status  Outcome: Progressing Towards Goal     Problem: Risk for Fluid Volume Deficit  Goal: Maintain normal heart rhythm  Outcome: Progressing Towards Goal  Goal: Maintain absence of muscle cramping  Outcome: Progressing Towards Goal  Goal: Maintain normal serum potassium, sodium, calcium, phosphorus, and pH  Outcome: Progressing Towards Goal     Problem: Loneliness or Risk for Loneliness  Goal: Demonstrate positive use of time alone when socialization is not possible  Outcome: Progressing Towards Goal     Problem: Fatigue  Goal: Verbalize increase energy and improved vitality  Outcome: Progressing Towards Goal     Problem: Patient Education: Go to Patient Education Activity  Goal: Patient/Family Education  Outcome: Progressing Towards Goal     Problem: Delirium Treatment  Goal: *Level of consciousness restored to baseline  Outcome: Progressing Towards Goal  Goal: *Level of environmental perceptions restored to baseline  Outcome: Progressing Towards Goal  Goal: *Sensory perception restored to baseline  Outcome: Progressing Towards Goal  Goal: *Emotional stability restored to baseline  Outcome: Progressing Towards Goal  Goal: *Functional assessment restored to baseline  Outcome: Progressing Towards Goal  Goal: *Absence of falls  Outcome: Progressing Towards Goal  Goal: *Will remain free of delirium, CAM Score negative  Outcome: Progressing Towards Goal  Goal: *Cognitive status will be restored to baseline  Outcome: Progressing Towards Goal  Goal: Interventions  Outcome: Progressing Towards Goal     Problem: Patient Education: Go to Patient Education Activity  Goal: Patient/Family Education  Outcome: Progressing Towards Goal     Problem: Falls - Risk of  Goal: *Absence of Falls  Description: Document Earma Arun Fall Risk and appropriate interventions in the flowsheet.   Outcome: Progressing Towards Goal  Note: Fall Risk Interventions:  Mobility Interventions: Bed/chair exit alarm    Mentation Interventions: Bed/chair exit alarm    Medication Interventions: Bed/chair exit alarm    Elimination Interventions: Call light in reach, Bed/chair exit alarm    History of Falls Interventions: Bed/chair exit alarm         Problem: Patient Education: Go to Patient Education Activity  Goal: Patient/Family Education  Outcome: Progressing Towards Goal

## 2022-08-12 NOTE — PROGRESS NOTES
End of Shift Note     Bedside shift change report given to Liz Isabel RN (oncoming nurse) by Diego Pennington RN (offgoing nurse).   Report included the following information SBAR, Kardex, MAR, and Recent Results     Shift worked: Days    Shift summary and any significant changes:     Tube feeding continues, pt tolerating well     Antibiotics Conitnued   Concerns for physician to address: None    [facility] phone for oncoming shift:  [phone]      Patient Information  Yo Monroy  68 y.o.  7/25/2022 12:04 PM by Bharat Farrar MD. Yo Monroy was admitted from Home     Problem List          Patient Active Problem List     Diagnosis Date Noted    Encephalopathy due to COVID-19 virus 07/28/2022    Thrombotic stroke involving left middle cerebral artery ([address] 07/27/2022    Acute alteration in mental status 07/26/2022    Convulsive syncope 07/26/2022    Bilateral carotid artery stenosis 07/26/2022    History of stroke 07/26/2022    CVA (cerebral vascular accident) ([address] 07/25/2022    Stroke ([address] 07/08/2022    Weakness of both legs 06/01/2021    Bilateral lower extremity pain 06/01/2021    Leg pain 05/30/2021    Dilated cardiomyopathy ([address] 12/18/2020    Permanent atrial fibrillation ([address] 12/18/2020    Tachycardia 12/18/2020    A-fib ([address] 12/18/2020    PAF (paroxysmal atrial fibrillation) ([address] 05/16/2018    Age-related cataract 08/01/2017    Allergic rhinitis 06/28/2017    Diverticulosis 06/28/2017    Urinary retention 06/28/2017    PVD (peripheral vascular disease) ([address] 06/28/2017    On statin therapy 06/28/2017    Low back pain 06/28/2017    Insomnia 06/28/2017    HTN (hypertension) 06/28/2017    Hyperlipidemia 06/28/2017    Glucose intolerance (impaired glucose tolerance) 06/28/2017    GERD (gastroesophageal reflux disease) 06/28/2017    ED (erectile dysfunction) 06/28/2017    DJD (degenerative joint disease) 06/28/2017    ASVD (arteriosclerotic vascular disease) 06/28/2017              Past Medical History:   Diagnosis Date    Allergic rhinitis 6/28/2017    Arrhythmia       Paroxysmal Afib- Dr. Lee Bourne    ASVD (arteriosclerotic vascular disease) 06/28/2017     Story: carotid stenosis followed by Vasc Surg    Atrial fibrillation [address]      Congestive heart failure ([facility]      Diverticulosis 6/28/2017     Comments: on colonoscopy 12/01    DJD (degenerative joint disease) 6/28/2017    ED (erectile dysfunction) 6/28/2017    GERD (gastroesophageal reflux disease) 6/28/2017    Glucose intolerance (impaired glucose tolerance) 06/28/2017    HTN (hypertension) 6/28/2017    Hyperlipidemia 6/28/2017    Insomnia 6/28/2017    Low back pain 6/28/2017    On statin therapy 6/28/2017    Pacemaker 2020    PVD (peripheral vascular disease) ([facility] 6/28/2017    Rheumatoid arthritis ([facility]      Urinary retention 6/28/2017         Core Measures:  CVA: Yes Yes  CHF:No Not applicable  PNA:No Not applicable     Activity:  Activity Level: Up with Assistance  Number times ambulated in hallways past shift: 0  Number of times OOB to chair past shift: 1     Cardiac:  Cardiac Monitoring: No            Access:   Current line(s): PIV         Genitourinary:   Urinary status: voiding inc and inc briefs  Urinary Catheter? No     Respiratory:   O2 Device: None (Room air)  Chronic home O2 use?: NO  Incentive spirometer at bedside: N/A     GI:  Last Bowel Movement Date: 07/27/22  Current diet:  DIET NPO  Passing flatus: YES  Tolerating current diet: YES     Pain Management:  Patient states pain is manageable on current regimen: YES     Skin:  Ghulam Score: 16  Interventions: increase time out of bed, limit briefs, and internal/external urinary devices    Patient Safety:  Fall Score:  Total Score: 5  Interventions: bed/chair alarm and gripper socks  High Fall Risk: Yes  @Rollbelt  @dexterity to release roll belt  Yes/No ( must document dexterity  here by stating Yes or No here, otherwise this is a restraint and must follow restraint documentation policy.)     DVT prophylaxis:  DVT prophylaxis Med- Yes  DVT prophylaxis SCD or ISAI- No     Wounds: (If Applicable)  Wounds- No  Location none     Active Consults:  IP CONSULT TO HOSPITALIST  IP CONSULT TO NEUROLOGY  IP CONSULT TO GASTROENTEROLOGY     Length of Stay:  Expected LOS: 4d 9h  Actual LOS: 16  Discharge Plan: Yes CM following.   [facility]

## 2022-08-12 NOTE — PROGRESS NOTES
Speech Pathology Note    Chart reviewed and spoke with RN. RN notes that patient is unresponsive, with difficulty arousing and had a fever again last night. He is not appropriate for SLP treatment at this time as patient not appropriate for PO trials on this date. Will defer and continue to follow as patient is appropriate. Thank you.     Catalina Owen, SLP

## 2022-08-13 NOTE — PROGRESS NOTES
End of Shift Note     Bedside shift change report given to Renettaángel Liang (oncoming nurse) by Emre Lorenzo RN (offgoing nurse). Report included the following information SBAR, Kardex, MAR, and Recent Results     Shift worked: Days    Shift summary and any significant changes:     Tube feeding continues, pt tolerating well     MEWS score of 5 this morning, MD notified see earlier note. MEWS down to 2 at 1600. Blood cultures drawn and sent.      Antibiotics Conitnued   Concerns for physician to address: None    [facility] phone for oncoming shift:  [phone]      Patient Information  Racquel Rothman  68 y.o.  7/25/2022 12:04 PM by Trever Ledezma MD. Racquel Rothman was admitted from Home     Problem List          Patient Active Problem List     Diagnosis Date Noted    Encephalopathy due to COVID-19 virus 07/28/2022    Thrombotic stroke involving left middle cerebral artery ([address] 07/27/2022    Acute alteration in mental status 07/26/2022    Convulsive syncope 07/26/2022    Bilateral carotid artery stenosis 07/26/2022    History of stroke 07/26/2022    CVA (cerebral vascular accident) ([address] 07/25/2022    Stroke ([address] 07/08/2022    Weakness of both legs 06/01/2021    Bilateral lower extremity pain 06/01/2021    Leg pain 05/30/2021    Dilated cardiomyopathy ([address] 12/18/2020    Permanent atrial fibrillation ([address] 12/18/2020    Tachycardia 12/18/2020    A-fib ([address] 12/18/2020    PAF (paroxysmal atrial fibrillation) ([address] 05/16/2018    Age-related cataract 08/01/2017    Allergic rhinitis 06/28/2017    Diverticulosis 06/28/2017    Urinary retention 06/28/2017    PVD (peripheral vascular disease) ([address] 06/28/2017    On statin therapy 06/28/2017    Low back pain 06/28/2017    Insomnia 06/28/2017    HTN (hypertension) 06/28/2017    Hyperlipidemia 06/28/2017    Glucose intolerance (impaired glucose tolerance) 06/28/2017    GERD (gastroesophageal reflux disease) 06/28/2017    ED (erectile dysfunction) 06/28/2017    DJD (degenerative joint disease) 06/28/2017    ASVD (arteriosclerotic vascular disease) 06/28/2017              Past Medical History:   Diagnosis Date    Allergic rhinitis 6/28/2017    Arrhythmia       Paroxysmal Afib- Dr. Patsy Jolly    ASVD (arteriosclerotic vascular disease) 06/28/2017     Story: carotid stenosis followed by Vasc Surg    Atrial fibrillation ([address]      Congestive heart failure ([address]      Diverticulosis 6/28/2017     Comments: on colonoscopy 12/01    DJD (degenerative joint disease) 6/28/2017    ED (erectile dysfunction) 6/28/2017    GERD (gastroesophageal reflux disease) 6/28/2017    Glucose intolerance (impaired glucose tolerance) 06/28/2017    HTN (hypertension) 6/28/2017    Hyperlipidemia 6/28/2017    Insomnia 6/28/2017    Low back pain 6/28/2017    On statin therapy 6/28/2017    Pacemaker 2020    PVD (peripheral vascular disease) ([address] 6/28/2017    Rheumatoid arthritis ([address]      Urinary retention 6/28/2017         Core Measures:  CVA: Yes Yes  CHF:No Not applicable  PNA:No Not applicable     Activity:  Activity Level: Up with Assistance  Number times ambulated in hallways past shift: 0  Number of times OOB to chair past shift: 0     Cardiac:  Cardiac Monitoring: No            Access:   Current line(s): PIV         Genitourinary:   Urinary status: voiding inc and inc briefs  Urinary Catheter? No     Respiratory:   O2 Device: None (Room air)  Chronic home O2 use?: NO  Incentive spirometer at bedside: N/A     GI:  Last Bowel Movement Date: 07/27/22  Current diet:  DIET NPO  Passing flatus: YES  Tolerating current diet: YES     Pain Management:  Patient states pain is manageable on current regimen: YES     Skin:  Ghulam Score: 16  Interventions: increase time out of bed, limit briefs, and internal/external urinary devices    Patient Safety:  Fall Score:  Total Score: 5  Interventions: bed/chair alarm and gripper socks  High Fall Risk: Yes  @Rollbelt  @dexterity to release roll belt  Yes/No ( must document dexterity  here by stating Yes or No here, otherwise this is a restraint and must follow restraint documentation policy.)     DVT prophylaxis:  DVT prophylaxis Med- Yes  DVT prophylaxis SCD or ISAI- No     Wounds: (If Applicable)  Wounds- No  Location none     Active Consults:  IP CONSULT TO HOSPITALIST  IP CONSULT TO NEUROLOGY  IP CONSULT TO GASTROENTEROLOGY     Length of Stay:  Expected LOS: 4d 9h  Actual LOS: 19  Discharge Plan: Yes CM following.   Referral for IPR to [facility] Jonathon Johnson RN

## 2022-08-13 NOTE — PROGRESS NOTES
-Please complete MRI History and Safety Screening Form   - Patient cannot be scanned until this form is completed, including signatures, and reviewed in MRI to ensure patient is SAFE and eligible for MRI. - CALL MRI when this has been successfully completed at [phone].

## 2022-08-13 NOTE — PROGRESS NOTES
End of Shift Note     Bedside shift change report given to Olesya RN (oncoming nurse) by Cody Knight RN (offgoing nurse). Report included the following information SBAR, Kardex, MAR, and Recent Results     Shift worked: Days    Shift summary and any significant changes:     Tube feeding continues, pt tolerating well   One fever spiked overnight managed with cooling  measures and tylenol. New wound to buttocks from moisture and documented yeast like rash.  Wound care to consult   Concerns for physician to address: None    [facility] phone for oncoming shift:  [phone]      Patient Information  Susan Moyer  68 y.o.  7/25/2022 12:04 PM by Bryan Espinal MD. Susan Moyer was admitted from Home     Problem List          Patient Active Problem List     Diagnosis Date Noted    Encephalopathy due to COVID-19 virus 07/28/2022    Thrombotic stroke involving left middle cerebral artery ([address] 07/27/2022    Acute alteration in mental status 07/26/2022    Convulsive syncope 07/26/2022    Bilateral carotid artery stenosis 07/26/2022    History of stroke 07/26/2022    CVA (cerebral vascular accident) ([address] 07/25/2022    Stroke ([address] 07/08/2022    Weakness of both legs 06/01/2021    Bilateral lower extremity pain 06/01/2021    Leg pain 05/30/2021    Dilated cardiomyopathy ([address] 12/18/2020    Permanent atrial fibrillation ([address] 12/18/2020    Tachycardia 12/18/2020    A-fib ([address] 12/18/2020    PAF (paroxysmal atrial fibrillation) ([address] 05/16/2018    Age-related cataract 08/01/2017    Allergic rhinitis 06/28/2017    Diverticulosis 06/28/2017    Urinary retention 06/28/2017    PVD (peripheral vascular disease) ([address] 06/28/2017    On statin therapy 06/28/2017    Low back pain 06/28/2017    Insomnia 06/28/2017    HTN (hypertension) 06/28/2017    Hyperlipidemia 06/28/2017    Glucose intolerance (impaired glucose tolerance) 06/28/2017    GERD (gastroesophageal reflux disease) 06/28/2017    ED (erectile dysfunction) 06/28/2017    DJD (degenerative joint disease) 06/28/2017    ASVD (arteriosclerotic vascular disease) 06/28/2017              Past Medical History:   Diagnosis Date    Allergic rhinitis 6/28/2017    Arrhythmia       Paroxysmal Afib- Dr. Randy Bianchi    ASVD (arteriosclerotic vascular disease) 06/28/2017     Story: carotid stenosis followed by Vasc Surg    Atrial fibrillation ([address]      Congestive heart failure ([address]      Diverticulosis 6/28/2017     Comments: on colonoscopy 12/01    DJD (degenerative joint disease) 6/28/2017    ED (erectile dysfunction) 6/28/2017    GERD (gastroesophageal reflux disease) 6/28/2017    Glucose intolerance (impaired glucose tolerance) 06/28/2017    HTN (hypertension) 6/28/2017    Hyperlipidemia 6/28/2017    Insomnia 6/28/2017    Low back pain 6/28/2017    On statin therapy 6/28/2017    Pacemaker 2020    PVD (peripheral vascular disease) ([address] 6/28/2017    Rheumatoid arthritis ([address]      Urinary retention 6/28/2017         Core Measures:  CVA: Yes Yes  CHF:No Not applicable  PNA:No Not applicable     Activity:  Activity Level: Up with Assistance  Number times ambulated in hallways past shift: 0  Number of times OOB to chair past shift: 1     Cardiac:  Cardiac Monitoring: No            Access:   Current line(s): PIV         Genitourinary:   Urinary status: voiding inc and inc briefs  Urinary Catheter? No     Respiratory:   O2 Device: None (Room air)  Chronic home O2 use?: NO  Incentive spirometer at bedside: N/A     GI:  Last Bowel Movement Date: 07/27/22  Current diet:  DIET NPO  Passing flatus: YES  Tolerating current diet: YES     Pain Management:  Patient states pain is manageable on current regimen: YES     Skin:  Ghulam Score: 16  Interventions: increase time out of bed, limit briefs, and internal/external urinary devices    Patient Safety:  Fall Score:  Total Score: 5  Interventions: bed/chair alarm and gripper socks  High Fall Risk: Yes  @Rollbelt  @dexterity to release roll belt  Yes/No ( must document dexterity here by stating Yes or No here, otherwise this is a restraint and must follow restraint documentation policy.)     DVT prophylaxis:  DVT prophylaxis Med- Yes  DVT prophylaxis SCD or ISAI- No     Wounds: (If Applicable)  Wounds- No  Location none     Active Consults:  IP CONSULT TO HOSPITALIST  IP CONSULT TO NEUROLOGY  IP CONSULT TO GASTROENTEROLOGY     Length of Stay:  Expected LOS: 4d 9h  Actual LOS: 16  Discharge Plan: Yes CM following.   [facility]

## 2022-08-13 NOTE — PROGRESS NOTES
Hospitalist Progress Note    NAME: Sim Paige   :  1945   MRN:  [id number]     Estimated discharge date: 2022    Barriers: awaiting SNF placement, medically ready for discharge    Assessment / Plan:    Severe spesis  Continues to have fevers  Likely due to pnemonia  COVID-19 + since admit  Still febrile  CXR showed: Increased interstitial process could represent interstitial edema or an atypical  or viral pneumonia  F/u with sputum culture  Legionella -ve  Mycoplasma positive, started on azithromycin for atypical coverage (day )  Continue with cefepime and vanco   Still fevers in the evening  Recheck blood cultures NGTD from . Patient has been afebrile x 24 hr, however, glucose and leukocytosis is worsening  ID recs appreciated  If patient spike another fever, will scan abdomen    Acute left basal ganglia ischemic infarct POA   ? Vascular dementia  Essential HTN POA  PAF POA  - Recent admission in 2022, for left facial droop, suspected TIA   MRI done which was negative for CVA. EEG was done which was negative. - Presented to ED for 1 week of sudden cognitive decline, confusion. Dragging of right leg.  - CTA head and neck IMPRESSION  No evidence of acute intracranial vessel occlusion. Multifocal moderate to severe stenoses with occlusion of the distal left              vertebral artery at the V3-V4 junction. There is no intracranial mass, hemorrhage or evidence of acute infarction. No acute intracranial process is identified. s-CT head negative for bleed  - MRI brain showed   1. Acute left basal ganglia ischemic infarct. 2. Atrophy and white matter disease, remote infarcts stable otherwise. - MRI Lumbar spine showed   1. Postsurgical changes L4-5 L5-S1 stable. 2. Spinal canal stenosis L3-L4.  - Telemetry  - Carotid duplex  Moderate stenosis in the right ICA (50-69%). Mild stenosis in the left ICA (<50%). Right vertebral is antegrade. Left vertebral is antegrade.   - Aspirin 162 mg daily, Eliquis 5 mg twice daily.    - C/w statin (not currently high intensity)  - Neuro followed  - PT/ST/OT.   - SNF.    - Dysphagia s/p PEG  - Vit B12 ~ 1152, folate 9.3, homocysteine 21.9, TSH 0.47  - ALAN negative  - Antineuronal cell AB pending    - Blood cultures NGTD  - Echocardiogram 7/11 LVEF 45-50%, normal wall motion   No AS or MS  -Continue tube feeds  - still not sleeping well, agitated at night despite increased melatonin  - D/W wife re risks and benefits of seroquel, including small cardiac event risk   Started on seroquel  - Patient's mentation has been deteriorating over the past 4 days. Differentials include worsening delirium versus metabolic encephalopathy from infection/fever and possible new strokes  -Recheck an MRI of the brain, has pacer so will wait for Monday     PEG  GI adjusted bumper  Larger Abdominal binder  TF resumed. Tolerating      Hypophosphatemia  Monitor and replete PRN     Agitation  Melatonin QHS  Neuro added buspar and lexapro     Cough  Aspiration? CXR no acute changes. Remains on RA     COVID + ve  Patient tested +ve for covid on 7/8 as well  Reason for CVA? Re-screen still +ve  Not exhibiting any respiratory symptoms, remains on room air    CAD  S/p pacemaker  History of grade 3 diastolic dysfunction  C/w coreg     Recurrent fall  Chronic back pain  -cont flexeril  -MRI lumbar spine   1. Postsurgical changes L4-5 L5-S1 stable. 2. Spinal canal stenosis L3-L4. BPH  C/w flomax     Hypernatremia (resolved)  Hypophosphatemia  DC IVF  Monitor and replete phos    Hyperglycemia  Increase lantus to 30 untis. Add NPH now  ISS  Consult nutrition for tube feed adjustment     Code Status: full code  Surrogate Decision Maker:wife     DVT Prophylaxis: eliquis  GI Prophylaxis: not indicated     18.5 - 24.9 Normal weight / Body mass index is 23.83 kg/m².      Recommended Disposition: SNF/LTC  PATTI: 8/20    Prognosis guarded at this point    Subjective:     Chief Complaint / Reason for Physician Visit  Patient seen and examined , wife at bedside. Patient continues to remain drowsy. He is not following commands. Does not open his eyes. Again, this is very concerning. No fevers for 24 hours. Awaiting repeat MRI. continues to have fevers in the evening. Discussed with RN events overnight. Review of Systems:  Symptom Y/N Comment  Symptom Y/N Comments   Fever/Chills n   Chest Pain n    Poor Appetite    Edema     Cough n   Abdominal Pain n    Sputum    Joint Pain     SOB/HANNA n   Pruritis/Rash     Nausea/vomit n   Tolerating PT/OT     Diarrhea n   Tolerating Diet y    Constipation    Other       Could NOT obtain due to:      Objective:     VITALS:   Last 24hrs VS reviewed since prior progress note. Most recent are:  Patient Vitals for the past 24 hrs:   Temp Pulse Resp BP SpO2   08/13/22 0844 (!) 102 °F (38.9 °C) 77 30 (!) 148/67 95 %   08/13/22 0301 98.9 °F (37.2 °C) 73 22 (!) 144/72 96 %   08/13/22 0057 98.1 °F (36.7 °C) -- -- -- --   08/12/22 2146 99 °F (37.2 °C) -- -- -- --   08/12/22 1953 (!) 101.4 °F (38.6 °C) 70 23 (!) 147/66 100 %   08/12/22 1532 99.8 °F (37.7 °C) 70 20 (!) 141/64 96 %   08/12/22 1130 98.4 °F (36.9 °C) 75 16 137/86 98 %         Intake/Output Summary (Last 24 hours) at 8/13/2022 1028  Last data filed at 8/12/2022 1953  Gross per 24 hour   Intake 330 ml   Output --   Net 330 ml          I had a face to face encounter and independently examined this patient on 8/13/2022, as outlined below:  PHYSICAL EXAM:  General: WD, WN. Awake, , no acute distress    EENT:  PERRLA. Anicteric sclerae. MMM  Resp:  CTA bilaterally, no wheezing or rales. No accessory muscle use  CV:  Regular  rhythm,  No edema  GI:  Soft, Non distended, Non tender. +Bowel sounds  Neurologic:  Alert, aphasia. Does follow simple commands  Psych:   Poor  insight. Not anxious nor agitated  Skin:  No rashes.   No jaundice    Reviewed most current lab test results and cultures YES  Reviewed most current radiology test results   YES  Review and summation of old records today    NO  Reviewed patient's current orders and MAR    YES  PMH/SH reviewed - no change compared to H&P  ________________________________________________________________________  Care Plan discussed with:    Comments   Patient y    Family  y Wife at bedside   RN y    Care Manager y    Consultant                        Multidiciplinary team rounds were held today with , nursing, pharmacist and clinical coordinator. Patient's plan of care was discussed; medications were reviewed and discharge planning was addressed. ________________________________________________________________________      Comments   >50% of visit spent in counseling and coordination of care     ________________________________________________________________________  Amaya Wright MD     Procedures: see electronic medical records for all procedures/Xrays and details which were not copied into this note but were reviewed prior to creation of Plan. LABS:  I reviewed today's most current labs and imaging studies.   Pertinent labs include:  Recent Labs     08/11/22  0158   WBC 15.3*   HGB 11.0*   HCT 32.5*          Recent Labs     08/13/22  0050 08/12/22  0056   * 135*   K 5.0 4.8    104   CO2 25 24   * 252*   BUN 27* 27*   CREA 0.96 0.92   CA 8.5 8.5         Signed: Amaya Wright MD

## 2022-08-13 NOTE — PROGRESS NOTES
Pharmacy Antimicrobial Kinetic Dosing    Indication for Antimicrobials: Sepsis     Current Regimen of Each Antimicrobial:  Cefepime 1 gm IV q 8h  (Start Date 8/10,  Day # 4)  Vancomycin 2000 mg IV x1 then pharmacy to dose  (Start Date 8/10,  Day # 4)  Azithromycin 500 mg IV Q24H (Start  - Day 3)    Previous Antimicrobial Therapy:    Goal Level: AUC: 400-600 mg/hr/Liter/day    Date Dose & Interval Measured (mcg/mL) Predicted AUC/BRITNEY     13.0                  Date & time of next level:     Dosing calculator used: [employer] calculator    Significant Positive Cultures:     Blood = NG  8/10 Mycoplasma - Reactive    Conditions for Dosing Consideration: None    Labs:  Recent Labs     22  0050 22  0056   CREA 0.96 0.92   BUN 27* 27*     Recent Labs     22  0158   WBC 15.3*     Temp (24hrs), Av.7 °F (37.6 °C), Min:98.1 °F (36.7 °C), Max:102 °F (38.9 °C)    Creatinine Clearance (mL/min):   CrCl (Ideal Body Weight): 67.6   If actual weight < IBW: CrCl (Actual Body Weight) 70.5    Impression/Plan:   Current  and trough of 13.2. Increase to 1250 mg IV Q18H for estimated AUC of 458. Continue renally dosed cefepime   Mycoplasma IgM positive. azithromycin x 5 days added  Daily BMP per Vancomycin dosing protocol  Antimicrobial stop date: To be determined     Pharmacy will follow daily and adjust medications as appropriate for renal function and/or serum levels.     Thank you,  Stusofia Sanchez PHARMD    Vancomycin Dosing Document    Documents located on pharmacy Teams site: Clinical Practice -> Antimicrobial Stewardship -> Antibiotics_Vancomycin     Aminoglycoside Dosing Document    Documents located on pharmacy Teams site: Clinical Practice -> Antimicrobial Stewardship -> Antibiotics_Aminoglycosides

## 2022-08-13 NOTE — PROGRESS NOTES
Problem: Airway Clearance - Ineffective  Goal: Achieve or maintain patent airway  Outcome: Progressing Towards Goal     Problem: Gas Exchange - Impaired  Goal: Absence of hypoxia  Outcome: Progressing Towards Goal     Problem: Falls - Risk of  Goal: *Absence of Falls  Description: Document Yogesh Fall Risk and appropriate interventions in the flowsheet.   Outcome: Progressing Towards Goal  Note: Fall Risk Interventions:  Mobility Interventions: Assess mobility with egress test, Patient to call before getting OOB    Mentation Interventions: Bed/chair exit alarm, Adequate sleep, hydration, pain control    Medication Interventions: Bed/chair exit alarm    Elimination Interventions: Bed/chair exit alarm, Stay With Me (per policy), Toileting schedule/hourly rounds    History of Falls Interventions: Bed/chair exit alarm    Problem: Patient Education: Go to Patient Education Activity  Goal: Patient/Family Education  Outcome: Progressing Towards Goal

## 2022-08-14 NOTE — PROGRESS NOTES
ID-weekend  No fevers overnight  CBC 16.2, BUN/ Cr 50/2.06  Elevated immune markers  CRP 17.2, D-dimer 1.93, ferritin 1119  Procalcitonin 0.85  CXR-resolved interstitial infiltrates. DC vancomycin ( D5)  Continue cefepime ( D5) , Flagyl IV ( D1)  Complete therapy with azithromycin ( D4/5)  Aspiration precautions  Adequate fluids, monitor creatinine  Agree with plan for CT abdomen/pelvis, MRI brain.
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
## 2022-08-14 NOTE — PROGRESS NOTES
End of Shift Note     Bedside shift change report given to Olesya RN (oncoming nurse) by Peter Carolina RN (offgoing nurse). Report included the following information SBAR, Kardex, MAR, and Recent Results     Shift worked: nights    Shift summary and any significant changes:     Febrile throughout night, tylenol given x1, afebrile currently    Condom cath applied.      Concerns for physician to address: None    Zone phone for oncoming shift:  [phone]      Patient Information  Susan Moyer  68 y.o.  7/25/2022 12:04 PM by Bryan Espinal MD. Susan Moyer was admitted from Home     Problem List          Patient Active Problem List     Diagnosis Date Noted    Encephalopathy due to COVID-19 virus 07/28/2022    Thrombotic stroke involving left middle cerebral artery ([address] 07/27/2022    Acute alteration in mental status 07/26/2022    Convulsive syncope 07/26/2022    Bilateral carotid artery stenosis 07/26/2022    History of stroke 07/26/2022    CVA (cerebral vascular accident) ([address] 07/25/2022    Stroke ([address] 07/08/2022    Weakness of both legs 06/01/2021    Bilateral lower extremity pain 06/01/2021    Leg pain 05/30/2021    Dilated cardiomyopathy ([address] 12/18/2020    Permanent atrial fibrillation ([address] 12/18/2020    Tachycardia 12/18/2020    A-fib ([address] 12/18/2020    PAF (paroxysmal atrial fibrillation) ([address] 05/16/2018    Age-related cataract 08/01/2017    Allergic rhinitis 06/28/2017    Diverticulosis 06/28/2017    Urinary retention 06/28/2017    PVD (peripheral vascular disease) ([address] 06/28/2017    On statin therapy 06/28/2017    Low back pain 06/28/2017    Insomnia 06/28/2017    HTN (hypertension) 06/28/2017    Hyperlipidemia 06/28/2017    Glucose intolerance (impaired glucose tolerance) 06/28/2017    GERD (gastroesophageal reflux disease) 06/28/2017    ED (erectile dysfunction) 06/28/2017    DJD (degenerative joint disease) 06/28/2017    ASVD (arteriosclerotic vascular disease) 06/28/2017              Past Medical History: Diagnosis Date    Allergic rhinitis 6/28/2017    Arrhythmia       Paroxysmal Afib- Dr. Sy Rivera    ASVD (arteriosclerotic vascular disease) 06/28/2017     Story: carotid stenosis followed by Vasc Surg    [facility]      Congestive heart failure ([facility]      Diverticulosis 6/28/2017     Comments: on colonoscopy 12/01    DJD (degenerative joint disease) 6/28/2017    ED (erectile dysfunction) 6/28/2017    GERD (gastroesophageal reflux disease) 6/28/2017    Glucose intolerance (impaired glucose tolerance) 06/28/2017    HTN (hypertension) 6/28/2017    Hyperlipidemia 6/28/2017    Insomnia 6/28/2017    Low back pain 6/28/2017    On statin therapy 6/28/2017    Pacemaker 2020    PVD (peripheral vascular disease) ([facility] 6/28/2017    Rheumatoid arthritis ([facility]      Urinary retention 6/28/2017         Core Measures:  CVA: Yes Yes  CHF:No Not applicable  PNA:No Not applicable     Activity:  Activity Level: Up with Assistance  Number times ambulated in hallways past shift: 0  Number of times OOB to chair past shift: 0     Cardiac:  Cardiac Monitoring: No            Access:   Current line(s): PIV         Genitourinary:   Urinary status: incontinent  Urinary Catheter? No     Respiratory:   O2 Device: None (Room air)  Chronic home O2 use?: NO  Incentive spirometer at bedside: N/A     GI:  Last Bowel Movement Date: 07/27/22  Current diet:  DIET NPO  Passing flatus: YES  Tolerating current diet: YES     Pain Management:  Patient states pain is manageable on current regimen: YES     Skin:  Ghulam Score: 16  Interventions: increase time out of bed, limit briefs, and internal/external urinary devices    Patient Safety:  Fall Score:  Total Score: 5  Interventions: bed/chair alarm and gripper socks  High Fall Risk: Yes  @Rollbelt  @dexterity to release roll belt  Yes/No ( must document dexterity  here by stating Yes or No here, otherwise this is a restraint and must follow restraint documentation policy.)     DVT prophylaxis:  DVT prophylaxis Med- Yes  DVT prophylaxis SCD or ISAI- No     Wounds: (If Applicable)  Wounds- No  Location none     Active Consults:  IP CONSULT TO HOSPITALIST  IP CONSULT TO NEUROLOGY  IP CONSULT TO GASTROENTEROLOGY     Length of Stay:  Expected LOS: 4d 9h  Actual LOS: 19  Discharge Plan: Yes CM following.   Referral for IPR to Rohit Johnson RN

## 2022-08-14 NOTE — PROGRESS NOTES
Pharmacy Antimicrobial Kinetic Dosing    Indication for Antimicrobials: Sepsis     Current Regimen of Each Antimicrobial:  Cefepime 1 gm IV q 8h  (Start Date 8/10,  Day # 5)  Vancomycin 2000 mg IV x1 then pharmacy to dose  (Start Date 8/10,  Day # 5)  Azithromycin 500 mg IV Q24H (Start  - Day 4/5)    Previous Antimicrobial Therapy:    Goal Level: AUC: 400-600 mg/hr/Liter/day    Date Dose & Interval Measured (mcg/mL) Predicted AUC/BRITNEY     13.0                  Date & time of next level:     Dosing calculator used: [employer] calculator    Significant Positive Cultures:     Blood = NG  8/10 Mycoplasma - Reactive    Conditions for Dosing Consideration: None    Labs:  Recent Labs     22  0440 22  0050 22  0056   CREA 2.06* 0.96 0.92   BUN 50* 27* 27*   PCT 0.85  --   --      Recent Labs     22  0440   WBC 16.2*     Temp (24hrs), Av.2 °F (37.3 °C), Min:97.8 °F (36.6 °C), Max:100.3 °F (37.9 °C)    Creatinine Clearance (mL/min):   CrCl (Ideal Body Weight): 31.5   If actual weight < IBW: CrCl (Actual Body Weight) 36.5    Impression/Plan:   Rise in SCr on   Empirically change vancomycin to 1000mg q 24h for auc 532 and trough ~17mcg/mL, next dose 6am on 8/15  Will get random level with AM labs. Renally dosed cefepime   Mycoplasma IgM positive. azithromycin x 5 days added  Daily BMP per Vancomycin dosing protocol  Antimicrobial stop date: To be determined     Pharmacy will follow daily and adjust medications as appropriate for renal function and/or serum levels.     Thank you,  OSVALDO RogersD    Vancomycin Dosing Document    Documents located on pharmacy Teams site: Clinical Practice -> Antimicrobial Stewardship -> Antibiotics_Vancomycin     Aminoglycoside Dosing Document    Documents located on pharmacy Teams site: Clinical Practice -> Antimicrobial Stewardship -> Antibiotics_Aminoglycosides

## 2022-08-14 NOTE — PROGRESS NOTES
End of Shift Note     Bedside shift change report given to Irby Gilford, RN (oncoming nurse) by Rogelio Ruiz RN (offgoing nurse).   Report included the following information SBAR, Kardex, MAR, and Recent Results     Shift worked: Days    Shift summary and any significant changes:     Tube feeding continues, pt tolerating well      Antibiotics Conitnued   Concerns for physician to address: None    Zone phone for oncoming shift:  [phone]      Patient Information  Jocelyn Borrego  68 y.o.  7/25/2022 12:04 PM by Latrell Luther MD. Jocelyn Borrego was admitted from Home     Problem List          Patient Active Problem List     Diagnosis Date Noted    Encephalopathy due to COVID-19 virus 07/28/2022    Thrombotic stroke involving left middle cerebral artery ([address] 07/27/2022    Acute alteration in mental status 07/26/2022    Convulsive syncope 07/26/2022    Bilateral carotid artery stenosis 07/26/2022    History of stroke 07/26/2022    CVA (cerebral vascular accident) ([address] 07/25/2022    Stroke ([address] 07/08/2022    Weakness of both legs 06/01/2021    Bilateral lower extremity pain 06/01/2021    Leg pain 05/30/2021    Dilated cardiomyopathy ([address] 12/18/2020    Permanent atrial fibrillation ([address] 12/18/2020    Tachycardia 12/18/2020    A-fib ([address] 12/18/2020    PAF (paroxysmal atrial fibrillation) ([address] 05/16/2018    Age-related cataract 08/01/2017    Allergic rhinitis 06/28/2017    Diverticulosis 06/28/2017    Urinary retention 06/28/2017    PVD (peripheral vascular disease) ([address] 06/28/2017    On statin therapy 06/28/2017    Low back pain 06/28/2017    Insomnia 06/28/2017    HTN (hypertension) 06/28/2017    Hyperlipidemia 06/28/2017    Glucose intolerance (impaired glucose tolerance) 06/28/2017    GERD (gastroesophageal reflux disease) 06/28/2017    ED (erectile dysfunction) 06/28/2017    DJD (degenerative joint disease) 06/28/2017    ASVD (arteriosclerotic vascular disease) 06/28/2017              Past Medical History:   Diagnosis Date Allergic rhinitis 6/28/2017    Arrhythmia       Paroxysmal Afib- Dr. Delta Baum    ASVD (arteriosclerotic vascular disease) 06/28/2017     Story: carotid stenosis followed by Vasc Surg    Atrial fibrillation [facility]      Congestive heart failure ([facility]      Diverticulosis 6/28/2017     Comments: on colonoscopy 12/01    DJD (degenerative joint disease) 6/28/2017    ED (erectile dysfunction) 6/28/2017    GERD (gastroesophageal reflux disease) 6/28/2017    Glucose intolerance (impaired glucose tolerance) 06/28/2017    HTN (hypertension) 6/28/2017    Hyperlipidemia 6/28/2017    Insomnia 6/28/2017    Low back pain 6/28/2017    On statin therapy 6/28/2017    Pacemaker 2020    PVD (peripheral vascular disease) ([facility] 6/28/2017    Rheumatoid arthritis ([facility]      Urinary retention 6/28/2017         Core Measures:  CVA: Yes Yes  CHF:No Not applicable  PNA:No Not applicable     Activity:  Activity Level: Up with Assistance  Number times ambulated in hallways past shift: 0  Number of times OOB to chair past shift: 0     Cardiac:  Cardiac Monitoring: No            Access:   Current line(s): PIV         Genitourinary:   Urinary status: voiding inc and inc briefs  Urinary Catheter? No     Respiratory:   O2 Device: None (Room air)  Chronic home O2 use?: NO  Incentive spirometer at bedside: N/A     GI:  Last Bowel Movement Date: 07/27/22  Current diet:  DIET NPO  Passing flatus: YES  Tolerating current diet: YES     Pain Management:  Patient states pain is manageable on current regimen: YES     Skin:  Ghulam Score: 16  Interventions: increase time out of bed, limit briefs, and internal/external urinary devices    Patient Safety:  Fall Score:  Total Score: 4  Interventions: bed/chair alarm and gripper socks  High Fall Risk: Yes  @Rollbelt  @dexterity to release roll belt  Yes/No ( must document dexterity  here by stating Yes or No here, otherwise this is a restraint and must follow restraint documentation policy.)     DVT prophylaxis:  DVT prophylaxis Med- Yes  DVT prophylaxis SCD or ISAI- No     Wounds: (If Applicable)  Wounds- No  Location none     Active Consults:  IP CONSULT TO HOSPITALIST  IP CONSULT TO NEUROLOGY  IP CONSULT TO GASTROENTEROLOGY     Length of Stay:  Expected LOS: 4d 9h  Actual LOS: 20  Discharge Plan: Yes CM following.   Referral for IPR to Shawnee Nieto RN

## 2022-08-14 NOTE — PROGRESS NOTES
Nocturnist NP Note         4578- Notified by nursing of . Ordered 5 units of Humalog x 1 to be given. 4501- Notified by nursing of . Ordered 9 units of Humalog x 1 to be given. Please note that this note was dictated using Dragon computer voice recognition software. Quite often unanticipated grammatical, syntax, homophones, and other interpretive errors are inadvertently transcribed by the computer software. Please disregard these errors. Please excuse any errors that have escaped final proofreading.
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
## 2022-08-15 NOTE — PROGRESS NOTES
Pt currently off the floor and has been declining medically, continues to spike fevers. Will defer and follow as appropriate.

## 2022-08-15 NOTE — PROGRESS NOTES
Problem: Patient Education: Go to Patient Education Activity  Goal: Patient/Family Education  Outcome: Progressing Towards Goal     Problem: Patient Education: Go to Patient Education Activity  Goal: Patient/Family Education  Outcome: Progressing Towards Goal     Problem: Patient Education: Go to Patient Education Activity  Goal: Patient/Family Education  Outcome: Progressing Towards Goal     Problem: Airway Clearance - Ineffective  Goal: Achieve or maintain patent airway  Outcome: Progressing Towards Goal     Problem: Gas Exchange - Impaired  Goal: Absence of hypoxia  Outcome: Progressing Towards Goal  Goal: Promote optimal lung function  Outcome: Progressing Towards Goal     Problem: Breathing Pattern - Ineffective  Goal: Ability to achieve and maintain a regular respiratory rate  Outcome: Progressing Towards Goal     Problem:  Body Temperature -  Risk of, Imbalanced  Goal: Ability to maintain a body temperature within defined limits  Outcome: Progressing Towards Goal  Goal: Will regain or maintain usual level of consciousness  Outcome: Progressing Towards Goal  Goal: Complications related to the disease process, condition or treatment will be avoided or minimized  Outcome: Progressing Towards Goal     Problem: Isolation Precautions - Risk of Spread of Infection  Goal: Prevent transmission of infectious organism to others  Outcome: Progressing Towards Goal     Problem: Nutrition Deficits  Goal: Optimize nutrtional status  Outcome: Progressing Towards Goal     Problem: Risk for Fluid Volume Deficit  Goal: Maintain normal heart rhythm  Outcome: Progressing Towards Goal  Goal: Maintain absence of muscle cramping  Outcome: Progressing Towards Goal  Goal: Maintain normal serum potassium, sodium, calcium, phosphorus, and pH  Outcome: Progressing Towards Goal     Problem: Loneliness or Risk for Loneliness  Goal: Demonstrate positive use of time alone when socialization is not possible  Outcome: Progressing Towards Goal     Problem: Fatigue  Goal: Verbalize increase energy and improved vitality  Outcome: Progressing Towards Goal     Problem: Patient Education: Go to Patient Education Activity  Goal: Patient/Family Education  Outcome: Progressing Towards Goal     Problem: Delirium Treatment  Goal: *Level of consciousness restored to baseline  Outcome: Progressing Towards Goal  Goal: *Level of environmental perceptions restored to baseline  Outcome: Progressing Towards Goal  Goal: *Sensory perception restored to baseline  Outcome: Progressing Towards Goal  Goal: *Emotional stability restored to baseline  Outcome: Progressing Towards Goal  Goal: *Functional assessment restored to baseline  Outcome: Progressing Towards Goal  Goal: *Absence of falls  Outcome: Progressing Towards Goal  Goal: *Will remain free of delirium, CAM Score negative  Outcome: Progressing Towards Goal  Goal: *Cognitive status will be restored to baseline  Outcome: Progressing Towards Goal  Goal: Interventions  Outcome: Progressing Towards Goal     Problem: Patient Education: Go to Patient Education Activity  Goal: Patient/Family Education  Outcome: Progressing Towards Goal     Problem: Falls - Risk of  Goal: *Absence of Falls  Description: Document Sabino Goodwin Fall Risk and appropriate interventions in the flowsheet.   Outcome: Progressing Towards Goal  Note: Fall Risk Interventions:  Mobility Interventions: Bed/chair exit alarm    Mentation Interventions: Bed/chair exit alarm    Medication Interventions: Bed/chair exit alarm    Elimination Interventions: Bed/chair exit alarm    History of Falls Interventions: Bed/chair exit alarm         Problem: Patient Education: Go to Patient Education Activity  Goal: Patient/Family Education  Outcome: Progressing Towards Goal

## 2022-08-15 NOTE — PROGRESS NOTES
Comprehensive Nutrition Assessment    Type and Reason for Visit: Reassess, Consult    Nutrition Recommendations/Plan:   Adjust TF to Nepro @ 45 mL/hr + 175 mL water flushes q 4 hours (provides 2160 kcals, 87.5g protein, 1835 mL water, and 173g CHO)     Nutrition Assessment:    Chart reviewed; new consult received for TF adjustment. Patient with significant hyperglycemia the last few days; not improving much with insulin adjustments. Potassium also trending up and elevated this morning. Switching to Glucerna 1.5 formula would provide more potassium than the current Jevity 1.5 formula. Will switch formula to Nepro which is appropriate for hyperkalemia and hyperglycemia. Recommendations provided above. Will continue to monitor TF tolerance and labs to provide further recommendations. Nutrition Related Findings:    Atorvastatin, Buspar, Coreg, Lexapro, Lantus, Humalog  BM 8/14  [phone], Phos 3.1, Mg 2.7, K+ 5.3    Current Nutrition Intake & Therapies:        DIET NPO  ADULT TUBE FEEDING PEG; Standard with Fiber; Delivery Method: Continuous; Continuous Initial Rate (mL/hr): 25; Continuous Advance Tube Feeding: Yes; Advancement Volume (mL/hr): 10; Advancement Frequency: Q 12 hours; Continuous Goal Rate (mL/hr): 5... Current Tube Feeding (TF) Orders:   Feeding Route: PEG  Formula: Standard with fiber  Schedule:Continuous    Regimen: 55 mL/hr  Additives/Modulars:    Water Flushes: 150 mL Q 4  Current TF & Flush Orders Provides: 1980 kcals, 84g protein, 1903 mL water, 285g CHO, 2878mg Potassium (Jevity 1.5)  Goal TF & Flush Orders Provides: 2160 kcals, 87.5g protein, 1835 mL water, 173g CHO, 1025mg Potassium (Nepro)    Anthropometric Measures:  Height: 5' 10\" (177.8 cm)  Ideal Body Weight (IBW): 166 lbs (75 kg)     Current Body Wt:  84.6 kg (186 lb 8.2 oz), 112.4 % IBW. Current BMI (kg/m2): 26.8                          BMI Category: Overweight (BMI 25.0-29. 9)    Estimated Daily Nutrient Needs:  Energy Requirements Based On: Formula  Weight Used for Energy Requirements: Current  Energy (kcal/day): 2092-8858 kcals (BMR 1586 x 1.2-1. 3AF)  Weight Used for Protein Requirements: Current  Protein (g/day): 85-102g (1.0-1.2 g/kg bw)  Method Used for Fluid Requirements: 1 ml/kcal  Fluid (ml/day): 1900 mL    Nutrition Diagnosis:   Altered nutrition-related lab values related to  (current medical condition) as evidenced by  (K+ 5.3, -[phone])    Nutrition Interventions:   Food and/or Nutrient Delivery: Modify tube feeding  Nutrition Education/Counseling: No recommendations at this time  Coordination of Nutrition Care: Continue to monitor while inpatient       Goals:  Previous Goal Met: Goal(s) achieved  Goals:  (Tolerate TF at goal rate with BG <180 and K+ WNL next 2-4 days)       Nutrition Monitoring and Evaluation:   Behavioral-Environmental Outcomes: None identified  Food/Nutrient Intake Outcomes: Enteral nutrition intake/tolerance  Physical Signs/Symptoms Outcomes: Biochemical data, Weight, Fluid status or edema, Hemodynamic status    Discharge Planning:    Enteral nutrition    Maribel Claire RD  Contact: ext [phone]

## 2022-08-15 NOTE — PROGRESS NOTES
Viewed chart prior to visit for ongoing pastoral support due to patient length of stay. Physician was in patient's room speaking with patient and family members. Unable to assess any spiritual needs or concerns at this time.    available upon referral by staff or by patient/family request.       HARDEEP Calderon, [facility], Staff   [facility] Paging Service  [phone]

## 2022-08-15 NOTE — PROGRESS NOTES
End of Shift Note     Bedside shift change report given to Rebecca Mccall RN (oncoming nurse) by Amelia Mcelroy RN (offgoing nurse).   Report included the following information SBAR, Kardex, MAR, and Recent Results     Shift worked: Nights   Shift summary and any significant changes:     Tube feeding continues, pt tolerating well    IV placed to the right hand    Antibiotics Conitnued   Concerns for physician to address: None    Zone phone for oncoming shift:  [phone]      Patient Information  Roosevelt Barnham  68 y.o.  7/25/2022 12:04 PM by Anayeli Rodriguez MD. Roosevelt Branham was admitted from Home     Problem List          Patient Active Problem List     Diagnosis Date Noted    Encephalopathy due to COVID-19 virus 07/28/2022    Thrombotic stroke involving left middle cerebral artery ([address] 07/27/2022    Acute alteration in mental status 07/26/2022    Convulsive syncope 07/26/2022    Bilateral carotid artery stenosis 07/26/2022    History of stroke 07/26/2022    CVA (cerebral vascular accident) ([address] 07/25/2022    Stroke ([address] 07/08/2022    Weakness of both legs 06/01/2021    Bilateral lower extremity pain 06/01/2021    Leg pain 05/30/2021    Dilated cardiomyopathy ([address] 12/18/2020    Permanent atrial fibrillation ([address] 12/18/2020    Tachycardia 12/18/2020    A-fib ([address] 12/18/2020    PAF (paroxysmal atrial fibrillation) ([address] 05/16/2018    Age-related cataract 08/01/2017    Allergic rhinitis 06/28/2017    Diverticulosis 06/28/2017    Urinary retention 06/28/2017    PVD (peripheral vascular disease) ([address] 06/28/2017    On statin therapy 06/28/2017    Low back pain 06/28/2017    Insomnia 06/28/2017    HTN (hypertension) 06/28/2017    Hyperlipidemia 06/28/2017    Glucose intolerance (impaired glucose tolerance) 06/28/2017    GERD (gastroesophageal reflux disease) 06/28/2017    ED (erectile dysfunction) 06/28/2017    DJD (degenerative joint disease) 06/28/2017    ASVD (arteriosclerotic vascular disease) 06/28/2017              Past Medical History:   Diagnosis Date    Allergic rhinitis 6/28/2017    Arrhythmia       Paroxysmal Afib- Dr. Yolande Graves    ASVD (arteriosclerotic vascular disease) 06/28/2017     Story: carotid stenosis followed by Vasc Surg    [facility]      Congestive heart failure ([facility]      Diverticulosis 6/28/2017     Comments: on colonoscopy 12/01    DJD (degenerative joint disease) 6/28/2017    ED (erectile dysfunction) 6/28/2017    GERD (gastroesophageal reflux disease) 6/28/2017    Glucose intolerance (impaired glucose tolerance) 06/28/2017    HTN (hypertension) 6/28/2017    Hyperlipidemia 6/28/2017    Insomnia 6/28/2017    Low back pain 6/28/2017    On statin therapy 6/28/2017    Pacemaker 2020    PVD (peripheral vascular disease) ([facility] 6/28/2017    Rheumatoid arthritis ([facility]      Urinary retention 6/28/2017         Core Measures:  CVA: Yes Yes  CHF:No Not applicable  PNA:No Not applicable     Activity:  Activity Level: Up with Assistance  Number times ambulated in hallways past shift: 0  Number of times OOB to chair past shift: 0     Cardiac:  Cardiac Monitoring: No            Access:   Current line(s): PIV         Genitourinary:   Urinary status: voiding inc and inc briefs  Urinary Catheter? No     Respiratory:   O2 Device: None (Room air)  Chronic home O2 use?: NO  Incentive spirometer at bedside: N/A     GI:  Last Bowel Movement Date: 07/27/22  Current diet:  DIET NPO  Passing flatus: YES  Tolerating current diet: YES     Pain Management:  Patient states pain is manageable on current regimen: YES     Skin:  Ghulam Score: 16  Interventions: increase time out of bed, limit briefs, and internal/external urinary devices    Patient Safety:  Fall Score:  Total Score: 4  Interventions: bed/chair alarm and gripper socks  High Fall Risk: Yes  @Rollbelt  @dexterity to release roll belt  Yes/No ( must document dexterity  here by stating Yes or No here, otherwise this is a restraint and must follow restraint documentation policy.)     DVT prophylaxis:  DVT prophylaxis Med- Yes  DVT prophylaxis SCD or ISAI- No     Wounds: (If Applicable)  Wounds- No  Location none     Active Consults:  IP CONSULT TO HOSPITALIST  IP CONSULT TO NEUROLOGY  IP CONSULT TO GASTROENTEROLOGY     Length of Stay:  Expected LOS: 4d 9h  Actual LOS: 20  Discharge Plan: Yes CM following.   Referral for IPR to [facility] Autumn Nieto RN
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
## 2022-08-15 NOTE — PROGRESS NOTES
Speech Pathology Note    Chart reviewed and spoke with RN who noted that patient is not awake, alert, and/or appropriate to participate with SLP treatment at this time. Patient is headed off the floor for multiple tests to further differentiate medical decline. Will defer and follow as patient is medically able to participate in treatment.     Juvenal Griggs, SLP
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
## 2022-08-15 NOTE — PROGRESS NOTES
Occupational Therapy note:    Chart reviewed and patient currently ANJANA for additional testing to addressing medical decline. Will defer and continue to follow.     Brandon Jain, OTR/L

## 2022-08-15 NOTE — PROGRESS NOTES
End of Shift Note     Bedside shift change report given to Keri Rawls RN (oncoming nurse) by Saeid Wall RN (offgoing nurse).   Report included the following information SBAR, Kardex, MAR, and Recent Results     Shift worked: Days    Shift summary and any significant changes:     Tube feed rate lowered to 45ml/hr, flush increased to 175ml q4     Desir placed today  Paired blood cultures done  Urine collected for reflex culture and other urine randoms  CT of chest and abd completed today  MRI completed today    Palliative consulted   Concerns for physician to address: None    Zone phone for oncoming shift:  [phone]      Patient Information  Verna Mays  68 y.o.  7/25/2022 12:04 PM by Jenniffer Aguillon MD. Verna Mays was admitted from Home     Problem List          Patient Active Problem List     Diagnosis Date Noted    Encephalopathy due to COVID-19 virus 07/28/2022    Thrombotic stroke involving left middle cerebral artery ([address] 07/27/2022    Acute alteration in mental status 07/26/2022    Convulsive syncope 07/26/2022    Bilateral carotid artery stenosis 07/26/2022    History of stroke 07/26/2022    CVA (cerebral vascular accident) ([address] 07/25/2022    Stroke ([address] 07/08/2022    Weakness of both legs 06/01/2021    Bilateral lower extremity pain 06/01/2021    Leg pain 05/30/2021    Dilated cardiomyopathy ([address] 12/18/2020    Permanent atrial fibrillation ([address] 12/18/2020    Tachycardia 12/18/2020    A-fib ([address] 12/18/2020    PAF (paroxysmal atrial fibrillation) ([address] 05/16/2018    Age-related cataract 08/01/2017    Allergic rhinitis 06/28/2017    Diverticulosis 06/28/2017    Urinary retention 06/28/2017    PVD (peripheral vascular disease) ([address] 06/28/2017    On statin therapy 06/28/2017    Low back pain 06/28/2017    Insomnia 06/28/2017    HTN (hypertension) 06/28/2017    Hyperlipidemia 06/28/2017    Glucose intolerance (impaired glucose tolerance) 06/28/2017    GERD (gastroesophageal reflux disease) 06/28/2017    ED (erectile dysfunction) 06/28/2017    DJD (degenerative joint disease) 06/28/2017    ASVD (arteriosclerotic vascular disease) 06/28/2017              Past Medical History:   Diagnosis Date    Allergic rhinitis 6/28/2017    Arrhythmia       Paroxysmal Afib- Dr. Arita Memory    ASVD (arteriosclerotic vascular disease) 06/28/2017     Story: carotid stenosis followed by Vasc Surg    Atrial fibrillation ([address]      Congestive heart failure ([address]      Diverticulosis 6/28/2017     Comments: on colonoscopy 12/01    DJD (degenerative joint disease) 6/28/2017    ED (erectile dysfunction) 6/28/2017    GERD (gastroesophageal reflux disease) 6/28/2017    Glucose intolerance (impaired glucose tolerance) 06/28/2017    HTN (hypertension) 6/28/2017    Hyperlipidemia 6/28/2017    Insomnia 6/28/2017    Low back pain 6/28/2017    On statin therapy 6/28/2017    Pacemaker 2020    PVD (peripheral vascular disease) ([address] 6/28/2017    Rheumatoid arthritis ([address]      Urinary retention 6/28/2017         Core Measures:  CVA: Yes Yes  CHF:No Not applicable  PNA:No Not applicable     Activity:  Activity Level: Up with Assistance  Number times ambulated in hallways past shift: 0  Number of times OOB to chair past shift: 0     Cardiac:  Cardiac Monitoring: No            Access:   Current line(s): PIV         Genitourinary:   Urinary status: voiding inc and inc briefs  Urinary Catheter? No     Respiratory:   O2 Device: None (Room air)  Chronic home O2 use?: NO  Incentive spirometer at bedside: N/A     GI:  Last Bowel Movement Date: 07/27/22  Current diet:  DIET NPO  Passing flatus: YES  Tolerating current diet: YES     Pain Management:  Patient states pain is manageable on current regimen: YES     Skin:  Ghulam Score: 16  Interventions: increase time out of bed, limit briefs, and internal/external urinary devices    Patient Safety:  Fall Score:  Total Score: 4  Interventions: bed/chair alarm and gripper socks  High Fall Risk: Yes  @Rollbelt  @dexterity to release roll belt  Yes/No ( must document dexterity  here by stating Yes or No here, otherwise this is a restraint and must follow restraint documentation policy.)     DVT prophylaxis:  DVT prophylaxis Med- Yes  DVT prophylaxis SCD or ISAI- No     Wounds: (If Applicable)  Wounds- No  Location none     Active Consults:  IP CONSULT TO HOSPITALIST  IP CONSULT TO NEUROLOGY  IP CONSULT TO GASTROENTEROLOGY     Length of Stay:  Expected LOS: 4d 9h  Actual LOS: 20  Discharge Plan: Yes CM following.   Referral for IPR to [facility]

## 2022-08-15 NOTE — PROGRESS NOTES
Transition of Care Plan:     RUR: 18% - \"moderate risk\"  LOS: 21 days  Disposition: COVID positive SNF placement - [facility] (pending medical stability/insurance auth)  Insurance auth: SNF initiated insurance auth 22, auth approved 22, auth  8/10/22  Follow up appointments: PCP & specialist as indicated   DME needed: Defer to SNF for DME needs  Transportation at Discharge: BLS transport needed  [address] or means to access home: N/A - pt transitioning to SNF at d/c     IM Medicare Letter: 2nd IM needed prior to d/c  Is patient a BCPI-A Bundle: N/A         Is patient a  and connected with the  [facility] ? Yes - clinical updates faxed to the [facility] throughout course of admission  Caregiver Contact: Pt's wife Mateo Siemina: [phone])  Discharge Caregiver contacted prior to discharge? To be contacted prior to d/c  Care Conference needed?: N/A  COVID-19 test: PCR COVID test completed 22; results positive. Updated PCR COVID test completed 22; results positive     Initial note: Chart reviewed, IDR completed. MD reported pt is not medically stable for d/c. Medical team informed of updates related to disposition re: SNF placement (pending insurance auth - auth  8/10/22). CM inquired about PATTI in effort for SNF to reinitiate insurance auth in alignment with projected date of medical stability; MD reported accepting SNF should reinitiate auth within the next 24 hrs. Pt will need updated PT/OT notes prior to auth being reinitiated (pt was last able to participate with therapy team on 22). CM will contact [facility] admission liaison Angela Me: [phone]) 22 to check in and coordinate insurance auth/time-frame of admission. CM will continue to follow & remain accessible for d/c planning.     Adam Phan MSW  Care Manager, [facility]

## 2022-08-15 NOTE — PROGRESS NOTES
Hospitalist Progress Note    NAME: Best Scott   :  1945   MRN:  [id number]            Subjective:     Chief Complaint / Reason for Physician Visit  Patient seen and evaluated at bedside, overnight events reviewed, of note patient did have a fever spike of 101.5 this a.m., . Discussed with RN events overnight. Review of Systems:  Symptom Y/N Comments  Symptom Y/N Comments   Fever/Chills    Chest Pain     Poor Appetite    Edema     Cough    Abdominal Pain     Sputum    Joint Pain     SOB/HANNA    Pruritis/Rash     Nausea/vomit    Tolerating PT/OT     Diarrhea    Tolerating Diet     Constipation    Other       Could NOT obtain due to: Limited 2/2 patients clinical status     Objective:     VITALS:   Last 24hrs VS reviewed since prior progress note. Most recent are:  Patient Vitals for the past 24 hrs:   Temp Pulse Resp BP SpO2   08/15/22 0832 (!) 101.5 °F (38.6 °C) 79 19 (!) 184/75 97 %   08/15/22 0322 98.7 °F (37.1 °C) 78 19 (!) 153/71 96 %   22 1940 98.6 °F (37 °C) 76 21 128/60 97 %   22 1242 99.3 °F (37.4 °C) 73 22 130/60 --       Intake/Output Summary (Last 24 hours) at 8/15/2022 0855  Last data filed at 2022 194  Gross per 24 hour   Intake 150 ml   Output --   Net 150 ml        PHYSICAL EXAM:  General: Patient appears comfortable   EENT:  EOMI. Anicteric sclerae. MMM  Resp:  Decreased air entry bilaterally in bilateral lower lung zones  CV:  Regular  rhythm, s1/s2 no m/r/g  No edema  GI:  Soft, Non distended, Non tender. +Bowel sounds  Neurologic:  Limited 2/2 patients clinical status   Psych:   Limited 2/2 patients clinical status  Skin:  No rashes. No jaundice    Procedures: see electronic medical records for all procedures/Xrays and details which were not copied into this note but were reviewed prior to creation of Plan. LABS:  I reviewed today's most current labs and imaging studies.   Pertinent labs include:  Recent Labs     08/15/22  0154 22  0440   WBC 16.3* 16.2* HGB 10.7* 10.6*   HCT 32.2* 31.6*   * 369     Recent Labs     08/15/22  0154 08/14/22  0440 08/13/22  0050    134* 134*   K 5.3* 5.0 5.0   * 104 103   CO2 23 23 25   * 389* 291*   BUN 54* 50* 27*   CREA 1.76* 2.06* 0.96   CA 9.2 9.1 8.5   MG 2.7* 2.7*  --    PHOS 3.2 3.3  --        Signed: Eliza Davis MD    CTA head and neck:No evidence of acute intracranial vessel occlusion. Multifocal moderate to severe stenoses with occlusion of the distal left  vertebral artery at the V3-V4 junction. There is no intracranial mass, hemorrhage or evidence of acute infarction. No acute intracranial process is identified. CT head without contrast:1. Volume loss and chronic appearing white matter changes. No acute intracranial  abnormality  2. Diffuse sinus mucosal inflammatory change. Air-fluid level Y maxillary sinus  may indicate acute or chronic sinus mucosal disease    X-ray chest:IMPRESSION  Resolved interstitial edema pattern. MRI brain:IMPRESSION  1. Acute left basal ganglia ischemic infarct. 2. Atrophy and white matter disease, remote infarcts stable otherwise. MRI lumbar spine:IMPRESSION  1. Postsurgical changes L4-5 L5-S1 stable. 2. Spinal canal stenosis L3-L4.     Reviewed most current lab test results and cultures  YES  Reviewed most current radiology test results   YES  Review and summation of old records today    NO  Reviewed patient's current orders and MAR    YES  PMH/SH reviewed - no change compared to H&P      Assessment / Plan:  Severe sepsis-of note patient continues to have fevers, had a fever spike of one 101.5 this a.m., has persistent leukocytosis, unclear as to etiology, could be secondary to pneumonia versus other possible etiologies, remains hemodynamically stable at this time  Obtain repeat blood cultures  Obtain urinalysis  Follow-up prior blood cultures for sensitivities, negative to date  Obtain CT chest abdomen pelvis without contrast given MALIK for further evaluation  Continue cefepime/Flagyl for broad-spectrum antibiotic coverage  Trend inflammatory markers  Infectious disease consult appreciated, continue to follow recommendations    Cerebrovascular accident-of note patient was found to have a recent cerebrovascular accident on prior admission, remains confused, unclear whether secondary to metabolic encephalopathy versus new cerebrovascular accident  Continue aspirin once daily  Continue Eliquis once daily  Continue statin  Follow-up repeat MRI brain for further evaluation  Continue to follow physical therapy/Occupational Therapy recommendations  Patient status post PEG tube placement  Neurology consult appreciated, continue to follow recommendations    Altered mental status-could be secondary to metabolic encephalopathy due to problem #1 versus intracranial pathologies  Continue supportive care  Follow-up repeat MRI brain  Continue to monitor mental status    Hyperglycemia due to type 2 diabetes-increase Lantus, start lispro 3 times daily  Continue insulin sliding scale, readjust insulin regimen according 24-hour coverage    History of coronary artery disease-continue current medications    Acute kidney injury-likely prerenal in etiology, currently serum creatinine downtrending  Avoid nephrotoxic medications  Continue gentle hydration  Obtain urinary electrolytes calculate fractional excretion of sodium  Follow-up CT abdomen pelvis  Obtain nephrology consult    BPH-continue current medications    Prophylaxis-Eliquis  FEN-continue tube feeding, maintenance IV fluids, replete potassium and magnesium  Full code, patient surrogate decision-maker is his wife, updated at bedside  Disposition-pending clinical improvement, repeat MRI brain, improvement in mental status, clarification of antibiotics by infectious disease once patient is afebrile for over 24 hours, will need skilled nursing facility placement once medically stable      25.0 - 29.9 Overweight / Body mass index is 26.75 kg/m². Code status: Full  Prophylaxis:  Eliquis  Recommended Disposition: SNF/LTC     ________________________________________________________________________  Care Plan discussed with:    Comments   Patient     Family  x    RN x    Care Manager x    Consultant  x                     x Multidiciplinary team rounds were held today with , nursing, pharmacist and clinical coordinator. Patient's plan of care was discussed; medications were reviewed and discharge planning was addressed.      ________________________________________________________________________  Total NON critical care TIME:  35   Minutes      Comments   >50% of visit spent in counseling and coordination of care x    ________________________________________________________________________  Kenzie Crespo MD
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
## 2022-08-15 NOTE — WOUND CARE
Wound care Nurse consult: consult placed for yeast rash of groin. Patient is a 69 y/o CM admitted for CVA and acute encephalopathy. Past Medical History:   Diagnosis Date    Allergic rhinitis 6/28/2017    Arrhythmia     Paroxysmal Afib- Dr. Fco Arias    ASVD (arteriosclerotic vascular disease) 06/28/2017    Story: carotid stenosis followed by Vasc Surg    Atrial fibrillation ([address]     Congestive heart failure ([address]     Diverticulosis 6/28/2017    Comments: on colonoscopy 12/01    DJD (degenerative joint disease) 6/28/2017    ED (erectile dysfunction) 6/28/2017    GERD (gastroesophageal reflux disease) 6/28/2017    Glucose intolerance (impaired glucose tolerance) 06/28/2017    HTN (hypertension) 6/28/2017    Hyperlipidemia 6/28/2017    Insomnia 6/28/2017    Low back pain 6/28/2017    On statin therapy 6/28/2017    Pacemaker 2020    PVD (peripheral vascular disease) ([address] 6/28/2017    Rheumatoid arthritis ([address]     Urinary retention 6/28/2017     Patient has a yeast rash and severe moisture associated skin damage to buttocks from urinary and chronic mucusy diarrhea. He is currently bed bound, non-responsive - except to groan , does not open his eyes. Sacral buttocks:     Patient needs a second look by [facility] nurse to rule out if DTPI is emerging to sacrum, his skin is so denuded and yeast budd every where it is hard to tell at this time. Wound Gluteal fold/cleft Right wound surrounded by yeast like rash 08/13/22 (Active)   Wound Image   08/15/22 1649   Dressing Status New dressing applied 08/15/22 1649   Cleansed Soap and water 08/15/22 1649   Dressing/Treatment Pharmaceutical agent (see MAR); Zinc paste 08/15/22 1649   Wound Assessment Denuded;Dusky 08/15/22 1649   Drainage Amount None 08/15/22 1649   Wound Odor None 08/15/22 1649   Tara-Wound/Incision Assessment Denuded;Blanchable erythema 08/15/22 1649   Edges Undefined edges 08/15/22 1649   Number of days: 2          Recommend:    Specialty bed: [place] with air blower and turn team - if not available order a [place] MARY ALICE bed from [facility] . Turn team and frequent incontinence checks. Sacral/buttocks; BID, gently cleanse with soap and water only - no wipes. Apply Secura antifungal zinc cream to clean dry skin. Float heels.     Geraldo Vidal RN, [employer]

## 2022-08-16 NOTE — PROGRESS NOTES
Speech pathology note  Reviewed chart and discussed case with RN who reported patient is not medically appropriate for swallowing re-evaluation at this time. Note per chart review that patient is pending palliative consult. SLP will hold for now and continue to follow as medically appropriate. Thank you.     Alie Herrera, CCC-SLP

## 2022-08-16 NOTE — PROGRESS NOTES
Hospitalist Progress Note    NAME: Radha Parada   :  1945   MRN:  [id number]            Subjective:     Chief Complaint / Reason for Physician Visit  Patient seen and evaluated at bedside, overnight events reviewed, patient currently minimally responsive to painful/verbal stimuli. Discussed with RN events overnight. Review of Systems:  Symptom Y/N Comments  Symptom Y/N Comments   Fever/Chills    Chest Pain     Poor Appetite    Edema     Cough    Abdominal Pain     Sputum    Joint Pain     SOB/HANNA    Pruritis/Rash     Nausea/vomit    Tolerating PT/OT     Diarrhea    Tolerating Diet     Constipation    Other       Could NOT obtain due to: Limited 2/2 patients clinical status     Objective:     VITALS:   Last 24hrs VS reviewed since prior progress note. Most recent are:  Patient Vitals for the past 24 hrs:   Temp Pulse Resp BP SpO2   22 0745 99 °F (37.2 °C) 69 24 (!) 118/56 --   22 0303 99 °F (37.2 °C) 74 24 117/68 97 %   08/15/22 2307 98.8 °F (37.1 °C) 69 21 (!) 116/45 100 %   08/15/22 1939 97.8 °F (36.6 °C) 70 22 (!) 113/53 98 %   08/15/22 1729 98.1 °F (36.7 °C) 70 20 120/60 99 %   08/15/22 1514 98.1 °F (36.7 °C) 70 -- (!) 105/54 99 %         Intake/Output Summary (Last 24 hours) at 2022 7125  Last data filed at 2022 0023  Gross per 24 hour   Intake --   Output 1300 ml   Net -1300 ml          PHYSICAL EXAM:  General: Patient appears comfortable   EENT:  EOMI. Anicteric sclerae. MMM  Resp:  Decreased air entry bilaterally in bilateral lower lung zones  CV:  Regular  rhythm, s1/s2 no m/r/g  No edema  GI:  Soft, Non distended, Non tender. +Bowel sounds  Neurologic:  Limited 2/2 patients clinical status   Psych:   Limited 2/2 patients clinical status  Skin:  No rashes. No jaundice    Procedures: see electronic medical records for all procedures/Xrays and details which were not copied into this note but were reviewed prior to creation of Plan.       LABS:  I reviewed today's most current labs and imaging studies. Pertinent labs include:  Recent Labs     08/16/22  0240 08/15/22  0154 08/14/22  0440   WBC 14.1* 16.3* 16.2*   HGB 9.8* 10.7* 10.6*   HCT 29.6* 32.2* 31.6*    415* 369       Recent Labs     08/16/22  0240 08/15/22  0154 08/14/22  0440    139 134*   K 4.7 5.3* 5.0   * 109* 104   CO2 25 23 23   * 236* 389*   BUN 49* 54* 50*   CREA 1.12 1.76* 2.06*   CA 9.0 9.2 9.1   MG 2.4 2.7* 2.7*   PHOS  --  3.2 3.3         Signed: Bacilio Voss MD    CTA head and neck:No evidence of acute intracranial vessel occlusion. Multifocal moderate to severe stenoses with occlusion of the distal left  vertebral artery at the V3-V4 junction. There is no intracranial mass, hemorrhage or evidence of acute infarction. No acute intracranial process is identified. CT head without contrast:1. Volume loss and chronic appearing white matter changes. No acute intracranial  abnormality  2. Diffuse sinus mucosal inflammatory change. Air-fluid level Y maxillary sinus  may indicate acute or chronic sinus mucosal disease    X-ray chest:IMPRESSION  Resolved interstitial edema pattern. MRI brain:IMPRESSION  1. Acute left basal ganglia ischemic infarct. 2. Atrophy and white matter disease, remote infarcts stable otherwise. MRI lumbar spine:IMPRESSION  1. Postsurgical changes L4-5 L5-S1 stable. 2. Spinal canal stenosis L3-L4.     Reviewed most current lab test results and cultures  YES  Reviewed most current radiology test results   YES  Review and summation of old records today    NO  Reviewed patient's current orders and MAR    YES  PMH/SH reviewed - no change compared to H&P      Assessment / Plan:  Severe sepsis-of note patient has remained afebrile over the past 24 hours, leukocytosis improving, CT chest abdomen pelvis negative for any acute infectious source, unclear as to etiology, could be secondary to pneumonia versus other possible etiologies, remains hemodynamically stable at this time, case was discussed with infectious disease attending, possibility of central fevers  Follow-up repeat blood cultures for sensitivities, ALT blood cultures have been negative to date  Follow-up prior blood cultures for sensitivities, negative to date  Continue cefepime and Flagyl for broad-spectrum antibiotic coverage  Trend inflammatory markers  Infectious disease consult appreciated, continue to follow recommendations    Cerebrovascular accident-of note patient was found to have a recent cerebrovascular accident on prior admission, remains confused, likely secondary to metabolic encephalopathy, repeat MRI was negative for any acute change  Continue aspirin once daily  Continue Eliquis once daily  Continue statin  Follow-up EEG  Continue to follow physical therapy Occupational Therapy recommendations  Patient status post PEG tube placement  Neurology consult appreciated, continue to follow recommendations    Altered mental status/metabolic encephalopathy-could be secondary to metabolic encephalopathy due to problem #1 versus intracranial pathologies, repeat MRI brain consistent with prior CVA, no acute change  Continue supportive care  Continue to monitor mental status  Neurology consult appreciated, continue to follow recommendations    Hyperglycemia due to type 2 diabetes-currently much better control  Continue current insulin regimen  Continue insulin sliding scale    History of coronary artery disease-continue current medications    Acute kidney injury-currently serum creatinine downtrending, CT chest abdomen pelvis concerning for volume overload  Discontinue IV fluids  Hold nephrotoxic medications  Continue to trend serum creatinine  Nephrology consult appreciated    Bilateral hydronephrosis-of note patient CT abdomen pelvis concerning for bladder distention as well as bilateral hydronephrosis  Continue current care  Nephrology consult appreciated  Obtain urology consult    BPH-continue current medications    Prophylaxis-Eliquis  FEN-continue tube feeding, maintenance IV fluids, replete potassium and magnesium  Full code, patient surrogate decision-maker is his wife, I have had a goals of care discussion with patient's wife at bedside, discussed patient's worsening clinical status as well as overall prognosis, patient's wife voiced understanding, would like to continue all measures at this time, I discussed CODE STATUS as well as possibility of making patient DNR, patient's wife would like to think about it and will inform care team of her decision  Disposition-pending clinical improvement, goals of care discussion, will need skilled nursing facility placement    25.0 - 29.9 Overweight / Body mass index is 26.75 kg/m². Code status: Full  Prophylaxis:  Eliquis  Recommended Disposition: SNF/LTC     ________________________________________________________________________  Care Plan discussed with:    Comments   Patient     Family  x    RN x    Care Manager x    Consultant  x                     x Multidiciplinary team rounds were held today with , nursing, pharmacist and clinical coordinator. Patient's plan of care was discussed; medications were reviewed and discharge planning was addressed.      ________________________________________________________________________  Total NON critical care TIME:  35   Minutes      Comments   >50% of visit spent in counseling and coordination of care x    ________________________________________________________________________  Lakshmi Philip MD

## 2022-08-16 NOTE — CONSULTS
Urology Consult    Patient: Roosevelt Branham MRN: [id number]  SSN: [id number]    YOB: 1945  Age: 68 y.o. Sex: male          Date of Consultation:  August 16, 2022  Requesting Physician: Godfrey Steve*  Reason for Consultation: retention with hydronephrosis            Assessment/Plan:  Mild B/L hydronephrosis in setting of FLORES with bladder distention and MALIK- s/p ch placement   Basal ganglia infarct with acute covid infection and worsening AMS    -malik resolved since ch placement   -continue ch for bladder decompression one week and then can attempt voiding trial. Only replace ch for PVR >250ml. Can continue flomax  -can re-image if renal function should start to worsen  -no further urologic intervention, please call if needed. Outpatient follow up with urology once clinical conditions improves     Supervising MD, Dr. Angelo Johnson       History of Present Illness:  Patient is a 68 y.o. male admitted 7/25/2022 to the hospital for CVA (cerebral vascular accident) ([facility] [address] [I63.9]. He has a PMHx of a-fib, CHF, HTN, pacemaker who presented to the ED with confusion and AMS lasting 4-5 days. Patient has been diagnosed with Acute left basal ganglia ischemic infarct. He is s/p PEG placement  S/p CT chest abd, pelvis 8/15/22. . Shows Left pleural effusion cardiomegaly and mild vascular prominence. distended bladder with mild bilateral hydronephrosis. Urology consulted. He is not a current patient of [facility]. ROS unreliable to due acute confusion. Wife at bedside denies significant urology hx incluidng voiding dysfunction, gross hematuria or  cancer prior to admission. Ch draining clear yellow UA    Chart reviewed:  Ongoing mild fevers followed by ID, currently 99. 7. other VSS  Good UOP  Creat normalized (2 on admission)  Wbc 16.3 to 14. HD stable. ID following.  Cx pending  CT a/p images reviewed:  CT Results  (Last 48 hours)                 08/15/22 1106  CT ABD PELV WO CONT Final result    Impression:  1. Anasarca. 2. Left pleural effusion cardiomegaly and mild vascular prominence. 3. Distended bladder with mild bilateral hydronephrosis or. Narrative:  INDICATION: Fever       COMPARISON: CT chest 2021       CONTRAST: None. TECHNIQUE:  5 mm axial images were obtained through the chest, abdomen and   pelvis. Coronal and sagittal reformats were generated. CT dose reduction was   achieved through use of a standardized protocol tailored for this examination   and automatic exposure control for dose modulation. The absence of intravenous contrast reduces the sensitivity for evaluation of   the mediastinum, donal, vasculature, and upper abdominal organs. FINDINGS:       CHEST WALL: No mass or axillary lymphadenopathy. Prior left clavicle fracture   with internal fixation device in place. THYROID: No nodule. MEDIASTINUM: No mass or lymphadenopathy. DONAL: No mass or lymphadenopathy. THORACIC AORTA: No aneurysm. MAIN PULMONARY ARTERY: Normal in caliber. TRACHEA/BRONCHI: Patent. ESOPHAGUS: No wall thickening or dilatation. HEART: Enlarged   PLEURA: Mild size left pleural effusion. Minimal right pleural reaction. LUNGS: No nodule, mass, or airspace disease. Prior cholecystectomy   Prior appendectomy. Liver and spleen are normal in size. The pancreas shows no focal abnormalities. There is mild anasarca. There is no free air or free fluid. There is no bowel obstruction. There is moderate fecal stasis. The prostate is enlarged. The bladder is severely distended in the midline. There is no aneurysm of the abdominal aorta. Calcifications. No definite evidence for adenopathy in the abdomen or pelvis. Prior lumbar spine surgery. 5 cm left renal cyst lower pole. Bilateral mild hydronephrosis and hydroureter likely related to distended   bladder. 08/15/22 1106  CT CHEST WO CONT Final result    Impression:  1. Anasarca. 2. Left pleural effusion cardiomegaly and mild vascular prominence. 3. Distended bladder with mild bilateral hydronephrosis or. Narrative:  INDICATION: Fever       COMPARISON: CT chest 2021       CONTRAST: None. TECHNIQUE:  5 mm axial images were obtained through the chest, abdomen and   pelvis. Coronal and sagittal reformats were generated. CT dose reduction was   achieved through use of a standardized protocol tailored for this examination   and automatic exposure control for dose modulation. The absence of intravenous contrast reduces the sensitivity for evaluation of   the mediastinum, donal, vasculature, and upper abdominal organs. FINDINGS:       CHEST WALL: No mass or axillary lymphadenopathy. Prior left clavicle fracture   with internal fixation device in place. THYROID: No nodule. MEDIASTINUM: No mass or lymphadenopathy. DONAL: No mass or lymphadenopathy. THORACIC AORTA: No aneurysm. MAIN PULMONARY ARTERY: Normal in caliber. TRACHEA/BRONCHI: Patent. ESOPHAGUS: No wall thickening or dilatation. HEART: Enlarged   PLEURA: Mild size left pleural effusion. Minimal right pleural reaction. LUNGS: No nodule, mass, or airspace disease. Prior cholecystectomy   Prior appendectomy. Liver and spleen are normal in size. The pancreas shows no focal abnormalities. There is mild anasarca. There is no free air or free fluid. There is no bowel obstruction. There is moderate fecal stasis. The prostate is enlarged. The bladder is severely distended in the midline. There is no aneurysm of the abdominal aorta. Calcifications. No definite evidence for adenopathy in the abdomen or pelvis. Prior lumbar spine surgery. 5 cm left renal cyst lower pole. Bilateral mild hydronephrosis and hydroureter likely related to distended   bladder. Past Medical History:   Allergies   Allergen Reactions    Corticosteroids (Glucocorticoids) Other (comments)     Depo medrol says patient, loss of consciousness    Pravastatin Other (comments)     Possible cause of elevated LFTs for 18 2018 AST 86       Prior to Admission medications    Medication Sig Start Date End Date Taking? Authorizing Provider   pregabalin (LYRICA) 75 mg capsule Take 1 Capsule by mouth two (2) times a day. Max Daily Amount: 150 mg. 7/30/22   Clarita Guardado MD   acetaminophen (TYLENOL) 325 mg tablet Take 2 Tablets by mouth every four (4) hours as needed for Fever (For temp greater than or equal to 38.5 C or 101.3 F (Unless hepatic failure or contrindicated). Give first line for fever. ). 7/12/22   Medina Palmer MD   calcium carb/vit D3/minerals (CALCIUM-VITAMIN D PO) Take  by mouth two (2) times a day. Provider, Historical   methotrexate (RHEUMATREX) 2.5 mg tablet Take 15 mg by mouth every Monday. Provider, Historical   carvediloL (COREG) 3.125 mg tablet Take 1 Tablet by mouth two (2) times daily (with meals). Patient taking differently: Take 3.125 mg by mouth daily. 12/20/21   Fabián Carroll MD   cyclobenzaprine (FLEXERIL) 10 mg tablet Take 10 mg by mouth three (3) times daily as needed. Provider, Historical   polyethylene glycol (MIRALAX) 17 gram packet Take 17 g by mouth daily as needed for Constipation. Provider, Historical   Eliquis 5 mg tablet Take 1 Tablet by mouth two (2) times a day. 3/3/20   Provider, Historical   tamsulosin (FLOMAX) 0.4 mg capsule Take 0.4 mg by mouth daily. Provider, Historical   lidocaine (XYLOCAINE) 5 % ointment Apply  to affected area as needed for Pain. Provider, Historical   pravastatin (PRAVACHOL) 20 mg tablet Take 20 mg by mouth nightly. Provider, Historical   omeprazole (PRILOSEC) 20 mg capsule Take 20 mg by mouth daily. Indications: 1 (one) Capsule DR daily    Provider, Historical   aspirin delayed-release 81 mg tablet Take 81 mg by mouth daily.     Provider, Historical      PMHx:  has a past medical history of Allergic rhinitis (6/28/2017), Arrhythmia, ASVD (arteriosclerotic vascular disease) (06/28/2017), Atrial fibrillation ([address] Congestive heart failure ([address] Diverticulosis (6/28/2017), DJD (degenerative joint disease) (6/28/2017), ED (erectile dysfunction) (6/28/2017), GERD (gastroesophageal reflux disease) (6/28/2017), Glucose intolerance (impaired glucose tolerance) (06/28/2017), HTN (hypertension) (6/28/2017), Hyperlipidemia (6/28/2017), Insomnia (6/28/2017), Low back pain (6/28/2017), On statin therapy (6/28/2017), Pacemaker (2020), PVD (peripheral vascular disease) ([address] (6/28/2017), Rheumatoid arthritis ([address] and Urinary retention (6/28/2017). PSurgHx:  has a past surgical history that includes pr icar catheter ablation atrioventr node function (N/A, 12/18/2020); hx cholecystectomy (1997); hx orthopaedic (2003); hx orthopaedic (2008); hx appendectomy (1951); hx heent (1950); and colonoscopy (N/A, 3/22/2022). PSocHx:  reports that he quit smoking about 47 years ago. His smoking use included cigarettes. He quit smokeless tobacco use about 30 years ago. His smokeless tobacco use included chew. He reports that he does not drink alcohol and does not use drugs. ROS:  Admission ROS by Yvrose Vee MD from 7/25/2022 were reviewed with the patient and changes (other than per HPI) include: none.     Physical Exam    General Appearance: NAD, awake  HENT: atraumatic, normal ears  Cardiovascular: not tachycardic, no LE edema  Respiratory: no distress, room air  Abdomen: soft, no suprapubic fullness or tenderness  : no CVA tenderness, ch draining clear yellow UA   Extremities: moves all  Musculoskeletal: normal alignment of neck and head  Neuro: Appropriate, no focal neurological deficits  Mood/Affect: appropriate, A&O x 3      Lab Results   Component Value Date/Time    WBC 14.1 (H) 08/16/2022 02:40 AM    HCT 29.6 (L) 08/16/2022 02:40 AM    PLATELET 230 16/61/1368 02:40 AM    Sodium 143 08/16/2022 02:40 AM Potassium 4.7 08/16/2022 02:40 AM    Chloride 114 (H) 08/16/2022 02:40 AM    CO2 25 08/16/2022 02:40 AM    BUN 49 (H) 08/16/2022 02:40 AM    Creatinine 1.12 08/16/2022 02:40 AM    Glucose 151 (H) 08/16/2022 02:40 AM    Calcium 9.0 08/16/2022 02:40 AM    Magnesium 2.4 08/16/2022 02:40 AM    INR 1.0 07/25/2022 12:59 PM       UA:   Lab Results   Component Value Date/Time    Color DARK YELLOW 08/15/2022 11:40 AM    Appearance CLOUDY (A) 08/15/2022 11:40 AM    Specific gravity 1.028 08/15/2022 11:40 AM    Specific gravity <1.005 07/25/2022 04:31 PM    pH (UA) 5.5 08/15/2022 11:40 AM    Protein 30 (A) 08/15/2022 11:40 AM    Glucose 250 (A) 08/15/2022 11:40 AM    Ketone TRACE (A) 08/15/2022 11:40 AM    Bilirubin Negative 08/08/2022 05:06 AM    Urobilinogen 1.0 08/15/2022 11:40 AM    Nitrites Negative 08/15/2022 11:40 AM    Leukocyte Esterase TRACE (A) 08/15/2022 11:40 AM    Epithelial cells MODERATE (A) 08/15/2022 11:40 AM    Bacteria Negative 08/15/2022 11:40 AM    WBC 0-4 08/15/2022 11:40 AM    RBC >100 (H) 08/15/2022 11:40 AM           Signed By: Cody Small NP  - August 16, 2022

## 2022-08-16 NOTE — ACP (ADVANCE CARE PLANNING)
Primary Decision Maker: [facility] - [phone]  Advance Care Planning 8/16/2022   Patient's Healthcare Decision Maker is: Legal Next of Kin   Confirm Advance Directive None   Patient Would Like to Complete Advance Directive Unable   Does the patient have other document types -      Patient does not have an AMD. This writer contacted the [facility] to see if they have anything on their records regarding an AMD but they do not. Patient's Estelle Ann is his wife Du Luna ([phone] or [phone] per chart). Next in line would be patient's two sons, Linda Fine ([phone]) and Sallie Pack ([phone]). LCSW met with patient and wife today. Patient is not verbal, he is minimally responsive, not able to engage meaningfully. Spoke to wife [facility] and asked her if she and patient have discussed his wishes in the past, should he get ill and not do well. [facility] noted that they have not discussed this. Patient is getting worked up at this time.  [facility] is not ready for comfort focused care yet, this writer did mention hospice as an option, if comfort is the goal.

## 2022-08-16 NOTE — PROGRESS NOTES
Full consult to follow. Historic VU visit Dr. Jazzy Troncoso 2014 for ED. Admit facial droop, weakness finding basal ganglia infarct, microvascular disease. Admission UA (-). CT 8/15 seen, bladder distended to umbilicus, mild to moderate bilateral hydro to bladder. Desir inserted for unknown drained volume, creatine has since normalized           Estimated Creatinine Clearance: 57.9 mL/min (based on SCr of 1.12 mg/dL).     Creatinine   Date/Time Value Ref Range Status   08/16/2022 02:40 AM 1.12 0.70 - 1.30 MG/DL Final   08/15/2022 01:54 AM 1.76 (H) 0.70 - 1.30 MG/DL Final   08/14/2022 04:40 AM 2.06 (H) 0.70 - 1.30 MG/DL Final     Comment:     INVESTIGATED PER DELTA CHECK PROTOCOL   08/13/2022 12:50 AM 0.96 0.70 - 1.30 MG/DL Final   08/12/2022 12:56 AM 0.92 0.70 - 1.30 MG/DL Final
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
## 2022-08-16 NOTE — PROGRESS NOTES
Palliative Medicine      Code Status: Full Code (DNR discussed by Dr Kathy Szymanski today with wife. LCSW did not bring this up again today at time of this meeting. She had noted that she would think about it and get back to the team). Advance Care Planning:  Patient does not have an AMD. Per wife, Flex Rotor, they have not discussed health care wishes in the past. She did note a couple of times that she would like to make sure that patient is comfortable. However she does not seem ready for a focus on comfort only yet. Flex Rivera is very tearful, aware of the changes in patient and trying to process them. She seems to know that prognosis is poor. (She shared this when this writer asked her what has been shared with her by the doctors on his team). Advance Care Planning 7/26/2022   Patient's Healthcare Decision Maker is: -   Confirm Advance Directive None   Patient Would Like to Complete Advance Directive -   Does the patient have other document types -       Patient / Family Encounter Documentation    Participants (names): This writer, patient's wife Saqib Crawford and patient (unable to participate). Narrative: Patient is not able to respond in a meaningful way. When this writer went over to his bed, spoke to him, touched him and asked him to open his eyes, he did try to open his eyes. Let him know that his wife was present, patient does not seem to be able to track but Flex Rivera did hold his hand and he reached out with his other hand. Patient is not able to communicate any needs, Flex Rivera indicated that he has never been this debilitated. Patient has a PEG, had an EEG this a m per Flex Rivera. LCSW did discuss hospice as an option for patient, at a time when she and family feel that hospital admissions and treatments are no longer helpful. Flex Rivera did not know much about hospice, shared some basic information with her. The couple have been  for 46 years, they have two adult sons, one lives next door to them.  Both children have come by to see patient. There are two adult grandchildren, ages 32 and 21. Jovi Porras is grieving, normal grief as her  has had multiple medical issues and now is unable to even communicate with her. Patient used to work in maintenance at [employer]. He was in [place] and is affiliated with the [employer] LCSW thanked him for his service. Patient unable to respond. Psychosocial Issues Identified/ Resilience Factors: Guarded prognosis, unsure how much patient will recover functionally. He has had steady declines over the past couple of months. Wife Jovi Porras is grieving and internally understands that prognosis is poor. Good family support of children. Emotional support provided to wife as she is quite tearful. Empathized with her and patient's challenging journey over the past couple of months. Have taken a CD player and some country/ Yarsanism CDs to patient, to see if this helps him in any way. Have asked our  to meet with patient and Jovi Porras, to provide additional support to Jovi Porras. Caregiver [place]: High due to patient's dependant state. Does the caregiver feel confident administering medication? Not at this time  Does the caregiver need any help connecting with community resources? Goals unknown so not at this time. Does the caregiver feel confident assisting with activities of daily living? Not at this time. Goals of Care / Plan: Needs to be determined. Unsure patient will be able to benefit from SNF at this time. Will see patient and Jovi Porras tomorrow. Thank you for including Palliative Medicine in the care of Mr Reese Feng. ADDENDUM: 3:45 pm LCSW went back to see patient to give a CD player and CDs. Several extended family members present including patient's brother Lesli Raymond and his wife Niki Muhammad, a sister, a brother in law and a sister in law. NAS explained the role of Palliative Medicine and shared that I would be back tomorrow with a provider so we can discuss the \"big picture\". Family noting that they are getting some mixed messages, that patient \"may improve\" vs needing palliative care/hospice. They also feel that patient is uncomfortable, in pain and restless (LCSW sent a PS message to Dr Guillermo Paige and he has adjusted the pain medicine). Patient has had a lengthy hospital stay. LCSW shared this afternoon that if patient's condition does not improve, that we would need to talk about where to go from here in terms of his goals. Have mentioned hospice to wife. The extended family are a good support to wife, would benefit from all of them meeting with our team so they all know what to expect. Soledad Smithxochitl will be calling me tomorrow to let me know what time we should meet with her and family (have asked her to invite any one she wants present). Have asked her to contact our team, she has our phone number.

## 2022-08-16 NOTE — PROGRESS NOTES
EEG REPORT    Patient Name: Verna Mays  : 1945  Age: 68 y.o. Ordering physician: Dr. Josefina Lanier  Date of EE2022  9:17 - 9:40  Diagnosis: encephalopathy  Interpreting physician: Ritika Tafoya D.O. FAAN    Procedure: EEG    CLINICAL INDICATION: The patient is a 68 y.o. male who is being evaluated for baseline electro cerebral activities and to rule out seizure focus.       Current Facility-Administered Medications   Medication Dose Route Frequency    insulin glargine (LANTUS) injection 38 Units  38 Units SubCUTAneous QHS    insulin lispro (HUMALOG) injection 4 Units  4 Units SubCUTAneous TID    traMADoL (ULTRAM) tablet 50 mg  50 mg Oral Q6H PRN    miconazole (SECURA) 2 % extra thick cream   Topical BID    cefepime (MAXIPIME) 2 g in 0.9% sodium chloride (MBP/ADV) 100 mL MBP  2 g IntraVENous Q12H    metroNIDAZOLE (FLAGYL) IVPB premix 500 mg  500 mg IntraVENous Q12H    glucose chewable tablet 16 g  4 Tablet Oral PRN    glucagon (GLUCAGEN) injection 1 mg  1 mg IntraMUSCular PRN    insulin lispro (HUMALOG) injection   SubCUTAneous Q6H    [Held by provider] QUEtiapine (SEROquel) tablet 25 mg  25 mg Oral QHS    melatonin tablet 10.5 mg  10.5 mg Oral QHS    apixaban (ELIQUIS) tablet 5 mg  5 mg Per G Tube BID    sodium chloride (NS) flush 5-40 mL  5-40 mL IntraVENous Q8H    sodium chloride (NS) flush 5-40 mL  5-40 mL IntraVENous PRN    simethicone (MYLICON) 74BW/2.2KS oral drops 80 mg  1.2 mL Oral Multiple    morphine injection 1 mg  1 mg IntraVENous Q6H PRN    glucagon (GLUCAGEN) injection 1 mg  1 mg IntraMUSCular PRN    dextrose 10% infusion 0-250 mL  0-250 mL IntraVENous PRN    aspirin chewable tablet 162 mg  162 mg Oral DAILY    escitalopram oxalate (LEXAPRO) tablet 10 mg  10 mg Oral DAILY WITH DINNER    busPIRone (BUSPAR) tablet 15 mg  15 mg Oral TID    acetaminophen (TYLENOL) tablet 650 mg  650 mg Oral Q4H PRN    Or    acetaminophen (TYLENOL) solution 650 mg  650 mg Per NG tube Q4H PRN    Or acetaminophen (TYLENOL) suppository 650 mg  650 mg Rectal Q4H PRN    carvediloL (COREG) tablet 3.125 mg  3.125 mg Oral DAILY    tamsulosin (FLOMAX) capsule 0.4 mg  0.4 mg Oral DAILY    atorvastatin (LIPITOR) tablet 20 mg  20 mg Oral QHS    cyclobenzaprine (FLEXERIL) tablet 5 mg  5 mg Oral TID PRN    ondansetron (ZOFRAN) injection 4 mg  4 mg IntraVENous Q8H PRN           DESCRIPTION OF PROCEDURE:     This is a digitally recorded electroencephalogram  Electrodes were applied in accordance with the international 10-20 system of electrode placement. 18 channels of scalp EEG are recorded  A channel was used for EoG  Another channel was used for ECG   The data is stored digitally and reviewed in reformatted montages for optimal display  EEG  was reviewed in both bipolar and referential montages    Description of Activity: The background of this recording contains no well-formed alpha activity seen. There was a mixtures of diffuse theta and delta activity identified throughout the study. Throughout the recording, there were no clear areas of focal slowing nor spike or spike-and-wave discharges seen. There were generalized triphasic waves seen during the study. Hyperventilation was not performed. Photic stimulation produced no response in the posterior head regions. During the recording, the patient did not achieve stage II sleep      Clinical Interpretation: This EEG, performed during wakefulness, is abnormal. There is mild to moderate generalized slowing as seen in encephalopathies. There is no focal asymmetry, seizures or epileptiform discharges seen. Clinical correlation is recommended        BRIDGER Sortoing

## 2022-08-16 NOTE — PROGRESS NOTES
Received notification from bedside RN about patient with regards to: , has hs Lantus of 43 units.  BG trend noted to be decreasing  VS: /53, HR 70, RR 22, O2 sat 98%     Intervention given: Give Lantus 20 units instead of 43 units ordered

## 2022-08-16 NOTE — PROGRESS NOTES
Discussed with nursing and pt is not appropriate for PT services, minimally responsive and mainly only responding to pain. Noted palliative was consulted which would be a great benefit from discussing goals of care. Pt has been medically declining and becoming less appropriate for PT services. Will continue to follow a little while longer, but will have to sign off if status does not improve.

## 2022-08-16 NOTE — PROGRESS NOTES
End of Shift Note     Bedside shift change report given to Kody Nelson RN (oncoming nurse) by [facility], RN (offgoing nurse).   Report included the following information SBAR, Kardex, MAR, and Recent Results     Shift worked: 1900-7   Shift summary and any significant changes:     Tube feed rate lowered to 45ml/hr, flush increased to 175ml q4     One time reduction for Lantus for     Pt hypotensive overnight- NP notified via perfectserve  Palliative consult  Labs drawn   Concerns for physician to address: None    Zone phone for oncoming shift:  [phone]      Patient Information  Alysia Keller  68 y.o.  7/25/2022 12:04 PM by Latrell Luther MD. Alysia Keller was admitted from Home     Problem List          Patient Active Problem List     Diagnosis Date Noted    Encephalopathy due to COVID-19 virus 07/28/2022    Thrombotic stroke involving left middle cerebral artery ([address] 07/27/2022    Acute alteration in mental status 07/26/2022    Convulsive syncope 07/26/2022    Bilateral carotid artery stenosis 07/26/2022    History of stroke 07/26/2022    CVA (cerebral vascular accident) ([address] 07/25/2022    Stroke ([address] 07/08/2022    Weakness of both legs 06/01/2021    Bilateral lower extremity pain 06/01/2021    Leg pain 05/30/2021    Dilated cardiomyopathy ([address] 12/18/2020    Permanent atrial fibrillation ([address] 12/18/2020    Tachycardia 12/18/2020    A-fib ([address] 12/18/2020    PAF (paroxysmal atrial fibrillation) ([address] 05/16/2018    Age-related cataract 08/01/2017    Allergic rhinitis 06/28/2017    Diverticulosis 06/28/2017    Urinary retention 06/28/2017    PVD (peripheral vascular disease) ([address] 06/28/2017    On statin therapy 06/28/2017    Low back pain 06/28/2017    Insomnia 06/28/2017    HTN (hypertension) 06/28/2017    Hyperlipidemia 06/28/2017    Glucose intolerance (impaired glucose tolerance) 06/28/2017    GERD (gastroesophageal reflux disease) 06/28/2017    ED (erectile dysfunction) 06/28/2017    DJD (degenerative joint disease) 06/28/2017    ASVD (arteriosclerotic vascular disease) 06/28/2017              Past Medical History:   Diagnosis Date    Allergic rhinitis 6/28/2017    Arrhythmia       Paroxysmal Afib- Dr. Edwige Reveles    ASVD (arteriosclerotic vascular disease) 06/28/2017     Story: carotid stenosis followed by Vasc Surg    Atrial fibrillation ([address]      Congestive heart failure ([address]      Diverticulosis 6/28/2017     Comments: on colonoscopy 12/01    DJD (degenerative joint disease) 6/28/2017    ED (erectile dysfunction) 6/28/2017    GERD (gastroesophageal reflux disease) 6/28/2017    Glucose intolerance (impaired glucose tolerance) 06/28/2017    HTN (hypertension) 6/28/2017    Hyperlipidemia 6/28/2017    Insomnia 6/28/2017    Low back pain 6/28/2017    On statin therapy 6/28/2017    Pacemaker 2020    PVD (peripheral vascular disease) ([address] 6/28/2017    Rheumatoid arthritis ([address]      Urinary retention 6/28/2017         Core Measures:  CVA: Yes Yes  CHF:No Not applicable  PNA:No Not applicable     Activity:  Activity Level: Up with Assistance  Number times ambulated in hallways past shift: 0  Number of times OOB to chair past shift: 0     Cardiac:  Cardiac Monitoring: No            Access:   Current line(s): PIV         Genitourinary:   Urinary status: voiding inc and inc briefs  Urinary Catheter? No     Respiratory:   O2 Device: None (Room air)  Chronic home O2 use?: NO  Incentive spirometer at bedside: N/A     GI:  Last Bowel Movement Date: 07/27/22  Current diet:  DIET NPO  Passing flatus: YES  Tolerating current diet: YES     Pain Management:  Patient states pain is manageable on current regimen: YES     Skin:  Ghulam Score: 16  Interventions: increase time out of bed, limit briefs, and internal/external urinary devices    Patient Safety:  Fall Score:  Total Score: 4  Interventions: bed/chair alarm and gripper socks  High Fall Risk: Yes  @Rollbelt  @dexterity to release roll belt  Yes/No ( must document dexterity  here by stating Yes or No here, otherwise this is a restraint and must follow restraint documentation policy.)     DVT prophylaxis:  DVT prophylaxis Med- Yes  DVT prophylaxis SCD or ISAI- No     Wounds: (If Applicable)  Wounds- No  Location none     Active Consults:  IP CONSULT TO HOSPITALIST  IP CONSULT TO NEUROLOGY  IP CONSULT TO GASTROENTEROLOGY     Length of Stay:  Expected LOS: 4d 9h  Actual LOS: 22  Discharge Plan: Yes CM following.   Referral for IPR to [facility]

## 2022-08-16 NOTE — PROGRESS NOTES
End of Shift Note     Bedside shift change report given to Radha Awad (oncoming nurse) by Emerald Howard RN (offgoing nurse).   Report included the following information SBAR, Kardex, MAR, and Recent Results     Shift worked: Days    Shift summary and any significant changes:     Palliative met with family today, will meet again tomorrow to determine care goals      Concerns for physician to address: None    Zone phone for oncoming shift:  [phone]      Patient Information  Verna Mays  68 y.o.  7/25/2022 12:04 PM by Jenniffer Aguillon MD. Verna Mays was admitted from Home     Problem List          Patient Active Problem List     Diagnosis Date Noted    Encephalopathy due to COVID-19 virus 07/28/2022    Thrombotic stroke involving left middle cerebral artery ([address] 07/27/2022    Acute alteration in mental status 07/26/2022    Convulsive syncope 07/26/2022    Bilateral carotid artery stenosis 07/26/2022    History of stroke 07/26/2022    CVA (cerebral vascular accident) ([address] 07/25/2022    Stroke ([address] 07/08/2022    Weakness of both legs 06/01/2021    Bilateral lower extremity pain 06/01/2021    Leg pain 05/30/2021    Dilated cardiomyopathy ([address] 12/18/2020    Permanent atrial fibrillation ([address] 12/18/2020    Tachycardia 12/18/2020    A-fib ([address] 12/18/2020    PAF (paroxysmal atrial fibrillation) ([address] 05/16/2018    Age-related cataract 08/01/2017    Allergic rhinitis 06/28/2017    Diverticulosis 06/28/2017    Urinary retention 06/28/2017    PVD (peripheral vascular disease) ([address] 06/28/2017    On statin therapy 06/28/2017    Low back pain 06/28/2017    Insomnia 06/28/2017    HTN (hypertension) 06/28/2017    Hyperlipidemia 06/28/2017    Glucose intolerance (impaired glucose tolerance) 06/28/2017    GERD (gastroesophageal reflux disease) 06/28/2017    ED (erectile dysfunction) 06/28/2017    DJD (degenerative joint disease) 06/28/2017    ASVD (arteriosclerotic vascular disease) 06/28/2017              Past Medical History: Diagnosis Date    Allergic rhinitis 6/28/2017    Arrhythmia       Paroxysmal Afib- Dr. Yolande Graves    ASVD (arteriosclerotic vascular disease) 06/28/2017     Story: carotid stenosis followed by Vasc Surg    Atrial fibrillation [address]      Congestive heart failure ([facility]      Diverticulosis 6/28/2017     Comments: on colonoscopy 12/01    DJD (degenerative joint disease) 6/28/2017    ED (erectile dysfunction) 6/28/2017    GERD (gastroesophageal reflux disease) 6/28/2017    Glucose intolerance (impaired glucose tolerance) 06/28/2017    HTN (hypertension) 6/28/2017    Hyperlipidemia 6/28/2017    Insomnia 6/28/2017    Low back pain 6/28/2017    On statin therapy 6/28/2017    Pacemaker 2020    PVD (peripheral vascular disease) ([facility] 6/28/2017    Rheumatoid arthritis ([facility]      Urinary retention 6/28/2017         Core Measures:  CVA: Yes Yes  CHF:No Not applicable  PNA:No Not applicable     Activity:  Activity Level: Up with Assistance  Number times ambulated in hallways past shift: 0  Number of times OOB to chair past shift: 0     Cardiac:  Cardiac Monitoring: No            Access:   Current line(s): PIV         Genitourinary:   Urinary status: voiding inc and inc briefs  Urinary Catheter? No     Respiratory:   O2 Device: None (Room air)  Chronic home O2 use?: NO  Incentive spirometer at bedside: N/A     GI:  Last Bowel Movement Date: 07/27/22  Current diet:  DIET NPO  Passing flatus: YES  Tolerating current diet: YES     Pain Management:  Patient states pain is manageable on current regimen: YES     Skin:  Ghulam Score: 16  Interventions: increase time out of bed, limit briefs, and internal/external urinary devices    Patient Safety:  Fall Score:  Total Score: 4  Interventions: bed/chair alarm and gripper socks  High Fall Risk: Yes  @Rollbelt  @dexterity to release roll belt  Yes/No ( must document dexterity  here by stating Yes or No here, otherwise this is a restraint and must follow restraint documentation policy.) DVT prophylaxis:  DVT prophylaxis Med- Yes  DVT prophylaxis SCD or ISAI- No     Wounds: (If Applicable)  Wounds- No  Location none     Active Consults:  IP CONSULT TO HOSPITALIST  IP CONSULT TO NEUROLOGY  IP CONSULT TO GASTROENTEROLOGY     Length of Stay:  Expected LOS: 4d 9h  Actual LOS: 22  Discharge Plan: Yes CM following.   [facility]

## 2022-08-16 NOTE — PROGRESS NOTES
Nephrology Progress Note  [facility] / [address] , [facility] [address]  Phone - [phone]  Fax - [phone]                 Patient: Mac Walter                   YOB: 1945        Date- 8/16/2022                      Admit Date: 7/25/2022  CC: Follow up for MALIK          IMPRESSION & PLAN:   MALIK stage III(secondary obstructive uropathy and poor p.o. intake.)  Urinary retention(Desir placement resulted almost >900 cc urine output)  Hyperkalemia (resolved)  Encephalopathy  Sepsis  Basal ganglia infarct  Type 2 diabetes  CAD  Paroxysmal A. fib    PLAN-  Can DC IV fluids. Resume PEG feeds when able. Will leave the Desir in for now. Urology has been consulted for further evaluation. Daily labs. Discussed with medicine team     Subjective: Interval History:   -Seen and examined today. -Remains confused getting EEG done.  -Spoke to wife at bedside    Objective:   Vitals:    08/15/22 2307 08/16/22 0303 08/16/22 0745 08/16/22 0800   BP: (!) 116/45 117/68 (!) 118/56    Pulse: 69 74 69 95   Resp: 21 24 24    Temp: 98.8 °F (37.1 °C) 99 °F (37.2 °C) 99 °F (37.2 °C)    SpO2: 100% 97% 97%    Weight:       Height:          08/15 0701 - 08/16 0700  In: -   Out: 1300 [Urine:1300]  Last 3 Recorded Weights in this Encounter    08/10/22 0748 08/11/22 1620 08/13/22 1104   Weight: 75.2 kg (165 lb 12.6 oz) 76.1 kg (167 lb 12.3 oz) 84.6 kg (186 lb 6.4 oz)      Physical exam:    GEN: Confused  NECK- Supple, no mass  RESP: No wheezing, Clear b/l  CVS: S1,S2  RRR  NEURO: AMS  EXT: No Edema   : Desir catheter    Chart reviewed. Pertinent Notes reviewed.      Data Review :  Recent Labs     08/16/22  0240 08/15/22  0154 08/14/22  0440    139 134*   K 4.7 5.3* 5.0   * 109* 104   CO2 25 23 23   BUN 49* 54* 50*   CREA 1.12 1.76* 2.06*   * 236* 389*   CA 9.0 9.2 9.1   MG 2.4 2.7* 2.7*   PHOS  --  3.2 3.3     Recent Labs     08/16/22  0240 08/15/22  0154 08/14/22  0440   WBC 14.1* 16.3* 16.2*   HGB 9.8* 10.7* 10.6*   HCT 29.6* 32.2* 31.6*    415* 369     Recent Labs     08/14/22  0440   FERR 1,119*      Medication list  reviewed  Current Facility-Administered Medications   Medication Dose Route Frequency    insulin glargine (LANTUS) injection 38 Units  38 Units SubCUTAneous QHS    insulin lispro (HUMALOG) injection 4 Units  4 Units SubCUTAneous TID    traMADoL (ULTRAM) tablet 50 mg  50 mg Oral Q6H PRN    miconazole (SECURA) 2 % extra thick cream   Topical BID    cefepime (MAXIPIME) 2 g in 0.9% sodium chloride (MBP/ADV) 100 mL MBP  2 g IntraVENous Q12H    metroNIDAZOLE (FLAGYL) IVPB premix 500 mg  500 mg IntraVENous Q12H    glucose chewable tablet 16 g  4 Tablet Oral PRN    glucagon (GLUCAGEN) injection 1 mg  1 mg IntraMUSCular PRN    insulin lispro (HUMALOG) injection   SubCUTAneous Q6H    [Held by provider] QUEtiapine (SEROquel) tablet 25 mg  25 mg Oral QHS    melatonin tablet 10.5 mg  10.5 mg Oral QHS    apixaban (ELIQUIS) tablet 5 mg  5 mg Per G Tube BID    sodium chloride (NS) flush 5-40 mL  5-40 mL IntraVENous Q8H    sodium chloride (NS) flush 5-40 mL  5-40 mL IntraVENous PRN    simethicone (MYLICON) 76DP/0.0XT oral drops 80 mg  1.2 mL Oral Multiple    morphine injection 1 mg  1 mg IntraVENous Q6H PRN    glucagon (GLUCAGEN) injection 1 mg  1 mg IntraMUSCular PRN    dextrose 10% infusion 0-250 mL  0-250 mL IntraVENous PRN    aspirin chewable tablet 162 mg  162 mg Oral DAILY    escitalopram oxalate (LEXAPRO) tablet 10 mg  10 mg Oral DAILY WITH DINNER    busPIRone (BUSPAR) tablet 15 mg  15 mg Oral TID    acetaminophen (TYLENOL) tablet 650 mg  650 mg Oral Q4H PRN    Or    acetaminophen (TYLENOL) solution 650 mg  650 mg Per NG tube Q4H PRN    Or    acetaminophen (TYLENOL) suppository 650 mg  650 mg Rectal Q4H PRN    carvediloL (COREG) tablet 3.125 mg  3.125 mg Oral DAILY    tamsulosin (FLOMAX) capsule 0.4 mg  0.4 mg Oral DAILY    atorvastatin (LIPITOR) tablet 20 mg  20 mg Oral QHS    cyclobenzaprine (FLEXERIL) tablet 5 mg  5 mg Oral TID PRN    ondansetron (ZOFRAN) injection 4 mg  4 mg IntraVENous Q8H PRN        Armando Andersen MD  8/16/2022

## 2022-08-16 NOTE — PROGRESS NOTES
Spiritual Care Assessment/Progress Note  [facility]      NAME: Lamine Hunt      MRN: [id number]  AGE: 68 y.o. SEX: male  Jainism Affiliation: Yarsanism   Language: English     8/16/2022     Total Time (in minutes): 17     Spiritual Assessment begun in [facility] through conversation with:         [x]Patient        [x] Family    [] Friend(s)        Reason for Consult: Initial/Spiritual assessment, patient floor     Spiritual beliefs: (Please include comment if needed)     [x] Identifies with a bari tradition:         [] Supported by a bari community:            [] Claims no spiritual orientation:           [] Seeking spiritual identity:                [] Adheres to an individual form of spirituality:           [] Not able to assess:                           Identified resources for coping:      [x] Prayer                               [] Music                  [] Guided Imagery     [x] Family/friends                 [] Pet visits     [] Devotional reading                         [] Unknown     [] Other:                                               Interventions offered during this visit: (See comments for more details)    Patient Interventions: Prayer (actual)     Family/Friend(s):  Affirmation of bari, Initial Assessment, Jainism beliefs/image of God discussed, Affirmation of emotions/emotional suffering, Prayer (actual), Prayer (assurance of), Normalization of emotional/spiritual concerns, Coping skills reviewed/reinforced     Plan of Care:     [x] Support spiritual and/or cultural needs    [] Support AMD and/or advance care planning process      [] Support grieving process   [] Coordinate Rites and/or Rituals    [] Coordination with community clergy   [] No spiritual needs identified at this time   [] Detailed Plan of Care below (See Comments)  [] Make referral to Music Therapy  [] Make referral to Pet Therapy     [] Make referral to Addiction services  [] Make referral to [facility]  [] Make referral to Spiritual Care Partner  [] No future visits requested        [x] Contact Spiritual Care for further referrals     Comments:  reviewed the patient's chart prior to the visit.  coordinated services with Justina Fernando (Patient Tech). Patient's family and friends were in the room (4) when the  entered his room. The family stated they were [facility] and others were [facility]. His wife stated she wanted prayer and the family agreed.  provided a presence, encouragement, prayer and empathetic listening. Chaplains services are available 24 hours a day as requested. Rev. MARCUS Bejarano   Paging Service [phone])

## 2022-08-16 NOTE — PROGRESS NOTES
Occupational Therapy note:    Chart reviewed and discussed with nursing. Patient not appropriate for therapy at this time as he is minimally responsive and only responding to pain. Patient with medical decline over last week and has not been appropriate for therapy over last several attempts. Noted palliative consult in place to discuss goals of care. Will defer and continue to follow at this time but may have to sign off if no improvement.      Amol Morrow, OTR/L
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
## 2022-08-17 NOTE — PROGRESS NOTES
Nephrology Progress Note  [facility] / [address], [place], [address]  Phone - [phone]  Fax - [phone]                 Patient: Maci العلي                   YOB: 1945        Date- 8/17/2022                      Admit Date: 7/25/2022  CC: Follow up for MALIK          IMPRESSION & PLAN:   MALIK stage III(secondary obstructive uropathy and poor p.o. intake.)  Urinary retention(Desir placement resulted almost >900 cc urine output)  Hyperkalemia (resolved)  Encephalopathy  Sepsis  Basal ganglia infarct  Type 2 diabetes  CAD  Paroxysmal A. fib    PLAN-  Creatinine remains labile. Off IV fluids and back on PEG feeds. Desir care per urology team  Ongoing discussion with family to assess goals of care. Subjective: Interval History:   -Seen and examined today. -Remains confused  -Creatinine labile  -Spoke to wife at bedside    Objective:   Vitals:    08/17/22 0000 08/17/22 0400 08/17/22 0744 08/17/22 0800   BP: 122/65 122/64 (!) 131/57    Pulse: 70 70 70 90   Resp: 16 17 18    Temp: 98.8 °F (37.1 °C) 98 °F (36.7 °C) 100 °F (37.8 °C)    SpO2: 99% 97% 97%    Weight:       Height:          08/16 0701 - 08/17 0700  In: 150   Out: 900 [Urine:900]  Last 3 Recorded Weights in this Encounter    08/11/22 1620 08/13/22 1104 08/16/22 0800   Weight: 76.1 kg (167 lb 12.3 oz) 84.6 kg (186 lb 6.4 oz) 89.2 kg (196 lb 9.6 oz)      Physical exam:    GEN: Confused  NECK- Supple, no mass  RESP: No wheezing, Clear b/l  CVS: S1,S2  RRR  NEURO: AMS  EXT: No Edema   : Desir catheter    Chart reviewed. Pertinent Notes reviewed.      Data Review :  Recent Labs     08/17/22  0350 08/16/22  0240 08/15/22  0154    143 139   K 4.7 4.7 5.3*   * 114* 109*   CO2 24 25 23   BUN 49* 49* 54*   CREA 1.38* 1.12 1.76*   * 151* 236*   CA 8.7 9.0 9.2   MG 2.5* 2.4 2.7*   PHOS  --   --  3.2       Recent Labs 08/17/22  0350 08/16/22  0240 08/15/22  0154   WBC 15.9* 14.1* 16.3*   HGB 9.7* 9.8* 10.7*   HCT 30.2* 29.6* 32.2*    349 415*       No results for input(s): FE, TIBC, PSAT, FERR in the last 72 hours.      Medication list  reviewed  Current Facility-Administered Medications   Medication Dose Route Frequency    insulin glargine (LANTUS) injection 38 Units  38 Units SubCUTAneous QHS    morphine injection 2 mg  2 mg IntraVENous Q4H PRN    insulin lispro (HUMALOG) injection 4 Units  4 Units SubCUTAneous TID    traMADoL (ULTRAM) tablet 50 mg  50 mg Oral Q6H PRN    miconazole (SECURA) 2 % extra thick cream   Topical BID    cefepime (MAXIPIME) 2 g in 0.9% sodium chloride (MBP/ADV) 100 mL MBP  2 g IntraVENous Q12H    metroNIDAZOLE (FLAGYL) IVPB premix 500 mg  500 mg IntraVENous Q12H    glucose chewable tablet 16 g  4 Tablet Oral PRN    glucagon (GLUCAGEN) injection 1 mg  1 mg IntraMUSCular PRN    insulin lispro (HUMALOG) injection   SubCUTAneous Q6H    [Held by provider] QUEtiapine (SEROquel) tablet 25 mg  25 mg Oral QHS    melatonin tablet 10.5 mg  10.5 mg Oral QHS    apixaban (ELIQUIS) tablet 5 mg  5 mg Per G Tube BID    sodium chloride (NS) flush 5-40 mL  5-40 mL IntraVENous Q8H    sodium chloride (NS) flush 5-40 mL  5-40 mL IntraVENous PRN    simethicone (MYLICON) 05KD/9.3MF oral drops 80 mg  1.2 mL Oral Multiple    glucagon (GLUCAGEN) injection 1 mg  1 mg IntraMUSCular PRN    dextrose 10% infusion 0-250 mL  0-250 mL IntraVENous PRN    aspirin chewable tablet 162 mg  162 mg Oral DAILY    escitalopram oxalate (LEXAPRO) tablet 10 mg  10 mg Oral DAILY WITH DINNER    busPIRone (BUSPAR) tablet 15 mg  15 mg Oral TID    acetaminophen (TYLENOL) tablet 650 mg  650 mg Oral Q4H PRN    Or    acetaminophen (TYLENOL) solution 650 mg  650 mg Per NG tube Q4H PRN    Or    acetaminophen (TYLENOL) suppository 650 mg  650 mg Rectal Q4H PRN    carvediloL (COREG) tablet 3.125 mg  3.125 mg Oral DAILY    tamsulosin (FLOMAX) capsule 0.4 mg  0.4 mg Oral DAILY    atorvastatin (LIPITOR) tablet 20 mg  20 mg Oral QHS    cyclobenzaprine (FLEXERIL) tablet 5 mg  5 mg Oral TID PRN    ondansetron (ZOFRAN) injection 4 mg  4 mg IntraVENous Q8H PRN          Mariya Don MD  8/17/2022

## 2022-08-17 NOTE — PROGRESS NOTES
Problem: TIA/CVA Stroke: 0-24 hours  Goal: Off Pathway (Use only if patient is Off Pathway)  Outcome: Progressing Towards Goal   Fall   prevention    Transfer   safety      Aspiration   prevention    Activity as   tolerated    Proper positioning to reduce skin   breakdown

## 2022-08-17 NOTE — PROGRESS NOTES
Computer rounds only, no charge submitted    Ch, no post-obstructive diuresis (900ccs/24hrs) or decompression hematuria  Creat had normalized, no new labs today.   On Flomax (PTA med)    Void trial at hospital or discharge facility 1 week, fu PVRs, replaced ch PVR > [address] Urology fu if needed for ch remaining

## 2022-08-17 NOTE — PROGRESS NOTES
Nutrition: RD notified by RN that TF was never switched to Nepro as previously ordered. Potassium back WNL and BG improved so will continue with Jevity 1.5. RD changed order back to Standard with Fiber (Jevity 1.5). Will continue to monitor and adjust TF as needed. Thanks.   Davion Lange, [address]   Ext [phone]

## 2022-08-17 NOTE — PROGRESS NOTES
Problem: Patient Education: Go to Patient Education Activity  Goal: Patient/Family Education  Outcome: Progressing Towards Goal     Problem: Patient Education: Go to Patient Education Activity  Goal: Patient/Family Education  Outcome: Progressing Towards Goal     Problem: Patient Education: Go to Patient Education Activity  Goal: Patient/Family Education  Outcome: Progressing Towards Goal     Problem: Airway Clearance - Ineffective  Goal: Achieve or maintain patent airway  Outcome: Progressing Towards Goal     Problem: Gas Exchange - Impaired  Goal: Absence of hypoxia  Outcome: Progressing Towards Goal  Goal: Promote optimal lung function  Outcome: Progressing Towards Goal     Problem: Breathing Pattern - Ineffective  Goal: Ability to achieve and maintain a regular respiratory rate  Outcome: Progressing Towards Goal     Problem:  Body Temperature -  Risk of, Imbalanced  Goal: Ability to maintain a body temperature within defined limits  Outcome: Progressing Towards Goal  Goal: Will regain or maintain usual level of consciousness  Outcome: Progressing Towards Goal  Goal: Complications related to the disease process, condition or treatment will be avoided or minimized  Outcome: Progressing Towards Goal     Problem: Isolation Precautions - Risk of Spread of Infection  Goal: Prevent transmission of infectious organism to others  Outcome: Progressing Towards Goal     Problem: Nutrition Deficits  Goal: Optimize nutrtional status  Outcome: Progressing Towards Goal     Problem: Risk for Fluid Volume Deficit  Goal: Maintain normal heart rhythm  Outcome: Progressing Towards Goal  Goal: Maintain absence of muscle cramping  Outcome: Progressing Towards Goal  Goal: Maintain normal serum potassium, sodium, calcium, phosphorus, and pH  Outcome: Progressing Towards Goal     Problem: Loneliness or Risk for Loneliness  Goal: Demonstrate positive use of time alone when socialization is not possible  Outcome: Progressing Towards Goal     Problem: Fatigue  Goal: Verbalize increase energy and improved vitality  Outcome: Progressing Towards Goal     Problem: Patient Education: Go to Patient Education Activity  Goal: Patient/Family Education  Outcome: Progressing Towards Goal     Problem: Delirium Treatment  Goal: *Level of consciousness restored to baseline  Outcome: Progressing Towards Goal  Goal: *Level of environmental perceptions restored to baseline  Outcome: Progressing Towards Goal  Goal: *Sensory perception restored to baseline  Outcome: Progressing Towards Goal  Goal: *Emotional stability restored to baseline  Outcome: Progressing Towards Goal  Goal: *Functional assessment restored to baseline  Outcome: Progressing Towards Goal  Goal: *Absence of falls  Outcome: Progressing Towards Goal  Goal: *Will remain free of delirium, CAM Score negative  Outcome: Progressing Towards Goal  Goal: *Cognitive status will be restored to baseline  Outcome: Progressing Towards Goal  Goal: Interventions  Outcome: Progressing Towards Goal     Problem: Patient Education: Go to Patient Education Activity  Goal: Patient/Family Education  Outcome: Progressing Towards Goal     Problem: Falls - Risk of  Goal: *Absence of Falls  Description: Document [place] Led Fall Risk and appropriate interventions in the flowsheet.   Outcome: Progressing Towards Goal  Note: Fall Risk Interventions:  Mobility Interventions: Bed/chair exit alarm    Mentation Interventions: Bed/chair exit alarm    Medication Interventions: Bed/chair exit alarm    Elimination Interventions: Bed/chair exit alarm    History of Falls Interventions: Bed/chair exit alarm         Problem: Patient Education: Go to Patient Education Activity  Goal: Patient/Family Education  Outcome: Progressing Towards Goal     Problem: Patient Education: Go to Patient Education Activity  Goal: Patient/Family Education  Outcome: Progressing Towards Goal     Problem: TIA/CVA Stroke: 0-24 hours  Goal: Off Pathway (Use only if patient is Off Pathway)  Outcome: Progressing Towards Goal  Goal: Activity/Safety  Outcome: Progressing Towards Goal  Goal: Consults, if ordered  Outcome: Progressing Towards Goal  Goal: Treatments/Interventions/Procedures  Outcome: Progressing Towards Goal  Goal: Minimize risk of bleeding post-thrombolytic infusion  Outcome: Progressing Towards Goal  Goal: Monitor for complications post-thrombolytic infusion  Outcome: Progressing Towards Goal  Goal: Psychosocial  Outcome: Progressing Towards Goal  Goal: *Hemodynamically stable  Outcome: Progressing Towards Goal  Goal: *Neurologically stable  Description: Absence of additional neurological deficits    Outcome: Progressing Towards Goal  Goal: *Verbalizes anxiety and depression are reduced or absent  Outcome: Progressing Towards Goal  Goal: *Absence of Signs of Aspiration on Current Diet  Outcome: Progressing Towards Goal  Goal: *Absence of deep venous thrombosis signs and symptoms(Stroke Metric)  Outcome: Progressing Towards Goal  Goal: *Ability to perform ADLs and demonstrates progressive mobility and function  Outcome: Progressing Towards Goal  Goal: *Stroke education started(Stroke Metric)  Outcome: Progressing Towards Goal  Goal: *Dysphagia screen performed(Stroke Metric)  Outcome: Progressing Towards Goal  Goal: *Rehab consulted(Stroke Metric)  Outcome: Progressing Towards Goal     Problem: TIA/CVA Stroke: Day 2 Until Discharge  Goal: Off Pathway (Use only if patient is Off Pathway)  Outcome: Progressing Towards Goal  Goal: Activity/Safety  Outcome: Progressing Towards Goal  Goal: Diagnostic Test/Procedures  Outcome: Progressing Towards Goal  Goal: Nutrition/Diet  Outcome: Progressing Towards Goal  Goal: Discharge Planning  Outcome: Progressing Towards Goal  Goal: Medications  Outcome: Progressing Towards Goal  Goal: Respiratory  Outcome: Progressing Towards Goal  Goal: Treatments/Interventions/Procedures  Outcome: Progressing Towards Goal  Goal: Psychosocial  Outcome: Progressing Towards Goal  Goal: *Verbalizes anxiety and depression are reduced or absent  Outcome: Progressing Towards Goal  Goal: *Absence of aspiration  Outcome: Progressing Towards Goal  Goal: *Absence of deep venous thrombosis signs and symptoms(Stroke Metric)  Outcome: Progressing Towards Goal  Goal: *Optimal pain control at patient's stated goal  Outcome: Progressing Towards Goal  Goal: *Tolerating diet  Outcome: Progressing Towards Goal  Goal: *Ability to perform ADLs and demonstrates progressive mobility and function  Outcome: Progressing Towards Goal  Goal: *Stroke education continued(Stroke Metric)  Outcome: Progressing Towards Goal     Problem: Ischemic Stroke: Discharge Outcomes  Goal: *Verbalizes anxiety and depression are reduced or absent  Outcome: Progressing Towards Goal  Goal: *Verbalize understanding of risk factor modification(Stroke Metric)  Outcome: Progressing Towards Goal  Goal: *Hemodynamically stable  Outcome: Progressing Towards Goal  Goal: *Absence of aspiration pneumonia  Outcome: Progressing Towards Goal  Goal: *Aware of needed dietary changes  Outcome: Progressing Towards Goal  Goal: *Verbalize understanding of prescribed medications including anti-coagulants, anti-lipid, and/or anti-platelets(Stroke Metric)  Outcome: Progressing Towards Goal  Goal: *Tolerating diet  Outcome: Progressing Towards Goal  Goal: *Aware of follow-up diagnostics related to anticoagulants  Outcome: Progressing Towards Goal  Goal: *Ability to perform ADLs and demonstrates progressive mobility and function  Outcome: Progressing Towards Goal  Goal: *Absence of DVT(Stroke Metric)  Outcome: Progressing Towards Goal  Goal: *Absence of aspiration  Outcome: Progressing Towards Goal  Goal: *Optimal pain control at patient's stated goal  Outcome: Progressing Towards Goal  Goal: *Home safety concerns addressed  Outcome: Progressing Towards Goal  Goal: *Describes available resources and support systems  Outcome: Progressing Towards Goal  Goal: *Verbalizes understanding of activation of EMS(911) for stroke symptoms(Stroke Metric)  Outcome: Progressing Towards Goal  Goal: *Understands and describes signs and symptoms to report to providers(Stroke Metric)  Outcome: Progressing Towards Goal  Goal: *Neurolgocially stable (absence of additional neurological deficits)  Outcome: Progressing Towards Goal  Goal: *Verbalizes importance of follow-up with primary care physician(Stroke Metric)  Outcome: Progressing Towards Goal  Goal: *Smoking cessation discussed,if applicable(Stroke Metric)  Outcome: Progressing Towards Goal  Goal: *Depression screening completed(Stroke Metric)  Outcome: Progressing Towards Goal

## 2022-08-17 NOTE — PROGRESS NOTES
Occupational Therapy note:    Chart reviewed and noted patient remains inappropriate for therapy. Patient is not opening eyes to stimulus or following commands. Will defer and attempt tomorrow if status has improved. Will have to sign off of patient remains inappropriate.      Diamond Whitley OTR/L

## 2022-08-17 NOTE — PROGRESS NOTES
Palliative Medicine  [place]: [phone]  [facility]: [phone])     Palliative Medicine      Code Status: Full Code    Advance Care Planning: Patient is unable to complete AMD. He is minimally responsive at this time, does not even open his eyes, he has metabolic encephalopathy and is on PEG tube feedings, he has had periods of aspiration. Family is not ready to de-escalate care at this time, wants current treatments to continue. Advance Care Planning 8/16/2022   Patient's Healthcare Decision Maker is: Legal Next of Kin   Confirm Advance Directive None   Patient Would Like to Complete Advance Directive Unable   Does the patient have other document types -       Patient / Family Encounter Documentation    Participants (names): We met in patient's room. Wife Nicholas Sanford, patient's sister Andrey Cummings, sister in law Jennifer Dillon, patient's  Sari Gant. Palliative team of Dr Matt Sullivan and this Andria Colemantiff. Narrative: Patient has been at the hospital for over 3 weeks. He was initially talking and answering simple questions, although family note that he did have some confusion and memory impairment. Patient has declined significantly since that time. He is now not at all interactive, sleeps much of the time; family attribute this to his \"infection\" and they are waiting to see if the IV antibiotics will help patient to come around some. Prior to his hospitalization, patient was able to take care of his personal needs, was walking and even mowed the lawn. Today, Dr Matt Sullivan explained at length patient's overall conditions that have impacted his current condition; CVA, metabolic encephalopathy, Covid recently, underlying dementia. We also discussed at length patient's Code Status, which remains Full Code at this time. Nicholas Sanford was to discuss this with her family, she shared that she will discuss with her sons when they both come in on Sunday.  Apparently the Code Status conversation has been brought up but one of the sons is having difficulty with patient's condition. We encouraged Figueroa Tavera to see if we can have a meeting to discuss this issue on Friday, 8/19/22 with this son, even via conference call. She will speak to him and call our team with a time. At this time, family is not ready to de-escalate care. They want patient to continue getting the current treatment that he is getting to see if he makes any progress. Patient's sister, Andrea Harvey is a retired RN and seems to be realistic. We discussed care of patient and setting where this may happen if patient continues in his current state. Patient is a [place] and unsure if he would be eligible for LTC at a NH under his benefits. Psychosocial Issues Identified/ Resilience Factors: Good family support, very involved siblings and their spouses. They all live next to each other on a large property. Figueroa Tavera seems less emotional today, not as tearful, slowly processing her 's declines. She did note that she has received mixed messages in that the IV ABX may help improve patient's condition over time vs patient remaining in this state. Family also does not want strong pain medications as they would like patient to be as awake as he can. They do feel that he is uncomfortable at times. Dr Haylee Rob to add some mild pain medication to his regimen. Caregiver [place]: Would be high if patient were to go home. Patient is total care. Does the caregiver feel confident administering medication? Not at this time  Does the caregiver need any help connecting with community resources? Will need assistance with this depending on discharge plan. Does the caregiver feel confident assisting with activities of daily living? No    Goals of Care / Plan: Family still deciding goals. Need to address Code status and long term goals. Dr Haylee Rob to meet with Figueroa Tavera and family on Friday 8/19 to continue this conversation.      Thank you for including Palliative Medicine in the care of Mr Nancy Fernandez.

## 2022-08-17 NOTE — PROGRESS NOTES
Palliative Medicine  [place]: [phone])  [facility]: [phone]     LCSW contacted Fernando De to meet with our team including Dr Raven Barahona today. We will meet family and patient at 11:30 am today.

## 2022-08-17 NOTE — PROGRESS NOTES
Brief summary of visit , full note to follow. Patient is unresponsive, restless, non verbal.    Met with patient wife cory Mcneill, patient sister and sister in law. We discuss the big picture, prolonged metabolic encephalopathy, due to multiple reasons ( stroke, pneumonia , Covid encephalopathy, underlying dementia), wife feels antibiotics are playing a significant role for alteration in mental status . We discussed CPR is not in best interest given his overall medical issues , wife may consider DNR, patient one son is having a hard time accepting patient condition, she wants to discuss with his two sons before making any decisions. Wife wants to wait few days to see if he improves, patient was independent [place], she knows patient would  NOT want to live in nursing home dependant for care. We decided to meet this Friday to continue the dialogue of goals of care,  wife to coordinate with patient sons to be included in family meeting, wife will call me with the time . Restless : non verbal pain indicator: on morphine I/V and tramadol prn. Thank you for allowing us to participate in the care of Mr Anne Marie Fernando.

## 2022-08-17 NOTE — PROGRESS NOTES
End of Shift Note     Bedside shift change report given to PASTOR Yates (oncoming nurse) by Keena Hernandez RN (offgoing nurse).   Report included the following information SBAR, Kardex, MAR, and Recent Results     Shift worked: Days    Shift summary and any significant changes:     Palliative meeting completed today  Continued Antibiotics  Wound care completed      Concerns for physician to address: None    Zone phone for oncoming shift:  [phone]      Patient Information  Roosevelt Branham  68 y.o.  7/25/2022 12:04 PM by Anayeli Rodriguez MD. Roosevelt Branham was admitted from Home     Problem List          Patient Active Problem List     Diagnosis Date Noted    Encephalopathy due to COVID-19 virus 07/28/2022    Thrombotic stroke involving left middle cerebral artery ([address] 07/27/2022    Acute alteration in mental status 07/26/2022    Convulsive syncope 07/26/2022    Bilateral carotid artery stenosis 07/26/2022    History of stroke 07/26/2022    CVA (cerebral vascular accident) ([address] 07/25/2022    Stroke ([address] 07/08/2022    Weakness of both legs 06/01/2021    Bilateral lower extremity pain 06/01/2021    Leg pain 05/30/2021    Dilated cardiomyopathy ([address] 12/18/2020    Permanent atrial fibrillation ([address] 12/18/2020    Tachycardia 12/18/2020    A-fib ([address] 12/18/2020    PAF (paroxysmal atrial fibrillation) ([address] 05/16/2018    Age-related cataract 08/01/2017    Allergic rhinitis 06/28/2017    Diverticulosis 06/28/2017    Urinary retention 06/28/2017    PVD (peripheral vascular disease) ([address] 06/28/2017    On statin therapy 06/28/2017    Low back pain 06/28/2017    Insomnia 06/28/2017    HTN (hypertension) 06/28/2017    Hyperlipidemia 06/28/2017    Glucose intolerance (impaired glucose tolerance) 06/28/2017    GERD (gastroesophageal reflux disease) 06/28/2017    ED (erectile dysfunction) 06/28/2017    DJD (degenerative joint disease) 06/28/2017    ASVD (arteriosclerotic vascular disease) 06/28/2017              Past Medical History:   Diagnosis Date    Allergic rhinitis 6/28/2017    Arrhythmia       Paroxysmal Afib- [address]    ASVD (arteriosclerotic vascular disease) 06/28/2017     Story: carotid stenosis followed by [facility]    Atrial fibrillation [address]      Congestive heart failure ([facility]      Diverticulosis 6/28/2017     Comments: on colonoscopy 12/01    DJD (degenerative joint disease) 6/28/2017    ED (erectile dysfunction) 6/28/2017    GERD (gastroesophageal reflux disease) 6/28/2017    Glucose intolerance (impaired glucose tolerance) 06/28/2017    HTN (hypertension) 6/28/2017    Hyperlipidemia 6/28/2017    Insomnia 6/28/2017    Low back pain 6/28/2017    On statin therapy 6/28/2017    Pacemaker 2020    PVD (peripheral vascular disease) ([facility] 6/28/2017    Rheumatoid arthritis ([facility]      Urinary retention 6/28/2017         Core Measures:  CVA: Yes Yes  CHF:No Not applicable  PNA:No Not applicable     Activity:  Activity Level: Up with Assistance  Number times ambulated in hallways past shift: 0  Number of times OOB to chair past shift: 0     Cardiac:  Cardiac Monitoring: No            Access:   Current line(s): PIV         Genitourinary:   Urinary status: voiding inc and inc briefs  Urinary Catheter? No     Respiratory:   O2 Device: None (Room air)  Chronic home O2 use?: NO  Incentive spirometer at bedside: N/A     GI:  Last Bowel Movement Date: 07/27/22  Current diet:  DIET NPO  Passing flatus: YES  Tolerating current diet: YES     Pain Management:  Patient states pain is manageable on current regimen: YES     Skin:  Ghulam Score: 16  Interventions: increase time out of bed, limit briefs, and internal/external urinary devices    Patient Safety:  Fall Score:  Total Score: 4  Interventions: bed/chair alarm and gripper socks  High Fall Risk: Yes  @Rollbelt  @dexterity to release roll belt  Yes/No ( must document dexterity  here by stating Yes or No here, otherwise this is a restraint and must follow restraint documentation policy.)     DVT prophylaxis:  DVT prophylaxis Med- Yes  DVT prophylaxis SCD or ISAI- No     Wounds: (If Applicable)  Wounds- No  Location none     Active Consults:  IP CONSULT TO HOSPITALIST  IP CONSULT TO NEUROLOGY  IP CONSULT TO GASTROENTEROLOGY     Length of Stay:  Expected LOS: 4d 9h  Actual LOS: 22  Discharge Plan: Yes CM following.   [facility]

## 2022-08-17 NOTE — PROGRESS NOTES
Hospitalist Progress Note    NAME: Lamine Hunt   :  1945   MRN:  [id number]            Subjective:     Chief Complaint / Reason for Physician Visit  Patient seen and evaluated at bedside, overnight events reviewed, patient currently minimally responsive to painful/verbal stimuli. Discussed with RN events overnight. Review of Systems:  Symptom Y/N Comments  Symptom Y/N Comments   Fever/Chills    Chest Pain     Poor Appetite    Edema     Cough    Abdominal Pain     Sputum    Joint Pain     SOB/HANNA    Pruritis/Rash     Nausea/vomit    Tolerating PT/OT     Diarrhea    Tolerating Diet     Constipation    Other       Could NOT obtain due to: Limited 2/2 patients clinical status     Objective:     VITALS:   Last 24hrs VS reviewed since prior progress note. Most recent are:  Patient Vitals for the past 24 hrs:   Temp Pulse Resp BP SpO2   22 0800 -- 90 -- -- --   22 0744 100 °F (37.8 °C) 70 18 (!) 131/57 97 %   22 0400 98 °F (36.7 °C) 70 17 122/64 97 %   22 0000 98.8 °F (37.1 °C) 70 16 122/65 99 %   22 2127 99.6 °F (37.6 °C) 72 17 129/64 98 %   22 1920 98 °F (36.7 °C) 70 18 130/62 96 %   22 1600 -- 71 -- -- --   22 1527 99.6 °F (37.6 °C) 70 20 (!) 143/60 97 %   22 1150 -- 79 -- -- --   22 1115 99.7 °F (37.6 °C) 71 24 138/61 96 %         Intake/Output Summary (Last 24 hours) at 2022 0857  Last data filed at 2022 0743  Gross per 24 hour   Intake 325 ml   Output 900 ml   Net -575 ml          PHYSICAL EXAM:  General: Patient appears comfortable   EENT:  EOMI. Anicteric sclerae. MMM  Resp:  Decreased air entry bilaterally in bilateral lower lung zones  CV:  Regular  rhythm, s1/s2 no m/r/g  No edema  GI:  Soft, Non distended, Non tender. +Bowel sounds  Neurologic:  Limited 2/2 patients clinical status   Psych:   Limited 2/2 patients clinical status  Skin:  No rashes.   No jaundice    Procedures: see electronic medical records for all procedures/Xrays and details which were not copied into this note but were reviewed prior to creation of Plan. LABS:  I reviewed today's most current labs and imaging studies. Pertinent labs include:  Recent Labs     08/17/22  0350 08/16/22 0240 08/15/22  0154   WBC 15.9* 14.1* 16.3*   HGB 9.7* 9.8* 10.7*   HCT 30.2* 29.6* 32.2*    349 415*       Recent Labs     08/17/22  0350 08/16/22  0240 08/15/22  0154    143 139   K 4.7 4.7 5.3*   * 114* 109*   CO2 24 25 23   * 151* 236*   BUN 49* 49* 54*   CREA 1.38* 1.12 1.76*   CA 8.7 9.0 9.2   MG 2.5* 2.4 2.7*   PHOS  --   --  3.2         Signed: Natividad Penn MD    CTA head and neck:No evidence of acute intracranial vessel occlusion. Multifocal moderate to severe stenoses with occlusion of the distal left  vertebral artery at the V3-V4 junction. There is no intracranial mass, hemorrhage or evidence of acute infarction. No acute intracranial process is identified. CT head without contrast:1. Volume loss and chronic appearing white matter changes. No acute intracranial  abnormality  2. Diffuse sinus mucosal inflammatory change. Air-fluid level Y maxillary sinus  may indicate acute or chronic sinus mucosal disease    X-ray chest:IMPRESSION  Resolved interstitial edema pattern. MRI brain:IMPRESSION  1. Acute left basal ganglia ischemic infarct. 2. Atrophy and white matter disease, remote infarcts stable otherwise. MRI lumbar spine:IMPRESSION  1. Postsurgical changes L4-5 L5-S1 stable. 2. Spinal canal stenosis L3-L4.     Reviewed most current lab test results and cultures  YES  Reviewed most current radiology test results   YES  Review and summation of old records today    NO  Reviewed patient's current orders and MAR    YES  PMH/SH reviewed - no change compared to H&P      Assessment / Plan:  Severe sepsis-of note patient has remained afebrile over the past 24 hours, leukocytosis improving, CT chest abdomen pelvis negative for any acute infectious source, unclear as to etiology, could be secondary to pneumonia versus other possible etiologies, remains hemodynamically stable at this time, case was discussed with infectious disease attending, possibility of central fevers  Follow-up repeat blood cultures for sensitivities, previous blood cultures have been negative to date  Follow-up prior blood cultures for sensitivities, negative to date  Continue cefepime and Flagyl for broad-spectrum antibiotic coverage  Trend inflammatory markers  Infectious disease consult appreciated, continue to follow recommendations    Cerebrovascular accident-of note patient was found to have a recent cerebrovascular accident on prior admission, remains confused, likely secondary to metabolic encephalopathy, repeat MRI was negative for any acute change  Continue aspirin once daily  Continue Eliquis once daily  Continue statin  Continue to follow physical therapy Occupational Therapy recommendations  Patient status post PEG tube placement  Neurology consult appreciated, continue to follow recommendations    Altered mental status/metabolic encephalopathy-could be secondary to metabolic encephalopathy due to problem #1 versus intracranial pathologies, repeat MRI brain consistent with prior CVA, no acute change  Continue supportive care  Continue to monitor mental status  Neurology consult appreciated, continue to follow recommendations    Hyperglycemia due to type 2 diabetes-currently much better control  Continue current insulin regimen  Continue insulin sliding scale    History of coronary artery disease-continue current medications    Acute kidney injury-currently serum creatinine downtrending, CT chest abdomen pelvis concerning for volume overload  Discontinue IV fluids  Hold nephrotoxic medications  Continue to trend serum creatinine  Nephrology consult appreciated    Bilateral hydronephrosis-of note patient CT abdomen pelvis concerning for bladder distention as well as bilateral hydronephrosis  Continue current care  Nephrology consult appreciated  Obtain urology consult    BPH-continue current medications    Prophylaxis-Eliquis  FEN-continue tube feeding, maintenance IV fluids, replete potassium and magnesium  Full code, I had an extensive goals of care discussion with patient's wife and extended family yesterday with regards to patient's clinical course as well as overall poor prognosis, family voiced understanding, patient's family to meet with palliative care attending later today, will follow up recommendations   Disposition-pending clinical improvement, goals of care discussion, will need skilled nursing facility placement versus hospice    25.0 - 29.9 Overweight / Body mass index is 28.21 kg/m². Code status: Full  Prophylaxis:  Eliquis  Recommended Disposition: SNF/LTC     ________________________________________________________________________  Care Plan discussed with:    Comments   Patient     Family  x    RN x    Care Manager x    Consultant  x                     x Multidiciplinary team rounds were held today with , nursing, pharmacist and clinical coordinator. Patient's plan of care was discussed; medications were reviewed and discharge planning was addressed.      ________________________________________________________________________  Total NON critical care TIME:  35   Minutes      Comments   >50% of visit spent in counseling and coordination of care x    ________________________________________________________________________  Lenard Norton MD
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
## 2022-08-18 NOTE — PROGRESS NOTES
End of Shift Note     Bedside shift change report given to PASTOR Silva (oncoming nurse) by Caroline Medrano LPN (offgoing nurse).   Report included the following information SBAR, Kardex, MAR, and Recent Results     Shift worked: Nights   Shift summary and any significant changes:       Continued Antibiotics        Concerns for physician to address: None    Zone phone for oncoming shift:  [phone]      Patient Information  Sirisha Fournier  68 y.o.  7/25/2022 12:04 PM by Enzo Verdugo MD. Sirisha Fournier was admitted from Home     Problem List          Patient Active Problem List     Diagnosis Date Noted    Encephalopathy due to COVID-19 virus 07/28/2022    Thrombotic stroke involving left middle cerebral artery ([address] 07/27/2022    Acute alteration in mental status 07/26/2022    Convulsive syncope 07/26/2022    Bilateral carotid artery stenosis 07/26/2022    History of stroke 07/26/2022    CVA (cerebral vascular accident) ([address] 07/25/2022    Stroke ([address] 07/08/2022    Weakness of both legs 06/01/2021    Bilateral lower extremity pain 06/01/2021    Leg pain 05/30/2021    Dilated cardiomyopathy ([address] 12/18/2020    Permanent atrial fibrillation ([address] 12/18/2020    Tachycardia 12/18/2020    A-fib ([address] 12/18/2020    PAF (paroxysmal atrial fibrillation) ([address] 05/16/2018    Age-related cataract 08/01/2017    Allergic rhinitis 06/28/2017    Diverticulosis 06/28/2017    Urinary retention 06/28/2017    PVD (peripheral vascular disease) ([address] 06/28/2017    On statin therapy 06/28/2017    Low back pain 06/28/2017    Insomnia 06/28/2017    HTN (hypertension) 06/28/2017    Hyperlipidemia 06/28/2017    Glucose intolerance (impaired glucose tolerance) 06/28/2017    GERD (gastroesophageal reflux disease) 06/28/2017    ED (erectile dysfunction) 06/28/2017    DJD (degenerative joint disease) 06/28/2017    ASVD (arteriosclerotic vascular disease) 06/28/2017              Past Medical History:   Diagnosis Date    Allergic rhinitis 6/28/2017 Arrhythmia       Paroxysmal Afib- Dr. Lee Bourne    ASVD (arteriosclerotic vascular disease) 06/28/2017     Story: carotid stenosis followed by Vasc Surg    Atrial fibrillation ([facility]      Congestive heart failure ([facility]      Diverticulosis 6/28/2017     Comments: on colonoscopy 12/01    DJD (degenerative joint disease) 6/28/2017    ED (erectile dysfunction) 6/28/2017    GERD (gastroesophageal reflux disease) 6/28/2017    Glucose intolerance (impaired glucose tolerance) 06/28/2017    HTN (hypertension) 6/28/2017    Hyperlipidemia 6/28/2017    Insomnia 6/28/2017    Low back pain 6/28/2017    On statin therapy 6/28/2017    Pacemaker 2020    PVD (peripheral vascular disease) ([facility] 6/28/2017    Rheumatoid arthritis ([facility]      Urinary retention 6/28/2017         Core Measures:  CVA: Yes Yes  CHF:No Not applicable  PNA:No Not applicable     Activity:  Activity Level: Up with Assistance  Number times ambulated in hallways past shift: 0  Number of times OOB to chair past shift: 0     Cardiac:  Cardiac Monitoring: No            Access:   Current line(s): PIV         Genitourinary:   Urinary status: voiding inc and inc briefs  Urinary Catheter? No     Respiratory:   O2 Device: None (Room air)  Chronic home O2 use?: NO  Incentive spirometer at bedside: N/A     GI:  Last Bowel Movement Date: 07/27/22  Current diet:  DIET NPO  Passing flatus: YES  Tolerating current diet: YES     Pain Management:  Patient states pain is manageable on current regimen: YES     Skin:  Ghulam Score: 16  Interventions: increase time out of bed, limit briefs, and internal/external urinary devices    Patient Safety:  Fall Score:  Total Score: 4  Interventions: bed/chair alarm and gripper socks  High Fall Risk: Yes  @Rollbelt  @dexterity to release roll belt  Yes/No ( must document dexterity  here by stating Yes or No here, otherwise this is a restraint and must follow restraint documentation policy.)     DVT prophylaxis:  DVT prophylaxis Med- Yes  DVT prophylaxis SCD or ISAI- No     Wounds: (If Applicable)  Wounds- No  Location none     Active Consults:  IP CONSULT TO HOSPITALIST  IP CONSULT TO NEUROLOGY  IP CONSULT TO GASTROENTEROLOGY     Length of Stay:  Expected LOS: 4d 9h  Actual LOS: 22  Discharge Plan: Yes CM following.   Referral for IPR to [facility]

## 2022-08-18 NOTE — PROGRESS NOTES
Speech pathology  Chart reviewed. SLP has continued to follow along; however, he has not been appropriate for therapy since 8/3/22. At that time, SLP continued to recommend NPO (consistent with [facility] recommendation on 7/27) with ice chips for oral integrity and prevention of disuse atrophy. Patient has been minimally responsive, spiking fevers and concerns for periods of aspiration. Note Palliative has met with family and they are not yet ready to de-escalate care; however, plans to re-visit goals of care on Friday, 8/19. Certainly if goal is for comfort and patient transitions to Hospice, would encourage PO offerings when patient alert and accepting for pleasure. Given continued inappropriateness for PO and patient receiving TF via peg, will sign off at this time. Certainly re-consult if patient's alertness and medical condition dramatically improve.     Thanks, Lyubov Mendoza M.S. CCC-SLP
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
## 2022-08-18 NOTE — PROGRESS NOTES
Computer rounds only, no charge submitted. Palliative consult seen, metabolic encephalopathy, unresponsive, underlying dementia. Would keep ch, change monthly and only pursue void trial if clinical picture significantly improves.

## 2022-08-18 NOTE — PROGRESS NOTES
Comprehensive Nutrition Assessment    Type and Reason for Visit: Reassess    Nutrition Recommendations/Plan:   Continue current TF regimen      Malnutrition Assessment:  Malnutrition Status:  No malnutrition (08/18/22 1308)      Nutrition Assessment:    Chart reviewed; patient receiving Jevity 1.5 at ordered goal rate. Tolerating well. K+ WNL and BG under better control. Palliative care following for ongoing discussions about goals of care. Nutrition Related Findings:    Atorvastatin, buspar, Coreg, Lexapro, Lantus, Humalog    8/16  [phone]  Wound Type: Moisture associate skin damage    Current Nutrition Intake & Therapies:        Current Tube Feeding (TF) Orders:   Feeding Route: PEG  Formula: Standard with fiber  Schedule:Continuous    Regimen: 55 mL/hr  Water Flushes: 150 mL Q 4  Current TF & Flush Orders Provides: 1980 kcals, 84g protein, 1903 mL water, 285g CHO, 2878mg Potassium  Goal TF & Flush Orders Provides: 1980 kcals, 84g protein, 1903 mL water, 285g CHO    Anthropometric Measures:  Height: 5' 10\" (177.8 cm)  Ideal Body Weight (IBW): 166 lbs (75 kg)     Current Body Wt:  84.6 kg (186 lb 8.2 oz), 112.4 % IBW. Current BMI (kg/m2): 26.8                          BMI Category: Overweight (BMI 25.0-29. 9)    Estimated Daily Nutrient Needs:  Energy Requirements Based On: Formula  Weight Used for Energy Requirements: Current  Energy (kcal/day): 1904 kcals (BMR 1586 x 1. 2AF)  Weight Used for Protein Requirements: Current  Protein (g/day): 85-102g (1.0-1.2 g/kg bw)  Method Used for Fluid Requirements: 1 ml/kcal  Fluid (ml/day): 1900 mL    Nutrition Diagnosis:   Altered nutrition-related lab values related to  (current medical condition) as evidenced by  (K+ 5.3, -[phone])  Previous diagnosis resolved    Nutrition Interventions:   Food and/or Nutrient Delivery: Continue tube feeding  Nutrition Education/Counseling: No recommendations at this time  Coordination of Nutrition Care: Continue to monitor while inpatient       Goals:  Previous Goal Met: Goal(s) achieved  Goals:  Tolerate nutrition support at goal rate, by next RD assessment       Nutrition Monitoring and Evaluation:   Behavioral-Environmental Outcomes: None identified  Food/Nutrient Intake Outcomes: Enteral nutrition intake/tolerance  Physical Signs/Symptoms Outcomes: Biochemical data, Weight    Discharge Planning:    Enteral nutrition    Lanell Goldmann, RD  Contact: ext [phone]
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
## 2022-08-18 NOTE — PROGRESS NOTES
Pt has not been appropriate for PT services for an extended amount of time. No noted improvements, he continues to spike fevers and is minimally responsive. Not appropriate for acute PT services or rehab. Noted palliative has had several meetings with family. Will sign off at this time.

## 2022-08-18 NOTE — PROGRESS NOTES
End of Shift Note     Bedside shift change report given to Nir Ambriz (oncoming nurse) by Jorge Moreno RN (offgoing nurse).   Report included the following information SBAR, Kardex, MAR, and Recent Results     Shift worked: Days    Shift summary and any significant changes:     No significant changes to condition      Concerns for physician to address: None    Zone phone for oncoming shift: [phone]      Patient Information  Dasha West  68 y.o.  7/25/2022 12:04 PM by Dorrie Schlatter, MD. Dasha West was admitted from Home     Problem List          Patient Active Problem List     Diagnosis Date Noted    Encephalopathy due to COVID-19 virus 07/28/2022    Thrombotic stroke involving left middle cerebral artery ([address] 07/27/2022    Acute alteration in mental status 07/26/2022    Convulsive syncope 07/26/2022    Bilateral carotid artery stenosis 07/26/2022    History of stroke 07/26/2022    CVA (cerebral vascular accident) ([address] 07/25/2022    Stroke ([address] 07/08/2022    Weakness of both legs 06/01/2021    Bilateral lower extremity pain 06/01/2021    Leg pain 05/30/2021    Dilated cardiomyopathy ([address] 12/18/2020    Permanent atrial fibrillation ([address] 12/18/2020    Tachycardia 12/18/2020    A-fib ([address] 12/18/2020    PAF (paroxysmal atrial fibrillation) ([address] 05/16/2018    Age-related cataract 08/01/2017    Allergic rhinitis 06/28/2017    Diverticulosis 06/28/2017    Urinary retention 06/28/2017    PVD (peripheral vascular disease) ([address] 06/28/2017    On statin therapy 06/28/2017    Low back pain 06/28/2017    Insomnia 06/28/2017    HTN (hypertension) 06/28/2017    Hyperlipidemia 06/28/2017    Glucose intolerance (impaired glucose tolerance) 06/28/2017    GERD (gastroesophageal reflux disease) 06/28/2017    ED (erectile dysfunction) 06/28/2017    DJD (degenerative joint disease) 06/28/2017    ASVD (arteriosclerotic vascular disease) 06/28/2017              Past Medical History:   Diagnosis Date    Allergic rhinitis 6/28/2017    Arrhythmia       Paroxysmal Afib- Dr. Niraj Rand    ASVD (arteriosclerotic vascular disease) 06/28/2017     Story: carotid stenosis followed by Vasc Surg    Atrial fibrillation ([facility]      Congestive heart failure ([facility]      Diverticulosis 6/28/2017     Comments: on colonoscopy 12/01    DJD (degenerative joint disease) 6/28/2017    ED (erectile dysfunction) 6/28/2017    GERD (gastroesophageal reflux disease) 6/28/2017    Glucose intolerance (impaired glucose tolerance) 06/28/2017    HTN (hypertension) 6/28/2017    Hyperlipidemia 6/28/2017    Insomnia 6/28/2017    Low back pain 6/28/2017    On statin therapy 6/28/2017    Pacemaker 2020    PVD (peripheral vascular disease) ([facility] 6/28/2017    Rheumatoid arthritis ([facility]      Urinary retention 6/28/2017         Core Measures:  CVA: Yes Yes  CHF:No Not applicable  PNA:No Not applicable     Activity:  Activity Level: Up with Assistance  Number times ambulated in hallways past shift: 0  Number of times OOB to chair past shift: 0     Cardiac:  Cardiac Monitoring: No            Access:   Current line(s): PIV         Genitourinary:   Urinary status: voiding inc and inc briefs  Urinary Catheter? No     Respiratory:   O2 Device: None (Room air)  Chronic home O2 use?: NO  Incentive spirometer at bedside: N/A     GI:  Last Bowel Movement Date: 07/27/22  Current diet:  DIET NPO  Passing flatus: YES  Tolerating current diet: YES     Pain Management:  Patient states pain is manageable on current regimen: YES     Skin:  Ghulam Score: 16  Interventions: increase time out of bed, limit briefs, and internal/external urinary devices    Patient Safety:  Fall Score:  Total Score: 4  Interventions: bed/chair alarm and gripper socks  High Fall Risk: Yes  @Rollbelt  @dexterity to release roll belt  Yes/No ( must document dexterity  here by stating Yes or No here, otherwise this is a restraint and must follow restraint documentation policy.)     DVT prophylaxis:  DVT prophylaxis Med- Yes  DVT prophylaxis SCD or ISAI- No     Wounds: (If Applicable)  Wounds- No  Location none     Active Consults:  IP CONSULT TO HOSPITALIST  IP CONSULT TO NEUROLOGY  IP CONSULT TO GASTROENTEROLOGY     Length of Stay:  Expected LOS: 4d 9h  Actual LOS: 24  Discharge Plan:  Yes

## 2022-08-18 NOTE — PROGRESS NOTES
Hospitalist Progress Note    NAME: Sim Paige   :  1945   MRN:  [id number]            Subjective:     Chief Complaint / Reason for Physician Visit  Patient seen and evaluated at bedside, overnight events reviewed, patient currently minimally responsive to painful/verbal stimuli, had a fever spike of 100.6 this AM.  Discussed with RN events overnight. Review of Systems:  Symptom Y/N Comments  Symptom Y/N Comments   Fever/Chills    Chest Pain     Poor Appetite    Edema     Cough    Abdominal Pain     Sputum    Joint Pain     SOB/HANNA    Pruritis/Rash     Nausea/vomit    Tolerating PT/OT     Diarrhea    Tolerating Diet     Constipation    Other       Could NOT obtain due to: Limited 2/2 patients clinical status     Objective:     VITALS:   Last 24hrs VS reviewed since prior progress note. Most recent are:  Patient Vitals for the past 24 hrs:   Temp Pulse Resp BP SpO2   22 0345 (!) 100.6 °F (38.1 °C) 74 20 125/64 97 %   22 1937 98 °F (36.7 °C) 70 18 (!) 116/55 97 %   22 1605 98.8 °F (37.1 °C) 70 16 (!) 120/56 98 %   22 1600 -- 70 -- -- --   22 1200 -- 70 -- -- --   22 1123 99 °F (37.2 °C) 70 18 131/67 97 %         Intake/Output Summary (Last 24 hours) at 2022 0847  Last data filed at 2022 0741  Gross per 24 hour   Intake 175 ml   Output 500 ml   Net -325 ml          PHYSICAL EXAM:  General: Patient appears comfortable   EENT:  EOMI. Anicteric sclerae. MMM  Resp:  Decreased air entry bilaterally in bilateral lower lung zones  CV:  Regular  rhythm, s1/s2 no m/r/g  No edema  GI:  Soft, Non distended, Non tender. +Bowel sounds  Neurologic:  Limited 2/2 patients clinical status   Psych:   Limited 2/2 patients clinical status  Skin:  No rashes. No jaundice    Procedures: see electronic medical records for all procedures/Xrays and details which were not copied into this note but were reviewed prior to creation of Plan.       LABS:  I reviewed today's most current labs and imaging studies. Pertinent labs include:  Recent Labs     08/18/22  0013 08/17/22  0350 08/16/22  0240   WBC 14.5* 15.9* 14.1*   HGB 9.8* 9.7* 9.8*   HCT 29.6* 30.2* 29.6*    336 349       Recent Labs     08/18/22  0013 08/17/22  0350 08/16/22  0240    144 143   K 4.6 4.7 4.7   * 114* 114*   CO2 24 24 25   * 167* 151*   BUN 44* 49* 49*   CREA 1.12 1.38* 1.12   CA 8.6 8.7 9.0   MG 2.2 2.5* 2.4         Signed: Srinivas Noe MD    CTA head and neck:No evidence of acute intracranial vessel occlusion. Multifocal moderate to severe stenoses with occlusion of the distal left  vertebral artery at the V3-V4 junction. There is no intracranial mass, hemorrhage or evidence of acute infarction. No acute intracranial process is identified. CT head without contrast:1. Volume loss and chronic appearing white matter changes. No acute intracranial  abnormality  2. Diffuse sinus mucosal inflammatory change. Air-fluid level Y maxillary sinus  may indicate acute or chronic sinus mucosal disease    X-ray chest:IMPRESSION  Resolved interstitial edema pattern. MRI brain:IMPRESSION  1. Acute left basal ganglia ischemic infarct. 2. Atrophy and white matter disease, remote infarcts stable otherwise. MRI lumbar spine:IMPRESSION  1. Postsurgical changes L4-5 L5-S1 stable. 2. Spinal canal stenosis L3-L4.     Reviewed most current lab test results and cultures  YES  Reviewed most current radiology test results   YES  Review and summation of old records today    NO  Reviewed patient's current orders and MAR    YES  PMH/SH reviewed - no change compared to H&P      Assessment / Plan:  Severe sepsis- CT chest abdomen pelvis negative for any acute infectious source, unclear as to etiology, could be secondary to pneumonia versus other possible etiologies, remains hemodynamically stable at this time, case was discussed with infectious disease attending, possibility of central fevers  Follow-up repeat blood cultures for sensitivities, previous blood cultures have been negative to date  Follow-up prior blood cultures for sensitivities, negative to date  Continue cefepime and Flagyl for broad-spectrum antibiotic coverage  Trend inflammatory markers  Infectious disease consult appreciated, continue to follow recommendations    Cerebrovascular accident-of note patient was found to have a recent cerebrovascular accident on prior admission, remains confused, likely secondary to metabolic encephalopathy, repeat MRI was negative for any acute change  Continue aspirin once daily  Continue Eliquis once daily  Continue statin  Continue to follow physical therapy Occupational Therapy recommendations  Patient status post PEG tube placement  Neurology consult appreciated, continue to follow recommendations    Altered mental status/metabolic encephalopathy-could be secondary to metabolic encephalopathy due to problem #1 versus intracranial pathologies, repeat MRI brain consistent with prior CVA, no acute change  Continue supportive care  Continue to monitor mental status  Neurology consult appreciated, continue to follow recommendations    Hyperglycemia due to type 2 diabetes-currently much better control  Continue current insulin regimen  Continue insulin sliding scale    History of coronary artery disease-continue current medications    Acute kidney injury-currently serum creatinine downtrending, CT chest abdomen pelvis concerning for volume overload  Discontinue IV fluids  Hold nephrotoxic medications  Continue to trend serum creatinine  Nephrology consult appreciated    Bilateral hydronephrosis-of note patient CT abdomen pelvis concerning for bladder distention as well as bilateral hydronephrosis  Continue current care  Nephrology consult appreciated  Urology consult appreciated    BPH-continue current medications    Prophylaxis-Eliquis  FEN-continue tube feeding, maintenance IV fluids, replete potassium and magnesium  Full code, palliative care involved with regards to addressing goals of care  Disposition-pending clinical improvement, goals of care discussion, will need skilled nursing facility placement versus hospice    25.0 - 29.9 Overweight / Body mass index is 28.21 kg/m². Code status: Full  Prophylaxis:  Eliquis  Recommended Disposition: SNF/LTC     ________________________________________________________________________  Care Plan discussed with:    Comments   Patient     Family  x    RN x    Care Manager x    Consultant  x                     x Multidiciplinary team rounds were held today with , nursing, pharmacist and clinical coordinator. Patient's plan of care was discussed; medications were reviewed and discharge planning was addressed.      ________________________________________________________________________  Total NON critical care TIME:  35   Minutes      Comments   >50% of visit spent in counseling and coordination of care x    ________________________________________________________________________  Deonna Hernandez MD

## 2022-08-18 NOTE — PROGRESS NOTES
Problem: Patient Education: Go to Patient Education Activity  Goal: Patient/Family Education  Outcome: Progressing Towards Goal     Problem: Patient Education: Go to Patient Education Activity  Goal: Patient/Family Education  Outcome: Progressing Towards Goal     Problem: Patient Education: Go to Patient Education Activity  Goal: Patient/Family Education  Outcome: Progressing Towards Goal     Problem: Airway Clearance - Ineffective  Goal: Achieve or maintain patent airway  Outcome: Progressing Towards Goal     Problem: Gas Exchange - Impaired  Goal: Absence of hypoxia  Outcome: Progressing Towards Goal  Goal: Promote optimal lung function  Outcome: Progressing Towards Goal     Problem: Breathing Pattern - Ineffective  Goal: Ability to achieve and maintain a regular respiratory rate  Outcome: Progressing Towards Goal     Problem: Body Temperature -  Risk of, Imbalanced  Goal: Ability to maintain a body temperature within defined limits  Outcome: Progressing Towards Goal  Goal: Will regain or maintain usual level of consciousness  Outcome: Progressing Towards Goal  Goal: Complications related to the disease process, condition or treatment will be avoided or minimized  Outcome: Progressing Towards Goal     Problem: Falls - Risk of  Goal: *Absence of Falls  Description: Document Thora Bare Fall Risk and appropriate interventions in the flowsheet.   Outcome: Progressing Towards Goal  Note: Fall Risk Interventions:  Mobility Interventions: Bed/chair exit alarm    Mentation Interventions: Bed/chair exit alarm, Room close to nurse's station    Medication Interventions: Bed/chair exit alarm    Elimination Interventions: Bed/chair exit alarm    History of Falls Interventions: Bed/chair exit alarm         Problem: Patient Education: Go to Patient Education Activity  Goal: Patient/Family Education  Outcome: Progressing Towards Goal     Problem: Patient Education: Go to Patient Education Activity  Goal: Patient/Family Education  Outcome: Progressing Towards Goal     Problem: TIA/CVA Stroke: 0-24 hours  Goal: Off Pathway (Use only if patient is Off Pathway)  Outcome: Progressing Towards Goal  Goal: Activity/Safety  Outcome: Progressing Towards Goal  Goal: Consults, if ordered  Outcome: Progressing Towards Goal  Goal: Treatments/Interventions/Procedures  Outcome: Progressing Towards Goal  Goal: Minimize risk of bleeding post-thrombolytic infusion  Outcome: Progressing Towards Goal  Goal: Monitor for complications post-thrombolytic infusion  Outcome: Progressing Towards Goal  Goal: Psychosocial  Outcome: Progressing Towards Goal  Goal: *Hemodynamically stable  Outcome: Progressing Towards Goal  Goal: *Neurologically stable  Description: Absence of additional neurological deficits    Outcome: Progressing Towards Goal  Goal: *Verbalizes anxiety and depression are reduced or absent  Outcome: Progressing Towards Goal  Goal: *Absence of Signs of Aspiration on Current Diet  Outcome: Progressing Towards Goal  Goal: *Absence of deep venous thrombosis signs and symptoms(Stroke Metric)  Outcome: Progressing Towards Goal  Goal: *Ability to perform ADLs and demonstrates progressive mobility and function  Outcome: Progressing Towards Goal  Goal: *Stroke education started(Stroke Metric)  Outcome: Progressing Towards Goal  Goal: *Dysphagia screen performed(Stroke Metric)  Outcome: Progressing Towards Goal  Goal: *Rehab consulted(Stroke Metric)  Outcome: Progressing Towards Goal     Problem: TIA/CVA Stroke: Day 2 Until Discharge  Goal: Off Pathway (Use only if patient is Off Pathway)  Outcome: Progressing Towards Goal  Goal: Activity/Safety  Outcome: Progressing Towards Goal  Goal: Diagnostic Test/Procedures  Outcome: Progressing Towards Goal  Goal: Nutrition/Diet  Outcome: Progressing Towards Goal  Goal: Discharge Planning  Outcome: Progressing Towards Goal  Goal: Medications  Outcome: Progressing Towards Goal  Goal: Respiratory  Outcome: Progressing Towards Goal  Goal: Treatments/Interventions/Procedures  Outcome: Progressing Towards Goal  Goal: Psychosocial  Outcome: Progressing Towards Goal  Goal: *Verbalizes anxiety and depression are reduced or absent  Outcome: Progressing Towards Goal  Goal: *Absence of aspiration  Outcome: Progressing Towards Goal  Goal: *Absence of deep venous thrombosis signs and symptoms(Stroke Metric)  Outcome: Progressing Towards Goal  Goal: *Optimal pain control at patient's stated goal  Outcome: Progressing Towards Goal  Goal: *Tolerating diet  Outcome: Progressing Towards Goal  Goal: *Ability to perform ADLs and demonstrates progressive mobility and function  Outcome: Progressing Towards Goal  Goal: *Stroke education continued(Stroke Metric)  Outcome: Progressing Towards Goal     Problem: Ischemic Stroke: Discharge Outcomes  Goal: *Verbalizes anxiety and depression are reduced or absent  Outcome: Progressing Towards Goal  Goal: *Verbalize understanding of risk factor modification(Stroke Metric)  Outcome: Progressing Towards Goal  Goal: *Hemodynamically stable  Outcome: Progressing Towards Goal  Goal: *Absence of aspiration pneumonia  Outcome: Progressing Towards Goal  Goal: *Aware of needed dietary changes  Outcome: Progressing Towards Goal  Goal: *Verbalize understanding of prescribed medications including anti-coagulants, anti-lipid, and/or anti-platelets(Stroke Metric)  Outcome: Progressing Towards Goal  Goal: *Tolerating diet  Outcome: Progressing Towards Goal  Goal: *Aware of follow-up diagnostics related to anticoagulants  Outcome: Progressing Towards Goal  Goal: *Ability to perform ADLs and demonstrates progressive mobility and function  Outcome: Progressing Towards Goal  Goal: *Absence of DVT(Stroke Metric)  Outcome: Progressing Towards Goal  Goal: *Absence of aspiration  Outcome: Progressing Towards Goal  Goal: *Optimal pain control at patient's stated goal  Outcome: Progressing Towards Goal  Goal: *Home safety concerns addressed  Outcome: Progressing Towards Goal  Goal: *Describes available resources and support systems  Outcome: Progressing Towards Goal  Goal: *Verbalizes understanding of activation of EMS(911) for stroke symptoms(Stroke Metric)  Outcome: Progressing Towards Goal  Goal: *Understands and describes signs and symptoms to report to providers(Stroke Metric)  Outcome: Progressing Towards Goal  Goal: *Neurolgocially stable (absence of additional neurological deficits)  Outcome: Progressing Towards Goal  Goal: *Verbalizes importance of follow-up with primary care physician(Stroke Metric)  Outcome: Progressing Towards Goal  Goal: *Smoking cessation discussed,if applicable(Stroke Metric)  Outcome: Progressing Towards Goal  Goal: *Depression screening completed(Stroke Metric)  Outcome: Progressing Towards Goal

## 2022-08-18 NOTE — PROGRESS NOTES
[place]         NAME:Stephon Howell  PCE:[id number]   QUF:5[birth year]     Labs reviewed  Cr stable  Cr 1.1 today    Nothing much to add  We will sign off. Please call us if needed    Labs:  Recent Labs     08/18/22  0013 08/17/22  0350 08/16/22  0240   WBC 14.5* 15.9* 14.1*   HGB 9.8* 9.7* 9.8*   HCT 29.6* 30.2* 29.6*    336 349     Recent Labs     08/18/22  0013 08/17/22  0350 08/16/22  0240    144 143   K 4.6 4.7 4.7   * 114* 114*   CO2 24 24 25   BUN 44* 49* 49*   CREA 1.12 1.38* 1.12   * 167* 151*   CA 8.6 8.7 9.0   MG 2.2 2.5* 2.4     No results for input(s): AP, TBIL, TP, ALB, GLOB, AML, LPSE, LAC in the last 72 hours. No lab exists for component: SGOT, GPT, AMYP  No results for input(s): INR, PTP, APTT, INREXT in the last 72 hours. No results for input(s): CPK, CKMB in the last 72 hours. No lab exists for component: TROPONINI, B-NP  No results for input(s): PHI, PCO2I, PO2I, FIO2I in the last 72 hours. Oxygen Therapy  O2 Sat (%): 97 % (08/18/22 0845)  Pulse via Oximetry: 70 beats per minute (07/25/22 1645)  O2 Device: None (Room air) (08/16/22 2127)  Skin Assessment: Clean, dry, & intact (08/05/22 0700)  O2 Flow Rate (L/min): 6 l/min (08/01/22 1401)  Ventilator:       Microbiology:  No results found for: SDES  Lab Results   Component Value Date/Time    Culture result: NO GROWTH 3 DAYS 08/15/2022 11:24 AM    Culture result: MRSA NOT PRESENT 08/14/2022 10:48 AM    Culture result:  08/14/2022 10:48 AM     Screening of patient nares for MRSA is for surveillance purposes and, if positive, to facilitate isolation considerations in high risk settings. It is not intended for automatic decolonization interventions per se as regimens are not sufficiently effective to warrant routine use.              CVA (cerebral vascular accident) [facility]) [I63.9]     Sohail Benton MD  [facility]  [address] , [facility] [place], [address] [place]  Phone - [phone]         Fax - [phone] [facility] Office  [address] [address], [facility]  Phone - [phone]        Fax - [phone]

## 2022-08-19 NOTE — PROGRESS NOTES
Hospitalist Progress Note    NAME: Maci العلي   :  1945   MRN:  [id number]            Subjective:     Chief Complaint / Reason for Physician Visit  Patient seen and evaluated at bedside, overnight events reviewed, patient currently minimally responsive to painful/verbal stimuli, had a fever spike of 101F last night. Discussed with RN events overnight. Review of Systems:  Symptom Y/N Comments  Symptom Y/N Comments   Fever/Chills    Chest Pain     Poor Appetite    Edema     Cough    Abdominal Pain     Sputum    Joint Pain     SOB/HANNA    Pruritis/Rash     Nausea/vomit    Tolerating PT/OT     Diarrhea    Tolerating Diet     Constipation    Other       Could NOT obtain due to: Limited 2/2 patients clinical status     Objective:     VITALS:   Last 24hrs VS reviewed since prior progress note. Most recent are:  Patient Vitals for the past 24 hrs:   Temp Pulse Resp BP SpO2   22 0310 99.9 °F (37.7 °C) 82 18 130/75 100 %   22 2300 (!) 101 °F (38.3 °C) 73 20 136/87 96 %   22 1937 98.6 °F (37 °C) 76 20 134/60 96 %   22 1600 -- 70 -- -- --   22 1537 98.9 °F (37.2 °C) 69 (!) 31 (!) 119/56 98 %   22 1225 98.1 °F (36.7 °C) 69 21 (!) 107/48 96 %   22 1153 -- 70 -- -- --   22 0849 -- -- -- (!) 115/58 --   22 0845 98.7 °F (37.1 °C) 70 22 (!) 115/58 97 %         Intake/Output Summary (Last 24 hours) at 2022 0803  Last data filed at 2022 0125  Gross per 24 hour   Intake 175 ml   Output 950 ml   Net -775 ml          PHYSICAL EXAM:  General: Patient appears comfortable   EENT:  EOMI. Anicteric sclerae. MMM  Resp:  Decreased air entry bilaterally in bilateral lower lung zones  CV:  Regular  rhythm, s1/s2 no m/r/g  No edema  GI:  Soft, Non distended, Non tender. +Bowel sounds  Neurologic:  Limited 2/2 patients clinical status   Psych:   Limited 2/2 patients clinical status  Skin:  No rashes.   No jaundice    Procedures: see electronic medical records for all procedures/Xrays and details which were not copied into this note but were reviewed prior to creation of Plan. LABS:  I reviewed today's most current labs and imaging studies. Pertinent labs include:  Recent Labs     08/19/22  0124 08/18/22  0013 08/17/22  0350   WBC 14.6* 14.5* 15.9*   HGB 9.4* 9.8* 9.7*   HCT 29.6* 29.6* 30.2*    361 336       Recent Labs     08/19/22  0124 08/18/22  0013 08/17/22  0350    143 144   K 4.6 4.6 4.7   * 114* 114*   CO2 25 24 24   * 168* 167*   BUN 38* 44* 49*   CREA 0.95 1.12 1.38*   CA 8.6 8.6 8.7   MG 2.2 2.2 2.5*         Signed: Justice Crowell MD    CTA head and neck:No evidence of acute intracranial vessel occlusion. Multifocal moderate to severe stenoses with occlusion of the distal left  vertebral artery at the V3-V4 junction. There is no intracranial mass, hemorrhage or evidence of acute infarction. No acute intracranial process is identified. CT head without contrast:1. Volume loss and chronic appearing white matter changes. No acute intracranial  abnormality  2. Diffuse sinus mucosal inflammatory change. Air-fluid level Y maxillary sinus  may indicate acute or chronic sinus mucosal disease    X-ray chest:IMPRESSION  Resolved interstitial edema pattern. MRI brain:IMPRESSION  1. Acute left basal ganglia ischemic infarct. 2. Atrophy and white matter disease, remote infarcts stable otherwise. MRI lumbar spine:IMPRESSION  1. Postsurgical changes L4-5 L5-S1 stable. 2. Spinal canal stenosis L3-L4.     Reviewed most current lab test results and cultures  YES  Reviewed most current radiology test results   YES  Review and summation of old records today    NO  Reviewed patient's current orders and MAR    YES  PMH/SH reviewed - no change compared to H&P      Assessment / Plan:  Severe sepsis- CT chest abdomen pelvis negative for any acute infectious source, unclear as to etiology, could be secondary to pneumonia versus other possible etiologies, remains hemodynamically stable at this time, case was discussed with infectious disease attending, possibility of central fevers  Follow-up repeat blood cultures for sensitivities, previous blood cultures have been negative to date  Follow-up prior blood cultures for sensitivities, negative to date  Continue cefepime and Flagyl for broad-spectrum antibiotic coverage  Trend inflammatory markers  Infectious disease consult appreciated, continue to follow recommendations    Cerebrovascular accident-of note patient was found to have a recent cerebrovascular accident on prior admission, remains confused, likely secondary to metabolic encephalopathy, repeat MRI was negative for any acute change  Continue aspirin once daily  Continue Eliquis once daily  Continue statin  Continue to follow physical therapy Occupational Therapy recommendations  Patient status post PEG tube placement  Neurology consult appreciated, continue to follow recommendations    Altered mental status/metabolic encephalopathy-could be secondary to metabolic encephalopathy due to problem #1 versus intracranial pathologies, repeat MRI brain consistent with prior CVA, no acute change  Continue supportive care  Continue to monitor mental status  Neurology consult appreciated, continue to follow recommendations    Hyperglycemia due to type 2 diabetes-currently much better control  Continue current insulin regimen  Continue insulin sliding scale    History of coronary artery disease-continue current medications    Acute kidney injury-resolved  Avoid nephrotoxic medications  Continue to trend serum creatinine  Nephrology consult appreciated, continue to follow recommendations    Bilateral hydronephrosis-of note patient CT abdomen pelvis concerning for bladder distention as well as bilateral hydronephrosis  Continue current care  Nephrology consult appreciated  Urology consult appreciated    BPH-continue current medications    Prophylaxis-Eliquis  FEN-continue tube feeding, maintenance IV fluids, replete potassium and magnesium  Full code, palliative care involved with regards to addressing goals of care  Disposition-pending clinical improvement, goals of care discussion, will need skilled nursing facility placement versus hospice    25.0 - 29.9 Overweight / Body mass index is 26.76 kg/m². Code status: Full  Prophylaxis:  Eliquis  Recommended Disposition: SNF/LTC     ________________________________________________________________________  Care Plan discussed with:    Comments   Patient     Family  x    RN x    Care Manager x    Consultant  x                     x Multidiciplinary team rounds were held today with , nursing, pharmacist and clinical coordinator. Patient's plan of care was discussed; medications were reviewed and discharge planning was addressed.      ________________________________________________________________________  Total NON critical care TIME:  35   Minutes      Comments   >50% of visit spent in counseling and coordination of care x    ________________________________________________________________________  Tressa Carlson MD

## 2022-08-19 NOTE — PROGRESS NOTES
End of Shift Note     Bedside shift change report given to [address] (oncoming nurse) by Minerva Hawk RN (offgoing nurse). Report included the following information SBAR, Kardex, MAR, and Recent Results     Shift worked: Nights    Shift summary and any significant changes:     No significant changes to condition. Family to meet with palliative today and revisit plan of care.     Concerns for physician to address: None    Zone phone for oncoming shift: [phone]      Patient Information  Keo Peña  68 y.o.  7/25/2022 12:04 PM by Margarita Deutsch MD. Keo Peña was admitted from Home     Problem List          Patient Active Problem List     Diagnosis Date Noted    Encephalopathy due to COVID-19 virus 07/28/2022    Thrombotic stroke involving left middle cerebral artery ([address] 07/27/2022    Acute alteration in mental status 07/26/2022    Convulsive syncope 07/26/2022    Bilateral carotid artery stenosis 07/26/2022    History of stroke 07/26/2022    CVA (cerebral vascular accident) ([address] 07/25/2022    Stroke ([address] 07/08/2022    Weakness of both legs 06/01/2021    Bilateral lower extremity pain 06/01/2021    Leg pain 05/30/2021    Dilated cardiomyopathy ([address] 12/18/2020    Permanent atrial fibrillation ([address] 12/18/2020    Tachycardia 12/18/2020    A-fib ([address] 12/18/2020    PAF (paroxysmal atrial fibrillation) ([address] 05/16/2018    Age-related cataract 08/01/2017    Allergic rhinitis 06/28/2017    Diverticulosis 06/28/2017    Urinary retention 06/28/2017    PVD (peripheral vascular disease) ([address] 06/28/2017    On statin therapy 06/28/2017    Low back pain 06/28/2017    Insomnia 06/28/2017    HTN (hypertension) 06/28/2017    Hyperlipidemia 06/28/2017    Glucose intolerance (impaired glucose tolerance) 06/28/2017    GERD (gastroesophageal reflux disease) 06/28/2017    ED (erectile dysfunction) 06/28/2017    DJD (degenerative joint disease) 06/28/2017    ASVD (arteriosclerotic vascular disease) 06/28/2017 Past Medical History:   Diagnosis Date    Allergic rhinitis 6/28/2017    Arrhythmia       Paroxysmal Afib- Dr. Miles Hoffman    ASVD (arteriosclerotic vascular disease) 06/28/2017     Story: carotid stenosis followed by Vasc Surg    Atrial fibrillation ([facility]      Congestive heart failure ([facility]      Diverticulosis 6/28/2017     Comments: on colonoscopy 12/01    DJD (degenerative joint disease) 6/28/2017    ED (erectile dysfunction) 6/28/2017    GERD (gastroesophageal reflux disease) 6/28/2017    Glucose intolerance (impaired glucose tolerance) 06/28/2017    HTN (hypertension) 6/28/2017    Hyperlipidemia 6/28/2017    Insomnia 6/28/2017    Low back pain 6/28/2017    On statin therapy 6/28/2017    Pacemaker 2020    PVD (peripheral vascular disease) ([facility] 6/28/2017    Rheumatoid arthritis ([facility]      Urinary retention 6/28/2017         Core Measures:  CVA: Yes Yes  CHF:No Not applicable  PNA:No Not applicable     Activity:  Activity Level: Up with Assistance  Number times ambulated in hallways past shift: 0  Number of times OOB to chair past shift: 0     Cardiac:  Cardiac Monitoring: No            Access:   Current line(s): PIV         Genitourinary:   Urinary status: voiding inc and inc briefs  Urinary Catheter? No     Respiratory:   O2 Device: None (Room air)  Chronic home O2 use?: NO  Incentive spirometer at bedside: N/A     GI:  Last Bowel Movement Date: 07/27/22  Current diet:  DIET NPO  Passing flatus: YES  Tolerating current diet: YES     Pain Management:  Patient states pain is manageable on current regimen: YES     Skin:  Ghulam Score: 16  Interventions: increase time out of bed, limit briefs, and internal/external urinary devices    Patient Safety:  Fall Score:  Total Score: 4  Interventions: bed/chair alarm and gripper socks  High Fall Risk: Yes  @Rollbelt  @dexterity to release roll belt  Yes/No ( must document dexterity  here by stating Yes or No here, otherwise this is a restraint and must follow restraint documentation policy.)     DVT prophylaxis:  DVT prophylaxis Med- Yes  DVT prophylaxis SCD or ISAI- No     Wounds: (If Applicable)  Wounds- No  Location none     Active Consults:  IP CONSULT TO HOSPITALIST  IP CONSULT TO NEUROLOGY  IP CONSULT TO GASTROENTEROLOGY     Length of Stay:  Expected LOS: 4d 9h  Actual LOS: 24  Discharge Plan:  Yes

## 2022-08-19 NOTE — ACP (ADVANCE CARE PLANNING)
Advanced Care Planning  [phone]    Patients wife contacted our office sharing that she signed her portion of the DDNR and wanted us to sign it to make it valid    Completed the DDNR with her and entered the order  Put copy on the chart    Wife has no other questions and doesn't seem open to more conversations today    Soifya Clemens NP

## 2022-08-19 NOTE — PROGRESS NOTES
Occupational Therapy note:    Chart reviewed and patient remains inappropriate for therapy services. Patient not been appropriate for therapy for an extended amount of time and remains minimally responsive. Will sign off at this time. Please reconsult if patient becomes more appropriate.     Peace Bryson, OTR/L

## 2022-08-19 NOTE — PROGRESS NOTES
Problem: Falls - Risk of  Goal: *Absence of Falls  Description: Document Tata Ross Fall Risk and appropriate interventions in the flowsheet.   Outcome: Progressing Towards Goal  Note: Fall Risk Interventions:  Mobility Interventions: Bed/chair exit alarm    Mentation Interventions: Bed/chair exit alarm, Door open when patient unattended    Medication Interventions: Assess postural VS orthostatic hypotension, Bed/chair exit alarm    Elimination Interventions: Bed/chair exit alarm    History of Falls Interventions: Bed/chair exit alarm    WITH FAMILY

## 2022-08-19 NOTE — PROGRESS NOTES
End of Shift Note    Bedside shift change report given to [facility] (oncoming nurse) by Abel Dawkins RN (offgoing nurse).   Report included the following information SBAR, Kardex, MAR, and Recent Results    Shift worked:  DAYS     Shift summary and any significant changes:     -PALLIATIVE ON BOARD   -PATIENT NONVERBAL     Concerns for physician to address: NONE   Zone phone for oncoming shift:   [phone]     Patient Information  Verna Mays  68 y.o.  7/25/2022 12:04 PM by Jenniffer Aguillon MD. Verna Mays was admitted from Home    Problem List  Patient Active Problem List    Diagnosis Date Noted    Encephalopathy due to COVID-19 virus 07/28/2022    Thrombotic stroke involving left middle cerebral artery ([address] 07/27/2022    Acute alteration in mental status 07/26/2022    Convulsive syncope 07/26/2022    Bilateral carotid artery stenosis 07/26/2022    History of stroke 07/26/2022    CVA (cerebral vascular accident) ([address] 07/25/2022    Stroke ([address] 07/08/2022    Weakness of both legs 06/01/2021    Bilateral lower extremity pain 06/01/2021    Leg pain 05/30/2021    Dilated cardiomyopathy ([address] 12/18/2020    Permanent atrial fibrillation ([address] 12/18/2020    Tachycardia 12/18/2020    A-fib ([address] 12/18/2020    PAF (paroxysmal atrial fibrillation) ([address] 05/16/2018    Age-related cataract 08/01/2017    Allergic rhinitis 06/28/2017    Diverticulosis 06/28/2017    Urinary retention 06/28/2017    PVD (peripheral vascular disease) ([address] 06/28/2017    On statin therapy 06/28/2017    Low back pain 06/28/2017    Insomnia 06/28/2017    HTN (hypertension) 06/28/2017    Hyperlipidemia 06/28/2017    Glucose intolerance (impaired glucose tolerance) 06/28/2017    GERD (gastroesophageal reflux disease) 06/28/2017    ED (erectile dysfunction) 06/28/2017    DJD (degenerative joint disease) 06/28/2017    ASVD (arteriosclerotic vascular disease) 06/28/2017     Past Medical History:   Diagnosis Date    Allergic rhinitis 6/28/2017 Arrhythmia     Paroxysmal Afib- Dr. Arita Memory    ASVD (arteriosclerotic vascular disease) 06/28/2017    Story: carotid stenosis followed by Vasc Surg    Atrial fibrillation ([facility]     Congestive heart failure ([facility]     Diverticulosis 6/28/2017    Comments: on colonoscopy 12/01    DJD (degenerative joint disease) 6/28/2017    ED (erectile dysfunction) 6/28/2017    GERD (gastroesophageal reflux disease) 6/28/2017    Glucose intolerance (impaired glucose tolerance) 06/28/2017    HTN (hypertension) 6/28/2017    Hyperlipidemia 6/28/2017    Insomnia 6/28/2017    Low back pain 6/28/2017    On statin therapy 6/28/2017    Pacemaker 2020    PVD (peripheral vascular disease) ([facility] 6/28/2017    Rheumatoid arthritis ([facility]     Urinary retention 6/28/2017       Core Measures:  CVA: Yes Yes  CHF:No No  PNA:No No    Activity:  Activity Level: Bed Rest  Number times ambulated in hallways past shift: 0  Number of times OOB to chair past shift: 0    Cardiac:   Cardiac Monitoring: Yes      Cardiac Rhythm: Atrial Paced    Access:   Current line(s): PIV   Central Line? No Placement date  Reason Medically Necessary     PICC LINE? No Placement date Reason Medically Necessary       Genitourinary:   Urinary status: voiding and incontinent  Urinary Catheter? No Placement Date  Reason Medically Necessary       Respiratory:   O2 Device: None (Room air)  Chronic home O2 use?: NO  Incentive spirometer at bedside: NO       GI:  Last Bowel Movement Date: 08/17/22  Current diet:  DIET NPO  DIET ONE TIME MESSAGE  ADULT TUBE FEEDING PEG; Standard with Fiber; Delivery Method: Continuous; Continuous Initial Rate (mL/hr): 55; Continuous Advance Tube Feeding: No; Water Flush Volume (mL): 150;  Water Flush Frequency: Q 4 hours  Passing flatus: YES  Tolerating current diet: YES       Pain Management:   Patient states pain is manageable on current regimen: YES    Skin:  Ghulam Score: 15  Interventions: float heels, increase time out of bed, and PT/OT consult    Patient Safety:  Fall Score:  Total Score: 4  Interventions: bed/chair alarm, gripper socks, pt to call before getting OOB, and stay with me (per policy)  High Fall Risk: Yes  @Rollbelt  @dexterity to release roll belt  Yes pt able to undo when wanted    DVT prophylaxis:  DVT prophylaxis Med- Yes  DVT prophylaxis SCD or ISAI- No     Wounds: (If Applicable)  Wounds- No  Location     Active Consults:  IP CONSULT TO HOSPITALIST  IP CONSULT TO NEUROLOGY  IP CONSULT TO GASTROENTEROLOGY  IP CONSULT TO INFECTIOUS DISEASES  IP CONSULT TO NEUROLOGY  IP CONSULT TO NEPHROLOGY  IP CONSULT TO UROLOGY  IP CONSULT TO PALLIATIVE CARE - PROVIDER    Length of Stay:  Expected LOS: 4d 9h  Actual LOS: 25  Discharge Plan: Yes       Rosendo Carter RN

## 2022-08-20 NOTE — PROGRESS NOTES
Hospitalist Progress Note    NAME: Nancy Henson   :  1945   MRN:  [id number]            Subjective:     Chief Complaint / Reason for Physician Visit  Patient seen and evaluated at bedside, overnight events reviewed, patient currently minimally responsive to painful/verbal stimuli, had a fever spike of 100.5F this AM, patient was made DNR by wife yesterday,  Discussed with RN events overnight. Review of Systems:  Symptom Y/N Comments  Symptom Y/N Comments   Fever/Chills    Chest Pain     Poor Appetite    Edema     Cough    Abdominal Pain     Sputum    Joint Pain     SOB/HANNA    Pruritis/Rash     Nausea/vomit    Tolerating PT/OT     Diarrhea    Tolerating Diet     Constipation    Other       Could NOT obtain due to: Limited 2/2 patients clinical status     Objective:     VITALS:   Last 24hrs VS reviewed since prior progress note. Most recent are:  Patient Vitals for the past 24 hrs:   Temp Pulse Resp BP SpO2   22 0845 99.5 °F (37.5 °C) 85 20 (!) 148/72 98 %   22 0359 (!) 100.5 °F (38.1 °C) 82 20 (!) 147/69 96 %   22 2322 99.8 °F (37.7 °C) (!) 20 20 135/62 94 %   22 1930 100.1 °F (37.8 °C) 71 16 (!) 139/59 97 %   22 1636 99.8 °F (37.7 °C) 73 24 (!) 127/46 98 %   22 1249 99.4 °F (37.4 °C) 69 28 133/84 99 %         Intake/Output Summary (Last 24 hours) at 2022 0848  Last data filed at 2022 0622  Gross per 24 hour   Intake 280 ml   Output 900 ml   Net -620 ml          PHYSICAL EXAM:  General: Patient appears comfortable   EENT:  EOMI. Anicteric sclerae. MMM  Resp:  Decreased air entry bilaterally in bilateral lower lung zones  CV:  Regular  rhythm, s1/s2 no m/r/g  No edema  GI:  Soft, Non distended, Non tender. +Bowel sounds  Neurologic:  Limited 2/2 patients clinical status   Psych:   Limited 2/2 patients clinical status  Skin:  No rashes.   No jaundice    Procedures: see electronic medical records for all procedures/Xrays and details which were not copied into this note but were reviewed prior to creation of Plan. LABS:  I reviewed today's most current labs and imaging studies. Pertinent labs include:  Recent Labs     08/20/22  0147 08/19/22  0124 08/18/22  0013   WBC 16.8* 14.6* 14.5*   HGB 9.5* 9.4* 9.8*   HCT 29.3* 29.6* 29.6*    383 361       Recent Labs     08/20/22  0147 08/19/22  0124 08/18/22  0013    144 143   K 4.3 4.6 4.6   * 114* 114*   CO2 26 25 24   * 183* 168*   BUN 32* 38* 44*   CREA 0.90 0.95 1.12   CA 8.6 8.6 8.6   MG 2.0 2.2 2.2         Signed: Justice Crowell MD    CTA head and neck:No evidence of acute intracranial vessel occlusion. Multifocal moderate to severe stenoses with occlusion of the distal left  vertebral artery at the V3-V4 junction. There is no intracranial mass, hemorrhage or evidence of acute infarction. No acute intracranial process is identified. CT head without contrast:1. Volume loss and chronic appearing white matter changes. No acute intracranial  abnormality  2. Diffuse sinus mucosal inflammatory change. Air-fluid level Y maxillary sinus  may indicate acute or chronic sinus mucosal disease    X-ray chest:IMPRESSION  Resolved interstitial edema pattern. MRI brain:IMPRESSION  1. Acute left basal ganglia ischemic infarct. 2. Atrophy and white matter disease, remote infarcts stable otherwise. MRI lumbar spine:IMPRESSION  1. Postsurgical changes L4-5 L5-S1 stable. 2. Spinal canal stenosis L3-L4.     Reviewed most current lab test results and cultures  YES  Reviewed most current radiology test results   YES  Review and summation of old records today    NO  Reviewed patient's current orders and MAR    YES  PMH/SH reviewed - no change compared to H&P      Assessment / Plan:  Severe sepsis- CT chest abdomen pelvis negative for any acute infectious source, unclear as to etiology, could be secondary to pneumonia versus other possible etiologies, remains hemodynamically stable at this time, case was discussed with infectious disease attending, possibility of central fevers  Follow-up repeat blood cultures for sensitivities, previous blood cultures have been negative to date  Follow-up prior blood cultures for sensitivities, negative to date  Continue cefepime and Flagyl for broad-spectrum antibiotic coverage  Trend inflammatory markers  Infectious disease consult appreciated, continue to follow recommendations    Cerebrovascular accident-of note patient was found to have a recent cerebrovascular accident on prior admission, remains confused, likely secondary to metabolic encephalopathy, repeat MRI was negative for any acute change  Continue aspirin once daily  Continue Eliquis once daily  Continue statin  Continue to follow physical therapy Occupational Therapy recommendations  Patient status post PEG tube placement  Neurology consult appreciated, continue to follow recommendations    Altered mental status/metabolic encephalopathy-could be secondary to metabolic encephalopathy due to problem #1 versus intracranial pathologies, repeat MRI brain consistent with prior CVA, no acute change  Continue supportive care  Continue to monitor mental status  Neurology consult appreciated, continue to follow recommendations    Hyperglycemia due to type 2 diabetes-currently much better control  Continue current insulin regimen  Continue insulin sliding scale    History of coronary artery disease-continue current medications    Acute kidney injury-resolved  Avoid nephrotoxic medications  Continue to trend serum creatinine  Nephrology consult appreciated, continue to follow recommendations    Bilateral hydronephrosis-of note patient CT abdomen pelvis concerning for bladder distention as well as bilateral hydronephrosis  Continue current care  Nephrology consult appreciated  Urology consult appreciated    BPH-continue current medications    Prophylaxis-Eliquis  FEN-continue tube feeding, maintenance IV fluids, replete potassium and magnesium  DNR, palliative care involved with regards to addressing goals of care, assistance greatly appreciated  Disposition-pending clinical improvement, goals of care discussion, will need skilled nursing facility placement versus hospice    25.0 - 29.9 Overweight / Body mass index is 26.76 kg/m². Code status: Full  Prophylaxis:  Eliquis  Recommended Disposition: SNF/LTC     ________________________________________________________________________  Care Plan discussed with:    Comments   Patient     Family  x    RN x    Care Manager x    Consultant  x                     x Multidiciplinary team rounds were held today with , nursing, pharmacist and clinical coordinator. Patient's plan of care was discussed; medications were reviewed and discharge planning was addressed.      ________________________________________________________________________  Total NON critical care TIME:  35   Minutes      Comments   >50% of visit spent in counseling and coordination of care x    ________________________________________________________________________  Rom Chacon MD

## 2022-08-20 NOTE — PROGRESS NOTES
End of Shift Note    Bedside shift change report given to Ashlee Ferguson RN (oncoming nurse) by Judy York RN (offgoing nurse).   Report included the following information SBAR, Kardex, MAR, and Recent Results    Shift worked: 1900-7a     Shift summary and any significant changes:    Patient only responsive to pain  Palliative following     Concerns for physician to address: None   Zone phone for oncoming shift:       Patient Information  Susan Moyer  68 y.o.  7/25/2022 12:04 PM by Bryan Espinal MD. Susan Moyer was admitted from Home    Problem List  Patient Active Problem List    Diagnosis Date Noted    Encephalopathy due to COVID-19 virus 07/28/2022    Thrombotic stroke involving left middle cerebral artery ([address] 07/27/2022    Acute alteration in mental status 07/26/2022    Convulsive syncope 07/26/2022    Bilateral carotid artery stenosis 07/26/2022    History of stroke 07/26/2022    CVA (cerebral vascular accident) ([address] 07/25/2022    Stroke ([address] 07/08/2022    Weakness of both legs 06/01/2021    Bilateral lower extremity pain 06/01/2021    Leg pain 05/30/2021    Dilated cardiomyopathy ([address] 12/18/2020    Permanent atrial fibrillation ([address] 12/18/2020    Tachycardia 12/18/2020    A-fib ([address] 12/18/2020    PAF (paroxysmal atrial fibrillation) ([address] 05/16/2018    Age-related cataract 08/01/2017    Allergic rhinitis 06/28/2017    Diverticulosis 06/28/2017    Urinary retention 06/28/2017    PVD (peripheral vascular disease) ([address] 06/28/2017    On statin therapy 06/28/2017    Low back pain 06/28/2017    Insomnia 06/28/2017    HTN (hypertension) 06/28/2017    Hyperlipidemia 06/28/2017    Glucose intolerance (impaired glucose tolerance) 06/28/2017    GERD (gastroesophageal reflux disease) 06/28/2017    ED (erectile dysfunction) 06/28/2017    DJD (degenerative joint disease) 06/28/2017    ASVD (arteriosclerotic vascular disease) 06/28/2017     Past Medical History:   Diagnosis Date    Allergic rhinitis 6/28/2017    Arrhythmia     Paroxysmal Afib- Dr. Yvrose Garnica    ASVD (arteriosclerotic vascular disease) 06/28/2017    Story: carotid stenosis followed by Vasc Surg    Atrial fibrillation ([facility]     Congestive heart failure ([facility]     Diverticulosis 6/28/2017    Comments: on colonoscopy 12/01    DJD (degenerative joint disease) 6/28/2017    ED (erectile dysfunction) 6/28/2017    GERD (gastroesophageal reflux disease) 6/28/2017    Glucose intolerance (impaired glucose tolerance) 06/28/2017    HTN (hypertension) 6/28/2017    Hyperlipidemia 6/28/2017    Insomnia 6/28/2017    Low back pain 6/28/2017    On statin therapy 6/28/2017    Pacemaker 2020    PVD (peripheral vascular disease) ([facility] 6/28/2017    Rheumatoid arthritis ([facility]     Urinary retention 6/28/2017       Core Measures:  CVA: Yes Yes  CHF:No No  PNA:No No    Activity:  Activity Level: Bed Rest  Number times ambulated in hallways past shift: 0  Number of times OOB to chair past shift: 0    Cardiac:   Cardiac Monitoring: Yes      Cardiac Rhythm: Atrial Paced    Access:   Current line(s): PIV   Central Line? No Placement date  Reason Medically Necessary     PICC LINE? No Placement date Reason Medically Necessary       Genitourinary:   Urinary status: voiding and incontinent  Urinary Catheter? No Placement Date  Reason Medically Necessary       Respiratory:   O2 Device: None (Room air)  Chronic home O2 use?: NO  Incentive spirometer at bedside: NO       GI:  Last Bowel Movement Date: 08/17/22  Current diet:  DIET NPO  DIET ONE TIME MESSAGE  ADULT TUBE FEEDING PEG; Standard with Fiber; Delivery Method: Continuous; Continuous Initial Rate (mL/hr): 55; Continuous Advance Tube Feeding: No; Water Flush Volume (mL): 150;  Water Flush Frequency: Q 4 hours  Passing flatus: YES  Tolerating current diet: YES       Pain Management:   Patient states pain is manageable on current regimen: YES    Skin:  Ghulam Score: 14  Interventions: float heels, increase time out of bed, and PT/OT consult    Patient Safety:  Fall Score:  Total Score: 4  Interventions: bed/chair alarm, gripper socks, pt to call before getting OOB, and stay with me (per policy)  High Fall Risk: Yes  @Rollbelt  @dexterity to release roll belt  Yes pt able to undo when wanted    DVT prophylaxis:  DVT prophylaxis Med- Yes  DVT prophylaxis SCD or ISAI- No     Wounds: (If Applicable)  Wounds- No  Location     Active Consults:  IP CONSULT TO HOSPITALIST  IP CONSULT TO NEUROLOGY  IP CONSULT TO GASTROENTEROLOGY  IP CONSULT TO INFECTIOUS DISEASES  IP CONSULT TO NEUROLOGY  IP CONSULT TO NEPHROLOGY  IP CONSULT TO UROLOGY  IP CONSULT TO PALLIATIVE CARE - PROVIDER    Length of Stay:  Expected LOS: 4d 9h  Actual LOS: 26  Discharge Plan: Yes       Sidney Marcus RN

## 2022-08-21 NOTE — PROGRESS NOTES
End of Shift Note    Bedside shift change report given to Aida Lyle RN (oncoming nurse) by Sandra Marin RN (offgoing nurse).   Report included the following information SBAR, Kardex, MAR, and Recent Results    Shift worked: 7P-7:30am     Shift summary and any significant changes:    Patient only responsive to pain  Palliative following     Concerns for physician to address: None   Zone phone for oncoming shift:       Patient Information  Nancy Henson  68 y.o.  7/25/2022 12:04 PM by Kenneth Barakat MD. Nancy Henson was admitted from Home    Problem List  Patient Active Problem List    Diagnosis Date Noted    Encephalopathy due to COVID-19 virus 07/28/2022    Thrombotic stroke involving left middle cerebral artery ([address] 07/27/2022    Acute alteration in mental status 07/26/2022    Convulsive syncope 07/26/2022    Bilateral carotid artery stenosis 07/26/2022    History of stroke 07/26/2022    CVA (cerebral vascular accident) ([address] 07/25/2022    Stroke ([address] 07/08/2022    Weakness of both legs 06/01/2021    Bilateral lower extremity pain 06/01/2021    Leg pain 05/30/2021    Dilated cardiomyopathy ([address] 12/18/2020    Permanent atrial fibrillation ([address] 12/18/2020    Tachycardia 12/18/2020    A-fib ([address] 12/18/2020    PAF (paroxysmal atrial fibrillation) ([address] 05/16/2018    Age-related cataract 08/01/2017    Allergic rhinitis 06/28/2017    Diverticulosis 06/28/2017    Urinary retention 06/28/2017    PVD (peripheral vascular disease) ([address] 06/28/2017    On statin therapy 06/28/2017    Low back pain 06/28/2017    Insomnia 06/28/2017    HTN (hypertension) 06/28/2017    Hyperlipidemia 06/28/2017    Glucose intolerance (impaired glucose tolerance) 06/28/2017    GERD (gastroesophageal reflux disease) 06/28/2017    ED (erectile dysfunction) 06/28/2017    DJD (degenerative joint disease) 06/28/2017    ASVD (arteriosclerotic vascular disease) 06/28/2017     Past Medical History:   Diagnosis Date    Allergic rhinitis 6/28/2017    Arrhythmia     Paroxysmal Afib- Dr. Miles Hoffman    ASVD (arteriosclerotic vascular disease) 06/28/2017    Story: carotid stenosis followed by Vasc Surg    Atrial fibrillation ([facility]     Congestive heart failure ([facility]     Diverticulosis 6/28/2017    Comments: on colonoscopy 12/01    DJD (degenerative joint disease) 6/28/2017    ED (erectile dysfunction) 6/28/2017    GERD (gastroesophageal reflux disease) 6/28/2017    Glucose intolerance (impaired glucose tolerance) 06/28/2017    HTN (hypertension) 6/28/2017    Hyperlipidemia 6/28/2017    Insomnia 6/28/2017    Low back pain 6/28/2017    On statin therapy 6/28/2017    Pacemaker 2020    PVD (peripheral vascular disease) ([facility] 6/28/2017    Rheumatoid arthritis ([facility]     Urinary retention 6/28/2017       Core Measures:  CVA: Yes Yes  CHF:No No  PNA:No No    Activity:  Activity Level: Bed Rest  Number times ambulated in hallways past shift: 0  Number of times OOB to chair past shift: 0    Cardiac:   Cardiac Monitoring: Yes      Cardiac Rhythm: Atrial Paced    Access:   Current line(s): PIV   Central Line? No Placement date  Reason Medically Necessary     PICC LINE? No Placement date Reason Medically Necessary       Genitourinary:   Urinary status: voiding and incontinent  Urinary Catheter? No Placement Date  Reason Medically Necessary       Respiratory:   O2 Device: None (Room air)  Chronic home O2 use?: NO  Incentive spirometer at bedside: NO       GI:  Last Bowel Movement Date: 08/17/22  Current diet:  DIET NPO  DIET ONE TIME MESSAGE  ADULT TUBE FEEDING PEG; Standard with Fiber; Delivery Method: Continuous; Continuous Initial Rate (mL/hr): 55; Continuous Advance Tube Feeding: No; Water Flush Volume (mL): 150;  Water Flush Frequency: Q 4 hours  Passing flatus: YES  Tolerating current diet: YES       Pain Management:   Patient states pain is manageable on current regimen: YES    Skin:  Ghulam Score: 14  Interventions: float heels, increase time out of bed, and PT/OT consult    Patient Safety:  Fall Score:  Total Score: 4  Interventions: bed/chair alarm, gripper socks, pt to call before getting OOB, and stay with me (per policy)  High Fall Risk: Yes  @Rollbelt  @dexterity to release roll belt  Yes pt able to undo when wanted    DVT prophylaxis:  DVT prophylaxis Med- Yes  DVT prophylaxis SCD or ISAI- No     Wounds: (If Applicable)  Wounds- No  Location     Active Consults:  IP CONSULT TO HOSPITALIST  IP CONSULT TO NEUROLOGY  IP CONSULT TO GASTROENTEROLOGY  IP CONSULT TO INFECTIOUS DISEASES  IP CONSULT TO NEUROLOGY  IP CONSULT TO NEPHROLOGY  IP CONSULT TO UROLOGY  IP CONSULT TO PALLIATIVE CARE - PROVIDER    Length of Stay:  Expected LOS: 4d 9h  Actual LOS: 27  Discharge Plan: Yes       Camila Duttonr, RN

## 2022-08-21 NOTE — PROGRESS NOTES
Hospitalist Progress Note    NAME: Haley Sheffield   :  1945   MRN:  [id number]            Subjective:     Chief Complaint / Reason for Physician Visit  Patient seen and evaluated at bedside, overnight events reviewed, patient currently minimally responsive to painful/verbal stimuli, had a fever spike of 100.8F yesterday,  Discussed with RN events overnight. Review of Systems:  Symptom Y/N Comments  Symptom Y/N Comments   Fever/Chills    Chest Pain     Poor Appetite    Edema     Cough    Abdominal Pain     Sputum    Joint Pain     SOB/HANNA    Pruritis/Rash     Nausea/vomit    Tolerating PT/OT     Diarrhea    Tolerating Diet     Constipation    Other       Could NOT obtain due to: Limited 2/2 patients clinical status     Objective:     VITALS:   Last 24hrs VS reviewed since prior progress note. Most recent are:  Patient Vitals for the past 24 hrs:   Temp Pulse Resp BP SpO2   22 0020 99.6 °F (37.6 °C) 70 22 (!) 145/68 97 %   22 -- -- -- -- 96 %   22 98.4 °F (36.9 °C) 77 18 104/71 98 %   22 1635 98.6 °F (37 °C) 95 20 (!) 142/69 95 %   22 1152 (!) 100.8 °F (38.2 °C) 96 20 (!) 149/62 96 %   22 0845 99.5 °F (37.5 °C) 85 20 (!) 148/72 98 %         Intake/Output Summary (Last 24 hours) at 2022 0803  Last data filed at 2022 0106  Gross per 24 hour   Intake 700 ml   Output --   Net 700 ml          PHYSICAL EXAM:  General: Patient appears comfortable   EENT:  EOMI. Anicteric sclerae. MMM  Resp:  Decreased air entry bilaterally in bilateral lower lung zones  CV:  Regular  rhythm, s1/s2 no m/r/g  No edema  GI:  Soft, Non distended, Non tender. +Bowel sounds  Neurologic:  Limited 2/2 patients clinical status   Psych:   Limited 2/2 patients clinical status  Skin:  No rashes. No jaundice    Procedures: see electronic medical records for all procedures/Xrays and details which were not copied into this note but were reviewed prior to creation of Plan.       LABS:  I reviewed today's most current labs and imaging studies. Pertinent labs include:  Recent Labs     08/21/22  0358 08/20/22  0147 08/19/22  0124   WBC 17.0* 16.8* 14.6*   HGB 9.2* 9.5* 9.4*   HCT 28.0* 29.3* 29.6*    388 383       Recent Labs     08/21/22  0358 08/20/22  0147 08/19/22  0124    143 144   K 4.2 4.3 4.6   * 114* 114*   CO2 25 26 25   * 137* 183*   BUN 31* 32* 38*   CREA 0.82 0.90 0.95   CA 8.7 8.6 8.6   MG 2.2 2.0 2.2         Signed: Lenard Norton MD    CTA head and neck:No evidence of acute intracranial vessel occlusion. Multifocal moderate to severe stenoses with occlusion of the distal left  vertebral artery at the V3-V4 junction. There is no intracranial mass, hemorrhage or evidence of acute infarction. No acute intracranial process is identified. CT head without contrast:1. Volume loss and chronic appearing white matter changes. No acute intracranial  abnormality  2. Diffuse sinus mucosal inflammatory change. Air-fluid level Y maxillary sinus  may indicate acute or chronic sinus mucosal disease    X-ray chest:IMPRESSION  Resolved interstitial edema pattern. MRI brain:IMPRESSION  1. Acute left basal ganglia ischemic infarct. 2. Atrophy and white matter disease, remote infarcts stable otherwise. MRI lumbar spine:IMPRESSION  1. Postsurgical changes L4-5 L5-S1 stable. 2. Spinal canal stenosis L3-L4.     Reviewed most current lab test results and cultures  YES  Reviewed most current radiology test results   YES  Review and summation of old records today    NO  Reviewed patient's current orders and MAR    YES  PMH/SH reviewed - no change compared to H&P      Assessment / Plan:  Severe sepsis- CT chest abdomen pelvis negative for any acute infectious source, unclear as to etiology, could be secondary to pneumonia versus other possible etiologies, remains hemodynamically stable at this time, case was discussed with infectious disease attending, possibility of central fevers  Follow-up repeat blood cultures for sensitivities, previous blood cultures have been negative to date  Follow-up prior blood cultures for sensitivities, negative to date  Continue cefepime and Flagyl for broad-spectrum antibiotic coverage  Trend inflammatory markers  Infectious disease consult appreciated, continue to follow recommendations    Cerebrovascular accident-of note patient was found to have a recent cerebrovascular accident on prior admission, remains confused, likely secondary to metabolic encephalopathy, repeat MRI was negative for any acute change  Continue aspirin once daily  Continue Eliquis once daily  Continue statin  Continue to follow physical therapy Occupational Therapy recommendations  Patient status post PEG tube placement  Neurology consult appreciated, continue to follow recommendations    Altered mental status/metabolic encephalopathy-could be secondary to metabolic encephalopathy due to problem #1 versus intracranial pathologies, repeat MRI brain consistent with prior CVA, no acute change  Continue supportive care  Continue to monitor mental status  Neurology consult appreciated, continue to follow recommendations    Hyperglycemia due to type 2 diabetes-currently much better control  Continue current insulin regimen  Continue insulin sliding scale    History of coronary artery disease-continue current medications    Acute kidney injury-resolved  Avoid nephrotoxic medications  Continue to trend serum creatinine  Nephrology consult appreciated, continue to follow recommendations    Bilateral hydronephrosis-of note patient CT abdomen pelvis concerning for bladder distention as well as bilateral hydronephrosis  Continue current care  Nephrology consult appreciated  Urology consult appreciated    BPH-continue current medications    Prophylaxis-Eliquis  FEN-continue tube feeding, maintenance IV fluids, replete potassium and magnesium  DNR, palliative care involved with regards to addressing goals of care, assistance greatly appreciated  Disposition-pending clinical improvement, goals of care discussion, will need skilled nursing facility placement versus hospice    25.0 - 29.9 Overweight / Body mass index is 26.76 kg/m². Code status: Full  Prophylaxis:  Eliquis  Recommended Disposition: SNF/LTC     ________________________________________________________________________  Care Plan discussed with:    Comments   Patient     Family  x    RN x    Care Manager x    Consultant  x                     x Multidiciplinary team rounds were held today with , nursing, pharmacist and clinical coordinator. Patient's plan of care was discussed; medications were reviewed and discharge planning was addressed.      ________________________________________________________________________  Total NON critical care TIME:  35   Minutes      Comments   >50% of visit spent in counseling and coordination of care x    ________________________________________________________________________  Romina Tolentino MD

## 2022-08-21 NOTE — PROGRESS NOTES
Received notification from bedside RN about patient with regards to: , has scheduled Lantus 38 units.     Intervention given: Give only 15 units of Lantus

## 2022-08-21 NOTE — PROGRESS NOTES
Report given to [employer] [facility] with all questions answered at this time. Pts condition, medications and plan of care addressed at bedside.

## 2022-08-21 NOTE — PROGRESS NOTES
Report given to [employer], RN with adequate time for questions given. All questions answered at this time.

## 2022-08-21 NOTE — PROGRESS NOTES
Problem: Patient Education: Go to Patient Education Activity  Goal: Patient/Family Education  Outcome: Progressing Towards Goal     Problem: Patient Education: Go to Patient Education Activity  Goal: Patient/Family Education  Outcome: Progressing Towards Goal     Problem: Patient Education: Go to Patient Education Activity  Goal: Patient/Family Education  Outcome: Progressing Towards Goal     Problem: Airway Clearance - Ineffective  Goal: Achieve or maintain patent airway  Outcome: Progressing Towards Goal     Problem: Gas Exchange - Impaired  Goal: Absence of hypoxia  Outcome: Progressing Towards Goal  Goal: Promote optimal lung function  Outcome: Progressing Towards Goal     Problem: Breathing Pattern - Ineffective  Goal: Ability to achieve and maintain a regular respiratory rate  Outcome: Progressing Towards Goal     Problem: Body Temperature -  Risk of, Imbalanced  Goal: Ability to maintain a body temperature within defined limits  Outcome: Progressing Towards Goal  Goal: Will regain or maintain usual level of consciousness  Outcome: Progressing Towards Goal  Goal: Complications related to the disease process, condition or treatment will be avoided or minimized  Outcome: Progressing Towards Goal     Problem: Falls - Risk of  Goal: *Absence of Falls  Description: Document Tata Ross Fall Risk and appropriate interventions in the flowsheet.   Outcome: Progressing Towards Goal  Note: Fall Risk Interventions:  Mobility Interventions: Bed/chair exit alarm    Mentation Interventions: Bed/chair exit alarm    Medication Interventions: Evaluate medications/consider consulting pharmacy    Elimination Interventions: Bed/chair exit alarm    History of Falls Interventions: Bed/chair exit alarm         Problem: Patient Education: Go to Patient Education Activity  Goal: Patient/Family Education  Outcome: Progressing Towards Goal     Problem: Patient Education: Go to Patient Education Activity  Goal: Patient/Family Education  Outcome: Progressing Towards Goal     Problem: TIA/CVA Stroke: 0-24 hours  Goal: Off Pathway (Use only if patient is Off Pathway)  Outcome: Progressing Towards Goal  Goal: Activity/Safety  Outcome: Progressing Towards Goal  Goal: Consults, if ordered  Outcome: Progressing Towards Goal  Goal: Treatments/Interventions/Procedures  Outcome: Progressing Towards Goal  Goal: Minimize risk of bleeding post-thrombolytic infusion  Outcome: Progressing Towards Goal  Goal: Monitor for complications post-thrombolytic infusion  Outcome: Progressing Towards Goal  Goal: Psychosocial  Outcome: Progressing Towards Goal  Goal: *Hemodynamically stable  Outcome: Progressing Towards Goal  Goal: *Neurologically stable  Description: Absence of additional neurological deficits    Outcome: Progressing Towards Goal  Goal: *Verbalizes anxiety and depression are reduced or absent  Outcome: Progressing Towards Goal  Goal: *Absence of Signs of Aspiration on Current Diet  Outcome: Progressing Towards Goal  Goal: *Absence of deep venous thrombosis signs and symptoms(Stroke Metric)  Outcome: Progressing Towards Goal  Goal: *Ability to perform ADLs and demonstrates progressive mobility and function  Outcome: Progressing Towards Goal  Goal: *Stroke education started(Stroke Metric)  Outcome: Progressing Towards Goal  Goal: *Dysphagia screen performed(Stroke Metric)  Outcome: Progressing Towards Goal  Goal: *Rehab consulted(Stroke Metric)  Outcome: Progressing Towards Goal     Problem: TIA/CVA Stroke: Day 2 Until Discharge  Goal: Off Pathway (Use only if patient is Off Pathway)  Outcome: Progressing Towards Goal  Goal: Activity/Safety  Outcome: Progressing Towards Goal  Goal: Diagnostic Test/Procedures  Outcome: Progressing Towards Goal  Goal: Nutrition/Diet  Outcome: Progressing Towards Goal  Goal: Discharge Planning  Outcome: Progressing Towards Goal  Goal: Medications  Outcome: Progressing Towards Goal  Goal: Respiratory  Outcome: Progressing Towards Goal  Goal: Treatments/Interventions/Procedures  Outcome: Progressing Towards Goal  Goal: Psychosocial  Outcome: Progressing Towards Goal  Goal: *Verbalizes anxiety and depression are reduced or absent  Outcome: Progressing Towards Goal  Goal: *Absence of aspiration  Outcome: Progressing Towards Goal  Goal: *Absence of deep venous thrombosis signs and symptoms(Stroke Metric)  Outcome: Progressing Towards Goal  Goal: *Optimal pain control at patient's stated goal  Outcome: Progressing Towards Goal  Goal: *Tolerating diet  Outcome: Progressing Towards Goal  Goal: *Ability to perform ADLs and demonstrates progressive mobility and function  Outcome: Progressing Towards Goal  Goal: *Stroke education continued(Stroke Metric)  Outcome: Progressing Towards Goal     Problem: Ischemic Stroke: Discharge Outcomes  Goal: *Verbalizes anxiety and depression are reduced or absent  Outcome: Progressing Towards Goal  Goal: *Verbalize understanding of risk factor modification(Stroke Metric)  Outcome: Progressing Towards Goal  Goal: *Hemodynamically stable  Outcome: Progressing Towards Goal  Goal: *Absence of aspiration pneumonia  Outcome: Progressing Towards Goal  Goal: *Aware of needed dietary changes  Outcome: Progressing Towards Goal  Goal: *Verbalize understanding of prescribed medications including anti-coagulants, anti-lipid, and/or anti-platelets(Stroke Metric)  Outcome: Progressing Towards Goal  Goal: *Tolerating diet  Outcome: Progressing Towards Goal  Goal: *Aware of follow-up diagnostics related to anticoagulants  Outcome: Progressing Towards Goal  Goal: *Ability to perform ADLs and demonstrates progressive mobility and function  Outcome: Progressing Towards Goal  Goal: *Absence of DVT(Stroke Metric)  Outcome: Progressing Towards Goal  Goal: *Absence of aspiration  Outcome: Progressing Towards Goal  Goal: *Optimal pain control at patient's stated goal  Outcome: Progressing Towards Goal  Goal: *Home safety concerns addressed  Outcome: Progressing Towards Goal  Goal: *Describes available resources and support systems  Outcome: Progressing Towards Goal  Goal: *Verbalizes understanding of activation of EMS(911) for stroke symptoms(Stroke Metric)  Outcome: Progressing Towards Goal  Goal: *Understands and describes signs and symptoms to report to providers(Stroke Metric)  Outcome: Progressing Towards Goal  Goal: *Neurolgocially stable (absence of additional neurological deficits)  Outcome: Progressing Towards Goal  Goal: *Verbalizes importance of follow-up with primary care physician(Stroke Metric)  Outcome: Progressing Towards Goal  Goal: *Smoking cessation discussed,if applicable(Stroke Metric)  Outcome: Progressing Towards Goal  Goal: *Depression screening completed(Stroke Metric)  Outcome: Progressing Towards Goal

## 2022-08-22 NOTE — PROGRESS NOTES
End of Shift Note     Bedside shift change report given to Vinicius Bradshaw RN (oncoming nurse) by Mehdi Howard RN (offgoing nurse). Report included the following information SBAR, Kardex, MAR, and Recent Results     Shift worked:  DAYS      Shift summary and any significant changes:      Patient nonverbal, no significant changes.       Concerns for physician to address: None   Zone phone for oncoming shift:   [phone]      Patient Information  Nir Luevano  68 y.o.  7/25/2022 12:04 PM by Fam Bear MD. Nir Luevano was admitted from Home     Problem List       Patient Active Problem List     Diagnosis Date Noted    Encephalopathy due to COVID-19 virus 07/28/2022    Thrombotic stroke involving left middle cerebral artery ([address] 07/27/2022    Acute alteration in mental status 07/26/2022    Convulsive syncope 07/26/2022    Bilateral carotid artery stenosis 07/26/2022    History of stroke 07/26/2022    CVA (cerebral vascular accident) ([address] 07/25/2022    Stroke ([address] 07/08/2022    Weakness of both legs 06/01/2021    Bilateral lower extremity pain 06/01/2021    Leg pain 05/30/2021    Dilated cardiomyopathy ([address] 12/18/2020    Permanent atrial fibrillation ([address] 12/18/2020    Tachycardia 12/18/2020    A-fib ([address] 12/18/2020    PAF (paroxysmal atrial fibrillation) ([address] 05/16/2018    Age-related cataract 08/01/2017    Allergic rhinitis 06/28/2017    Diverticulosis 06/28/2017    Urinary retention 06/28/2017    PVD (peripheral vascular disease) ([address] 06/28/2017    On statin therapy 06/28/2017    Low back pain 06/28/2017    Insomnia 06/28/2017    HTN (hypertension) 06/28/2017    Hyperlipidemia 06/28/2017    Glucose intolerance (impaired glucose tolerance) 06/28/2017    GERD (gastroesophageal reflux disease) 06/28/2017    ED (erectile dysfunction) 06/28/2017    DJD (degenerative joint disease) 06/28/2017    ASVD (arteriosclerotic vascular disease) 06/28/2017           Past Medical History:   Diagnosis Date    Allergic rhinitis 6/28/2017    Arrhythmia       Paroxysmal Afib- Dr. Leyla Thibodeaux    ASVD (arteriosclerotic vascular disease) 06/28/2017     Story: carotid stenosis followed by Vasc Surg    Atrial fibrillation [facility]      Congestive heart failure ([facility]      Diverticulosis 6/28/2017     Comments: on colonoscopy 12/01    DJD (degenerative joint disease) 6/28/2017    ED (erectile dysfunction) 6/28/2017    GERD (gastroesophageal reflux disease) 6/28/2017    Glucose intolerance (impaired glucose tolerance) 06/28/2017    HTN (hypertension) 6/28/2017    Hyperlipidemia 6/28/2017    Insomnia 6/28/2017    Low back pain 6/28/2017    On statin therapy 6/28/2017    Pacemaker 2020    PVD (peripheral vascular disease) ([facility] 6/28/2017    Rheumatoid arthritis ([facility]      Urinary retention 6/28/2017         Core Measures:  CVA: Yes Yes  CHF:No No  PNA:No No     Activity:  Activity Level: Bed Rest  Number times ambulated in hallways past shift: 0  Number of times OOB to chair past shift: 0     Cardiac:   Cardiac Monitoring: Yes      Cardiac Rhythm: Atrial Paced     Access:   Current line(s): PIV   Central Line? No Placement date  Reason Medically Necessary      PICC LINE? No Placement date Reason Medically Necessary         Genitourinary:   Urinary status: voiding and incontinent  Urinary Catheter? No Placement Date  Reason Medically Necessary         Respiratory:   O2 Device: None (Room air)  Chronic home O2 use?: NO  Incentive spirometer at bedside: NO     GI:  Last Bowel Movement Date: 08/17/22  Current diet:  DIET NPO  DIET ONE TIME MESSAGE  ADULT TUBE FEEDING PEG; Standard with Fiber; Delivery Method: Continuous; Continuous Initial Rate (mL/hr): 55; Continuous Advance Tube Feeding: No; Water Flush Volume (mL): 150;  Water Flush Frequency: Q 4 hours  Passing flatus: YES  Tolerating current diet: YES     Pain Management:   Patient states pain is manageable on current regimen: YES     Skin:  Ghulam Score: 15  Interventions: float heels, increase time out of bed, and PT/OT consult    Patient Safety:  Fall Score:  Total Score: 4  Interventions: bed/chair alarm, gripper socks, pt to call before getting OOB, and stay with me (per policy)  High Fall Risk: Yes  @Rollbelt  @dexterity to release roll belt  Yes pt able to undo when wanted     DVT prophylaxis:  DVT prophylaxis Med- Yes  DVT prophylaxis SCD or ISAI- No      Wounds: (If Applicable)  Wounds- No  Location      Active Consults:  IP CONSULT TO HOSPITALIST  IP CONSULT TO NEUROLOGY  IP CONSULT TO GASTROENTEROLOGY  IP CONSULT TO INFECTIOUS DISEASES  IP CONSULT TO NEUROLOGY  IP CONSULT TO NEPHROLOGY  IP CONSULT TO UROLOGY  IP CONSULT TO PALLIATIVE CARE - PROVIDER     Length of Stay:  Expected LOS: 4d 9h  Actual LOS: 28  Discharge Plan: Yes          Dinora Gtz RN Detail Level: Detailed

## 2022-08-22 NOTE — PROGRESS NOTES
Received notification from bedside RN about patient with regards to: , has scheduled 38 units Lantus    Intervention given: Hold Lantus 38 units, give 15 units instead

## 2022-08-22 NOTE — PROGRESS NOTES
End of Shift Note     Bedside shift change report given to Olesya RN (oncoming nurse) by Nikki Crowell RN (offgoing nurse). Report included the following information SBAR, Kardex, MAR, and Recent Results     Shift worked: 7P-7:30AM   Shift summary and any significant changes:     No significant changes to condition.     Concerns for physician to address: None    Zone phone for oncoming shift: [phone]      Patient Information  Alysia Keller  68 y.o.  7/25/2022 12:04 PM by Latrell Luther MD. Alysia Keller was admitted from Home     Problem List          Patient Active Problem List     Diagnosis Date Noted    Encephalopathy due to COVID-19 virus 07/28/2022    Thrombotic stroke involving left middle cerebral artery ([address] 07/27/2022    Acute alteration in mental status 07/26/2022    Convulsive syncope 07/26/2022    Bilateral carotid artery stenosis 07/26/2022    History of stroke 07/26/2022    CVA (cerebral vascular accident) ([address] 07/25/2022    Stroke ([address] 07/08/2022    Weakness of both legs 06/01/2021    Bilateral lower extremity pain 06/01/2021    Leg pain 05/30/2021    Dilated cardiomyopathy ([address] 12/18/2020    Permanent atrial fibrillation ([address] 12/18/2020    Tachycardia 12/18/2020    A-fib ([address] 12/18/2020    PAF (paroxysmal atrial fibrillation) ([address] 05/16/2018    Age-related cataract 08/01/2017    Allergic rhinitis 06/28/2017    Diverticulosis 06/28/2017    Urinary retention 06/28/2017    PVD (peripheral vascular disease) ([address] 06/28/2017    On statin therapy 06/28/2017    Low back pain 06/28/2017    Insomnia 06/28/2017    HTN (hypertension) 06/28/2017    Hyperlipidemia 06/28/2017    Glucose intolerance (impaired glucose tolerance) 06/28/2017    GERD (gastroesophageal reflux disease) 06/28/2017    ED (erectile dysfunction) 06/28/2017    DJD (degenerative joint disease) 06/28/2017    ASVD (arteriosclerotic vascular disease) 06/28/2017              Past Medical History:   Diagnosis Date    Allergic rhinitis 6/28/2017 Arrhythmia       Paroxysmal Afib- Dr. Corey Sanderson    ASVD (arteriosclerotic vascular disease) 06/28/2017     Story: carotid stenosis followed by Vasc Surg    Atrial fibrillation ([facility]      Congestive heart failure ([facility]      Diverticulosis 6/28/2017     Comments: on colonoscopy 12/01    DJD (degenerative joint disease) 6/28/2017    ED (erectile dysfunction) 6/28/2017    GERD (gastroesophageal reflux disease) 6/28/2017    Glucose intolerance (impaired glucose tolerance) 06/28/2017    HTN (hypertension) 6/28/2017    Hyperlipidemia 6/28/2017    Insomnia 6/28/2017    Low back pain 6/28/2017    On statin therapy 6/28/2017    Pacemaker 2020    PVD (peripheral vascular disease) ([facility] 6/28/2017    Rheumatoid arthritis ([facility]      Urinary retention 6/28/2017         Core Measures:  CVA: Yes Yes  CHF:No Not applicable  PNA:No Not applicable     Activity:  Activity Level: Up with Assistance  Number times ambulated in hallways past shift: 0  Number of times OOB to chair past shift: 0     Cardiac:  Cardiac Monitoring: No            Access:   Current line(s): PIV         Genitourinary:   Urinary status: voiding inc and inc briefs  Urinary Catheter? No     Respiratory:   O2 Device: None (Room air)  Chronic home O2 use?: NO  Incentive spirometer at bedside: N/A     GI:  Last Bowel Movement Date: 07/27/22  Current diet:  DIET NPO  Passing flatus: YES  Tolerating current diet: YES     Pain Management:  Patient states pain is manageable on current regimen: YES     Skin:  Ghulam Score: 16  Interventions: increase time out of bed, limit briefs, and internal/external urinary devices    Patient Safety:  Fall Score:  Total Score: 4  Interventions: bed/chair alarm and gripper socks  High Fall Risk: Yes  @Rollbelt  @dexterity to release roll belt  Yes/No ( must document dexterity  here by stating Yes or No here, otherwise this is a restraint and must follow restraint documentation policy.)     DVT prophylaxis:  DVT prophylaxis Med- Yes  DVT prophylaxis SCD or ISAI- No     Wounds: (If Applicable)  Wounds- No  Location none     Active Consults:  IP CONSULT TO HOSPITALIST  IP CONSULT TO NEUROLOGY  IP CONSULT TO GASTROENTEROLOGY     Length of Stay:  Expected LOS: 4d 9h  Actual LOS: 24  Discharge Plan:  Yes

## 2022-08-22 NOTE — PROGRESS NOTES
Transition of Care Plan:     RUR: 16% - \"moderate risk\"  LOS: 28 days  Disposition: Home with [facility] & family support (8/24/22)  Follow up appointments: PCP & specialist as indicated   DME needed: [facility] to coordinate the delivery of any DME necessary for d/c  Transportation at Discharge: BLS transport needed  [address] or means to access home: Pt's wife has access to the home   IM Medicare Letter: 2nd IM needed prior to d/c  Is patient a BCPI-A Bundle: N/A         Is patient a [place] and connected with the [place]? Yes - clinical updates faxed to the [facility] throughout course of admission  Caregiver Contact: Pt's wife Yolanda Francisco J: [phone])  Discharge Caregiver contacted prior to discharge? To be contacted prior to d/c  Care Conference needed?: N/A  COVID-19 test: PCR COVID test completed 7/27/22; results positive. Updated PCR COVID test completed 8/14/22; results positive     Update - 1:49 PM: CM received call from [employer] (Leta) reporting plans to admit pt at home 8/24/22. [place] requested for CM to arrange BLS transport for 8/24/22 at 10:00 AM; CM to complete request. [facility]'s information reflected in AVS for reference. Update - 11:40 AM: CM received call from palliative LCSWAlda reporting wife is agreeable to hospice. Wife requesting services via [facility]. Referral sent via [facility] to [facility] requesting follow up with pt and wife at bedside. Initial note: Chart reviewed. Disposition remains pending at this time re: home with hospice vs COVID positive SNF placement (pending insurance auth). MD aware pt's family is working to make final decision regarding direction of disposition. Clinical updates to be faxed to the [facility] for reference (f: [phone]). CM will continue to follow & remain accessible for d/c planning.     Carlene Lindo MSW  Care Manager, [facility]

## 2022-08-22 NOTE — HOSPICE
[facility] RN note:  consult noted. Reviewing chart. Discussed pt with palliative team.  Will address shortly. In to meet with pt and wife Jovi Porras at bedside. Lengthy discussion about hospice philosophy and services. Wife wants to bring pt home, has 2 local sons for support, very tearful coming to terms with pt's clinical status. Plan is to make space for DME to be delivered tomorrow before 8pm, transport home Wed morning at 10:00, arranged by JUDI Masters. Keely Fernando YEXDD/60 [birth year] #[id number]  Dx: CVA  Date of Consult: 8/22  Discharge date: 18/24/22, 10am transport with 11:00am admission  DDNR:  yes  Consents: yes  Meds: SRK for delivery 8  Equip: bed, obt, 02 for delivery Tuesday between 4-8 #[id number]  Clinical notes: Thank you for the opportunity to care for this pt and family. Please contact hospice at [phone] with any questions or concerns.

## 2022-08-22 NOTE — PROGRESS NOTES
Hospitalist Progress Note    NAME: Haley Sheffield   :  1945   MRN:  [id number]            Subjective:     Chief Complaint / Reason for Physician Visit  Patient seen and evaluated at bedside, overnight events reviewed, patient currently minimally responsive to painful/verbal stimuli, afebrile overnight,  Discussed with RN events overnight. Review of Systems:  Symptom Y/N Comments  Symptom Y/N Comments   Fever/Chills    Chest Pain     Poor Appetite    Edema     Cough    Abdominal Pain     Sputum    Joint Pain     SOB/HANNA    Pruritis/Rash     Nausea/vomit    Tolerating PT/OT     Diarrhea    Tolerating Diet     Constipation    Other       Could NOT obtain due to: Limited 2/2 patients clinical status     Objective:     VITALS:   Last 24hrs VS reviewed since prior progress note. Most recent are:  Patient Vitals for the past 24 hrs:   Temp Pulse Resp BP SpO2   22 0905 98.4 °F (36.9 °C) 82 18 135/70 98 %   22 2345 98.6 °F (37 °C) 70 20 139/62 98 %   22 1930 98.5 °F (36.9 °C) 69 18 130/64 98 %   22 1622 98.9 °F (37.2 °C) 76 16 132/78 94 %   22 1056 98.7 °F (37.1 °C) 75 16 129/79 93 %         Intake/Output Summary (Last 24 hours) at 2022 0891  Last data filed at 2022 1930  Gross per 24 hour   Intake --   Output 1020 ml   Net -1020 ml          PHYSICAL EXAM:  General: Patient appears comfortable   EENT:  EOMI. Anicteric sclerae. MMM  Resp:  Decreased air entry bilaterally in bilateral lower lung zones  CV:  Regular  rhythm, s1/s2 no m/r/g  No edema  GI:  Soft, Non distended, Non tender. +Bowel sounds  Neurologic:  Limited 2/2 patients clinical status   Psych:   Limited 2/2 patients clinical status  Skin:  No rashes. No jaundice    Procedures: see electronic medical records for all procedures/Xrays and details which were not copied into this note but were reviewed prior to creation of Plan. LABS:  I reviewed today's most current labs and imaging studies.   Pertinent labs include:  Recent Labs     08/21/22  0358 08/20/22  0147   WBC 17.0* 16.8*   HGB 9.2* 9.5*   HCT 28.0* 29.3*    388       Recent Labs     08/22/22  0158 08/21/22  0358 08/20/22  0147    141 143   K 4.5 4.2 4.3   * 111* 114*   CO2 26 25 26   * 188* 137*   BUN 32* 31* 32*   CREA 0.85 0.82 0.90   CA 8.6 8.7 8.6   MG  --  2.2 2.0         Signed: Rom Chacon MD    CTA head and neck:No evidence of acute intracranial vessel occlusion. Multifocal moderate to severe stenoses with occlusion of the distal left  vertebral artery at the V3-V4 junction. There is no intracranial mass, hemorrhage or evidence of acute infarction. No acute intracranial process is identified. CT head without contrast:1. Volume loss and chronic appearing white matter changes. No acute intracranial  abnormality  2. Diffuse sinus mucosal inflammatory change. Air-fluid level Y maxillary sinus  may indicate acute or chronic sinus mucosal disease    X-ray chest:IMPRESSION  Resolved interstitial edema pattern. MRI brain:IMPRESSION  1. Acute left basal ganglia ischemic infarct. 2. Atrophy and white matter disease, remote infarcts stable otherwise. MRI lumbar spine:IMPRESSION  1. Postsurgical changes L4-5 L5-S1 stable. 2. Spinal canal stenosis L3-L4.     Reviewed most current lab test results and cultures  YES  Reviewed most current radiology test results   YES  Review and summation of old records today    NO  Reviewed patient's current orders and MAR    YES  PMH/SH reviewed - no change compared to H&P      Assessment / Plan:  Severe sepsis- CT chest abdomen pelvis negative for any acute infectious source, unclear as to etiology, could be secondary to pneumonia versus other possible etiologies, remains hemodynamically stable at this time, case was discussed with infectious disease attending, possibility of central fevers  Follow-up repeat blood cultures for sensitivities, previous blood cultures have been negative to date  Follow-up prior blood cultures for sensitivities, negative to date  Continue cefepime and Flagyl for broad-spectrum antibiotic coverage  Trend inflammatory markers  Infectious disease consult appreciated, continue to follow recommendations    Cerebrovascular accident-of note patient was found to have a recent cerebrovascular accident on prior admission, remains confused, likely secondary to metabolic encephalopathy, repeat MRI was negative for any acute change  Continue aspirin once daily  Continue Eliquis once daily  Continue statin  Continue to follow physical therapy Occupational Therapy recommendations  Patient status post PEG tube placement  Neurology consult appreciated, continue to follow recommendations    Altered mental status/metabolic encephalopathy-could be secondary to metabolic encephalopathy due to problem #1 versus intracranial pathologies, repeat MRI brain consistent with prior CVA, no acute change  Continue supportive care  Continue to monitor mental status  Neurology consult appreciated, continue to follow recommendations    Hyperglycemia due to type 2 diabetes-currently much better control  Continue current insulin regimen  Continue insulin sliding scale    History of coronary artery disease-continue current medications    Acute kidney injury-resolved  Avoid nephrotoxic medications  Continue to trend serum creatinine  Nephrology consult appreciated, continue to follow recommendations    Bilateral hydronephrosis-of note patient CT abdomen pelvis concerning for bladder distention as well as bilateral hydronephrosis  Continue current care  Nephrology consult appreciated  Urology consult appreciated    BPH-continue current medications    Prophylaxis-Eliquis  FEN-continue tube feeding, maintenance IV fluids, replete potassium and magnesium  DNR, palliative care involved with regards to addressing goals of care, assistance greatly appreciated  Disposition-pending clinical improvement, goals of care discussion, will need skilled nursing facility placement versus hospice    25.0 - 29.9 Overweight / Body mass index is 28.44 kg/m². Code status: Full  Prophylaxis:  Eliquis  Recommended Disposition: SNF/LTC     ________________________________________________________________________  Care Plan discussed with:    Comments   Patient     Family  x    RN x    Care Manager x    Consultant  x                     x Multidiciplinary team rounds were held today with , nursing, pharmacist and clinical coordinator. Patient's plan of care was discussed; medications were reviewed and discharge planning was addressed.      ________________________________________________________________________  Total NON critical care TIME:  35   Minutes      Comments   >50% of visit spent in counseling and coordination of care x    ________________________________________________________________________  Bacilio Voss MD

## 2022-08-22 NOTE — PROGRESS NOTES
Problem: Falls - Risk of  Goal: *Absence of Falls  Description: Document Denise Bridges Fall Risk and appropriate interventions in the flowsheet. Outcome: Progressing Towards Goal  Note: Fall Risk Interventions:  Mobility Interventions: Bed/chair exit alarm    Mentation Interventions: Bed/chair exit alarm    Medication Interventions: Bed/chair exit alarm    Elimination Interventions: Call light in reach    History of Falls Interventions: Bed/chair exit alarm         Problem: Patient Education: Go to Patient Education Activity  Goal: Patient/Family Education  Outcome: Progressing Towards Goal     Problem: TIA/CVA Stroke: 0-24 hours  Goal: *Verbalizes anxiety and depression are reduced or absent  Outcome: Progressing Towards Goal     Problem: TIA/CVA Stroke: 0-24 hours  Goal: *Hemodynamically stable  Outcome: Progressing Towards Goal     Problem: Patient Education: Go to Patient Education Activity  Goal: Patient/Family Education  Outcome: Progressing Towards Goal     Problem: Patient Education: Go to Patient Education Activity  Goal: Patient/Family Education  Outcome: Progressing Towards Goal     Problem: Patient Education: Go to Patient Education Activity  Goal: Patient/Family Education  Outcome: Progressing Towards Goal     Problem: Airway Clearance - Ineffective  Goal: Achieve or maintain patent airway  Outcome: Progressing Towards Goal     Problem: Gas Exchange - Impaired  Goal: Absence of hypoxia  Outcome: Progressing Towards Goal  Goal: Promote optimal lung function  Outcome: Progressing Towards Goal     Problem: Breathing Pattern - Ineffective  Goal: Ability to achieve and maintain a regular respiratory rate  Outcome: Progressing Towards Goal     Problem:  Body Temperature -  Risk of, Imbalanced  Goal: Ability to maintain a body temperature within defined limits  Outcome: Progressing Towards Goal  Goal: Will regain or maintain usual level of consciousness  Outcome: Progressing Towards Goal  Goal: Complications related to the disease process, condition or treatment will be avoided or minimized  Outcome: Progressing Towards Goal     Problem: Falls - Risk of  Goal: *Absence of Falls  Description: Document Dre Stevens Fall Risk and appropriate interventions in the flowsheet.   Outcome: Progressing Towards Goal  Note: Fall Risk Interventions:  Mobility Interventions: Bed/chair exit alarm    Mentation Interventions: Bed/chair exit alarm    Medication Interventions: Bed/chair exit alarm    Elimination Interventions: Call light in reach    History of Falls Interventions: Bed/chair exit alarm         Problem: Patient Education: Go to Patient Education Activity  Goal: Patient/Family Education  Outcome: Progressing Towards Goal     Problem: Patient Education: Go to Patient Education Activity  Goal: Patient/Family Education  Outcome: Progressing Towards Goal     Problem: TIA/CVA Stroke: 0-24 hours  Goal: Off Pathway (Use only if patient is Off Pathway)  Outcome: Progressing Towards Goal  Goal: Activity/Safety  Outcome: Progressing Towards Goal  Goal: Consults, if ordered  Outcome: Progressing Towards Goal  Goal: Treatments/Interventions/Procedures  Outcome: Progressing Towards Goal  Goal: Minimize risk of bleeding post-thrombolytic infusion  Outcome: Progressing Towards Goal  Goal: Monitor for complications post-thrombolytic infusion  Outcome: Progressing Towards Goal  Goal: Psychosocial  Outcome: Progressing Towards Goal  Goal: *Hemodynamically stable  Outcome: Progressing Towards Goal  Goal: *Neurologically stable  Description: Absence of additional neurological deficits    Outcome: Progressing Towards Goal  Goal: *Verbalizes anxiety and depression are reduced or absent  Outcome: Progressing Towards Goal  Goal: *Absence of Signs of Aspiration on Current Diet  Outcome: Progressing Towards Goal  Goal: *Absence of deep venous thrombosis signs and symptoms(Stroke Metric)  Outcome: Progressing Towards Goal  Goal: *Ability to perform ADLs and demonstrates progressive mobility and function  Outcome: Progressing Towards Goal  Goal: *Stroke education started(Stroke Metric)  Outcome: Progressing Towards Goal  Goal: *Dysphagia screen performed(Stroke Metric)  Outcome: Progressing Towards Goal  Goal: *Rehab consulted(Stroke Metric)  Outcome: Progressing Towards Goal     Problem: TIA/CVA Stroke: Day 2 Until Discharge  Goal: Off Pathway (Use only if patient is Off Pathway)  Outcome: Progressing Towards Goal  Goal: Activity/Safety  Outcome: Progressing Towards Goal  Goal: Diagnostic Test/Procedures  Outcome: Progressing Towards Goal  Goal: Nutrition/Diet  Outcome: Progressing Towards Goal  Goal: Discharge Planning  Outcome: Progressing Towards Goal  Goal: Medications  Outcome: Progressing Towards Goal  Goal: Respiratory  Outcome: Progressing Towards Goal  Goal: Treatments/Interventions/Procedures  Outcome: Progressing Towards Goal  Goal: Psychosocial  Outcome: Progressing Towards Goal  Goal: *Verbalizes anxiety and depression are reduced or absent  Outcome: Progressing Towards Goal  Goal: *Absence of aspiration  Outcome: Progressing Towards Goal  Goal: *Absence of deep venous thrombosis signs and symptoms(Stroke Metric)  Outcome: Progressing Towards Goal  Goal: *Optimal pain control at patient's stated goal  Outcome: Progressing Towards Goal  Goal: *Tolerating diet  Outcome: Progressing Towards Goal  Goal: *Ability to perform ADLs and demonstrates progressive mobility and function  Outcome: Progressing Towards Goal  Goal: *Stroke education continued(Stroke Metric)  Outcome: Progressing Towards Goal     Problem: Ischemic Stroke: Discharge Outcomes  Goal: *Verbalizes anxiety and depression are reduced or absent  Outcome: Progressing Towards Goal  Goal: *Verbalize understanding of risk factor modification(Stroke Metric)  Outcome: Progressing Towards Goal  Goal: *Hemodynamically stable  Outcome: Progressing Towards Goal  Goal: *Absence of aspiration pneumonia  Outcome: Progressing Towards Goal  Goal: *Aware of needed dietary changes  Outcome: Progressing Towards Goal  Goal: *Verbalize understanding of prescribed medications including anti-coagulants, anti-lipid, and/or anti-platelets(Stroke Metric)  Outcome: Progressing Towards Goal  Goal: *Tolerating diet  Outcome: Progressing Towards Goal  Goal: *Aware of follow-up diagnostics related to anticoagulants  Outcome: Progressing Towards Goal  Goal: *Ability to perform ADLs and demonstrates progressive mobility and function  Outcome: Progressing Towards Goal  Goal: *Absence of DVT(Stroke Metric)  Outcome: Progressing Towards Goal  Goal: *Absence of aspiration  Outcome: Progressing Towards Goal  Goal: *Optimal pain control at patient's stated goal  Outcome: Progressing Towards Goal  Goal: *Home safety concerns addressed  Outcome: Progressing Towards Goal  Goal: *Describes available resources and support systems  Outcome: Progressing Towards Goal  Goal: *Verbalizes understanding of activation of EMS(911) for stroke symptoms(Stroke Metric)  Outcome: Progressing Towards Goal  Goal: *Understands and describes signs and symptoms to report to providers(Stroke Metric)  Outcome: Progressing Towards Goal  Goal: *Neurolgocially stable (absence of additional neurological deficits)  Outcome: Progressing Towards Goal  Goal: *Verbalizes importance of follow-up with primary care physician(Stroke Metric)  Outcome: Progressing Towards Goal  Goal: *Smoking cessation discussed,if applicable(Stroke Metric)  Outcome: Progressing Towards Goal  Goal: *Depression screening completed(Stroke Metric)  Outcome: Progressing Towards Goal

## 2022-08-22 NOTE — PROGRESS NOTES
Palliative Medicine  [place]: [phone] ([phone])  [facility]: [phone] ([phone])     Patient remains minimally responsive. His mental and functional status has not changed, eyes closed, not engaging in any interaction, he is still being fed via PEG. Joe Bradley asked for this writer to meet with her. Joe Bradley was quite tearful but shared that \"we had a family meeting and I want him to come home with hospice\". It has taken a long time for Ara to get to this point of acceptance that patient's condition likely will not change. Joeisrael Bradley indicated that she did not know much about hospice services; [facility] [place] [facility] and the support that they provide. She is open to meeting with the [facility] team for hospice at home. She noted that there are family members who would assist her in caring for patient. Emotional support provided to Joeisrael Bradley and validated her feelings of sadness and anticipatory grief. Coordinated care with JUDI, Tristen Molina and Aranza Lane, [address] to meet with Joe Bradley and begin the process for hospice information and possible admission.

## 2022-08-23 NOTE — PROGRESS NOTES
Bedside and Verbal shift change report given to Michelle Rodriguez RN (oncoming nurse) by Claudia Gore (offgoing nurse). Report included the following information SBAR, Kardex, and Med Rec Status.

## 2022-08-23 NOTE — PROGRESS NOTES
Hospitalist Progress Note    NAME: Nisreen Payton   :  1945   MRN:  [id number]            Subjective:     Chief Complaint / Reason for Physician Visit  Patient seen and evaluated at bedside, overnight events reviewed, patient currently minimally responsive to painful/verbal stimuli, afebrile overnight,  Discussed with RN events overnight. Review of Systems:  Symptom Y/N Comments  Symptom Y/N Comments   Fever/Chills    Chest Pain     Poor Appetite    Edema     Cough    Abdominal Pain     Sputum    Joint Pain     SOB/HANNA    Pruritis/Rash     Nausea/vomit    Tolerating PT/OT     Diarrhea    Tolerating Diet     Constipation    Other       Could NOT obtain due to: Limited 2/2 patients clinical status     Objective:     VITALS:   Last 24hrs VS reviewed since prior progress note. Most recent are:  Patient Vitals for the past 24 hrs:   Temp Pulse Resp BP SpO2   22 0320 98 °F (36.7 °C) 80 18 125/60 96 %   22 1915 98 °F (36.7 °C) 80 18 (!) 122/56 97 %   22 1629 98.5 °F (36.9 °C) 87 17 135/78 98 %   22 1220 98.7 °F (37.1 °C) 85 18 (!) 145/72 97 %   22 0905 98.4 °F (36.9 °C) 82 18 135/70 98 %       No intake or output data in the 24 hours ending 22 0830       PHYSICAL EXAM:  General: Patient appears comfortable   EENT:  EOMI. Anicteric sclerae. MMM  Resp:  Decreased air entry bilaterally in bilateral lower lung zones  CV:  Regular  rhythm, s1/s2 no m/r/g  No edema  GI:  Soft, Non distended, Non tender. +Bowel sounds  Neurologic:  Limited 2/2 patients clinical status   Psych:   Limited 2/2 patients clinical status  Skin:  No rashes. No jaundice    Procedures: see electronic medical records for all procedures/Xrays and details which were not copied into this note but were reviewed prior to creation of Plan. LABS:  I reviewed today's most current labs and imaging studies.   Pertinent labs include:  Recent Labs     22  0359 22  0358   WBC 17.6* 17.0*   HGB 9.1* 9.2* HCT 28.4* 28.0*    393       Recent Labs     08/23/22  0359 08/22/22  0158 08/21/22  0358    140 141   K 4.5 4.5 4.2   * 110* 111*   CO2 24 26 25   * 171* 188*   BUN 32* 32* 31*   CREA 0.89 0.85 0.82   CA 8.6 8.6 8.7   MG 2.2  --  2.2         Signed: Greg Romo MD    CTA head and neck:No evidence of acute intracranial vessel occlusion. Multifocal moderate to severe stenoses with occlusion of the distal left  vertebral artery at the V3-V4 junction. There is no intracranial mass, hemorrhage or evidence of acute infarction. No acute intracranial process is identified. CT head without contrast:1. Volume loss and chronic appearing white matter changes. No acute intracranial  abnormality  2. Diffuse sinus mucosal inflammatory change. Air-fluid level Y maxillary sinus  may indicate acute or chronic sinus mucosal disease    X-ray chest:IMPRESSION  Resolved interstitial edema pattern. MRI brain:IMPRESSION  1. Acute left basal ganglia ischemic infarct. 2. Atrophy and white matter disease, remote infarcts stable otherwise. MRI lumbar spine:IMPRESSION  1. Postsurgical changes L4-5 L5-S1 stable. 2. Spinal canal stenosis L3-L4.     Reviewed most current lab test results and cultures  YES  Reviewed most current radiology test results   YES  Review and summation of old records today    NO  Reviewed patient's current orders and MAR    YES  PMH/SH reviewed - no change compared to H&P      Assessment / Plan:  Severe sepsis- CT chest abdomen pelvis negative for any acute infectious source, unclear as to etiology, could be secondary to pneumonia versus other possible etiologies, remains hemodynamically stable at this time, case was discussed with infectious disease attending, possibility of central fevers  Follow-up repeat blood cultures for sensitivities, previous blood cultures have been negative to date  Follow-up prior blood cultures for sensitivities, negative to date  Continue cefepime and Flagyl for broad-spectrum antibiotic coverage  Trend inflammatory markers  Infectious disease consult appreciated, continue to follow recommendations    Cerebrovascular accident-of note patient was found to have a recent cerebrovascular accident on prior admission, remains confused, likely secondary to metabolic encephalopathy, repeat MRI was negative for any acute change  Continue aspirin once daily  Continue Eliquis once daily  Continue statin  Continue to follow physical therapy Occupational Therapy recommendations  Patient status post PEG tube placement  Neurology consult appreciated, continue to follow recommendations    Altered mental status/metabolic encephalopathy-could be secondary to metabolic encephalopathy due to problem #1 versus intracranial pathologies, repeat MRI brain consistent with prior CVA, no acute change  Continue supportive care  Continue to monitor mental status  Neurology consult appreciated, continue to follow recommendations    Hyperglycemia due to type 2 diabetes-currently much better control  Continue current insulin regimen  Continue insulin sliding scale    History of coronary artery disease-continue current medications    Acute kidney injury-resolved  Avoid nephrotoxic medications  Continue to trend serum creatinine  Nephrology consult appreciated, continue to follow recommendations    Bilateral hydronephrosis-of note patient CT abdomen pelvis concerning for bladder distention as well as bilateral hydronephrosis  Continue current care  Nephrology consult appreciated  Urology consult appreciated    BPH-continue current medications    Prophylaxis-Eliquis  FEN-continue tube feeding, maintenance IV fluids, replete potassium and magnesium  DNR, palliative care involved with regards to addressing goals of care, assistance greatly appreciated, plan for discharge home with home hospice tomorrow (DME being delivered today)  [facility] with home hospice tomorrow, DME being delivered today    25.0 - 29.9 Overweight / Body mass index is 28.38 kg/m². Code status: Full  Prophylaxis:  Eliquis  Recommended Disposition: SNF/LTC     ________________________________________________________________________  Care Plan discussed with:    Comments   Patient     Family  x    RN x    Care Manager x    Consultant  x                     x Multidiciplinary team rounds were held today with , nursing, pharmacist and clinical coordinator. Patient's plan of care was discussed; medications were reviewed and discharge planning was addressed.      ________________________________________________________________________  Total NON critical care TIME:  35   Minutes      Comments   >50% of visit spent in counseling and coordination of care x    ________________________________________________________________________  Mariam Willson MD

## 2022-08-23 NOTE — PROGRESS NOTES
Transition of Care Plan:     RUR: 17% - \"moderate risk\"  LOS: 29 days  Disposition: Home with  Hospice & family support (8/24/22)  Follow up appointments: PCP & specialist as indicated   DME needed: [facility] to coordinate the delivery of any DME necessary for d/c  Transportation at Discharge: [facility] transport secured for 9:45 AM 8/24/22; PCS completed, copy on chart  Keys or means to access home: Pt's wife has access to the home   IM Medicare Letter: 2nd IM needed prior to d/c  Is patient a BCPI-A Bundle: N/A         Is patient a [place] and connected with the [address]? Yes - clinical updates faxed to the [facility] throughout course of admission  Caregiver Contact: Pt's wife Jeanne Brim: [phone])  Discharge Caregiver contacted prior to discharge? To be contacted prior to d/c  Care Conference needed?: N/A - pt will d/c home with hospice 8/24/22   COVID-19 test: PCR COVID test completed 8/14/22; results positive    Initial note: Chart reviewed, IDR completed. Medical team informed pt will d/c tomorrow (8/24/22) home with [facility] & family support. [facility] secured for 9:45 AM; PCS completed, copy on chart. CM specified in [facility] referral that pt will be returning home with hospice & requested for assigned crew to arrive on time to the best of their ability; request acknowledged. CM will continue to follow & remain accessible for d/c planning.     Leena Rodriguez, MSW  Care Manager, [facility]

## 2022-08-23 NOTE — HOSPICE
[facility] RN note: In to meet with pt, sister and sister in law at bedside. Pt's wife Latanya Ruano is at home preparing for DME/ medication delivery. Pt noticeably uncomfortable, restless, tachypnea with facial tension. PRN dose of IV morphine given with some relief. Discussed pt with palliative team re full transition to [address] [place] [address] along with adding sl symptom medications to assure comfort once home. All in agreement with D/C tube feedings and IV therapy at home, recommend D/C while inpt, will confirm plan with wife Anastasia Melendrez. 12:36---return call from wife Dewayne Austin. Confirmed agreement with full CMO along with stopping TF and IV, transition IV or sl symptom medications. Discussed with Dr Keli Lawrence who will place orders. Thank you for the opportunity to care for this pt and family. Please contact hospice at [phone] with any questions or concerns.

## 2022-08-23 NOTE — DISCHARGE SUMMARY
Hospitalist Discharge Summary     Patient ID:  Dasha West  [id number]  40 y.o.  1945  7/25/2022    PCP on record: Toby Hdez MD    Admit date: 7/25/2022  Discharge date and time: 8/23/2022    DISCHARGE DIAGNOSIS:    Severe sepsis/cerebrovascular accident/altered mental status/metabolic encephalopathy/hyperglycemia type 2 diabetes/history of coronary artery disease/acute kidney injury/bilateral hydronephrosis/BPH    CONSULTATIONS:  IP CONSULT TO HOSPITALIST  IP CONSULT TO NEUROLOGY  IP CONSULT TO GASTROENTEROLOGY  IP CONSULT TO INFECTIOUS DISEASES  IP CONSULT TO NEUROLOGY  IP CONSULT TO NEPHROLOGY  IP CONSULT TO UROLOGY  IP CONSULT TO PALLIATIVE CARE - PROVIDER    Excerpted HPI from H&P of Dorrie Schlatter, MD:  Jamie Lynn is a 68 y.o.  male with a past medical history of paroxysmal A. fib, CHF, GERD, hypertension, has a pacemaker presented to ED with a chief complaint of confusion. Wife at the bedside. She reports for past 4 to 5 days patient has been more confused than before. He has been trying to reach for things which are not there. She reports his memory is suddenly declined. Patient had a fall yesterday and injured his head as well. No LOC or nausea vomiting. No seizure-like activity  -In the ED he was oriented to place person. But was not able to answer some questions, which he usually does. ______________________________________________________________________  DISCHARGE SUMMARY/HOSPITAL COURSE:  for full details see H&P, daily progress notes, labs, consult notes.    Patient was subsequently admitted to [facility] for further evaluation as well as management, patient was found to have cerebrovascular accident on prior admission, status post PEG tube placement, patient was noted to be significantly confused with altered mental status secondary to metabolic encephalopathy, MRI brain was negative for any new CVA, patient was noted to be in severe sepsis, likely secondary to pneumonia, started on antibiotics, evaluated by neurology, evaluated by infectious disease, overall patient had a prolonged hospital course, complicated by multiple fever spikes, no improvement in patient's clinical status, patient was noted to be in acute kidney injury with urinary retention, Desir catheter was placed, patient was evaluated by urology as well as nephrology, of note palliative care was consulted, had extensive discussions with patient's wife, following which given patient's clinical condition as well as overall course and after being explained about poor prognosis, patient's wife made a decision to make patient DNR/DNI and to take him home with home hospice, once home hospice arrangements were set up, patient was discharged home with home hospice.          _______________________________________________________________________  Patient seen and examined by me on discharge day. Pertinent Findings:  General:          Patient appears comfortable   EENT:              EOMI. Anicteric sclerae. MMM  Resp:               Decreased air entry bilaterally in bilateral lower lung zones  CV:                  Regular  rhythm, s1/s2 no m/r/g  No edema  GI:                   Soft, Non distended, Non tender. +Bowel sounds  Neurologic:       Limited 2/2 patients clinical status   Psych:   Limited 2/2 patients clinical status  Skin:                No rashes.   No jaundice  _______________________________________________________________________  DISCHARGE MEDICATIONS:   Current Discharge Medication List        STOP taking these medications       pregabalin (LYRICA) 75 mg capsule Comments:   Reason for Stopping:         acetaminophen (TYLENOL) 325 mg tablet Comments:   Reason for Stopping:         calcium carb/vit D3/minerals (CALCIUM-VITAMIN D PO) Comments:   Reason for Stopping:         methotrexate (RHEUMATREX) 2.5 mg tablet Comments:   Reason for Stopping:         carvediloL (COREG) 3.125 mg tablet Comments:   Reason for Stopping:         cyclobenzaprine (FLEXERIL) 10 mg tablet Comments:   Reason for Stopping:         polyethylene glycol (MIRALAX) 17 gram packet Comments:   Reason for Stopping:         Eliquis 5 mg tablet Comments:   Reason for Stopping:         tamsulosin (FLOMAX) 0.4 mg capsule Comments:   Reason for Stopping:         lidocaine (XYLOCAINE) 5 % ointment Comments:   Reason for Stopping:         pravastatin (PRAVACHOL) 20 mg tablet Comments:   Reason for Stopping:         omeprazole (PRILOSEC) 20 mg capsule Comments:   Reason for Stopping:         aspirin delayed-release 81 mg tablet Comments:   Reason for Stopping:                 Patient Follow Up Instructions: Activity: As per hospice  Diet: Comfort feeding/tube feeding as per hospice  Wound Care: As per hospice    Follow-up with as per hospice  Follow-up Information       Follow up With Specialties Details Why [address] Follow up This is your home hospice agency. Contact the office directly with any questions or concerns [address] [place] [address]  [phone]          ________________________________________________________________    Risk of deterioration: High    Condition at Discharge:  Stable  __________________________________________________________________    Disposition  Home with hospice services    ____________________________________________________________________    Code Status: DNR/DNI  ___________________________________________________________________      Total time in minutes spent coordinating this discharge (includes going over instructions, follow-up, prescriptions, and preparing report for sign off to her PCP) :  45 minutes    Signed:   Saul Tam MD

## 2022-08-23 NOTE — PROGRESS NOTES
Nutrition: Chart reviewed for follow up. Family has decided to take patient home with hospice. Plans for discharge tomorrow. Can discontinue TF at discharge. Nutrition will sign off.    Stefany Chavez RD

## 2022-08-24 NOTE — HOSPICE
[facility] Help to Those in Need  [phone]    Hospice Nursing PRE-Admission   Discharge Summary  Patient Name: Addie Quijano  YOB: 1945  Age: 68 y.o. Date of [address] Admission: 11:00am with Nata Macedo   Hospice Attending: Dr Lisa Pina  Primary Care Physician: Layton Medel, [address] [address]    Primary Contact and Phone: wife Fernando De [phone]      ADVANCE CARE PLANNING    Code Status: DNR  Durable DNR: [x]  Yes  []  No  Advance Care Planning 2022   Patient's Healthcare Decision Maker is: Legal Next of Kin   Confirm Advance Directive None   Patient Would Like to Complete Advance Directive Unable   Does the patient have other document types -       [facility]: [facility]   Home: TBD    HOSPICE SUMMARY       NCD: Requested      CLINICAL INFORMATION   Allergies: Allergies   Allergen Reactions    Corticosteroids (Glucocorticoids) Other (comments)     Depo medrol says patient, loss of consciousness    Pravastatin Other (comments)     Possible cause of elevated LFTs for 2018 AST 86          Currently this patient has:  [] Supplemental O2 [] Peripheral IV  [] PICC    [] PORT   [] Desir Catheter [] NG Tube   [] PEG Tube [] Ostomy    [] AICD: Has ICD been deactivated? [] Yes [] Wounds      COVID Screening: Positive   Negative      ASSESSMENT & PLAN     1. Symptom Issues Identified: slight restlessness, non verbal signs of pain    2. Spiritual Issues  Identified: to be evaluarted    3. Psych/ Social/ Emotional Issues Identified: lives with wife, son is local, sister and two sisters in law are [place] and involved with care. Two family members are nurses.               CARE COORDINATION     Morena Chakraborty [id number] #[id number]  Dx: CVA  Date of Consult:   Discharge date: 22, 10am transport with 11:00am admission with Nata Macedo  DDNR:  yes, scanned and copied for transport Consents: yes  CTI:  per Dr Gardiner Thai: [facility] for delivery 8/23 (delivered)  Equip: bed, obt, 02 for delivery Tuesday between 4-8 #[id number] (delivered)  Clinical notes:  wife amenable to d/c TF and hydration, transitioned to [address] [place] [address] with sl medications to assure comfort at home. Very short prognosis, may need to discuss aborting discharge if too symptomatic though wife adamant about home care. May need CC.

## 2022-08-24 NOTE — HOSPICE
Kristin Ramey [facility]  1945  76                                                         Principle Hospice Diagnosis: CVA    Diagnoses RELATED to the terminal prognosis:   PVD (peripheral vascular disease)   Arrhythmia   Paroxysmal Afib  ASVD (arteriosclerotic vascular disease)   Atrial fibrillation   HTN (hypertension)   Congestive heart failure     Other Diagnoses: Allergic rhinitis   Diverticulosis   DJD (degenerative joint disease)   ED (erectile dysfunction)   GERD (gastroesophageal reflux disease)   Glucose intolerance (impaired glucose tolerance)   Hyperlipidemia   Insomnia   Low back pain   Pacemaker   Rheumatoid arthritis   Urinary retention      Date of Hospice Admission: 2022  Hospice Attending Elected by Patient: Ruben Riley  Primary Care Physician: n/a    Admitting RN: Fermin Temple  Nurse CM: Claudia  : Anay Alarcon:    Anthony Moses DNR:  Yes       Service: Lesli Sniff       Appropriate for Pinning Ceremony:        No, pt is unresponsive     Home:did not discuss    Direct Observation:unresponsive. resp between 30 and 38. pain noted when turning and repositioning. medicated with morphine 5 mg SL. lorazepam tab 0.5 mg dissolved in water and given SL.  pt more comfortable after administration of meds. breath sounds diminished, bowel sounds absent. Desir patent and intact, minimal output. DTI to right heel. unstageable sacral decubitus covering buttocks. PPS 10. ER Visits/ Hospitalizations in past year: 3-4    Onset Date of Hospice Diagnosis:     Summary of Disease Progression Leading to Hospice Diagnosis:       Excerpted HPI from H&P of Dorrie Schlatter, MD:   Jamie Lynn is a 68 y.o.  male with a past medical history of paroxysmal A. fib, CHF, GERD, hypertension, has a pacemaker presented to ED with a chief complaint of confusion. Wife at the bedside. She reports for past 4 to 5 days patient has been more confused than before.  He has been trying to reach for things which are not there. She reports his memory is suddenly declined. Patient had a fall yesterday and injured his head as well. No LOC or nausea vomiting. No seizure-like activity   -In the ED he was oriented to place person. But was not able to answer some questions, which he usually does. ______________________________________________________________________   DISCHARGE SUMMARY/HOSPITAL COURSE: for full details see H&P, daily progress notes, labs, consult notes. Patient was subsequently admitted to [facility] for further evaluation as well as management, patient was found to have cerebrovascular accident on prior admission, status post PEG tube placement, patient was noted to be significantly confused with altered mental status secondary to metabolic encephalopathy, MRI brain was negative for any new CVA, patient was noted to be in severe sepsis, likely secondary to pneumonia, started on antibiotics, evaluated by neurology, evaluated by infectious disease, overall patient had a prolonged hospital course, complicated by multiple fever spikes, no improvement in patient's clinical status, patient was noted to be in acute kidney injury with urinary retention, Desir catheter was placed, patient was evaluated by urology as well as nephrology, of note palliative care was consulted, had extensive discussions with patient's wife, following which given patient's clinical condition as well as overall course and after being explained about poor prognosis, patient's wife made a decision to make patient DNR/DNI and to take him home with home hospice, once home hospice arrangements were set up, patient was discharged home with home hospice. Use LCD Guidelines and list features:     Stroke:  ___X_____  1. Karnofsky Performance Status (KPS) or Palliative Performance Scale (PPS) of 40% or less;  ___X_____  2.  Inability to maintain hydration and caloric intake with one of the following:        ____X____  a. Weight loss >10% in the last 6 months or >7.5% in the last 3 months;        ___X_____  b. Serum albumin         ____X____  c. Current history of pulmonary aspiration not responsive to speech language                                 pathology intervention;                       ___X_____  d. Sequential calorie counts documenting inadequate caloric/fluid intake;                       ___X_____  e. Dysphagia severe enough to prevent the patient from receiving food and fluids                   necessary to sustain life, in a patient who declines or does not receive artificial                                 nutrition and hydration. Coma (any etiology):   ____X____  Comatose patients with any 3 of the following on day three of coma:                       ________  a. abnormal brain stem response;                       ____X____  b. absent verbal response;                       ____X____  c. absent withdrawal response to pain;                       ________  d. serum creatinine >1.5 mg/dl. Documentation of the following factors will support eligibility for hospice care:    ____X____  Documentation of medical complications, in the context of progressive clinical decline, within                       the previous 12 months, which support a terminal prognosis:                       ________  a. Aspiration pneumonia;        ________  b. Upper urinary tract infection (pyelonephritis);                       ____X____  c. Sepsis;                       ________  d. Refractory stage 3-4 decubitus ulcers;                       ________  e. Fever recurrent after antibiotics. Documentation of diagnostic imaging factors which support poor prognosis after stroke include:    A. For non-traumatic hemorrhagic stroke:                     ________1. Large-volume hemorrhage on CT:                                           ________  a.  Infratentorial: ?20 ml.; ________  b. Supratentorial: ?50 ml.                    ________  2. Ventricular extension of hemorrhage;                    ________  3. Surface area of involvement of hemorrhage ? 30% of cerebrum;                    ________  4. Midline shift ?1.5 cm.;                    ________  5. Obstructive hydrocephalus in patient who declines, or is not a candidate for,                                             ventriculoperitoneal shunt. B. For thrombotic/embolic stroke:      ___X_____  1. Large anterior infarcts with both cortical and subcortical involvement;      ________  2. Large bihemispheric infarcts;      ____X____  3. Basilar artery occlusion;      ________  4. Bilateral vertebral artery occlusion. SPIRITUAL/Social/Emotional:Irvin Gaxiola __Rayne_____________ contacted, agrees to serve as attending provider for hospice and provided verbal certification of terminal illness with prognosis of 6 months or less life expectancy. Orders for hospice admission, medications and plan of treatment received. Medication reconciliation completed.     Currently this patient has:    Supplemental O2:   yes                                 Desir Catheter:    yes           MEDS:  I have reviewed the patient's medication list with MD and identified the following:  Nonformulary medications: none  Unrelated medications: none    IDT communication to include MD, , SW, [address] [place] [address] and support team.

## 2022-08-24 NOTE — DISCHARGE SUMMARY
Hospitalist Discharge Summary     Patient ID:  Maribel Georges  [id number]  85 y.o.  1945  7/25/2022    PCP on record: Penny Choe MD    Admit date: 7/25/2022  Discharge date and time: 8/24/2022    DISCHARGE DIAGNOSIS:    Severe sepsis/cerebrovascular accident/altered mental status/metabolic encephalopathy/hyperglycemia type 2 diabetes/history of coronary artery disease/acute kidney injury/bilateral hydronephrosis/BPH    CONSULTATIONS:  IP CONSULT TO HOSPITALIST  IP CONSULT TO NEUROLOGY  IP CONSULT TO GASTROENTEROLOGY  IP CONSULT TO INFECTIOUS DISEASES  IP CONSULT TO NEUROLOGY  IP CONSULT TO NEPHROLOGY  IP CONSULT TO UROLOGY  IP CONSULT TO PALLIATIVE CARE - PROVIDER    Excerpted HPI from H&P of Екатерина Last MD:  Evangelist Barnard is a 68 y.o.  male with a past medical history of paroxysmal A. fib, CHF, GERD, hypertension, has a pacemaker presented to ED with a chief complaint of confusion. Wife at the bedside. She reports for past 4 to 5 days patient has been more confused than before. He has been trying to reach for things which are not there. She reports his memory is suddenly declined. Patient had a fall yesterday and injured his head as well. No LOC or nausea vomiting. No seizure-like activity  -In the ED he was oriented to place person. But was not able to answer some questions, which he usually does. ______________________________________________________________________  DISCHARGE SUMMARY/HOSPITAL COURSE:  for full details see H&P, daily progress notes, labs, consult notes.    Patient was subsequently admitted to [facility] for further evaluation as well as management, patient was found to have cerebrovascular accident on prior admission, status post PEG tube placement, patient was noted to be significantly confused with altered mental status secondary to metabolic encephalopathy, MRI brain was negative for any new CVA, patient was noted to be in severe sepsis, likely secondary to pneumonia, started on antibiotics, evaluated by neurology, evaluated by infectious disease, overall patient had a prolonged hospital course, complicated by multiple fever spikes, no improvement in patient's clinical status, patient was noted to be in acute kidney injury with urinary retention, Desir catheter was placed, patient was evaluated by urology as well as nephrology, of note palliative care was consulted, had extensive discussions with patient's wife, following which given patient's clinical condition as well as overall course and after being explained about poor prognosis, patient's wife made a decision to make patient DNR/DNI and to take him home with home hospice, once home hospice arrangements were set up, patient was discharged home with home hospice.          _______________________________________________________________________  Patient seen and examined by me on discharge day. Pertinent Findings:  General:          Patient appears comfortable   EENT:              EOMI. Anicteric sclerae. MMM  Resp:               Decreased air entry bilaterally in bilateral lower lung zones  CV:                  Regular  rhythm, s1/s2 no m/r/g  No edema  GI:                   Soft, Non distended, Non tender. +Bowel sounds  Neurologic:       Limited 2/2 patients clinical status   Psych:   Limited 2/2 patients clinical status  Skin:                No rashes.   No jaundice  _______________________________________________________________________  DISCHARGE MEDICATIONS:   Current Discharge Medication List        STOP taking these medications       pregabalin (LYRICA) 75 mg capsule Comments:   Reason for Stopping:         acetaminophen (TYLENOL) 325 mg tablet Comments:   Reason for Stopping:         calcium carb/vit D3/minerals (CALCIUM-VITAMIN D PO) Comments:   Reason for Stopping:         methotrexate (RHEUMATREX) 2.5 mg tablet Comments:   Reason for Stopping:         carvediloL (COREG) 3.125 mg tablet Comments:   Reason for Stopping:         cyclobenzaprine (FLEXERIL) 10 mg tablet Comments:   Reason for Stopping:         polyethylene glycol (MIRALAX) 17 gram packet Comments:   Reason for Stopping:         Eliquis 5 mg tablet Comments:   Reason for Stopping:         tamsulosin (FLOMAX) 0.4 mg capsule Comments:   Reason for Stopping:         lidocaine (XYLOCAINE) 5 % ointment Comments:   Reason for Stopping:         pravastatin (PRAVACHOL) 20 mg tablet Comments:   Reason for Stopping:         omeprazole (PRILOSEC) 20 mg capsule Comments:   Reason for Stopping:         aspirin delayed-release 81 mg tablet Comments:   Reason for Stopping:                 Patient Follow Up Instructions: Activity: As per hospice  Diet: Comfort feeding/tube feeding as per hospice  Wound Care: As per hospice    Follow-up with as per hospice  Follow-up Information       Follow up With Specialties Details Why [address] Follow up This is your home hospice agency.  Contact the office directly with any questions or concerns [address] [place] [address] [place], [place] [address] [phone]            ________________________________________________________________    Risk of deterioration: High    Condition at Discharge:  Stable  __________________________________________________________________    Disposition  Home with hospice services    ____________________________________________________________________    Code Status: DNR/DNI  ___________________________________________________________________      Total time in minutes spent coordinating this discharge (includes going over instructions, follow-up, prescriptions, and preparing report for sign off to her PCP) :  45 minutes    Signed:  Linda Hopper MD

## 2022-08-24 NOTE — PROGRESS NOTES
No further CM needs identified. Transition of Care Plan:     RUR: 17% - \"moderate risk\"  LOS: 30 days  Disposition: Home with [facility] & family support   Follow up appointments: PCP & specialist as indicated   DME needed: [facility] coordinated the delivery of any DME necessary for d/c  Transportation at Discharge: [facility] transport secured for 9:45 AM; PCS completed, copy on chart  Keys or means to access home: Pt's wife has access to the home   IM Medicare Letter: 2nd IM needed prior to d/c  Is patient a BCPI-A Bundle: N/A         Is patient a  and connected with the [place]? Yes - clinical updates faxed to the [facility] throughout course of admission  Caregiver Contact: Pt's wife Zuri Venegas: [phone])  Discharge Caregiver contacted prior to discharge? To be contacted prior to d/c  Care Conference needed?: N/A - pt will d/c home with hospice 8/24/22   COVID-19 test: PCR COVID test completed 8/14/22; results positive     Initial note: CM acknowledged d/c. Pt will d/c home with [facility] via [facility] at 9:45 AM. PCS completed, copy on chart. Clinical updates, including pt's d/c summary, MAR, and DDNR faxed to the [facility] for reference (f: [phone]). CM will contact pt's wife Zuri Venegas: [phone]) to provide overview of the d/c plan. Care Management Interventions  PCP Verified by CM:  Yes ([facility] PCP)  Palliative Care Criteria Met (RRAT>21 & CHF Dx)?: Yes  Palliative Consult Recommended?: Yes  Mode of Transport at Discharge: [facility] Time of Discharge: 0945  Transition of Care Consult (CM Consult): Tonya: Yes  MyChart Signup:  (DME coordinated by [facility] )  Discharge Durable Medical Equipment: Yes  Physical Therapy Consult: Yes  Occupational Therapy Consult: Yes  Speech Therapy Consult: Yes  Support Systems: Other Family Member(s), Spouse/Significant Other, Child(brandon)  Confirm Follow Up Transport: Family  The Plan for Transition of Care is Related to the Following Treatment Goals : hospice  The Patient and/or Patient Representative was Provided with a Choice of Provider and Agrees with the Discharge Plan?: Yes  Name of the Patient Representative Who was Provided with a Choice of Provider and Agrees with the Discharge Plan: wife  Freedom of Choice List was Provided with Basic Dialogue that Supports the Patient's Individualized Plan of Care/Goals, Treatment Preferences and Shares the Quality Data Associated with the Providers?: Yes   Resource Information Provided?: Yes  Discharge Location  Patient Expects to be Discharged to[de-identified] Home with hospice [facility] & family support)    ERIKA Levi  Care Manager, [facility]  [phone]

## 2022-08-24 NOTE — PROGRESS NOTES
Discharge paperwork reviewed with wife. Opportunity for questions and clarification provided. Wife verbalized understanding. [facility] to transport home for hospice admission at 1100.

## 2022-08-24 NOTE — PROGRESS NOTES
End of Shift Note     Bedside shift change report given to Yony Lawrence RN (oncoming nurse) by Isabel Rangel RN (offgoing nurse). Report included the following information SBAR, Kardex, MAR, and Recent Results     Shift worked:  DAYS      Shift summary and any significant changes:      Patient nonverbal, responds to stimulation. Patient placed on comfort care.  BG checks and tube feedings discontinued      Concerns for physician to address: None   Zone phone for oncoming shift:   1014      Patient Information  Roosevelt Branham  68 y.o.  7/25/2022 12:04 PM by Anayeli Rodriguez MD. Roosevelt Branham was admitted from Home     Problem List          Patient Active Problem List     Diagnosis Date Noted    Encephalopathy due to COVID-19 virus 07/28/2022    Thrombotic stroke involving left middle cerebral artery ([address] 07/27/2022    Acute alteration in mental status 07/26/2022    Convulsive syncope 07/26/2022    Bilateral carotid artery stenosis 07/26/2022    History of stroke 07/26/2022    CVA (cerebral vascular accident) ([address] 07/25/2022    Stroke ([address] 07/08/2022    Weakness of both legs 06/01/2021    Bilateral lower extremity pain 06/01/2021    Leg pain 05/30/2021    Dilated cardiomyopathy ([address] 12/18/2020    Permanent atrial fibrillation ([address] 12/18/2020    Tachycardia 12/18/2020    A-fib ([address] 12/18/2020    PAF (paroxysmal atrial fibrillation) ([address] 05/16/2018    Age-related cataract 08/01/2017    Allergic rhinitis 06/28/2017    Diverticulosis 06/28/2017    Urinary retention 06/28/2017    PVD (peripheral vascular disease) ([address] 06/28/2017    On statin therapy 06/28/2017    Low back pain 06/28/2017    Insomnia 06/28/2017    HTN (hypertension) 06/28/2017    Hyperlipidemia 06/28/2017    Glucose intolerance (impaired glucose tolerance) 06/28/2017    GERD (gastroesophageal reflux disease) 06/28/2017    ED (erectile dysfunction) 06/28/2017    DJD (degenerative joint disease) 06/28/2017    ASVD (arteriosclerotic vascular disease) 06/28/2017              Past Medical History:   Diagnosis Date    Allergic rhinitis 6/28/2017    Arrhythmia       Paroxysmal Afib- Dr. Simeon Tatum    ASVD (arteriosclerotic vascular disease) 06/28/2017     Story: carotid stenosis followed by [facility]    Atrial fibrillation [address]      Congestive heart failure ([facility]      Diverticulosis 6/28/2017     Comments: on colonoscopy 12/01    DJD (degenerative joint disease) 6/28/2017    ED (erectile dysfunction) 6/28/2017    GERD (gastroesophageal reflux disease) 6/28/2017    Glucose intolerance (impaired glucose tolerance) 06/28/2017    HTN (hypertension) 6/28/2017    Hyperlipidemia 6/28/2017    Insomnia 6/28/2017    Low back pain 6/28/2017    On statin therapy 6/28/2017    Pacemaker 2020    PVD (peripheral vascular disease) ([facility] 6/28/2017    Rheumatoid arthritis ([facility]      Urinary retention 6/28/2017         Core Measures:  CVA: Yes Yes  CHF:No No  PNA:No No     Activity:  Activity Level: Bed Rest  Number times ambulated in hallways past shift: 0  Number of times OOB to chair past shift: 0     Cardiac:   Cardiac Monitoring: Yes      Cardiac Rhythm: Atrial Paced     Access:   Current line(s): PIV   Central Line? No Placement date  Reason Medically Necessary      PICC LINE? No Placement date Reason Medically Necessary         Genitourinary:   Urinary status: voiding and incontinent  Urinary Catheter? No Placement Date  Reason Medically Necessary         Respiratory:   O2 Device: None (Room air)  Chronic home O2 use?: NO  Incentive spirometer at bedside: NO     GI:  Last Bowel Movement Date: 08/17/22  Current diet:  DIET NPO  DIET ONE TIME MESSAGE  ADULT TUBE FEEDING PEG; Standard with Fiber; Delivery Method: Continuous; Continuous Initial Rate (mL/hr): 55; Continuous Advance Tube Feeding: No; Water Flush Volume (mL): 150;  Water Flush Frequency: Q 4 hours  Passing flatus: YES  Tolerating current diet: YES     Pain Management:   Patient states pain is manageable on current regimen: YES     Skin:  Ghulam Score: 15  Interventions: float heels, increase time out of bed, and PT/OT consult    Patient Safety:  Fall Score: Total Score: 4  Interventions: bed/chair alarm, gripper socks, pt to call before getting OOB, and stay with me (per policy)  High Fall Risk: Yes  @Rollbelt  @dexterity to release roll belt  Yes pt able to undo when wanted     DVT prophylaxis:  DVT prophylaxis Med- Yes  DVT prophylaxis SCD or ISAI- No      Wounds: (If Applicable)  Wounds- No  Location      Active Consults:  IP CONSULT TO HOSPITALIST  IP CONSULT TO NEUROLOGY  IP CONSULT TO GASTROENTEROLOGY  IP CONSULT TO INFECTIOUS DISEASES  IP CONSULT TO NEUROLOGY  IP CONSULT TO NEPHROLOGY  IP CONSULT TO UROLOGY  IP CONSULT TO PALLIATIVE CARE - PROVIDER     Length of Stay:  Expected LOS: 4d 9h  Actual LOS: 29  Discharge Plan:  Yes            Abdias Duckworth RN

## 2022-08-25 NOTE — HOSPICE
Patient recieved in home with spouse. Patient adminstered with morphine prior to wound care. Discussed with family to adminster medicaitons prior to movement and to limit movement with patient as it causes pain. Ativan, hyscyamine and mrphine ordered from [phone]. Family to call hospice with needs or concerns.

## 2022-08-26 NOTE — HOSPICE
Patient recieved in home with spouse, son, daughter in law and sister. Patient unresponsive. Hyoscyamine adminstered during visit and morphine given prior to visit. Wound care to buttocks performed. No med refills needed.  Dicussed EOL with family present, instructed family to call hospice 24.7 with needs or concerns

## 2022-08-27 ENCOUNTER — HOME CARE VISIT (OUTPATIENT)
Dept: HOSPICE | Facility: HOSPICE | Age: 77
End: 2022-08-27
Payer: MEDICARE

## 2022-08-27 NOTE — HOSPICE
Following one minute without respirations or pulse, this writer pronounced this patient at 26. Family members present and grieving appropriately. DME scheduled for , post mortem care provided, catheter removed. Medications wasted by this writer observed by patient's wife. [facility] in [facility] to remove remains. Writer offer  and SW, family refused. Son expressed gratitude for hospice services.

## 2022-08-27 NOTE — HOSPICE
LMSW arrived at the patient's home to complete an Initial Psychosocial Assessment visit for [facility]. The LMSW was greeted at the front door by the patient's wife Katerin Leblanc and their son Maureen Gallagher was present. The home was clean and clutter free. The patient is a 67 y/o  male with a [facility] Dx. Cerebrovascular accident (CVA), unspecified mechanism. The patient was sleeping comfortably upon arrival and Katerin Leblanc was the POC for this assessment. Katerin Leblanc reported that the patient is comatose and at the EOL. Patient last nutrients was on 8/22/22. The patient was in the [employer] during the [place] for four years. Patient worked at the [employer] for a while and then retired from [employer] after 34 years of service. Patient has been  to Katerin Leblanc for 46 years and has two sons Rach Gardner who lives next door and Maureen Gallagher who lives in locally. Patient has a brother Kvng Montano who lives next door and a sister Leanne Osman who lives next door and provides support. Patient belongs to the [facility] and receive good support.  LMSW provided community resources, [place] benefits,  emotional support, and information on the [employer], and the [employer] home repair program.

## 2022-09-28 ENCOUNTER — HOME CARE VISIT (OUTPATIENT)
Dept: HOSPICE | Facility: HOSPICE | Age: 77
End: 2022-09-28
Payer: MEDICARE

## 2023-01-11 NOTE — TELEPHONE ENCOUNTER
LV Mo 8/27/20 LV Gio 6/8/21 NV Caitlin 6/22/22 will you refill this request thanks no known allergies

## (undated) DEVICE — CATHETER IV 20GA L1.25IN FEP STR HUB TEF INTROCAN SFTY

## (undated) DEVICE — THE MONARCH® "D" CARTRIDGE IS A SINGLE-USE POLYPROPYLENE CARTRIDGE FOR POSTERIOR CHAMBER IOL DELIVERY: Brand: MONARCH® III

## (undated) DEVICE — INTRODUCER SHTH 8FR L63CM 8FR DIL GWIRE L145CM DIA0.038IN

## (undated) DEVICE — COTTON TIPPED APPLICATORS: Brand: DEROYAL

## (undated) DEVICE — 1200 GUARD II KIT W/5MM TUBE W/O VAC TUBE: Brand: GUARDIAN

## (undated) DEVICE — SEALANT SURG CORNEA PROPHYLACTIC STRL RESURE

## (undated) DEVICE — Device

## (undated) DEVICE — APPLICATOR,COTTON-TIP,WOOD,3,STRL: Brand: MEDLINE

## (undated) DEVICE — NDL FLTR TIP 5 MIC 18GX1.5IN --

## (undated) DEVICE — TOWEL SURG W17XL27IN STD BLU COT NONFENESTRATED PREWASHED

## (undated) DEVICE — CABLE RMFG SUPREME BPTPLR/QPLR --

## (undated) DEVICE — CATH IV AUTOGRD BC PNK 20GA 25 -- INSYTE

## (undated) DEVICE — ELECTRODE,RADIOTRANSLUCENT,FOAM,5PK: Brand: MEDLINE

## (undated) DEVICE — SYR 3ML LL TIP 1/10ML GRAD --

## (undated) DEVICE — 3M™ TEGADERM™ TRANSPARENT FILM DRESSING FRAME STYLE, 1624W, 2-3/8 IN X 2-3/4 IN (6 CM X 7 CM), 100/CT 4CT/CASE: Brand: 3M™ TEGADERM™

## (undated) DEVICE — MEDI-TRACE CADENCE ADULT, DEFIBRILLATION ELECTRODE -RTS  (10 PR/PK) - PHILIPS: Brand: MEDI-TRACE CADENCE

## (undated) DEVICE — DRESSING HEMSTAT W3INXL2YD 1 PLY Z FLD QUIKCLOT CONTROL+

## (undated) DEVICE — STERILE LATEX POWDER-FREE SURGICAL GLOVESWITH NITRILE COATING: Brand: PROTEXIS

## (undated) DEVICE — KENDALL DL ECG CABLE AND LEAD WIRE SYSTEM, 3-LEAD, SINGLE PATIENT USE: Brand: KENDALL

## (undated) DEVICE — REM POLYHESIVE ADULT PATIENT RETURN ELECTRODE: Brand: VALLEYLAB

## (undated) DEVICE — YANKAUER,TAPERED BULBOUS TIP,W/O VENT: Brand: MEDLINE

## (undated) DEVICE — HIGH FLOW RATE EXTENSION SET, LUER LOCK ADAPTERS

## (undated) DEVICE — SOLIDIFIER FLD 2OZ 1500CC N DISINF IN BTL DISP SAFESORB

## (undated) DEVICE — PACK PROCEDURE SURG HRT CATH

## (undated) DEVICE — STERILE POLYISOPRENE POWDER-FREE SURGICAL GLOVES: Brand: PROTEXIS

## (undated) DEVICE — HYPODERMIC SAFETY NEEDLE: Brand: MAGELLAN

## (undated) DEVICE — SOLUTION IV STRL H2O 500 ML AQUALITE POUR BTL

## (undated) DEVICE — SYR 5ML 1/5 GRAD LL NSAF LF --

## (undated) DEVICE — PINNACLE INTRODUCER SHEATH: Brand: PINNACLE

## (undated) DEVICE — Z DISCONTINUED PER MEDLINE LINE GAS SAMPLING O2/CO2 LNG AD 13 FT NSL W/ TBNG FILTERLINE

## (undated) DEVICE — CATH RMFG SUP 6F 5MM 120 --

## (undated) DEVICE — CATH NAVISTAR BIDIR FJ 8MM --

## (undated) DEVICE — SYR 10ML LUER LOK 1/5ML GRAD --

## (undated) DEVICE — SET ADMIN 16ML TBNG L100IN 2 Y INJ SITE IV PIGGY BK DISP

## (undated) DEVICE — BLOCK BITE ENDOSCP AD 21 MM W/ DIL BLU LF DISP

## (undated) DEVICE — TOWEL 4 PLY TISS 19X30 SUE WHT

## (undated) DEVICE — NEONATAL-ADULT SPO2 SENSOR: Brand: NELLCOR

## (undated) DEVICE — SOLUTION IV 250ML 0.9% SOD CHL CLR INJ FLX BG CONT PRT CLSR

## (undated) DEVICE — SURGICAL PROCEDURE PACK EYE CUST DR CHNDLR

## (undated) DEVICE — PATCH CARTO 3 EXT REF --

## (undated) DEVICE — SURGICAL PROCEDURE PACK CATRCT CUST

## (undated) DEVICE — BASIN EMSIS 16OZ GRAPHITE PLAS KID SHP MOLD GRAD FOR ORAL

## (undated) DEVICE — BINDER ABD H12IN FOR 30-45IN WAIST UNIV 4 PNL PREM DSGN E

## (undated) DEVICE — TAPE,CLOTH/SILK,CURAD,3"X10YD,LF,40/CS: Brand: CURAD

## (undated) DEVICE — CABLE CATH L10FT RED PIN CONN 25-34 FOR NAVISTAR CARTO 3

## (undated) DEVICE — DRESSING,STRATASORB,COMP,ISLAND,6"X7.5": Brand: MEDLINE

## (undated) DEVICE — DRESSING HEMOSTATIC SFT INTVENT W/O SLT DBL WRP QUIKCLOT LF